# Patient Record
Sex: MALE | Race: BLACK OR AFRICAN AMERICAN | NOT HISPANIC OR LATINO | URBAN - METROPOLITAN AREA
[De-identification: names, ages, dates, MRNs, and addresses within clinical notes are randomized per-mention and may not be internally consistent; named-entity substitution may affect disease eponyms.]

---

## 2019-03-17 ENCOUNTER — HOSPITAL ENCOUNTER (EMERGENCY)
Facility: HOSPITAL | Age: 54
Discharge: HOME | End: 2019-03-17
Attending: EMERGENCY MEDICINE

## 2019-03-17 VITALS
HEIGHT: 71 IN | RESPIRATION RATE: 17 BRPM | WEIGHT: 172.63 LBS | BODY MASS INDEX: 24.17 KG/M2 | SYSTOLIC BLOOD PRESSURE: 172 MMHG | OXYGEN SATURATION: 100 % | HEART RATE: 68 BPM | TEMPERATURE: 98 F | DIASTOLIC BLOOD PRESSURE: 107 MMHG

## 2019-03-17 DIAGNOSIS — I10 HYPERTENSION: ICD-10-CM

## 2019-03-17 LAB
ALBUMIN SERPL BCP-MCNC: 4.3 G/DL
ALP SERPL-CCNC: 104 U/L
ALT SERPL W/O P-5'-P-CCNC: 55 U/L
ANION GAP SERPL CALC-SCNC: 11 MMOL/L
AST SERPL-CCNC: 62 U/L
BACTERIA #/AREA URNS HPF: ABNORMAL /HPF
BASOPHILS # BLD AUTO: 0.01 K/UL
BASOPHILS NFR BLD: 0.2 %
BILIRUB SERPL-MCNC: 0.7 MG/DL
BILIRUB UR QL STRIP: NEGATIVE
BNP SERPL-MCNC: 144 PG/ML
BUN SERPL-MCNC: 15 MG/DL
CALCIUM SERPL-MCNC: 9.7 MG/DL
CHLORIDE SERPL-SCNC: 98 MMOL/L
CLARITY UR: CLEAR
CO2 SERPL-SCNC: 26 MMOL/L
COLOR UR: YELLOW
CREAT SERPL-MCNC: 1.2 MG/DL
DIFFERENTIAL METHOD: ABNORMAL
EOSINOPHIL # BLD AUTO: 0.6 K/UL
EOSINOPHIL NFR BLD: 11.3 %
ERYTHROCYTE [DISTWIDTH] IN BLOOD BY AUTOMATED COUNT: 14.7 %
EST. GFR  (AFRICAN AMERICAN): >60 ML/MIN/1.73 M^2
EST. GFR  (NON AFRICAN AMERICAN): >60 ML/MIN/1.73 M^2
GLUCOSE SERPL-MCNC: 77 MG/DL
GLUCOSE UR QL STRIP: NEGATIVE
HCT VFR BLD AUTO: 41.3 %
HGB BLD-MCNC: 14 G/DL
HGB UR QL STRIP: NEGATIVE
HYALINE CASTS #/AREA URNS LPF: 0 /LPF
KETONES UR QL STRIP: NEGATIVE
LEUKOCYTE ESTERASE UR QL STRIP: NEGATIVE
LYMPHOCYTES # BLD AUTO: 1.2 K/UL
LYMPHOCYTES NFR BLD: 24.8 %
MCH RBC QN AUTO: 30.2 PG
MCHC RBC AUTO-ENTMCNC: 33.9 G/DL
MCV RBC AUTO: 89 FL
MICROSCOPIC COMMENT: ABNORMAL
MONOCYTES # BLD AUTO: 0.8 K/UL
MONOCYTES NFR BLD: 16 %
NEUTROPHILS # BLD AUTO: 2.3 K/UL
NEUTROPHILS NFR BLD: 47.7 %
NITRITE UR QL STRIP: NEGATIVE
NON-SQ EPI CELLS #/AREA URNS HPF: 3 /HPF
PH UR STRIP: 7 [PH] (ref 5–8)
PLATELET # BLD AUTO: 200 K/UL
PMV BLD AUTO: 10.4 FL
POTASSIUM SERPL-SCNC: 4.3 MMOL/L
PROT SERPL-MCNC: 7.9 G/DL
PROT UR QL STRIP: ABNORMAL
RBC # BLD AUTO: 4.64 M/UL
RBC #/AREA URNS HPF: 2 /HPF (ref 0–4)
SODIUM SERPL-SCNC: 135 MMOL/L
SP GR UR STRIP: 1.02 (ref 1–1.03)
SQUAMOUS #/AREA URNS HPF: 0 /HPF
TROPONIN I SERPL DL<=0.01 NG/ML-MCNC: <0.006 NG/ML
URN SPEC COLLECT METH UR: ABNORMAL
UROBILINOGEN UR STRIP-ACNC: 1 EU/DL
WBC # BLD AUTO: 4.87 K/UL
WBC #/AREA URNS HPF: 1 /HPF (ref 0–5)

## 2019-03-17 PROCEDURE — 81000 URINALYSIS NONAUTO W/SCOPE: CPT

## 2019-03-17 PROCEDURE — 93005 ELECTROCARDIOGRAM TRACING: CPT

## 2019-03-17 PROCEDURE — 25000003 PHARM REV CODE 250: Performed by: EMERGENCY MEDICINE

## 2019-03-17 PROCEDURE — 84484 ASSAY OF TROPONIN QUANT: CPT

## 2019-03-17 PROCEDURE — 96374 THER/PROPH/DIAG INJ IV PUSH: CPT

## 2019-03-17 PROCEDURE — 85025 COMPLETE CBC W/AUTO DIFF WBC: CPT

## 2019-03-17 PROCEDURE — 83880 ASSAY OF NATRIURETIC PEPTIDE: CPT

## 2019-03-17 PROCEDURE — 99285 EMERGENCY DEPT VISIT HI MDM: CPT | Mod: 25

## 2019-03-17 PROCEDURE — 93010 ELECTROCARDIOGRAM REPORT: CPT | Mod: ,,, | Performed by: INTERNAL MEDICINE

## 2019-03-17 PROCEDURE — 93010 EKG 12-LEAD: ICD-10-PCS | Mod: ,,, | Performed by: INTERNAL MEDICINE

## 2019-03-17 PROCEDURE — 80053 COMPREHEN METABOLIC PANEL: CPT

## 2019-03-17 PROCEDURE — 63600175 PHARM REV CODE 636 W HCPCS: Performed by: EMERGENCY MEDICINE

## 2019-03-17 RX ORDER — HYDRALAZINE HYDROCHLORIDE 20 MG/ML
10 INJECTION INTRAMUSCULAR; INTRAVENOUS
Status: COMPLETED | OUTPATIENT
Start: 2019-03-17 | End: 2019-03-17

## 2019-03-17 RX ORDER — LISINOPRIL AND HYDROCHLOROTHIAZIDE 10; 12.5 MG/1; MG/1
1 TABLET ORAL DAILY
Qty: 30 TABLET | Refills: 3 | Status: SHIPPED | OUTPATIENT
Start: 2019-03-17 | End: 2020-01-29

## 2019-03-17 RX ADMIN — CLONIDINE HYDROCHLORIDE 0.3 MG: 0.2 TABLET ORAL at 12:03

## 2019-03-17 RX ADMIN — HYDRALAZINE HYDROCHLORIDE 10 MG: 20 INJECTION, SOLUTION INTRAMUSCULAR; INTRAVENOUS at 01:03

## 2019-03-17 NOTE — ED NOTES
APPEARANCE: Alert, oriented and in no acute distress.  CARDIAC: Normal rate and rhythm, no murmur heard.   PERIPHERAL VASCULAR: peripheral pulses present. Normal cap refill. No edema. Warm to touch.    RESPIRATORY:Normal rate and effort, breath sounds clear bilaterally throughout chest. Respirations are equal and unlabored no obvious signs of distress.  GASTRO: soft, bowel sounds normal, no tenderness, no abdominal distention.  MUSC: Full ROM. No bony tenderness or soft tissue tenderness. No obvious deformity.  SKIN: Skin is warm and dry, normal skin turgor, mucous membranes moist.  NEURO: 5/5 strength major flexors/extensors bilaterally. Sensory intact to light touch bilaterally. [place] coma scale: eyes open spontaneously-4, oriented & converses-5, obeys commands-6. No neurological abnormalities.   MENTAL STATUS: awake, alert and aware of environment.  EYE: PERRL, both eyes: pupils brisk and reactive to light. Normal size.

## 2019-03-17 NOTE — ED NOTES
Right sided nose bleed intermittently x 1 week.  Reports hx of HTN but ran out of medications 2-3 years ago but does not have PCP.  Reports HA last pm that resolved after taking advil and going to bed.  Denies weakness, dizziness, blurred vision

## 2019-03-17 NOTE — ED PROVIDER NOTES
Encounter Date: 3/17/2019    SCRIBE #1 NOTE: I, Gavi Lee, am scribing for, and in the presence of,  Dr. Arciniega. I have scribed the entire note.       History     Chief Complaint   Patient presents with    Epistaxis     intermitent x 1 week, states has hx of HTN but cannot afford meds, and doesnot have primary care MD, + HA      Time seen by provider: 12:02 PM    This is a 53 y.o. male with a PMHx of HTN who presents to the Emergency Department with a cc of intermittent nosebleeds x 1 week. He denies fever, chest pain, or nausea. He reports no worsening or alleviating factors. Patient reports no prior history of similar symptoms. He also reports elevated BP and states he hasn't taken any BP medications in 4 years because he couldn't afford them.        The history is provided by the patient.     Review of patient's allergies indicates:  No Known Allergies  Past Medical History:   Diagnosis Date    Hypertension      Past Surgical History:   Procedure Laterality Date    KIDNEY STONE SURGERY       History reviewed. No pertinent family history.  Social History     Tobacco Use    Smoking status: Current Every Day Smoker     Packs/day: 0.50     Years: 30.00     Pack years: 15.00     Types: Cigarettes    Smokeless tobacco: Never Used   Substance Use Topics    Alcohol use: Yes    Drug use: No     Review of Systems   Constitutional: Negative for fever.   HENT: Positive for nosebleeds. Negative for sore throat.    Respiratory: Negative for cough and shortness of breath.    Cardiovascular: Negative for chest pain.   Gastrointestinal: Negative for abdominal pain, diarrhea, nausea and vomiting.   Genitourinary: Negative for dysuria and flank pain.   Musculoskeletal: Negative for back pain.   Skin: Negative for rash.   Neurological: Negative for weakness.   Hematological: Does not bruise/bleed easily.   Psychiatric/Behavioral: Negative.  Negative for confusion and hallucinations.   All other systems reviewed and are  negative.      Physical Exam     Initial Vitals [03/17/19 1110]   BP Pulse Resp Temp SpO2   (!) 268/151 99 20 98.1 °F (36.7 °C) 99 %      MAP       --         Physical Exam    Nursing note and vitals reviewed.  Constitutional: He appears well-developed and well-nourished.   HENT:   Head: Normocephalic and atraumatic.   Eyes: EOM are normal. Pupils are equal, round, and reactive to light.   Neck: Normal range of motion. Neck supple.   Cardiovascular: Normal rate, regular rhythm, normal heart sounds and intact distal pulses.   Pulmonary/Chest: Breath sounds normal.   Abdominal: Soft. Bowel sounds are normal. He exhibits no distension. There is no tenderness. There is no rebound and no guarding.   Musculoskeletal: Normal range of motion. He exhibits no edema.   Neurological: He is alert and oriented to person, place, and time.   Skin: Skin is warm and dry.   Psychiatric: He has a normal mood and affect. His behavior is normal. Judgment and thought content normal.         ED Course   Procedures  Labs Reviewed   CBC W/ AUTO DIFFERENTIAL - Abnormal; Notable for the following components:       Result Value    RDW 14.7 (*)     Eos # 0.6 (*)     Mono% 16.0 (*)     Eosinophil% 11.3 (*)     All other components within normal limits   COMPREHENSIVE METABOLIC PANEL - Abnormal; Notable for the following components:    Sodium 135 (*)     AST 62 (*)     ALT 55 (*)     All other components within normal limits   TROPONIN I   URINALYSIS, REFLEX TO URINE CULTURE   B-TYPE NATRIURETIC PEPTIDE   URINALYSIS MICROSCOPIC     EKG Readings: (Independently Interpreted)   1211: Rate 70 bpm. LVH with nonspecific ST-T wave abnormalities.       Imaging Results    None          Medical Decision Making:   Clinical Tests:   Lab Tests: Ordered and Reviewed  Medical Tests: Ordered and Reviewed                   ED Course as of Mar 17 1500   Sun Mar 17, 2019   1337 Blood pressure is essentially unchanged after clonidine 0.3 mg p.o..  I will order  hydralazine at this time.  [ST]   1458 Blood pressure is now 172/107.  The patient remains asymptomatic.  He will be discharged at this time his labs are normal.  The patient will be started on lisinopril hydrochlorothiazide and will be given multiple resources for a primary care doctor  [ST]      ED Course User Index  [ST] Fanny Arciniega MD     Clinical Impression:     1. Hypertension               I, Fanny Arciniega, personally performed the services described in this documentation. All medical record entries made by the scribe were at my direction and in my presence.  I have reviewed the chart and agree that the record reflects my personal performance and is accurate and complete. Fanny Arciniega M.D. 2:58 PM03/17/2019                   Fanny Arciniega MD  03/17/19 1500

## 2019-11-24 ENCOUNTER — HOSPITAL ENCOUNTER (EMERGENCY)
Facility: HOSPITAL | Age: 54
Discharge: HOME | End: 2019-11-24
Attending: EMERGENCY MEDICINE

## 2019-11-24 VITALS
SYSTOLIC BLOOD PRESSURE: 184 MMHG | TEMPERATURE: 98 F | DIASTOLIC BLOOD PRESSURE: 100 MMHG | RESPIRATION RATE: 19 BRPM | HEIGHT: 73 IN | BODY MASS INDEX: 22.77 KG/M2 | OXYGEN SATURATION: 99 % | HEART RATE: 87 BPM

## 2019-11-24 DIAGNOSIS — F10.10 ETOH ABUSE: ICD-10-CM

## 2019-11-24 DIAGNOSIS — R55 SYNCOPE, UNSPECIFIED SYNCOPE TYPE: Primary | ICD-10-CM

## 2019-11-24 DIAGNOSIS — I10 HYPERTENSION: ICD-10-CM

## 2019-11-24 LAB
ALBUMIN SERPL BCP-MCNC: 4.2 G/DL (ref 3.5–5.2)
ALP SERPL-CCNC: 100 U/L (ref 55–135)
ALT SERPL W/O P-5'-P-CCNC: 26 U/L (ref 10–44)
ANION GAP SERPL CALC-SCNC: 13 MMOL/L (ref 8–16)
AST SERPL-CCNC: 35 U/L (ref 10–40)
BASOPHILS # BLD AUTO: 0.01 K/UL (ref 0–0.2)
BASOPHILS NFR BLD: 0.2 % (ref 0–1.9)
BILIRUB SERPL-MCNC: 0.2 MG/DL (ref 0.1–1)
BILIRUB UR QL STRIP: NEGATIVE
BUN SERPL-MCNC: 15 MG/DL (ref 6–20)
CALCIUM SERPL-MCNC: 9.7 MG/DL (ref 8.7–10.5)
CHLORIDE SERPL-SCNC: 104 MMOL/L (ref 95–110)
CLARITY UR: CLEAR
CO2 SERPL-SCNC: 26 MMOL/L (ref 23–29)
COLOR UR: YELLOW
CREAT SERPL-MCNC: 1.4 MG/DL (ref 0.5–1.4)
DIFFERENTIAL METHOD: ABNORMAL
EOSINOPHIL # BLD AUTO: 0.3 K/UL (ref 0–0.5)
EOSINOPHIL NFR BLD: 8 % (ref 0–8)
ERYTHROCYTE [DISTWIDTH] IN BLOOD BY AUTOMATED COUNT: 14.8 % (ref 11.5–14.5)
EST. GFR  (AFRICAN AMERICAN): >60 ML/MIN/1.73 M^2
EST. GFR  (NON AFRICAN AMERICAN): 56 ML/MIN/1.73 M^2
ETHANOL SERPL-MCNC: 182 MG/DL
GLUCOSE SERPL-MCNC: 74 MG/DL (ref 70–110)
GLUCOSE UR QL STRIP: NEGATIVE
HCT VFR BLD AUTO: 42 % (ref 40–54)
HGB BLD-MCNC: 14.1 G/DL (ref 14–18)
HGB UR QL STRIP: NEGATIVE
KETONES UR QL STRIP: NEGATIVE
LEUKOCYTE ESTERASE UR QL STRIP: NEGATIVE
LYMPHOCYTES # BLD AUTO: 1.8 K/UL (ref 1–4.8)
LYMPHOCYTES NFR BLD: 44.5 % (ref 18–48)
MCH RBC QN AUTO: 30.5 PG (ref 27–31)
MCHC RBC AUTO-ENTMCNC: 33.6 G/DL (ref 32–36)
MCV RBC AUTO: 91 FL (ref 82–98)
MONOCYTES # BLD AUTO: 0.4 K/UL (ref 0.3–1)
MONOCYTES NFR BLD: 10.2 % (ref 4–15)
NEUTROPHILS # BLD AUTO: 1.5 K/UL (ref 1.8–7.7)
NEUTROPHILS NFR BLD: 37.1 % (ref 38–73)
NITRITE UR QL STRIP: NEGATIVE
PH UR STRIP: 6 [PH] (ref 5–8)
PLATELET # BLD AUTO: 247 K/UL (ref 150–350)
PMV BLD AUTO: 9.6 FL (ref 9.2–12.9)
POTASSIUM SERPL-SCNC: 3.9 MMOL/L (ref 3.5–5.1)
PROT SERPL-MCNC: 8.6 G/DL (ref 6–8.4)
PROT UR QL STRIP: NEGATIVE
RBC # BLD AUTO: 4.62 M/UL (ref 4.6–6.2)
SODIUM SERPL-SCNC: 143 MMOL/L (ref 136–145)
SP GR UR STRIP: 1.02 (ref 1–1.03)
TROPONIN I SERPL DL<=0.01 NG/ML-MCNC: 0.02 NG/ML (ref 0–0.03)
URN SPEC COLLECT METH UR: NORMAL
UROBILINOGEN UR STRIP-ACNC: NEGATIVE EU/DL
WBC # BLD AUTO: 4.11 K/UL (ref 3.9–12.7)

## 2019-11-24 PROCEDURE — 96361 HYDRATE IV INFUSION ADD-ON: CPT

## 2019-11-24 PROCEDURE — 63600175 PHARM REV CODE 636 W HCPCS: Performed by: PHYSICIAN ASSISTANT

## 2019-11-24 PROCEDURE — 85025 COMPLETE CBC W/AUTO DIFF WBC: CPT

## 2019-11-24 PROCEDURE — 80320 DRUG SCREEN QUANTALCOHOLS: CPT

## 2019-11-24 PROCEDURE — 25000003 PHARM REV CODE 250: Performed by: PHYSICIAN ASSISTANT

## 2019-11-24 PROCEDURE — 25000003 PHARM REV CODE 250: Performed by: EMERGENCY MEDICINE

## 2019-11-24 PROCEDURE — 93010 EKG 12-LEAD: ICD-10-PCS | Mod: ,,, | Performed by: STUDENT IN AN ORGANIZED HEALTH CARE EDUCATION/TRAINING PROGRAM

## 2019-11-24 PROCEDURE — 81003 URINALYSIS AUTO W/O SCOPE: CPT

## 2019-11-24 PROCEDURE — 84484 ASSAY OF TROPONIN QUANT: CPT

## 2019-11-24 PROCEDURE — 80053 COMPREHEN METABOLIC PANEL: CPT

## 2019-11-24 PROCEDURE — 99285 EMERGENCY DEPT VISIT HI MDM: CPT | Mod: 25

## 2019-11-24 PROCEDURE — 96374 THER/PROPH/DIAG INJ IV PUSH: CPT

## 2019-11-24 PROCEDURE — 93010 ELECTROCARDIOGRAM REPORT: CPT | Mod: ,,, | Performed by: STUDENT IN AN ORGANIZED HEALTH CARE EDUCATION/TRAINING PROGRAM

## 2019-11-24 PROCEDURE — 96376 TX/PRO/DX INJ SAME DRUG ADON: CPT

## 2019-11-24 PROCEDURE — 93005 ELECTROCARDIOGRAM TRACING: CPT

## 2019-11-24 RX ORDER — HYDROCHLOROTHIAZIDE 12.5 MG/1
12.5 TABLET ORAL DAILY
Status: DISCONTINUED | OUTPATIENT
Start: 2019-11-25 | End: 2019-11-24

## 2019-11-24 RX ORDER — HYDRALAZINE HYDROCHLORIDE 20 MG/ML
10 INJECTION INTRAMUSCULAR; INTRAVENOUS
Status: COMPLETED | OUTPATIENT
Start: 2019-11-24 | End: 2019-11-24

## 2019-11-24 RX ORDER — HYDROCHLOROTHIAZIDE 12.5 MG/1
12.5 TABLET ORAL ONCE
Status: COMPLETED | OUTPATIENT
Start: 2019-11-24 | End: 2019-11-24

## 2019-11-24 RX ORDER — LISINOPRIL 10 MG/1
10 TABLET ORAL
Status: COMPLETED | OUTPATIENT
Start: 2019-11-24 | End: 2019-11-24

## 2019-11-24 RX ADMIN — LISINOPRIL 10 MG: 10 TABLET ORAL at 04:11

## 2019-11-24 RX ADMIN — SODIUM CHLORIDE 1000 ML: 0.9 INJECTION, SOLUTION INTRAVENOUS at 03:11

## 2019-11-24 RX ADMIN — HYDRALAZINE HYDROCHLORIDE 10 MG: 20 INJECTION INTRAMUSCULAR; INTRAVENOUS at 06:11

## 2019-11-24 RX ADMIN — HYDROCHLOROTHIAZIDE 12.5 MG: 12.5 TABLET ORAL at 04:11

## 2019-11-24 RX ADMIN — HYDRALAZINE HYDROCHLORIDE 10 MG: 20 INJECTION INTRAMUSCULAR; INTRAVENOUS at 05:11

## 2019-11-24 NOTE — ED PROVIDER NOTES
Encounter Date: 11/24/2019    SCRIBE #1 NOTE: I, Michelle Ahumada, am scribing for, and in the presence of,  Jazmyn Gilliland PA-C. I have scribed the entire note.       History     Chief Complaint   Patient presents with    Loss of Consciousness     pt had a syncopal epsiode vs possible seizure just pta. pt was sitting in a chair when he suddenly became unresponsive, diaphoretic, and incontinent of urine. on arrival pt was only able to squeeze the paramedics hand and is now bakc to normal. pt is AAOx4. denies complaints      Sarbjit Brewer Sr. is a 54 y.o. male who  has a past medical history of Hypertension.    The patient presents to the ED via EMS due to loss of conscious prior to arrival. Patient says he drank less than half a gallon of vodka at home and fell asleep on his cough. EMS personnel reports that patient was not easily aroused and had urinary incontinence. Patient is now awake, alert, and oriented x 4. Patient says he feels fine and doesn't know why he was brought to the ED. He denies abdominal pain, chest pain, back pain, head injury, shortness of breath, or any other complaints at this time. He denies any history of seizures.    The history is provided by the patient.     Review of patient's allergies indicates:  No Known Allergies  Past Medical History:   Diagnosis Date    Hypertension      Past Surgical History:   Procedure Laterality Date    KIDNEY STONE SURGERY       No family history on file.  Social History     Tobacco Use    Smoking status: Current Every Day Smoker     Packs/day: 0.50     Years: 30.00     Pack years: 15.00     Types: Cigarettes    Smokeless tobacco: Never Used   Substance Use Topics    Alcohol use: Yes    Drug use: No     Review of Systems   Constitutional: Negative for chills and fever.   HENT: Negative for congestion, ear pain, rhinorrhea and sore throat.    Respiratory: Negative for cough, shortness of breath and wheezing.    Cardiovascular: Negative for chest pain and  palpitations.   Gastrointestinal: Negative for abdominal pain, diarrhea, nausea and vomiting.   Genitourinary: Negative for dysuria and hematuria.   Musculoskeletal: Negative for back pain, myalgias and neck pain.   Skin: Negative for rash.   Neurological: Negative for dizziness, weakness, light-headedness and headaches.   Psychiatric/Behavioral: Negative for confusion.   All other systems reviewed and are negative.      Physical Exam     Initial Vitals [11/24/19 1410]   BP Pulse Resp Temp SpO2   (!) 169/90 72 18 97.3 °F (36.3 °C) 98 %      MAP       --         Physical Exam    Nursing note and vitals reviewed.  Constitutional: He appears well-developed and well-nourished. No distress.   HENT:   Head: Normocephalic and atraumatic.   Mouth/Throat: Oropharynx is clear and moist.   Eyes: Conjunctivae and EOM are normal. Pupils are equal, round, and reactive to light.   Neck: Normal range of motion. Neck supple. No stridor present. No tracheal deviation present.   Cardiovascular: Normal rate, regular rhythm and normal heart sounds.   No murmur heard.  Pulmonary/Chest: Breath sounds normal. No respiratory distress.   Abdominal: Soft. Bowel sounds are normal. There is no tenderness. There is no rebound and no guarding.   Musculoskeletal: Normal range of motion. He exhibits no edema or tenderness.   Neurological: He is alert and oriented to person, place, and time. No sensory deficit. GCS score is 15. GCS eye subscore is 4. GCS verbal subscore is 5. GCS motor subscore is 6.   Skin: Skin is warm and dry. Capillary refill takes less than 2 seconds.   Psychiatric: He has a normal mood and affect. His behavior is normal.         ED Course   Procedures  Labs Reviewed   CBC W/ AUTO DIFFERENTIAL - Abnormal; Notable for the following components:       Result Value    RDW 14.8 (*)     Gran # (ANC) 1.5 (*)     Gran% 37.1 (*)     All other components within normal limits   ALCOHOL,MEDICAL (ETHANOL) - Abnormal; Notable for the  following components:    Alcohol, Medical, Serum 182 (*)     All other components within normal limits   COMPREHENSIVE METABOLIC PANEL - Abnormal; Notable for the following components:    Total Protein 8.6 (*)     eGFR if non  56 (*)     All other components within normal limits   URINALYSIS, REFLEX TO URINE CULTURE    Narrative:     Preferred Collection Type->Urine, Clean Catch   TROPONIN I   TROPONIN I             Medical Decision Making:   Initial Assessment:   Syncope  Differential Diagnosis:   ETHO intoxication, dehydration, electrolyte abnormality   Clinical Tests:   Lab Tests: Ordered and Reviewed  ED Management:  Patient presents to ED for evaluation after syncopal episode.  Upon family arrival witness states he was getting hair cut and had a syncopal episode with loss of urine.  Was initially difficult to arouse per EMS report.  However he is oriented x3 in the ED.  Patient's blood pressure has increased during course in the ER.  States he has not taken his blood pressure medicine over the last 3 days.  Blood pressure medicine was given in the ED with improvement.  He denies any HA, CP, SOB, dizziness.  Two doses of hydralazine were given with improvement of his pressure.  Patient is stable for discharge at this time.  He was encouraged to follow up with [facility] to develop a plan for alcohol cessation.  Instructed to take his blood pressure medicine as prescribed and to return with any new or worsening symptoms. Patient and wife verbalized understanding and agreement plan.                                 Clinical Impression:     1. Syncope, unspecified syncope type    2. Hypertension    3. ETOH abuse             Scribe attestation Jazmyn AGOSTO PA-C, personally performed the services described in this documentation. All medical record entries made by the scribe were at my direction and in my presence.  I have reviewed the chart and agree that the record reflects my personal  performance and is accurate and complete.                 Jazmyn Gilliland PA-C  11/24/19 1949

## 2019-11-24 NOTE — ED TRIAGE NOTES
Pt received by EMS, Pt wife at bedside, stated pt was sitting up in a chair receiving a haircut when she was alerted that he slumped over and began drooling.  Also stated pt urinated on himself.  Pt is alert at present.  Denies hitting head, denies any pain.  Pt denies hx of seizures.  States he has not had his BP meds in a few days.

## 2020-01-29 ENCOUNTER — OFFICE VISIT (OUTPATIENT)
Dept: FAMILY MEDICINE | Facility: HOSPITAL | Age: 55
End: 2020-01-29

## 2020-01-29 VITALS
BODY MASS INDEX: 24.81 KG/M2 | TEMPERATURE: 98 F | DIASTOLIC BLOOD PRESSURE: 92 MMHG | HEIGHT: 73 IN | SYSTOLIC BLOOD PRESSURE: 152 MMHG | WEIGHT: 187.19 LBS | HEART RATE: 83 BPM

## 2020-01-29 DIAGNOSIS — I10 ESSENTIAL HYPERTENSION: Primary | ICD-10-CM

## 2020-01-29 DIAGNOSIS — F19.90 ILLICIT DRUG USE: ICD-10-CM

## 2020-01-29 DIAGNOSIS — Z72.0 TOBACCO ABUSE: ICD-10-CM

## 2020-01-29 PROCEDURE — 99214 OFFICE O/P EST MOD 30 MIN: CPT | Performed by: STUDENT IN AN ORGANIZED HEALTH CARE EDUCATION/TRAINING PROGRAM

## 2020-01-29 RX ORDER — LISINOPRIL AND HYDROCHLOROTHIAZIDE 12.5; 2 MG/1; MG/1
1 TABLET ORAL DAILY
Qty: 90 TABLET | Refills: 1 | Status: SHIPPED | OUTPATIENT
Start: 2020-01-29 | End: 2020-07-21 | Stop reason: ALTCHOICE

## 2020-02-03 PROBLEM — Z72.0 TOBACCO ABUSE: Status: ACTIVE | Noted: 2020-02-03

## 2020-02-03 NOTE — PROGRESS NOTES
Subjective:       Patient ID: Sarbjit Brewer Sr. is a 54 y.o. male.    Chief Complaint: Hypertension management.    HPI   Pt is a 55 yo AAM w/ pmhx of HNT, alcoholism, illicit drug use, nephrolithiasis, presenting for hypertension. Pt states he recently had an ED visit for AMS and bladder incontinence (11/24/19) after consuming 1 gallon of vodka. In ED was found to have BP 170s/90s, was given antihypertensive meds prescription (Lisniopril-hctz) and given referral for our clinic. Pt has no complaints today.  Denies headache, dizziness, tinnitus, chest pain, shortness breath, blurry vision.  He does not take his blood pressure at home.  He has never had a PCP.    Patient states he drinks 1 gal of vodka per day.  In approximately 2-40 oz beers daily.  He has been doing this for about 23 years.  He has a history of crack/cocaine use, has been to rehab 3 x, he quit smoking crack 6 months ago.  Patient also has a 1 pack per day X 40 years smoking history.     Medhx: nephrolithiasis, htn  Meds: lisinopril-hctz  Surghx: lithotripsy  FamHx: mom; breast caner. Grandfather; cancer. Brother; DMII, HTN. Aunt; breast cancer. Uncle; lung cancer  Social: as above, denies IVDU. Works as . Uninsured. No exercise, diet of processed/fast food. Sexually active w/ wife. Hx of incarceration.    Review of Systems   Constitutional: Negative for chills, diaphoresis, fatigue and fever.   HENT: Negative for congestion, rhinorrhea, sore throat and trouble swallowing.    Eyes: Negative for itching and visual disturbance.   Respiratory: Negative for cough, chest tightness, shortness of breath and wheezing.    Cardiovascular: Negative for chest pain and palpitations.   Gastrointestinal: Negative for abdominal pain, constipation, diarrhea, nausea and vomiting.   Endocrine: Negative for polyphagia and polyuria.   Genitourinary: Negative for dysuria and flank pain.   Musculoskeletal: Negative for back pain, joint swelling and neck  stiffness.   Skin: Negative for color change and rash.   Neurological: Negative for dizziness, weakness, light-headedness and headaches.   Psychiatric/Behavioral: Negative for confusion. The patient is not nervous/anxious.        Objective:      Vitals:    01/29/20 1358   BP: (!) 152/92   Pulse: 83   Temp: 98.1 °F (36.7 °C)     Physical Exam   Constitutional: He is oriented to person, place, and time. He appears well-developed and well-nourished. No distress.   HENT:   Head: Normocephalic and atraumatic.   Mouth/Throat: Oropharynx is clear and moist.   Eyes: Pupils are equal, round, and reactive to light. Conjunctivae and EOM are normal.   Neck: Normal range of motion. Neck supple.   Cardiovascular: Normal rate, regular rhythm, normal heart sounds and intact distal pulses.   Pulmonary/Chest: Effort normal and breath sounds normal. No respiratory distress. He has no wheezes.   Abdominal: Soft. Bowel sounds are normal. There is no tenderness.   Musculoskeletal: Normal range of motion. He exhibits no edema.   Neurological: He is alert and oriented to person, place, and time.   Skin: Skin is warm and dry. Capillary refill takes less than 2 seconds. He is not diaphoretic. No erythema.   Psychiatric: He has a normal mood and affect. His behavior is normal.       Assessment:       1. Essential hypertension    2. Alcoholism /alcohol abuse    3. Tobacco abuse    4. Illicit drug use        Plan:       Essential hypertension  -     lisinopril-hydrochlorothiazide (PRINZIDE,ZESTORETIC) 20-12.5 mg per tablet; Take 1 tablet by mouth once daily.  Dispense: 90 tablet; Refill: 1  -     Ambulatory referral to Smoking Cessation Program  -     Lipid panel; Future; Expected date: 01/29/2020  -     Comprehensive metabolic panel; Future; Expected date: 01/29/2020  -     Hepatitis C antibody; Future; Expected date: 01/29/2020  -     HIV 1/2 Ag/Ab (4th Gen); Future; Expected date: 01/29/2020    Alcoholism /alcohol abuse   -Counseled pt to  cut down on alcohol. Given his history, counseled pt to cutdown because of risk of withdrawal. Will readdress at next visit. Possible referral so substance abuse counseling.    Tobacco abuse  -     Ambulatory referral to Smoking Cessation Program    Illicit drug use  -     Hepatitis C antibody; Future; Expected date: 01/29/2020  -     HIV 1/2 Ag/Ab (4th Gen); Future; Expected date: 01/29/2020    Counseled pt to obtain health insurance. Pt states he could not afford labs since he is self pay.    Follow up in about 3 months (around 4/29/2020), or if symptoms worsen or fail to improve, for HTN, alcohlism.      Jesus Tian,  HO-1  [facility]

## 2020-02-05 NOTE — PROGRESS NOTES
I have reviewed the notes, assessments, and/or procedures performed, I concur with her/his documentation of Sarbjit Brewer Sr..

## 2020-02-13 ENCOUNTER — TELEPHONE (OUTPATIENT)
Dept: SMOKING CESSATION | Facility: CLINIC | Age: 55
End: 2020-02-13

## 2020-02-13 NOTE — TELEPHONE ENCOUNTER
Was not able to leave a message due to phone errors. Was calling to reschedule a missed smoking cessation appointment. Will try again.

## 2020-07-21 ENCOUNTER — HOSPITAL ENCOUNTER (EMERGENCY)
Facility: HOSPITAL | Age: 55
Discharge: HOME | End: 2020-07-21
Attending: EMERGENCY MEDICINE
Payer: OTHER GOVERNMENT

## 2020-07-21 VITALS
RESPIRATION RATE: 20 BRPM | SYSTOLIC BLOOD PRESSURE: 191 MMHG | HEART RATE: 72 BPM | BODY MASS INDEX: 24.67 KG/M2 | OXYGEN SATURATION: 100 % | TEMPERATURE: 99 F | DIASTOLIC BLOOD PRESSURE: 115 MMHG | WEIGHT: 187 LBS

## 2020-07-21 DIAGNOSIS — I10 UNCONTROLLED HYPERTENSION: Primary | ICD-10-CM

## 2020-07-21 DIAGNOSIS — I16.0 HYPERTENSIVE URGENCY: ICD-10-CM

## 2020-07-21 DIAGNOSIS — I10 HYPERTENSION: ICD-10-CM

## 2020-07-21 LAB
ALBUMIN SERPL BCP-MCNC: 4.4 G/DL (ref 3.5–5.2)
ALP SERPL-CCNC: 86 U/L (ref 55–135)
ALT SERPL W/O P-5'-P-CCNC: 34 U/L (ref 10–44)
ANION GAP SERPL CALC-SCNC: 11 MMOL/L (ref 8–16)
AST SERPL-CCNC: 37 U/L (ref 10–40)
BACTERIA #/AREA URNS HPF: NORMAL /HPF
BASOPHILS # BLD AUTO: 0.03 K/UL (ref 0–0.2)
BASOPHILS NFR BLD: 0.5 % (ref 0–1.9)
BILIRUB SERPL-MCNC: 0.3 MG/DL (ref 0.1–1)
BILIRUB UR QL STRIP: NEGATIVE
BUN SERPL-MCNC: 15 MG/DL (ref 6–20)
CALCIUM SERPL-MCNC: 9.5 MG/DL (ref 8.7–10.5)
CHLORIDE SERPL-SCNC: 105 MMOL/L (ref 95–110)
CLARITY UR: CLEAR
CO2 SERPL-SCNC: 21 MMOL/L (ref 23–29)
COLOR UR: YELLOW
CREAT SERPL-MCNC: 1.3 MG/DL (ref 0.5–1.4)
DIFFERENTIAL METHOD: NORMAL
EOSINOPHIL # BLD AUTO: 0.3 K/UL (ref 0–0.5)
EOSINOPHIL NFR BLD: 5.5 % (ref 0–8)
ERYTHROCYTE [DISTWIDTH] IN BLOOD BY AUTOMATED COUNT: 14.4 % (ref 11.5–14.5)
EST. GFR  (AFRICAN AMERICAN): >60 ML/MIN/1.73 M^2
EST. GFR  (NON AFRICAN AMERICAN): >60 ML/MIN/1.73 M^2
GLUCOSE SERPL-MCNC: 81 MG/DL (ref 70–110)
GLUCOSE UR QL STRIP: NEGATIVE
HCT VFR BLD AUTO: 41.8 % (ref 40–54)
HGB BLD-MCNC: 14.3 G/DL (ref 14–18)
HGB UR QL STRIP: ABNORMAL
HYALINE CASTS #/AREA URNS LPF: 0 /LPF
IMM GRANULOCYTES # BLD AUTO: 0.02 K/UL (ref 0–0.04)
IMM GRANULOCYTES NFR BLD AUTO: 0.4 % (ref 0–0.5)
KETONES UR QL STRIP: NEGATIVE
LEUKOCYTE ESTERASE UR QL STRIP: NEGATIVE
LYMPHOCYTES # BLD AUTO: 1.8 K/UL (ref 1–4.8)
LYMPHOCYTES NFR BLD: 32 % (ref 18–48)
MCH RBC QN AUTO: 30.4 PG (ref 27–31)
MCHC RBC AUTO-ENTMCNC: 34.2 G/DL (ref 32–36)
MCV RBC AUTO: 89 FL (ref 82–98)
MICROSCOPIC COMMENT: NORMAL
MONOCYTES # BLD AUTO: 0.6 K/UL (ref 0.3–1)
MONOCYTES NFR BLD: 11.5 % (ref 4–15)
NEUTROPHILS # BLD AUTO: 2.7 K/UL (ref 1.8–7.7)
NEUTROPHILS NFR BLD: 50.1 % (ref 38–73)
NITRITE UR QL STRIP: NEGATIVE
NRBC BLD-RTO: 0 /100 WBC
PH UR STRIP: 6 [PH] (ref 5–8)
PLATELET # BLD AUTO: 230 K/UL (ref 150–350)
PMV BLD AUTO: 11.5 FL (ref 9.2–12.9)
POTASSIUM SERPL-SCNC: 3.9 MMOL/L (ref 3.5–5.1)
PROT SERPL-MCNC: 8.3 G/DL (ref 6–8.4)
PROT UR QL STRIP: ABNORMAL
RBC # BLD AUTO: 4.7 M/UL (ref 4.6–6.2)
RBC #/AREA URNS HPF: 0 /HPF (ref 0–4)
SARS-COV-2 RDRP RESP QL NAA+PROBE: NEGATIVE
SODIUM SERPL-SCNC: 137 MMOL/L (ref 136–145)
SP GR UR STRIP: 1.02 (ref 1–1.03)
SQUAMOUS #/AREA URNS HPF: 2 /HPF
TROPONIN I SERPL DL<=0.01 NG/ML-MCNC: <0.006 NG/ML (ref 0–0.03)
URN SPEC COLLECT METH UR: ABNORMAL
UROBILINOGEN UR STRIP-ACNC: NEGATIVE EU/DL
WBC # BLD AUTO: 5.47 K/UL (ref 3.9–12.7)
WBC #/AREA URNS HPF: 0 /HPF (ref 0–5)

## 2020-07-21 PROCEDURE — 25000003 PHARM REV CODE 250: Performed by: PHYSICIAN ASSISTANT

## 2020-07-21 PROCEDURE — 84484 ASSAY OF TROPONIN QUANT: CPT

## 2020-07-21 PROCEDURE — U0002 COVID-19 LAB TEST NON-CDC: HCPCS

## 2020-07-21 PROCEDURE — 96376 TX/PRO/DX INJ SAME DRUG ADON: CPT

## 2020-07-21 PROCEDURE — 80053 COMPREHEN METABOLIC PANEL: CPT

## 2020-07-21 PROCEDURE — 25000003 PHARM REV CODE 250: Performed by: EMERGENCY MEDICINE

## 2020-07-21 PROCEDURE — 63600175 PHARM REV CODE 636 W HCPCS: Performed by: PHYSICIAN ASSISTANT

## 2020-07-21 PROCEDURE — 81000 URINALYSIS NONAUTO W/SCOPE: CPT

## 2020-07-21 PROCEDURE — 96375 TX/PRO/DX INJ NEW DRUG ADDON: CPT

## 2020-07-21 PROCEDURE — 99285 EMERGENCY DEPT VISIT HI MDM: CPT | Mod: 25

## 2020-07-21 PROCEDURE — 85025 COMPLETE CBC W/AUTO DIFF WBC: CPT

## 2020-07-21 PROCEDURE — 96374 THER/PROPH/DIAG INJ IV PUSH: CPT

## 2020-07-21 PROCEDURE — 93005 ELECTROCARDIOGRAM TRACING: CPT

## 2020-07-21 RX ORDER — LABETALOL HYDROCHLORIDE 5 MG/ML
20 INJECTION, SOLUTION INTRAVENOUS
Status: DISCONTINUED | OUTPATIENT
Start: 2020-07-21 | End: 2020-07-21

## 2020-07-21 RX ORDER — CHLORTHALIDONE 25 MG/1
12.5 TABLET ORAL DAILY
Qty: 15 TABLET | Refills: 2 | Status: ON HOLD | OUTPATIENT
Start: 2020-07-21 | End: 2022-01-03 | Stop reason: HOSPADM

## 2020-07-21 RX ORDER — AMLODIPINE BESYLATE 10 MG/1
10 TABLET ORAL DAILY
Qty: 30 TABLET | Refills: 2 | Status: ON HOLD | OUTPATIENT
Start: 2020-07-21 | End: 2022-01-03 | Stop reason: HOSPADM

## 2020-07-21 RX ORDER — HYDROCHLOROTHIAZIDE 25 MG/1
25 TABLET ORAL
Status: COMPLETED | OUTPATIENT
Start: 2020-07-21 | End: 2020-07-21

## 2020-07-21 RX ORDER — CLONIDINE HYDROCHLORIDE 0.1 MG/1
0.1 TABLET ORAL
Status: COMPLETED | OUTPATIENT
Start: 2020-07-21 | End: 2020-07-21

## 2020-07-21 RX ORDER — HYDRALAZINE HYDROCHLORIDE 20 MG/ML
10 INJECTION INTRAMUSCULAR; INTRAVENOUS
Status: COMPLETED | OUTPATIENT
Start: 2020-07-21 | End: 2020-07-21

## 2020-07-21 RX ORDER — LABETALOL HYDROCHLORIDE 5 MG/ML
20 INJECTION, SOLUTION INTRAVENOUS
Status: COMPLETED | OUTPATIENT
Start: 2020-07-21 | End: 2020-07-21

## 2020-07-21 RX ORDER — AMLODIPINE BESYLATE 5 MG/1
10 TABLET ORAL
Status: COMPLETED | OUTPATIENT
Start: 2020-07-21 | End: 2020-07-21

## 2020-07-21 RX ADMIN — AMLODIPINE BESYLATE 10 MG: 5 TABLET ORAL at 12:07

## 2020-07-21 RX ADMIN — HYDRALAZINE HYDROCHLORIDE 10 MG: 20 INJECTION INTRAMUSCULAR; INTRAVENOUS at 08:07

## 2020-07-21 RX ADMIN — CLONIDINE HYDROCHLORIDE 0.1 MG: 0.1 TABLET ORAL at 12:07

## 2020-07-21 RX ADMIN — HYDRALAZINE HYDROCHLORIDE 10 MG: 20 INJECTION INTRAMUSCULAR; INTRAVENOUS at 10:07

## 2020-07-21 RX ADMIN — HYDROCHLOROTHIAZIDE 25 MG: 25 TABLET ORAL at 12:07

## 2020-07-21 RX ADMIN — LABETALOL HYDROCHLORIDE 20 MG: 5 INJECTION, SOLUTION INTRAVENOUS at 11:07

## 2020-07-21 NOTE — ED TRIAGE NOTES
Pt reports he needed to be retested for COVID. When vitals were taken, pt's BP was elevated and needed to be evaluated. PT in no acute distress.

## 2020-07-21 NOTE — DISCHARGE INSTRUCTIONS
Thank you for choosing [facility]! We appreciate you coming to us for your medical care. We hope you feel better soon! Please come back to [facility] for all of your future medical needs.    Our goal in the emergency department is to always give you outstanding care and exceptional service. You may receive a survey by mail or e-mail in the next week regarding your experience in our ED. We would greatly appreciate your completing and returning the survey. Your feedback provides us with a way to recognize our staff who give very good care and it helps us learn how to improve when your experience was below our aspiration of excellence.       Sincerely,    Dylan Cruz MD  Medical Director  Emergency Department  [facility] [place]

## 2020-07-21 NOTE — ED PROVIDER NOTES
Encounter Date: 7/21/2020       History     Chief Complaint   Patient presents with    COVID-19 Concerns     Sarbjit Brewer Sr., a 54 y.o. male  has a past medical history of Hypertension.     He presents to the ED requesting COVID swab, states that he call in to work yesterday and told his work that he was feeling sick however he was not and lied to his job because he wanted the day off.  Because he told his job that he was not feeling well, in order to return he needed to be COVID swabbed.  He denies all symptoms that could be related to coronavirus.  Upon triage he was found to have a blood pressure of 228/130.  He denies chest pain, headache or shortness of breath.  States he did not take his anti-hypertensive medicines for some time.  Does not like taking the medicine because it causes numbness in his feet.        The history is provided by the patient.     Review of patient's allergies indicates:  No Known Allergies  Past Medical History:   Diagnosis Date    Hypertension      Past Surgical History:   Procedure Laterality Date    KIDNEY STONE SURGERY       No family history on file.  Social History     Tobacco Use    Smoking status: Current Every Day Smoker     Packs/day: 0.50     Years: 30.00     Pack years: 15.00     Types: Cigarettes    Smokeless tobacco: Never Used   Substance Use Topics    Alcohol use: Yes    Drug use: No     Review of Systems   Constitutional: Negative for fever.   HENT: Negative for congestion.    Respiratory: Negative for cough and shortness of breath.    Cardiovascular: Negative for chest pain.   Gastrointestinal: Negative for nausea and vomiting.   Skin: Negative for color change and rash.   Allergic/Immunologic: Negative for immunocompromised state.   Neurological: Negative for headaches.   Psychiatric/Behavioral: Negative for agitation.       Physical Exam     Initial Vitals [07/21/20 0826]   BP Pulse Resp Temp SpO2   (!) 228/130 83 20 98.8 °F (37.1 °C) 97 %      MAP       --          Physical Exam    Nursing note and vitals reviewed.  Constitutional: He appears well-developed and well-nourished.   HENT:   Head: Normocephalic and atraumatic.   Right Ear: External ear normal.   Left Ear: External ear normal.   Nose: Nose normal.   Mouth/Throat: Oropharynx is clear and moist.   Eyes: EOM are normal.   Neck: Normal range of motion. Neck supple.   Cardiovascular: Normal rate and regular rhythm.   Pulmonary/Chest: No respiratory distress. He has no wheezes. He has no rhonchi. He has no rales.   Abdominal: Soft. Bowel sounds are normal. He exhibits no distension. There is no abdominal tenderness. There is no rebound and no guarding.   Musculoskeletal: Normal range of motion. No tenderness or edema.   Lymphadenopathy:     He has no cervical adenopathy.   Neurological: He is alert and oriented to person, place, and time. No cranial nerve deficit.   Skin: Skin is warm and dry. No rash and no abscess noted. No erythema.   Psychiatric: He has a normal mood and affect. Thought content normal.         ED Course   Procedures  Labs Reviewed   COMPREHENSIVE METABOLIC PANEL - Abnormal; Notable for the following components:       Result Value    CO2 21 (*)     All other components within normal limits   URINALYSIS, REFLEX TO URINE CULTURE - Abnormal; Notable for the following components:    Protein, UA 1+ (*)     Occult Blood UA Trace (*)     All other components within normal limits    Narrative:     Specimen Source->Urine   CBC W/ AUTO DIFFERENTIAL   TROPONIN I   SARS-COV-2 RNA AMPLIFICATION, QUAL   URINALYSIS MICROSCOPIC    Narrative:     Specimen Source->Urine          Imaging Results          X-Ray Chest AP Portable (Final result)  Result time 07/21/20 09:37:48    Final result by Marco Arcinieag DO (07/21/20 09:37:48)                 Impression:      No acute cardiopulmonary abnormality.      Electronically signed by: Marco Arciniega  Date:    07/21/2020  Time:    09:37             Narrative:     EXAMINATION:  XR CHEST AP PORTABLE    CLINICAL HISTORY:  Essential (primary) hypertension    TECHNIQUE:  Single frontal view of the chest was performed.    COMPARISON:  Multiple prior radiographs of the chest, most recent from 03/17/2019.    FINDINGS:  The lungs are well expanded and clear. No focal opacities are seen. The pleural spaces are clear.  The cardiac silhouette is unremarkable.  The visualized osseous structures are unremarkable.                                 Medical Decision Making:   Initial Assessment:   Requesting COVID test, hypertensive  Differential Diagnosis:   Hypertensive urgency vs emergency, ACS   ED Management:  PT presents to ED requesting COVID test, found to be extremely hypertensive in the ED.  Has no complaints of end-organ damage.  Was given labetalol, hydralazine in the ER with little improvement to his hypertension.  No acute abnormalities on blood work, EKG or chest x-ray.  Patient was discharged with a systolic blood pressure in the 190s.  He was prescriptions for antihypertensive medications and instructed to follow up with PCP for further evaluation.  Instructed to return with any new or worsening symptoms.  Patient verbalized understanding and agreement with plan.                                 Clinical Impression:       ICD-10-CM ICD-9-CM   1. Uncontrolled hypertension  I10 401.9   2. Hypertension  I10 401.9   3. Hypertensive urgency  I16.0 401.9

## 2020-07-22 NOTE — ED NOTES
Patient identifiers verified and correct for [facility]..    LOC: The patient is awake, alert and aware of environment with an appropriate affect, the patient is oriented x 3 and speaking appropriately.  APPEARANCE: Patient resting comfortably and in no acute distress, patient is clean and well groomed, patient's clothing is properly fastened.  SKIN: The skin is warm and dry, color consistent with ethnicity, patient has normal skin turgor and moist mucus membranes, skin intact, no breakdown or bruising noted.  MUSCULOSKELETAL: Patient moving all extremities spontaneously, no obvious swelling or deformities noted.  RESPIRATORY: Airway is open and patent, respirations are spontaneous, patient has a normal effort and rate, no accessory muscle use noted, bilateral breath sounds .  CARDIAC: Patient has a normal rate and regular rhythm, no periphreal edema noted, capillary refill < 3 seconds.  ABDOMEN: Soft and non tender to palpation, no distention noted, normoactive bowel sounds present in all four quadrants.  NEUROLOGIC: PERRL,  eyes open spontaneously, behavior appropriate to situation, follows commands, facial expression symmetrical, bilateral hand grasp equal and even, purposeful motor response noted, normal sensation in all extremities when touched with a finger.

## 2021-08-24 ENCOUNTER — LAB VISIT (OUTPATIENT)
Dept: FAMILY MEDICINE | Facility: CLINIC | Age: 56
End: 2021-08-24
Payer: OTHER GOVERNMENT

## 2021-08-24 DIAGNOSIS — R05.9 COUGH: ICD-10-CM

## 2021-08-24 PROCEDURE — U0003 INFECTIOUS AGENT DETECTION BY NUCLEIC ACID (DNA OR RNA); SEVERE ACUTE RESPIRATORY SYNDROME CORONAVIRUS 2 (SARS-COV-2) (CORONAVIRUS DISEASE [COVID-19]), AMPLIFIED PROBE TECHNIQUE, MAKING USE OF HIGH THROUGHPUT TECHNOLOGIES AS DESCRIBED BY CMS-2020-01-R: HCPCS | Performed by: INTERNAL MEDICINE

## 2021-08-24 PROCEDURE — U0005 INFEC AGEN DETEC AMPLI PROBE: HCPCS | Performed by: INTERNAL MEDICINE

## 2021-08-25 LAB
SARS-COV-2 RNA RESP QL NAA+PROBE: NOT DETECTED
SARS-COV-2- CYCLE NUMBER: NORMAL

## 2021-10-01 PROBLEM — F10.20 ALCOHOL DEPENDENCE: Status: ACTIVE | Noted: 2020-02-03

## 2021-12-30 ENCOUNTER — HOSPITAL ENCOUNTER (INPATIENT)
Facility: HOSPITAL | Age: 56
LOS: 4 days | Discharge: HOME | DRG: 304 | End: 2022-01-03
Attending: EMERGENCY MEDICINE | Admitting: FAMILY MEDICINE
Payer: MEDICAID

## 2021-12-30 DIAGNOSIS — I16.1 HYPERTENSIVE EMERGENCY: ICD-10-CM

## 2021-12-30 DIAGNOSIS — I48.91 A-FIB: ICD-10-CM

## 2021-12-30 DIAGNOSIS — R03.0 ELEVATED BLOOD PRESSURE READING: ICD-10-CM

## 2021-12-30 DIAGNOSIS — I50.9 CHF (CONGESTIVE HEART FAILURE): ICD-10-CM

## 2021-12-30 DIAGNOSIS — R00.0 TACHYCARDIA: ICD-10-CM

## 2021-12-30 DIAGNOSIS — I50.9 ACUTE ON CHRONIC CONGESTIVE HEART FAILURE, UNSPECIFIED HEART FAILURE TYPE: Primary | ICD-10-CM

## 2021-12-30 DIAGNOSIS — R07.9 CHEST PAIN: ICD-10-CM

## 2021-12-30 DIAGNOSIS — R05.9 COUGH: ICD-10-CM

## 2021-12-30 PROBLEM — I16.0 HYPERTENSIVE URGENCY: Status: ACTIVE | Noted: 2021-12-30

## 2021-12-30 PROBLEM — R79.89 ELEVATED TROPONIN: Status: ACTIVE | Noted: 2021-12-30

## 2021-12-30 LAB
ALBUMIN SERPL BCP-MCNC: 3.6 G/DL (ref 3.5–5.2)
ALP SERPL-CCNC: 118 U/L (ref 55–135)
ALT SERPL W/O P-5'-P-CCNC: 39 U/L (ref 10–44)
ANION GAP SERPL CALC-SCNC: 11 MMOL/L (ref 8–16)
AST SERPL-CCNC: 37 U/L (ref 10–40)
AV INDEX (PROSTH): 0.8
AV MEAN GRADIENT: 6 MMHG
AV PEAK GRADIENT: 10 MMHG
AV VALVE AREA: 3.28 CM2
AV VELOCITY RATIO: 0.81
BACTERIA #/AREA URNS HPF: NORMAL /HPF
BASOPHILS # BLD AUTO: 0.02 K/UL (ref 0–0.2)
BASOPHILS NFR BLD: 0.3 % (ref 0–1.9)
BILIRUB SERPL-MCNC: 0.6 MG/DL (ref 0.1–1)
BILIRUB UR QL STRIP: NEGATIVE
BNP SERPL-MCNC: 1636 PG/ML (ref 0–99)
BSA FOR ECHO PROCEDURE: 1.99 M2
BUN SERPL-MCNC: 18 MG/DL (ref 6–20)
CALCIUM SERPL-MCNC: 9.1 MG/DL (ref 8.7–10.5)
CHLORIDE SERPL-SCNC: 106 MMOL/L (ref 95–110)
CLARITY UR: CLEAR
CO2 SERPL-SCNC: 22 MMOL/L (ref 23–29)
COLOR UR: COLORLESS
CREAT SERPL-MCNC: 1.4 MG/DL (ref 0.5–1.4)
CTP QC/QA: YES
CV ECHO LV RWT: 0.58 CM
D DIMER PPP IA.FEU-MCNC: 0.5 MG/L FEU
DIFFERENTIAL METHOD: ABNORMAL
DOP CALC AO PEAK VEL: 1.61 M/S
DOP CALC AO VTI: 24.13 CM
DOP CALC LVOT AREA: 4.1 CM2
DOP CALC LVOT DIAMETER: 2.28 CM
DOP CALC LVOT PEAK VEL: 1.3 M/S
DOP CALC LVOT STROKE VOLUME: 79.04 CM3
DOP CALC MV VTI: 14.43 CM
DOP CALCLVOT PEAK VEL VTI: 19.37 CM
ECHO LV POSTERIOR WALL: 1.52 CM (ref 0.6–1.1)
EJECTION FRACTION: 40 %
EOSINOPHIL # BLD AUTO: 0.1 K/UL (ref 0–0.5)
EOSINOPHIL NFR BLD: 1.3 % (ref 0–8)
ERYTHROCYTE [DISTWIDTH] IN BLOOD BY AUTOMATED COUNT: 14.8 % (ref 11.5–14.5)
EST. GFR  (AFRICAN AMERICAN): >60 ML/MIN/1.73 M^2
EST. GFR  (NON AFRICAN AMERICAN): 55 ML/MIN/1.73 M^2
ESTIMATED AVG GLUCOSE: 114 MG/DL (ref 68–131)
ETHANOL SERPL-MCNC: <10 MG/DL
FRACTIONAL SHORTENING: 19 % (ref 28–44)
GLUCOSE SERPL-MCNC: 100 MG/DL (ref 70–110)
GLUCOSE UR QL STRIP: NEGATIVE
HBA1C MFR BLD: 5.6 % (ref 4–5.6)
HCT VFR BLD AUTO: 42.3 % (ref 40–54)
HGB BLD-MCNC: 14.2 G/DL (ref 14–18)
HGB UR QL STRIP: NEGATIVE
HYALINE CASTS #/AREA URNS LPF: 0 /LPF
IMM GRANULOCYTES # BLD AUTO: 0.02 K/UL (ref 0–0.04)
IMM GRANULOCYTES NFR BLD AUTO: 0.3 % (ref 0–0.5)
INTERVENTRICULAR SEPTUM: 1.66 CM (ref 0.6–1.1)
KETONES UR QL STRIP: NEGATIVE
LA MAJOR: 6.2 CM
LA MINOR: 5.72 CM
LA WIDTH: 4.02 CM
LEFT ATRIUM SIZE: 3.74 CM
LEFT ATRIUM VOLUME INDEX MOD: 32 ML/M2
LEFT ATRIUM VOLUME INDEX: 37.8 ML/M2
LEFT ATRIUM VOLUME MOD: 64.41 CM3
LEFT ATRIUM VOLUME: 76.04 CM3
LEFT INTERNAL DIMENSION IN SYSTOLE: 4.23 CM (ref 2.1–4)
LEFT VENTRICLE DIASTOLIC VOLUME INDEX: 65.67 ML/M2
LEFT VENTRICLE DIASTOLIC VOLUME: 132 ML
LEFT VENTRICLE MASS INDEX: 188 G/M2
LEFT VENTRICLE SYSTOLIC VOLUME INDEX: 39.7 ML/M2
LEFT VENTRICLE SYSTOLIC VOLUME: 79.83 ML
LEFT VENTRICULAR INTERNAL DIMENSION IN DIASTOLE: 5.24 CM (ref 3.5–6)
LEFT VENTRICULAR MASS: 377.55 G
LEUKOCYTE ESTERASE UR QL STRIP: NEGATIVE
LYMPHOCYTES # BLD AUTO: 1.5 K/UL (ref 1–4.8)
LYMPHOCYTES NFR BLD: 23 % (ref 18–48)
MAGNESIUM SERPL-MCNC: 1.9 MG/DL (ref 1.6–2.6)
MCH RBC QN AUTO: 30.1 PG (ref 27–31)
MCHC RBC AUTO-ENTMCNC: 33.6 G/DL (ref 32–36)
MCV RBC AUTO: 90 FL (ref 82–98)
MICROSCOPIC COMMENT: NORMAL
MONOCYTES # BLD AUTO: 0.9 K/UL (ref 0.3–1)
MONOCYTES NFR BLD: 12.8 % (ref 4–15)
MV MEAN GRADIENT: 1 MMHG
MV PEAK GRADIENT: 4 MMHG
MV VALVE AREA BY CONTINUITY EQUATION: 5.48 CM2
NEUTROPHILS # BLD AUTO: 4.2 K/UL (ref 1.8–7.7)
NEUTROPHILS NFR BLD: 62.3 % (ref 38–73)
NITRITE UR QL STRIP: NEGATIVE
NRBC BLD-RTO: 0 /100 WBC
PH UR STRIP: 7 [PH] (ref 5–8)
PISA TR MAX VEL: 2.59 M/S
PLATELET # BLD AUTO: 236 K/UL (ref 150–450)
PMV BLD AUTO: 11.9 FL (ref 9.2–12.9)
POTASSIUM SERPL-SCNC: 4 MMOL/L (ref 3.5–5.1)
PROT SERPL-MCNC: 7.7 G/DL (ref 6–8.4)
PROT UR QL STRIP: ABNORMAL
PV PEAK VELOCITY: 0.99 CM/S
RA MAJOR: 5.46 CM
RA PRESSURE: 3 MMHG
RA WIDTH: 5.22 CM
RBC # BLD AUTO: 4.72 M/UL (ref 4.6–6.2)
RBC #/AREA URNS HPF: 0 /HPF (ref 0–4)
RIGHT VENTRICULAR END-DIASTOLIC DIMENSION: 2.81 CM
RV TISSUE DOPPLER FREE WALL SYSTOLIC VELOCITY 1 (APICAL 4 CHAMBER VIEW): 15.12 CM/S
SARS-COV-2 RDRP RESP QL NAA+PROBE: NEGATIVE
SINUS: 3.8 CM
SODIUM SERPL-SCNC: 139 MMOL/L (ref 136–145)
SP GR UR STRIP: 1.01 (ref 1–1.03)
TR MAX PG: 27 MMHG
TROPONIN I SERPL DL<=0.01 NG/ML-MCNC: 0.03 NG/ML (ref 0–0.03)
TROPONIN I SERPL DL<=0.01 NG/ML-MCNC: 0.04 NG/ML (ref 0–0.03)
TV REST PULMONARY ARTERY PRESSURE: 30 MMHG
URN SPEC COLLECT METH UR: ABNORMAL
UROBILINOGEN UR STRIP-ACNC: NEGATIVE EU/DL
WBC # BLD AUTO: 6.7 K/UL (ref 3.9–12.7)
WBC #/AREA URNS HPF: 0 /HPF (ref 0–5)

## 2021-12-30 PROCEDURE — 85025 COMPLETE CBC W/AUTO DIFF WBC: CPT | Performed by: EMERGENCY MEDICINE

## 2021-12-30 PROCEDURE — U0002 COVID-19 LAB TEST NON-CDC: HCPCS | Performed by: EMERGENCY MEDICINE

## 2021-12-30 PROCEDURE — 36415 COLL VENOUS BLD VENIPUNCTURE: CPT | Performed by: FAMILY MEDICINE

## 2021-12-30 PROCEDURE — 83036 HEMOGLOBIN GLYCOSYLATED A1C: CPT | Performed by: FAMILY MEDICINE

## 2021-12-30 PROCEDURE — 96375 TX/PRO/DX INJ NEW DRUG ADDON: CPT

## 2021-12-30 PROCEDURE — 25000003 PHARM REV CODE 250: Performed by: EMERGENCY MEDICINE

## 2021-12-30 PROCEDURE — 84484 ASSAY OF TROPONIN QUANT: CPT | Mod: 91 | Performed by: EMERGENCY MEDICINE

## 2021-12-30 PROCEDURE — 25000003 PHARM REV CODE 250: Performed by: FAMILY MEDICINE

## 2021-12-30 PROCEDURE — 25000003 PHARM REV CODE 250: Performed by: NURSE PRACTITIONER

## 2021-12-30 PROCEDURE — 81000 URINALYSIS NONAUTO W/SCOPE: CPT | Performed by: EMERGENCY MEDICINE

## 2021-12-30 PROCEDURE — 63600175 PHARM REV CODE 636 W HCPCS: Performed by: FAMILY MEDICINE

## 2021-12-30 PROCEDURE — 96374 THER/PROPH/DIAG INJ IV PUSH: CPT

## 2021-12-30 PROCEDURE — 99285 EMERGENCY DEPT VISIT HI MDM: CPT | Mod: 25

## 2021-12-30 PROCEDURE — 83735 ASSAY OF MAGNESIUM: CPT | Performed by: EMERGENCY MEDICINE

## 2021-12-30 PROCEDURE — 63600175 PHARM REV CODE 636 W HCPCS: Performed by: EMERGENCY MEDICINE

## 2021-12-30 PROCEDURE — 20000000 HC ICU ROOM

## 2021-12-30 PROCEDURE — 80053 COMPREHEN METABOLIC PANEL: CPT | Performed by: EMERGENCY MEDICINE

## 2021-12-30 PROCEDURE — 80307 DRUG TEST PRSMV CHEM ANLYZR: CPT | Performed by: FAMILY MEDICINE

## 2021-12-30 PROCEDURE — 83880 ASSAY OF NATRIURETIC PEPTIDE: CPT | Performed by: EMERGENCY MEDICINE

## 2021-12-30 PROCEDURE — 85379 FIBRIN DEGRADATION QUANT: CPT | Performed by: EMERGENCY MEDICINE

## 2021-12-30 PROCEDURE — 94761 N-INVAS EAR/PLS OXIMETRY MLT: CPT

## 2021-12-30 PROCEDURE — 82077 ASSAY SPEC XCP UR&BREATH IA: CPT | Performed by: FAMILY MEDICINE

## 2021-12-30 PROCEDURE — 96361 HYDRATE IV INFUSION ADD-ON: CPT

## 2021-12-30 RX ORDER — ASPIRIN 325 MG
325 TABLET ORAL
Status: COMPLETED | OUTPATIENT
Start: 2021-12-30 | End: 2021-12-30

## 2021-12-30 RX ORDER — MUPIROCIN 20 MG/G
OINTMENT TOPICAL 2 TIMES DAILY
Status: DISCONTINUED | OUTPATIENT
Start: 2021-12-30 | End: 2022-01-03 | Stop reason: HOSPADM

## 2021-12-30 RX ORDER — SODIUM CHLORIDE 0.9 % (FLUSH) 0.9 %
10 SYRINGE (ML) INJECTION
Status: DISCONTINUED | OUTPATIENT
Start: 2021-12-30 | End: 2022-01-03 | Stop reason: HOSPADM

## 2021-12-30 RX ORDER — AMLODIPINE BESYLATE 5 MG/1
10 TABLET ORAL DAILY
Status: DISCONTINUED | OUTPATIENT
Start: 2021-12-31 | End: 2022-01-02

## 2021-12-30 RX ORDER — BENZONATATE 100 MG/1
100 CAPSULE ORAL 3 TIMES DAILY PRN
Status: DISCONTINUED | OUTPATIENT
Start: 2021-12-30 | End: 2022-01-03 | Stop reason: HOSPADM

## 2021-12-30 RX ORDER — FUROSEMIDE 10 MG/ML
40 INJECTION INTRAMUSCULAR; INTRAVENOUS
Status: COMPLETED | OUTPATIENT
Start: 2021-12-30 | End: 2021-12-30

## 2021-12-30 RX ORDER — TALC
6 POWDER (GRAM) TOPICAL NIGHTLY PRN
Status: DISCONTINUED | OUTPATIENT
Start: 2021-12-30 | End: 2022-01-03 | Stop reason: HOSPADM

## 2021-12-30 RX ORDER — FAMOTIDINE 20 MG/1
20 TABLET, FILM COATED ORAL 2 TIMES DAILY
Status: DISCONTINUED | OUTPATIENT
Start: 2021-12-30 | End: 2022-01-03 | Stop reason: HOSPADM

## 2021-12-30 RX ORDER — HEPARIN SODIUM 5000 [USP'U]/ML
5000 INJECTION, SOLUTION INTRAVENOUS; SUBCUTANEOUS EVERY 8 HOURS
Status: DISCONTINUED | OUTPATIENT
Start: 2021-12-30 | End: 2022-01-03 | Stop reason: HOSPADM

## 2021-12-30 RX ORDER — NICARDIPINE HYDROCHLORIDE 0.2 MG/ML
0-15 INJECTION INTRAVENOUS CONTINUOUS
Status: DISCONTINUED | OUTPATIENT
Start: 2021-12-30 | End: 2022-01-03

## 2021-12-30 RX ORDER — HYDRALAZINE HYDROCHLORIDE 20 MG/ML
10 INJECTION INTRAMUSCULAR; INTRAVENOUS
Status: COMPLETED | OUTPATIENT
Start: 2021-12-30 | End: 2021-12-30

## 2021-12-30 RX ORDER — NIFEDIPINE 30 MG/1
30 TABLET, EXTENDED RELEASE ORAL DAILY
Status: DISCONTINUED | OUTPATIENT
Start: 2021-12-30 | End: 2021-12-30

## 2021-12-30 RX ORDER — FUROSEMIDE 10 MG/ML
20 INJECTION INTRAMUSCULAR; INTRAVENOUS
Status: DISCONTINUED | OUTPATIENT
Start: 2021-12-30 | End: 2022-01-02

## 2021-12-30 RX ORDER — LABETALOL HYDROCHLORIDE 5 MG/ML
10 INJECTION, SOLUTION INTRAVENOUS
Status: COMPLETED | OUTPATIENT
Start: 2021-12-30 | End: 2021-12-30

## 2021-12-30 RX ADMIN — HEPARIN SODIUM 5000 UNITS: 5000 INJECTION, SOLUTION INTRAVENOUS; SUBCUTANEOUS at 10:12

## 2021-12-30 RX ADMIN — BENZONATATE 100 MG: 100 CAPSULE ORAL at 11:12

## 2021-12-30 RX ADMIN — MUPIROCIN: 20 OINTMENT TOPICAL at 08:12

## 2021-12-30 RX ADMIN — FUROSEMIDE 40 MG: 10 INJECTION, SOLUTION INTRAMUSCULAR; INTRAVENOUS at 12:12

## 2021-12-30 RX ADMIN — LABETALOL HYDROCHLORIDE 10 MG: 5 INJECTION, SOLUTION INTRAVENOUS at 10:12

## 2021-12-30 RX ADMIN — ASPIRIN 325 MG ORAL TABLET 325 MG: 325 PILL ORAL at 10:12

## 2021-12-30 RX ADMIN — FAMOTIDINE 20 MG: 20 TABLET ORAL at 08:12

## 2021-12-30 RX ADMIN — NICARDIPINE HYDROCHLORIDE 2 MG/HR: 0.2 INJECTION, SOLUTION INTRAVENOUS at 01:12

## 2021-12-30 RX ADMIN — SODIUM CHLORIDE 1000 ML: 0.9 INJECTION, SOLUTION INTRAVENOUS at 11:12

## 2021-12-30 RX ADMIN — NICARDIPINE HYDROCHLORIDE 4 MG/HR: 0.2 INJECTION, SOLUTION INTRAVENOUS at 11:12

## 2021-12-30 RX ADMIN — NITROGLYCERIN 0.5 INCH: 20 OINTMENT TOPICAL at 12:12

## 2021-12-30 RX ADMIN — MELATONIN TAB 3 MG 6 MG: 3 TAB at 09:12

## 2021-12-30 RX ADMIN — HYDRALAZINE HYDROCHLORIDE 10 MG: 20 INJECTION INTRAMUSCULAR; INTRAVENOUS at 11:12

## 2021-12-30 RX ADMIN — FUROSEMIDE 20 MG: 10 INJECTION, SOLUTION INTRAVENOUS at 10:12

## 2021-12-30 RX ADMIN — BENZONATATE 100 MG: 100 CAPSULE ORAL at 09:12

## 2021-12-30 NOTE — ED NOTES
Patient unable to urinate at this time. Patient educated on need for urine sample and instructed to call RN when able to urinate.

## 2021-12-30 NOTE — ASSESSMENT & PLAN NOTE
Likely due to uncontrolled hypertension  BNP 1636  Troponin  EKG negative for any ischemic changes  TTE  Started on IV Lasix in the ED-continue at 20 mg b.i.d.  Urine toxicology pending  Supplemental oxygen as tolerated  Consult cardiology

## 2021-12-30 NOTE — ED PROVIDER NOTES
Encounter Date: 12/30/2021    SCRIBE #1 NOTE: I, Rashid Liu, am scribing for, and in the presence of, Hanh Guerra MD.       History     Chief Complaint   Patient presents with    Cough     Sinus pressure/cough/ nasal congestion worse at night x 2 mths       Sarbjit Brewer Sr. is a 56 y.o. male who  has a past medical history of Hypertension.    The patient presents to the ED due to Cough.   Patient reports symptoms started 6 weeks ago. Associated symptoms include rhinorrhea, congestion, and productive cough with green sputum. He denies chest pain, SOB, fever, or N/V/D. He reports that he was prescribed blood pressure medication by the ED and given a 30 day supply but has not followed up with a PCP and has run out. He reports home treatment using Nyquil with some relief.  No known drug allergies.    The history is provided by the patient.     Review of patient's allergies indicates:  No Known Allergies  Past Medical History:   Diagnosis Date    Hypertension      Past Surgical History:   Procedure Laterality Date    KIDNEY STONE SURGERY       History reviewed. No pertinent family history.  Social History     Tobacco Use    Smoking status: Current Every Day Smoker     Packs/day: 1.50     Years: 40.00     Pack years: 60.00     Types: Cigarettes     Start date: 1981    Smokeless tobacco: Never Used    Tobacco comment: Pt enrolled in the [employer] on 1/29/20 (SCT Member ID # [id number]). Ambulatory referral to Smoking Cessation Program.   Substance Use Topics    Alcohol use: Yes    Drug use: No     Review of Systems   HENT: Negative for congestion and rhinorrhea.    Respiratory: Positive for cough. Negative for shortness of breath.    Cardiovascular: Negative for chest pain.   Gastrointestinal: Negative for diarrhea, nausea and vomiting.   All other systems reviewed and are negative.      Physical Exam     Initial Vitals [12/30/21 1009]   BP Pulse Resp Temp SpO2   (!) 244/124 (!) 142 20 98.5 °F (36.9  °C) 100 %      MAP       --         Physical Exam    Nursing note and vitals reviewed.  Constitutional: He appears well-developed and well-nourished.   Eyes: EOM are normal. Pupils are equal, round, and reactive to light.   Cardiovascular: Normal rate, regular rhythm and normal heart sounds.   Pulmonary/Chest: He has wheezes. He has rales.   Abdominal: Abdomen is soft. Bowel sounds are normal. He exhibits no distension and no mass. There is no abdominal tenderness. There is no rebound and no guarding.     Neurological: He is alert and oriented to person, place, and time.   Skin: Skin is warm and dry.   Psychiatric: He has a normal mood and affect.         ED Course   Critical Care    Date/Time: 2/10/2022 3:27 PM  Performed by: Hanh Guerra MD  Authorized by: Darlene Sampson MD   Direct patient critical care time: 40 minutes  Additional history critical care time: 5 minutes  Ordering / reviewing critical care time: 5 minutes  Documentation critical care time: 5 minutes  Consulting other physicians critical care time: 5 minutes  Total critical care time (exclusive of procedural time) : 60 minutes        Labs Reviewed   CBC W/ AUTO DIFFERENTIAL - Abnormal; Notable for the following components:       Result Value    RDW 14.8 (*)     All other components within normal limits   COMPREHENSIVE METABOLIC PANEL - Abnormal; Notable for the following components:    CO2 22 (*)     eGFR if non  55 (*)     All other components within normal limits   TROPONIN I - Abnormal; Notable for the following components:    Troponin I 0.035 (*)     All other components within normal limits   TROPONIN I - Abnormal; Notable for the following components:    Troponin I 0.030 (*)     All other components within normal limits   B-TYPE NATRIURETIC PEPTIDE - Abnormal; Notable for the following components:    BNP 1,636 (*)     All other components within normal limits   D DIMER, QUANTITATIVE - Abnormal; Notable for the  following components:    D-Dimer 0.50 (*)     All other components within normal limits   URINALYSIS, REFLEX TO URINE CULTURE - Abnormal; Notable for the following components:    Color, UA Colorless (*)     Protein, UA 1+ (*)     All other components within normal limits    Narrative:     Specimen Source->Urine   MAGNESIUM   MAGNESIUM   URINALYSIS MICROSCOPIC    Narrative:     Specimen Source->Urine   SARS-COV-2 RDRP GENE    Narrative:     This test utilizes isothermal nucleic acid amplification   technology to detect the SARS-CoV-2 RdRp nucleic acid segment.   The analytical sensitivity (limit of detection) is 125 genome   equivalents/mL.   A POSITIVE result implies infection with the SARS-CoV-2 virus;   the patient is presumed to be contagious.     A NEGATIVE result means that SARS-CoV-2 nucleic acids are not   present above the limit of detection. A NEGATIVE result should be   treated as presumptive. It does not rule out the possibility of   COVID-19 and should not be the sole basis for treatment decisions.   If COVID-19 is strongly suspected based on clinical and exposure   history, re-testing using an alternate molecular assay should be   considered.   This test is only for use under the Food and Drug   Administration s Emergency Use Authorization (EUA).   Commercial kits are provided by [employer].   Performance characteristics of the EUA have been independently   verified by [facility] Department of   Pathology and Laboratory Medicine.   _________________________________________________________________   The authorized Fact Sheet for Healthcare Providers and the authorized Fact   Sheet for Patients of the ID NOW COVID-19 are available on the FDA   website:     https://www.fda.gov/media/858832/download  https://www.fda.gov/media/169127/download         EKG Readings: (Independently Interpreted)   Initial Reading: No STEMI. Rhythm: Atrial Fibrillation. Heart Rate: 128. Conduction: Normal.  Other Impression: AFib with RVR, abnormal EKG, nonspecific ST changes     ECG Results    None       Imaging Results          X-Ray Chest AP Portable (Final result)  Result time 12/30/21 11:29:43    Final result by Aquiles Wilson MD (12/30/21 11:29:43)                 Impression:      Interstitial prominent opacities in both lung fields may relate to pulmonary vascular congestion/edema, noting that viral or atypical infectious etiology could appear similar by imaging.      Electronically signed by: Aquiles Wilson  Date:    12/30/2021  Time:    11:29             Narrative:    EXAMINATION:  XR CHEST AP PORTABLE    CLINICAL HISTORY:  Chest Pain;    TECHNIQUE:  Single frontal view of the chest was performed.    COMPARISON:  Chest radiograph performed 07/21/2020    FINDINGS:  Monitoring leads overlie the chest.  Cardiomediastinal contours appear grossly unchanged, again noted enlargement of the cardiac silhouette and tortuosity of thoracic aorta.    Background chronic coarsened interstitial increased attenuation is noted.  Increased interstitial opacities are noted beyond these, new since prior examination of 07/21/2020.  No definite pneumothorax or pleural effusion.  No acute findings identified in the visualized abdomen.  Osseous soft tissue structures appear without acute finding.                              X-Rays:   Independently Interpreted Readings:   Other Readings:  Vascular congestion    Medications   aspirin tablet 325 mg (325 mg Oral Given 12/30/21 1059)   hydrALAZINE injection 10 mg (10 mg Intravenous Given 12/30/21 1100)   labetaloL injection 10 mg (10 mg Intravenous Given 12/30/21 1059)   furosemide injection 40 mg (40 mg Intravenous Given 12/30/21 1242)   nitroGLYCERIN 2% TD oint ointment 0.5 inch (0.5 inches Transdermal Given 12/30/21 1242)   sars-cov-2 (covid-19) (Pfizer COVID-19) 30 mcg/0.3 ml injection 0.3 mL (0.3 mLs Intramuscular Given 1/3/22 1415)     Medical Decision Making:   Initial  Assessment:   Patient is a 56-year-old male with history of hypertension who presents the ED with complaints of cough for 6 weeks.  He arrives hypertensive and tachycardic.  Patient is non compliant with his medicine.  Symptoms are concerning for hypertensive emergency vs hypertensive urgency likely secondary to COVID-19, pneumonia, sepsis, CHF exacerbation.  Differential Diagnosis:   Pneumonia, PE, sepsis, CHF excacerbation  Independently Interpreted Test(s):   I have ordered and independently interpreted X-rays - see prior notes.  I have ordered and independently interpreted EKG Reading(s) - see prior notes  Clinical Tests:   Lab Tests: Ordered and Reviewed  The following lab test(s) were unremarkable: CBC, CMP, Troponin and BNP  Radiological Study: Ordered and Reviewed  Other:   I have discussed this case with another health care provider.          Scribe Attestation:   Scribe #1: I performed the above scribed service and the documentation accurately describes the services I performed. I attest to the accuracy of the note.                 Clinical Impression:   Final diagnoses:  [R03.0] Elevated blood pressure reading  [R07.9] Chest pain  [I50.9] Acute on chronic congestive heart failure, unspecified heart failure type (Primary)  [R05.9] Cough  [I16.1] Hypertensive emergency  [I48.91] A-fib          ED Disposition Condition    Admit           I, Dr. Hanh Guerra, personally performed the services described in this documentation. All medical record entries made by the scribe were at my direction and in my presence. I have reviewed the chart and agree that the record reflects my personal performance and is accurate and complete. Hanh Guerra MD.  10:59 AM 01/05/2022       Hanh Guerra MD  01/05/22 1803       Hanh Guerra MD  02/10/22 9122

## 2021-12-30 NOTE — ASSESSMENT & PLAN NOTE
Hypertension  Blood pressure uncontrolled on admission 191/129  During exam and after 1 dose of Lasix and hydralazine Bp 134/120  Patient has been out of medication for some months  Continue Norvasc  Continue IV Lasix  Cardene drip  Admit ICU

## 2021-12-30 NOTE — SUBJECTIVE & OBJECTIVE
Past Medical History:   Diagnosis Date    Hypertension        Past Surgical History:   Procedure Laterality Date    KIDNEY STONE SURGERY         Review of patient's allergies indicates:  No Known Allergies    No current facility-administered medications on file prior to encounter.     Current Outpatient Medications on File Prior to Encounter   Medication Sig    amLODIPine (NORVASC) 10 MG tablet Take 1 tablet (10 mg total) by mouth once daily.    amoxicillin (AMOXIL) 500 MG capsule     chlorthalidone (HYGROTEN) 25 MG Tab Take 0.5 tablets (12.5 mg total) by mouth once daily.    cyclobenzaprine (FLEXERIL) 10 MG tablet Take 1 tablet (10 mg total) by mouth 3 (three) times daily as needed for Muscle spasms. (Patient not taking: Reported on 1/29/2020)    naproxen (NAPROSYN) 500 MG tablet Take 1 tablet (500 mg total) by mouth 2 (two) times daily. (Patient not taking: Reported on 1/29/2020)    pantoprazole (PROTONIX) 20 MG tablet Take 2 tablets (40 mg total) by mouth 2 (two) times daily before meals.     Family History    None       Tobacco Use    Smoking status: Current Every Day Smoker     Packs/day: 0.50     Years: 30.00     Pack years: 15.00     Types: Cigarettes    Smokeless tobacco: Never Used   Substance and Sexual Activity    Alcohol use: Yes    Drug use: No    Sexual activity: Not on file     Review of Systems   Constitutional: Negative for activity change, chills, diaphoresis and fever.   HENT: Positive for congestion and rhinorrhea. Negative for ear discharge, sinus pressure and tinnitus.    Eyes: Negative for itching and visual disturbance.   Respiratory: Positive for cough. Negative for apnea, shortness of breath and wheezing.    Cardiovascular: Negative for chest pain.   Gastrointestinal: Negative for nausea and rectal pain.   Endocrine: Negative for polyphagia and polyuria.   Genitourinary: Negative for dysuria.   Skin: Negative for rash.   Neurological: Negative for dizziness, syncope, weakness,  light-headedness, numbness and headaches.   Psychiatric/Behavioral: Negative for behavioral problems, decreased concentration and sleep disturbance.     Objective:     Vital Signs (Most Recent):  Temp: 98.2 °F (36.8 °C) (12/30/21 1217)  Pulse: 105 (12/30/21 1217)  Resp: (!) 22 (12/30/21 1217)  BP: (!) 191/129 (12/30/21 1217)  SpO2: 95 % (12/30/21 1217) Vital Signs (24h Range):  Temp:  [98.2 °F (36.8 °C)-98.5 °F (36.9 °C)] 98.2 °F (36.8 °C)  Pulse:  [105-142] 105  Resp:  [18-22] 22  SpO2:  [95 %-100 %] 95 %  BP: (191-244)/(112-156) 191/129     Weight: 77.1 kg (170 lb)  Body mass index is 22.43 kg/m².    Physical Exam  Constitutional:       Appearance: He is well-developed and well-nourished.   HENT:      Head: Normocephalic and atraumatic.      Right Ear: There is no impacted cerumen.      Left Ear: There is no impacted cerumen.      Nose: No congestion.   Eyes:      Extraocular Movements: Extraocular movements intact.      Pupils: Pupils are equal, round, and reactive to light.   Cardiovascular:      Rate and Rhythm: Regular rhythm. Tachycardia present.      Heart sounds: Normal heart sounds. No murmur heard.  No friction rub. No gallop.    Pulmonary:      Effort: Pulmonary effort is normal.      Breath sounds: Normal breath sounds.   Abdominal:      Tenderness: There is no abdominal tenderness.   Musculoskeletal:         General: No swelling or tenderness.      Cervical back: Normal range of motion and neck supple. No tenderness.      Right lower leg: No edema.      Left lower leg: No edema.   Skin:     General: Skin is warm.      Coloration: Skin is not pale.   Neurological:      Mental Status: He is alert and oriented to person, place, and time. Mental status is at baseline.   Psychiatric:         Mood and Affect: Mood is not anxious or depressed.         Speech: Speech normal.         Behavior: Behavior normal.         Thought Content: Thought content normal.         Cognition and Memory: Cognition and memory  normal.           CRANIAL NERVES     CN III, IV, VI   Pupils are equal, round, and reactive to light.       Significant Labs:   A1C: No results for input(s): HGBA1C in the last 4320 hours.  ABGs: No results for input(s): PH, PCO2, HCO3, POCSATURATED, BE, TOTALHB, COHB, METHB, O2HB, POCFIO2, PO2 in the last 48 hours.  Bilirubin:   Recent Labs   Lab 12/30/21  1058   BILITOT 0.6     Blood Culture: No results for input(s): LABBLOO in the last 48 hours.  CBC:   Recent Labs   Lab 12/30/21  1058   WBC 6.70   HGB 14.2   HCT 42.3        CMP:   Recent Labs   Lab 12/30/21  1058      K 4.0      CO2 22*      BUN 18   CREATININE 1.4   CALCIUM 9.1   PROT 7.7   ALBUMIN 3.6   BILITOT 0.6   ALKPHOS 118   AST 37   ALT 39   ANIONGAP 11   EGFRNONAA 55*     Coagulation: No results for input(s): PT, INR, APTT in the last 48 hours.  Lactic Acid: No results for input(s): LACTATE in the last 48 hours.  Lipase: No results for input(s): LIPASE in the last 48 hours.  Lipid Panel: No results for input(s): CHOL, HDL, LDLCALC, TRIG, CHOLHDL in the last 48 hours.  Troponin:   Recent Labs   Lab 12/30/21  1058   TROPONINI 0.035*     TSH: No results for input(s): TSH in the last 4320 hours.  Urine Culture: No results for input(s): LABURIN in the last 48 hours.  Urine Studies: No results for input(s): COLORU, APPEARANCEUA, PHUR, SPECGRAV, PROTEINUA, GLUCUA, KETONESU, BILIRUBINUA, OCCULTUA, NITRITE, UROBILINOGEN, LEUKOCYTESUR, RBCUA, WBCUA, BACTERIA, SQUAMEPITHEL, HYALINECASTS in the last 48 hours.    Invalid input(s): WRIGHTSUR    Significant Imaging: I have reviewed all pertinent imaging results/findings within the past 24 hours.

## 2021-12-30 NOTE — HPI
Sarbjit Brewer Sr. is a 57 y/o M with PMH of Hypertension, present with 6 weeks history of progressive cough with productive greenish sputum, with associated runny nose, and congestion.  He denies fever, chills, nausea, vomiting, chest pain, dizziness, diaphoresis, headache, weakness. Patient has tried NyQuil and benadryl without any relief.  Patient reported elevated blood pressure few months ago in the ED and was prescribed a 30-day supply of blood pressure medication and is yet to follow-up with his PCP and has run out of medication.   BNP 1636, troponin 0.03, CXR concerning for pulmonary edema, blood pressure elevated at 191/129, EKG with no ischemic changes.      In the ED received Lasix, hydralazine, nitroglycerin paste and labetalol with minimal blood pressure improvement.  Start Cardene drip and admit hospital medicine service on observation for new onset CHF

## 2021-12-30 NOTE — ASSESSMENT & PLAN NOTE
Blood pressure uncontrolled on admission 191/129  Patient has been out of medication for some months  Continue Norvasc  Continue IV Lasix

## 2021-12-30 NOTE — H&P
[facility] Medicine  History & Physical    Patient Name: Sarbjit Brewer Sr.  MRN: [id number]  Patient Class: IP- Inpatient  Admission Date: 12/30/2021  Attending Physician: Darlene Sampson MD  Primary Care Provider: Primary Doctor No         Patient information was obtained from patient and ER records.     Subjective:     Principal Problem:Acute congestive heart failure    Chief Complaint:   Chief Complaint   Patient presents with    Cough     Sinus pressure/cough/ nasal congestion worse at night x 2 mths          HPI: Sarbjit Brewer Sr. is a 55 y/o M with PMH of Hypertension, present with 6 weeks history of progressive cough with productive greenish sputum, with associated runny nose, and congestion.  He denies fever, chills, nausea, vomiting, chest pain, dizziness, diaphoresis, headache, weakness. Patient has tried NyQuil and benadryl without any relief.  Patient reported elevated blood pressure few months ago in the ED and was prescribed a 30-day supply of blood pressure medication and is yet to follow-up with his PCP and has run out of medication.   BNP 1636, troponin 0.03, CXR concerning for pulmonary edema, blood pressure elevated at 191/129, EKG with no ischemic changes.      In the ED received Lasix, hydralazine, nitroglycerin paste and labetalol with minimal blood pressure improvement.  Start Cardene drip and admit hospital medicine service on observation for new onset CHF      Past Medical History:   Diagnosis Date    Hypertension        Past Surgical History:   Procedure Laterality Date    KIDNEY STONE SURGERY         Review of patient's allergies indicates:  No Known Allergies    No current facility-administered medications on file prior to encounter.     Current Outpatient Medications on File Prior to Encounter   Medication Sig    amLODIPine (NORVASC) 10 MG tablet Take 1 tablet (10 mg total) by mouth once daily.    amoxicillin (AMOXIL) 500 MG capsule     chlorthalidone  (HYGROTEN) 25 MG Tab Take 0.5 tablets (12.5 mg total) by mouth once daily.    cyclobenzaprine (FLEXERIL) 10 MG tablet Take 1 tablet (10 mg total) by mouth 3 (three) times daily as needed for Muscle spasms. (Patient not taking: Reported on 1/29/2020)    naproxen (NAPROSYN) 500 MG tablet Take 1 tablet (500 mg total) by mouth 2 (two) times daily. (Patient not taking: Reported on 1/29/2020)    pantoprazole (PROTONIX) 20 MG tablet Take 2 tablets (40 mg total) by mouth 2 (two) times daily before meals.     Family History    None       Tobacco Use    Smoking status: Current Every Day Smoker     Packs/day: 0.50     Years: 30.00     Pack years: 15.00     Types: Cigarettes    Smokeless tobacco: Never Used   Substance and Sexual Activity    Alcohol use: Yes    Drug use: No    Sexual activity: Not on file     Review of Systems   Constitutional: Negative for activity change, chills, diaphoresis and fever.   HENT: Positive for congestion and rhinorrhea. Negative for ear discharge, sinus pressure and tinnitus.    Eyes: Negative for itching and visual disturbance.   Respiratory: Positive for cough. Negative for apnea, shortness of breath and wheezing.    Cardiovascular: Negative for chest pain.   Gastrointestinal: Negative for nausea and rectal pain.   Endocrine: Negative for polyphagia and polyuria.   Genitourinary: Negative for dysuria.   Skin: Negative for rash.   Neurological: Negative for dizziness, syncope, weakness, light-headedness, numbness and headaches.   Psychiatric/Behavioral: Negative for behavioral problems, decreased concentration and sleep disturbance.     Objective:     Vital Signs (Most Recent):  Temp: 98.2 °F (36.8 °C) (12/30/21 1217)  Pulse: 105 (12/30/21 1217)  Resp: (!) 22 (12/30/21 1217)  BP: (!) 191/129 (12/30/21 1217)  SpO2: 95 % (12/30/21 1217) Vital Signs (24h Range):  Temp:  [98.2 °F (36.8 °C)-98.5 °F (36.9 °C)] 98.2 °F (36.8 °C)  Pulse:  [105-142] 105  Resp:  [18-22] 22  SpO2:  [95 %-100 %] 95  %  BP: (191-244)/(112-156) 191/129     Weight: 77.1 kg (170 lb)  Body mass index is 22.43 kg/m².    Physical Exam  Constitutional:       Appearance: He is well-developed and well-nourished.   HENT:      Head: Normocephalic and atraumatic.      Right Ear: There is no impacted cerumen.      Left Ear: There is no impacted cerumen.      Nose: No congestion.   Eyes:      Extraocular Movements: Extraocular movements intact.      Pupils: Pupils are equal, round, and reactive to light.   Cardiovascular:      Rate and Rhythm: Regular rhythm. Tachycardia present.      Heart sounds: Normal heart sounds. No murmur heard.  No friction rub. No gallop.    Pulmonary:      Effort: Pulmonary effort is normal.      Breath sounds: Normal breath sounds.   Abdominal:      Tenderness: There is no abdominal tenderness.   Musculoskeletal:         General: No swelling or tenderness.      Cervical back: Normal range of motion and neck supple. No tenderness.      Right lower leg: No edema.      Left lower leg: No edema.   Skin:     General: Skin is warm.      Coloration: Skin is not pale.   Neurological:      Mental Status: He is alert and oriented to person, place, and time. Mental status is at baseline.   Psychiatric:         Mood and Affect: Mood is not anxious or depressed.         Speech: Speech normal.         Behavior: Behavior normal.         Thought Content: Thought content normal.         Cognition and Memory: Cognition and memory normal.           CRANIAL NERVES     CN III, IV, VI   Pupils are equal, round, and reactive to light.       Significant Labs:   A1C: No results for input(s): HGBA1C in the last 4320 hours.  ABGs: No results for input(s): PH, PCO2, HCO3, POCSATURATED, BE, TOTALHB, COHB, METHB, O2HB, POCFIO2, PO2 in the last 48 hours.  Bilirubin:   Recent Labs   Lab 12/30/21  1058   BILITOT 0.6     Blood Culture: No results for input(s): LABBLOO in the last 48 hours.  CBC:   Recent Labs   Lab 12/30/21  1058   WBC 6.70   HGB  14.2   HCT 42.3        CMP:   Recent Labs   Lab 12/30/21  1058      K 4.0      CO2 22*      BUN 18   CREATININE 1.4   CALCIUM 9.1   PROT 7.7   ALBUMIN 3.6   BILITOT 0.6   ALKPHOS 118   AST 37   ALT 39   ANIONGAP 11   EGFRNONAA 55*     Coagulation: No results for input(s): PT, INR, APTT in the last 48 hours.  Lactic Acid: No results for input(s): LACTATE in the last 48 hours.  Lipase: No results for input(s): LIPASE in the last 48 hours.  Lipid Panel: No results for input(s): CHOL, HDL, LDLCALC, TRIG, CHOLHDL in the last 48 hours.  Troponin:   Recent Labs   Lab 12/30/21  1058   TROPONINI 0.035*     TSH: No results for input(s): TSH in the last 4320 hours.  Urine Culture: No results for input(s): LABURIN in the last 48 hours.  Urine Studies: No results for input(s): COLORU, APPEARANCEUA, PHUR, SPECGRAV, PROTEINUA, GLUCUA, KETONESU, BILIRUBINUA, OCCULTUA, NITRITE, UROBILINOGEN, LEUKOCYTESUR, RBCUA, WBCUA, BACTERIA, SQUAMEPITHEL, HYALINECASTS in the last 48 hours.    Invalid input(s): KIERAN    Significant Imaging: I have reviewed all pertinent imaging results/findings within the past 24 hours.    Assessment/Plan:     * new onset Acute congestive heart failure  Likely due to uncontrolled hypertension  BNP 1636  Troponin  EKG negative for any ischemic changes  TTE  Started on IV Lasix in the ED-continue at 20 mg b.i.d.  Urine toxicology pending  Supplemental oxygen as tolerated  Consult cardiology      Hypertensive urgency  Hypertension  Blood pressure uncontrolled on admission 191/129  During exam and after 1 dose of Lasix and hydralazine Bp 134/120  Patient has been out of medication for some months  Continue Norvasc  Continue IV Lasix  Cardene drip  Admit ICU      Elevated troponin  Likely due to demand ischemia  Monitor      Tobacco abuse  > 3 minute counseling regarding smoking cessation  Smokes about 1 pack per day   Nicotine patch      Alcoholism /alcohol abuse  Ongoing counseling  regarding alcohol  Last use 12/29      GERD (gastroesophageal reflux disease)  PPI        VTE Risk Mitigation (From admission, onward)    None             Darlene Sampson MD  Department of Hospital Medicine   [facility] - Emergency Dept

## 2021-12-30 NOTE — ED NOTES
Attempted to call report - ICU RN will call back.   The patient is Stable - Low risk of patient condition declining or worsening    Shift Goals  Clinical Goals: pain control; skin integrity      Progress made toward(s) clinical / shift goals:  Q2 turns, use of barrier cream and wipes, PRN pain medications used    Patient is not progressing towards the following goals:

## 2021-12-30 NOTE — PHARMACY MED REC
"Admission Medication History     The home medication history was taken by Tatiana Guzman CPhT.    Medication history obtained from,[facility] pharmacy    You may go to "Admission" then "Reconcile Home Medications" tabs to review and/or act upon these items.      The home medication list has been updated by the Pharmacy department.    Please read ALL comments highlighted in yellow.    Please address this information as you see fit.     Feel free to contact us if you have any questions or require assistance.      The medications listed below were removed from the home medication list.  Please reorder if appropriate:  Patient reports no longer taking the following medication(s):   Amoxil 500mg   Flexeril 10mg   Naproxen 500mg   protonix 20mg      Tatiana Guzman CPhT.  Ext [phone]                  .          "

## 2021-12-30 NOTE — FIRST PROVIDER EVALUATION
Emergency Department TeleTriage Encounter Note      CHIEF COMPLAINT    Chief Complaint   Patient presents with    Cough     Sinus pressure/cough/ nasal congestion worse at night x 2 mths         VITAL SIGNS   Initial Vitals [12/30/21 1009]   BP Pulse Resp Temp SpO2   (!) 244/124 (!) 142 20 98.5 °F (36.9 °C) 100 %      MAP       --            ALLERGIES    Review of patient's allergies indicates:  No Known Allergies    PROVIDER TRIAGE NOTE  This is a teletriage evaluation of a 56 y.o. male presenting to the ED complaining of nasal congestion and cough for about two months.  Pt has been out of BP medication for about a month. Denies CP and SOB.    Pt's BP significantly elevated.     Will start with EKG.     Initial orders will be placed and care will be transferred to an alternate provider when patient is roomed for a full evaluation. Any additional orders and the final disposition will be determined by that provider.           ORDERS  Labs Reviewed - No data to display    ED Orders (720h ago, onward)    Start Ordered     Status Ordering Provider    12/30/21 1017 12/30/21 1016  EKG 12-lead  Once         Ordered CLEMENTINA CURRIE            Virtual Visit Note: The provider triage portion of this emergency department evaluation and documentation was performed via [employer], a HIPAA-compliant telemedicine application, in concert with a tele-presenter in the room. A face to face patient evaluation with one of my colleagues will occur once the patient is placed in an emergency department room.      DISCLAIMER: This note was prepared with [employer] voice recognition transcription software. Garbled syntax, mangled pronouns, and other bizarre constructions may be attributed to that software system.

## 2021-12-30 NOTE — PLAN OF CARE
Pt Is in bed resting. Requested diet and received an order. Dmitri de jesus is infusing. All safety measures in place. Bed in lowest position , call light within reach. No distress noted and no complaints.

## 2021-12-30 NOTE — Clinical Note
Diagnosis: Acute on chronic congestive heart failure, unspecified heart failure type [[id number]]   Admitting Provider:: GIRISH ARELLANO [[id number]]   Future Attending Provider: GIRISH ARELLANO [[id number]]   Reason for IP Medical Treatment  (Clinical interventions that can only be accomplished in the IP setting? ) :: Hypertensive emergency, CHF exacerbation   Estimated Length of Stay:: 2 midnights   I certify that Inpatient services for greater than or equal to 2 midnights are medically necessary:: No   Plans for Post-Acute care--if anticipated (pick the single best option):: A. No post acute care anticipated at this time

## 2021-12-30 NOTE — ED NOTES
Report called to ICU RN using SBAR. All questions answered. Patient to be transported to ICU on monitor with RN at bedside.

## 2021-12-30 NOTE — ED NOTES
56 y.o. male with PMH HTN to ED with c.o. cough x 6 weeks with worsening symptoms over the past two days. Patient reports he was not able to sleep last night because he was cough. Patient reports a dry, nonproductive cough. Patient hypertensive in triage at 244/124 and tachycardiac at 142. MD aware. Patient reports he has been out of his blood pressure medication for about 1 week. Patient states it was prescribed by the ED and he did not follow up before the medication ran out. Patient denies headache/blurred vision, denies chest pain/ shortness of breath, denies unilateral weakness or dizziness. Patient neuro intact +PERRL, equal strength and sensation bilaterally. Patient awake, alert, and oriented x 4. No apparent distress noted. Patient assisted onto stretcher and changed into a gown. Patient placed on cardiac monitor, continuous pulse oximetry and automatic blood pressure cuff. Bed placed in low locked position, side rails up x 2, call light is within reach of patient orientation to room and explanation of wait provided to patient, alarms set and turned on for monitor and pulse ox, awaiting MD evaluation and orders, will continue to monitor.

## 2021-12-31 PROBLEM — R00.0 TACHYCARDIA: Status: ACTIVE | Noted: 2021-12-31

## 2021-12-31 PROBLEM — I48.91 ATRIAL FIBRILLATION: Status: ACTIVE | Noted: 2021-12-31

## 2021-12-31 LAB
AMPHET+METHAMPHET UR QL: NEGATIVE
ANION GAP SERPL CALC-SCNC: 13 MMOL/L (ref 8–16)
BARBITURATES UR QL SCN>200 NG/ML: NEGATIVE
BENZODIAZ UR QL SCN>200 NG/ML: NEGATIVE
BUN SERPL-MCNC: 15 MG/DL (ref 6–20)
BZE UR QL SCN: NEGATIVE
CALCIUM SERPL-MCNC: 9.4 MG/DL (ref 8.7–10.5)
CANNABINOIDS UR QL SCN: NEGATIVE
CHLORIDE SERPL-SCNC: 103 MMOL/L (ref 95–110)
CO2 SERPL-SCNC: 18 MMOL/L (ref 23–29)
CREAT SERPL-MCNC: 1.3 MG/DL (ref 0.5–1.4)
CREAT UR-MCNC: 13 MG/DL (ref 23–375)
EST. GFR  (AFRICAN AMERICAN): >60 ML/MIN/1.73 M^2
EST. GFR  (NON AFRICAN AMERICAN): >60 ML/MIN/1.73 M^2
ETHANOL UR-MCNC: <10 MG/DL
GLUCOSE SERPL-MCNC: 99 MG/DL (ref 70–110)
METHADONE UR QL SCN>300 NG/ML: NEGATIVE
OPIATES UR QL SCN: NEGATIVE
PCP UR QL SCN>25 NG/ML: NEGATIVE
POTASSIUM SERPL-SCNC: 3.5 MMOL/L (ref 3.5–5.1)
SODIUM SERPL-SCNC: 134 MMOL/L (ref 136–145)
TOXICOLOGY INFORMATION: ABNORMAL

## 2021-12-31 PROCEDURE — 36415 COLL VENOUS BLD VENIPUNCTURE: CPT | Performed by: FAMILY MEDICINE

## 2021-12-31 PROCEDURE — 99291 PR CRITICAL CARE, E/M 30-74 MINUTES: ICD-10-PCS | Mod: ,,, | Performed by: INTERNAL MEDICINE

## 2021-12-31 PROCEDURE — 93005 ELECTROCARDIOGRAM TRACING: CPT

## 2021-12-31 PROCEDURE — 94761 N-INVAS EAR/PLS OXIMETRY MLT: CPT

## 2021-12-31 PROCEDURE — 99291 CRITICAL CARE FIRST HOUR: CPT | Mod: ,,, | Performed by: INTERNAL MEDICINE

## 2021-12-31 PROCEDURE — 25000003 PHARM REV CODE 250: Performed by: EMERGENCY MEDICINE

## 2021-12-31 PROCEDURE — 63600175 PHARM REV CODE 636 W HCPCS: Performed by: FAMILY MEDICINE

## 2021-12-31 PROCEDURE — 93010 EKG 12-LEAD: ICD-10-PCS | Mod: ,,, | Performed by: INTERNAL MEDICINE

## 2021-12-31 PROCEDURE — 25000003 PHARM REV CODE 250: Performed by: NURSE PRACTITIONER

## 2021-12-31 PROCEDURE — 80048 BASIC METABOLIC PNL TOTAL CA: CPT | Performed by: FAMILY MEDICINE

## 2021-12-31 PROCEDURE — 97162 PT EVAL MOD COMPLEX 30 MIN: CPT | Performed by: PHYSICAL THERAPIST

## 2021-12-31 PROCEDURE — 20000000 HC ICU ROOM

## 2021-12-31 PROCEDURE — 97165 OT EVAL LOW COMPLEX 30 MIN: CPT

## 2021-12-31 PROCEDURE — 25000003 PHARM REV CODE 250: Performed by: FAMILY MEDICINE

## 2021-12-31 PROCEDURE — 93010 ELECTROCARDIOGRAM REPORT: CPT | Mod: ,,, | Performed by: INTERNAL MEDICINE

## 2021-12-31 RX ORDER — LOSARTAN POTASSIUM 25 MG/1
25 TABLET ORAL DAILY
Status: DISCONTINUED | OUTPATIENT
Start: 2022-01-01 | End: 2022-01-02

## 2021-12-31 RX ORDER — METOPROLOL TARTRATE 25 MG/1
25 TABLET, FILM COATED ORAL 2 TIMES DAILY
Status: DISCONTINUED | OUTPATIENT
Start: 2021-12-31 | End: 2021-12-31

## 2021-12-31 RX ORDER — LISINOPRIL 10 MG/1
10 TABLET ORAL DAILY
Status: DISCONTINUED | OUTPATIENT
Start: 2021-12-31 | End: 2021-12-31

## 2021-12-31 RX ADMIN — NICARDIPINE HYDROCHLORIDE 5 MG/HR: 0.2 INJECTION, SOLUTION INTRAVENOUS at 07:12

## 2021-12-31 RX ADMIN — FAMOTIDINE 20 MG: 20 TABLET ORAL at 09:12

## 2021-12-31 RX ADMIN — HEPARIN SODIUM 5000 UNITS: 5000 INJECTION, SOLUTION INTRAVENOUS; SUBCUTANEOUS at 06:12

## 2021-12-31 RX ADMIN — FAMOTIDINE 20 MG: 20 TABLET ORAL at 08:12

## 2021-12-31 RX ADMIN — FUROSEMIDE 20 MG: 10 INJECTION, SOLUTION INTRAVENOUS at 08:12

## 2021-12-31 RX ADMIN — BENZONATATE 100 MG: 100 CAPSULE ORAL at 05:12

## 2021-12-31 RX ADMIN — HEPARIN SODIUM 5000 UNITS: 5000 INJECTION, SOLUTION INTRAVENOUS; SUBCUTANEOUS at 09:12

## 2021-12-31 RX ADMIN — BENZONATATE 100 MG: 100 CAPSULE ORAL at 08:12

## 2021-12-31 RX ADMIN — FUROSEMIDE 20 MG: 10 INJECTION, SOLUTION INTRAVENOUS at 09:12

## 2021-12-31 RX ADMIN — MELATONIN TAB 3 MG 6 MG: 3 TAB at 09:12

## 2021-12-31 RX ADMIN — MUPIROCIN: 20 OINTMENT TOPICAL at 09:12

## 2021-12-31 RX ADMIN — MUPIROCIN 4 G: 20 OINTMENT TOPICAL at 08:12

## 2021-12-31 RX ADMIN — METOPROLOL TARTRATE 25 MG: 25 TABLET, FILM COATED ORAL at 09:12

## 2021-12-31 RX ADMIN — LISINOPRIL 10 MG: 10 TABLET ORAL at 09:12

## 2021-12-31 RX ADMIN — HEPARIN SODIUM 5000 UNITS: 5000 INJECTION, SOLUTION INTRAVENOUS; SUBCUTANEOUS at 01:12

## 2021-12-31 RX ADMIN — AMLODIPINE BESYLATE 10 MG: 5 TABLET ORAL at 08:12

## 2021-12-31 NOTE — PLAN OF CARE
Pt awake, and alert; oriented x4. Continues on Nicardipine gtt at 5 mg/hr. Resp >90% on room air. HR appears a-fib/ST on monitor throughout the night. No apparent distress, denies SOB. UOP overnight ~ 1 L. Updated pt on plan of care. Educated on meds and modifiable risk factors r/t HF dx. Pt verbalizes understanding. Report given to oncoming RNNegrito.

## 2021-12-31 NOTE — PLAN OF CARE
Pt in room, spouse at bedside. No distress noted, no pain reported. Pt had physical therapy and OT. Pt ambulate independently. Pt had bm x2. . No complaints

## 2021-12-31 NOTE — PLAN OF CARE
Problem: Occupational Therapy Goal  Goal: Occupational Therapy Goal  Outcome: Met   Pt. Indep ADLs and fx transfers. No skilled OT needs identified. Dc acute OT. Rec: dc home when medically ready. Will discontinue OT orders.

## 2021-12-31 NOTE — PT/OT/SLP EVAL
Physical Therapy Evaluation and Discharge Note    Patient Name:  Sarbjit Brewer Sr.   MRN:  [id number]    Recommendations:     Discharge Recommendations:  home   Discharge Equipment Recommendations: none   Barriers to discharge: None    Assessment:     Sarbjit Brewer Sr. is a 56 y.o. male admitted with a medical diagnosis of Acute congestive heart failure. .  At this time, patient is functioning at their prior level of function and does not require further acute PT services.     Recent Surgery: * No surgery found *      Plan:     During this hospitalization, patient does not require further acute PT services.  Please re-consult if situation changes.      Subjective     Chief Complaint: I can walk on my own  Patient/Family Comments/goals: go home  Pain/Comfort:  · Pain Rating 1: 0/10    Patients cultural, spiritual, Mandaeism conflicts given the current situation: no    Living Environment:  Pt lives with his wife in a H with no steps.  Prior to admission, patients level of function was Ind community ambulation.  Equipment used at home: none.  DME owned (not currently used): none.  Upon discharge, patient will have assistance from family.    Objective:     Communicated with nurse prior to session.  Patient found HOB elevated with blood pressure cuff,telemetry,pulse ox (continuous),peripheral IV upon PT entry to room.    General Precautions: Standard, fall   Orthopedic Precautions:    Braces:     Respiratory Status: Room air    Exams:  · Pt is oriented x 4.  Pt has BLE AROM WFL.  Pt has 4 to 5/5 BLE strength.    Functional Mobility:  · Pt donned socks Ind.  Pt supine to/from sit Ind. Pt ambulated 120' without AD Ind.    AM-PAC 6 CLICK MOBILITY  Total Score:        AM-PAC 6 CLICK MOBILITY  Total Score:      Patient left up in chair with all lines intact, call button in reach, nurse notified and family present.    GOALS:   Multidisciplinary Problems     Physical Therapy Goals        Problem: Physical Therapy Goal    Goal  Priority Disciplines Outcome Goal Variances Interventions   Physical Therapy Goal     PT, PT/OT Ongoing, Progressing     Description: Goals met                          History:     Past Medical History:   Diagnosis Date    Hypertension        Past Surgical History:   Procedure Laterality Date    KIDNEY STONE SURGERY         Time Tracking:     PT Received On: 12/31/21  PT Start Time: 1040     PT Stop Time: 1100  PT Total Time (min): 20 min     Billable Minutes: Evaluation 20 12/31/2021

## 2021-12-31 NOTE — PROGRESS NOTES
[facility] Medicine  Progress Note    Patient Name: Sarbjit Brewer Sr.  MRN: [id number]  Patient Class: IP- Inpatient   Admission Date: 12/30/2021  Length of Stay: 1 days  Attending Physician: Darlene Sampson*  Primary Care Provider: Primary Doctor No        Subjective:     Principal Problem:Acute congestive heart failure      HPI:  Sarbjit Brewer Sr. is a 57 y/o M with PMH of Hypertension, present with 6 weeks history of progressive cough with productive greenish sputum, with associated runny nose, and congestion.  He denies fever, chills, nausea, vomiting, chest pain, dizziness, diaphoresis, headache, weakness. Patient has tried NyQuil and benadryl without any relief.  Patient reported elevated blood pressure few months ago in the ED and was prescribed a 30-day supply of blood pressure medication and is yet to follow-up with his PCP and has run out of medication.   BNP 1636, troponin 0.03, CXR concerning for pulmonary edema, blood pressure elevated at 191/129, EKG with no ischemic changes.      In the ED received Lasix, hydralazine, nitroglycerin paste and labetalol with minimal blood pressure improvement.  Start Cardene drip and admit hospital medicine service on observation for new onset CHF      Overview/Hospital Course:  No notes on file    Interval History: awake and alert,   BP still elevated and still requiring cardene gtt  Resume home meds and add Lisinopril and Metoprolol.  Requesting for work excuse  TTE EF 40% , LV global hypokinesis and LD diastolic dysfunction  Diuresing- continue Lasix    Review of Systems   Constitutional: Negative for activity change, chills, diaphoresis and fever.   HENT: Negative for congestion, ear discharge, rhinorrhea, sinus pressure and tinnitus.    Respiratory: Negative for apnea, cough, shortness of breath and wheezing.    Cardiovascular: Negative for chest pain.   Gastrointestinal: Negative for nausea and rectal pain.   Genitourinary: Negative  for dysuria.   Skin: Negative for rash.   Neurological: Negative for dizziness, syncope, weakness, light-headedness, numbness and headaches.   Psychiatric/Behavioral: Negative for behavioral problems, decreased concentration and sleep disturbance.     Objective:     Vital Signs (Most Recent):  Temp: 98.3 °F (36.8 °C) (12/31/21 0800)  Pulse: 107 (12/31/21 0930)  Resp: (!) 21 (12/31/21 0930)  BP: (!) 161/104 (12/31/21 0930)  SpO2: 95 % (12/31/21 0930) Vital Signs (24h Range):  Temp:  [97.8 °F (36.6 °C)-98.9 °F (37.2 °C)] 98.3 °F (36.8 °C)  Pulse:  [104-142] 107  Resp:  [12-47] 21  SpO2:  [90 %-100 %] 95 %  BP: (134-270)/() 161/104     Weight: 77.1 kg (170 lb)  Body mass index is 22.43 kg/m².    Intake/Output Summary (Last 24 hours) at 12/31/2021 1007  Last data filed at 12/31/2021 0705  Gross per 24 hour   Intake 353.47 ml   Output 4075 ml   Net -3721.53 ml      Physical Exam  Constitutional:       Appearance: He is well-developed.   HENT:      Head: Normocephalic and atraumatic.      Right Ear: There is no impacted cerumen.      Left Ear: There is no impacted cerumen.      Nose: No congestion.   Cardiovascular:      Rate and Rhythm: Regular rhythm. Tachycardia present.      Heart sounds: Normal heart sounds. No murmur heard.  No friction rub. No gallop.    Pulmonary:      Effort: Pulmonary effort is normal.      Breath sounds: Normal breath sounds.   Abdominal:      Tenderness: There is no abdominal tenderness.   Musculoskeletal:         General: No swelling or tenderness.      Cervical back: Normal range of motion and neck supple. No tenderness.      Right lower leg: No edema.      Left lower leg: No edema.   Skin:     General: Skin is warm.      Coloration: Skin is not pale.   Neurological:      Mental Status: He is alert and oriented to person, place, and time. Mental status is at baseline.   Psychiatric:         Mood and Affect: Mood is not anxious or depressed.         Speech: Speech normal.          Behavior: Behavior normal.         Thought Content: Thought content normal.         Significant Labs:   A1C:   Recent Labs   Lab 12/30/21  1509   HGBA1C 5.6     ABGs: No results for input(s): PH, PCO2, HCO3, POCSATURATED, BE, TOTALHB, COHB, METHB, O2HB, POCFIO2, PO2 in the last 48 hours.  Bilirubin:   Recent Labs   Lab 12/30/21  1058   BILITOT 0.6     Blood Culture: No results for input(s): LABBLOO in the last 48 hours.  CBC:   Recent Labs   Lab 12/30/21  1058   WBC 6.70   HGB 14.2   HCT 42.3        CMP:   Recent Labs   Lab 12/30/21  1058 12/31/21  0312    134*   K 4.0 3.5    103   CO2 22* 18*    99   BUN 18 15   CREATININE 1.4 1.3   CALCIUM 9.1 9.4   PROT 7.7  --    ALBUMIN 3.6  --    BILITOT 0.6  --    ALKPHOS 118  --    AST 37  --    ALT 39  --    ANIONGAP 11 13   EGFRNONAA 55* >60     Lactic Acid: No results for input(s): LACTATE in the last 48 hours.  Lipase: No results for input(s): LIPASE in the last 48 hours.  Lipid Panel: No results for input(s): CHOL, HDL, LDLCALC, TRIG, CHOLHDL in the last 48 hours.  Magnesium:   Recent Labs   Lab 12/30/21  1058   MG 1.9     Troponin:   Recent Labs   Lab 12/30/21  1058 12/30/21  1347   TROPONINI 0.035* 0.030*     TSH: No results for input(s): TSH in the last 4320 hours.  Urine Culture: No results for input(s): LABURIN in the last 48 hours.  Urine Studies:   Recent Labs   Lab 12/30/21  1347   COLORU Colorless*   APPEARANCEUA Clear   PHUR 7.0   SPECGRAV 1.010   PROTEINUA 1+*   GLUCUA Negative   KETONESU Negative   BILIRUBINUA Negative   OCCULTUA Negative   NITRITE Negative   UROBILINOGEN Negative   LEUKOCYTESUR Negative   RBCUA 0   WBCUA 0   BACTERIA None   HYALINECASTS 0       Significant Imaging: I have reviewed all pertinent imaging results/findings within the past 24 hours.      Assessment/Plan:      * new onset Acute congestive heart failure  Likely due to uncontrolled hypertension  BNP 1636  Troponin  EKG negative for any ischemic  changes  TTE  Started on IV Lasix in the ED-continue at 20 mg b.i.d.  Urine toxicology pending  Supplemental oxygen as tolerated  Consult cardiology- appreciates rec's- consider losartan (with goal to transition to sacubitril-valsartan)    Atrial fibrillation  Possibly secondary to uncontrolled BP  -Ischemic work up once euvolemic  Consult Cardiology: consider metoprolol succinate when euvolemic    Hypertensive urgency  Hypertension  Blood pressure uncontrolled on admission 191/129  During exam and after 1 dose of Lasix and hydralazine Bp 134/120  Patient has been out of medication for some months  Continue Norvasc  Continue IV Lasix  Cardene drip  Admit ICU  Add Lisinopril switch to losartan per cardiology  TTE  · The left ventricle is mildly enlarged with concentric hypertrophy and mildly decreased systolic function.  · The estimated ejection fraction is 40%.  · There is left ventricular global hypokinesis.  · Left ventricular diastolic dysfunction.  · Normal right ventricular size with normal right ventricular systolic function.  · Mild left atrial enlargement.  · The sinuses of Valsalva is mildly dilated.  · Moderate aortic regurgitation.  · Moderate mitral regurgitation.            Elevated troponin  Likely due to demand ischemia  Monitor      Tobacco abuse  > 3 minute counseling regarding smoking cessation  Smokes about 1 pack per day   Nicotine patch      Alcoholism /alcohol abuse  Ongoing counseling regarding alcohol  Last use 12/29      GERD (gastroesophageal reflux disease)  PPI      VTE Risk Mitigation (From admission, onward)         Ordered     heparin (porcine) injection 5,000 Units  Every 8 hours         12/30/21 2134     IP VTE HIGH RISK PATIENT  Once         12/30/21 2134     Place sequential compression device  Until discontinued         12/30/21 2134                Discharge Planning   SANTOS:      Code Status: Full Code   Is the patient medically ready for discharge?:     Reason for patient still in  hospital (select all that apply): Patient trending condition           Critical care time spent on the evaluation and treatment of severe organ dysfunction, review of pertinent labs and imaging studies, discussions with consulting providers and discussions with patient/family: 35 minutes.      Darlene Sampson MD  Department of [facility] Medicine   [place] - Intensive Care

## 2021-12-31 NOTE — SUBJECTIVE & OBJECTIVE
Interval History: awake and alert,   BP still elevated and still requiring cardene gtt  Resume home meds and add Lisinopril and Metoprolol.  Requesting for work excuse  TTE EF 40% , LV global hypokinesis and LD diastolic dysfunction  Diuresing- continue Lasix    Review of Systems   Constitutional: Negative for activity change, chills, diaphoresis and fever.   HENT: Negative for congestion, ear discharge, rhinorrhea, sinus pressure and tinnitus.    Respiratory: Negative for apnea, cough, shortness of breath and wheezing.    Cardiovascular: Negative for chest pain.   Gastrointestinal: Negative for nausea and rectal pain.   Genitourinary: Negative for dysuria.   Skin: Negative for rash.   Neurological: Negative for dizziness, syncope, weakness, light-headedness, numbness and headaches.   Psychiatric/Behavioral: Negative for behavioral problems, decreased concentration and sleep disturbance.     Objective:     Vital Signs (Most Recent):  Temp: 98.3 °F (36.8 °C) (12/31/21 0800)  Pulse: 107 (12/31/21 0930)  Resp: (!) 21 (12/31/21 0930)  BP: (!) 161/104 (12/31/21 0930)  SpO2: 95 % (12/31/21 0930) Vital Signs (24h Range):  Temp:  [97.8 °F (36.6 °C)-98.9 °F (37.2 °C)] 98.3 °F (36.8 °C)  Pulse:  [104-142] 107  Resp:  [12-47] 21  SpO2:  [90 %-100 %] 95 %  BP: (134-270)/() 161/104     Weight: 77.1 kg (170 lb)  Body mass index is 22.43 kg/m².    Intake/Output Summary (Last 24 hours) at 12/31/2021 1007  Last data filed at 12/31/2021 0705  Gross per 24 hour   Intake 353.47 ml   Output 4075 ml   Net -3721.53 ml      Physical Exam  Constitutional:       Appearance: He is well-developed.   HENT:      Head: Normocephalic and atraumatic.      Right Ear: There is no impacted cerumen.      Left Ear: There is no impacted cerumen.      Nose: No congestion.   Cardiovascular:      Rate and Rhythm: Regular rhythm. Tachycardia present.      Heart sounds: Normal heart sounds. No murmur heard.  No friction rub. No gallop.    Pulmonary:       Effort: Pulmonary effort is normal.      Breath sounds: Normal breath sounds.   Abdominal:      Tenderness: There is no abdominal tenderness.   Musculoskeletal:         General: No swelling or tenderness.      Cervical back: Normal range of motion and neck supple. No tenderness.      Right lower leg: No edema.      Left lower leg: No edema.   Skin:     General: Skin is warm.      Coloration: Skin is not pale.   Neurological:      Mental Status: He is alert and oriented to person, place, and time. Mental status is at baseline.   Psychiatric:         Mood and Affect: Mood is not anxious or depressed.         Speech: Speech normal.         Behavior: Behavior normal.         Thought Content: Thought content normal.         Significant Labs:   A1C:   Recent Labs   Lab 12/30/21  1509   HGBA1C 5.6     ABGs: No results for input(s): PH, PCO2, HCO3, POCSATURATED, BE, TOTALHB, COHB, METHB, O2HB, POCFIO2, PO2 in the last 48 hours.  Bilirubin:   Recent Labs   Lab 12/30/21  1058   BILITOT 0.6     Blood Culture: No results for input(s): LABBLOO in the last 48 hours.  CBC:   Recent Labs   Lab 12/30/21  1058   WBC 6.70   HGB 14.2   HCT 42.3        CMP:   Recent Labs   Lab 12/30/21  1058 12/31/21  0312    134*   K 4.0 3.5    103   CO2 22* 18*    99   BUN 18 15   CREATININE 1.4 1.3   CALCIUM 9.1 9.4   PROT 7.7  --    ALBUMIN 3.6  --    BILITOT 0.6  --    ALKPHOS 118  --    AST 37  --    ALT 39  --    ANIONGAP 11 13   EGFRNONAA 55* >60     Lactic Acid: No results for input(s): LACTATE in the last 48 hours.  Lipase: No results for input(s): LIPASE in the last 48 hours.  Lipid Panel: No results for input(s): CHOL, HDL, LDLCALC, TRIG, CHOLHDL in the last 48 hours.  Magnesium:   Recent Labs   Lab 12/30/21  1058   MG 1.9     Troponin:   Recent Labs   Lab 12/30/21  1058 12/30/21  1347   TROPONINI 0.035* 0.030*     TSH: No results for input(s): TSH in the last 4320 hours.  Urine Culture: No results for input(s):  LABURIN in the last 48 hours.  Urine Studies:   Recent Labs   Lab 12/30/21  1347   COLORU Colorless*   APPEARANCEUA Clear   PHUR 7.0   SPECGRAV 1.010   PROTEINUA 1+*   GLUCUA Negative   KETONESU Negative   BILIRUBINUA Negative   OCCULTUA Negative   NITRITE Negative   UROBILINOGEN Negative   LEUKOCYTESUR Negative   RBCUA 0   WBCUA 0   BACTERIA None   HYALINECASTS 0       Significant Imaging: I have reviewed all pertinent imaging results/findings within the past 24 hours.

## 2021-12-31 NOTE — ASSESSMENT & PLAN NOTE
Possibly secondary to uncontrolled BP  -Ischemic work up once euvolemic  Consult Cardiology: consider metoprolol succinate when euvolemic

## 2021-12-31 NOTE — ASSESSMENT & PLAN NOTE
Called patient to verify source of prescription.  Patient stated Dr. Valle was the original prescriber of this medication, he was seen at the hospital by Dr. Leon as an inpatient.  Last prescription from Dr. Valle was 7/30/18.  Ok to fill?   Likely due to uncontrolled hypertension  BNP 1636  Troponin  EKG negative for any ischemic changes  TTE  Started on IV Lasix in the ED-continue at 20 mg b.i.d.  Urine toxicology pending  Supplemental oxygen as tolerated  Consult cardiology- appreciates rec's- consider losartan (with goal to transition to sacubitril-valsartan)

## 2021-12-31 NOTE — ASSESSMENT & PLAN NOTE
Hypertension  Blood pressure uncontrolled on admission 191/129  During exam and after 1 dose of Lasix and hydralazine Bp 134/120  Patient has been out of medication for some months  Continue Norvasc  Continue IV Lasix  Cardene drip  Admit ICU  Add Lisinopril switch to losartan per cardiology  TTE  · The left ventricle is mildly enlarged with concentric hypertrophy and mildly decreased systolic function.  · The estimated ejection fraction is 40%.  · There is left ventricular global hypokinesis.  · Left ventricular diastolic dysfunction.  · Normal right ventricular size with normal right ventricular systolic function.  · Mild left atrial enlargement.  · The sinuses of Valsalva is mildly dilated.  · Moderate aortic regurgitation.  · Moderate mitral regurgitation.

## 2021-12-31 NOTE — ASSESSMENT & PLAN NOTE
Hypertension  Blood pressure uncontrolled on admission 191/129  During exam and after 1 dose of Lasix and hydralazine Bp 134/120  Patient has been out of medication for some months  Continue Norvasc  Continue IV Lasix  Cardene drip  Admit ICU    Add Lisinopril  TTE  · The left ventricle is mildly enlarged with concentric hypertrophy and mildly decreased systolic function.  · The estimated ejection fraction is 40%.  · There is left ventricular global hypokinesis.  · Left ventricular diastolic dysfunction.  · Normal right ventricular size with normal right ventricular systolic function.  · Mild left atrial enlargement.  · The sinuses of Valsalva is mildly dilated.  · Moderate aortic regurgitation.  · Moderate mitral regurgitation.

## 2021-12-31 NOTE — CARE UPDATE
[facility]  Critical Care Team Assessment     Patient's name: Sarbjit Brewer Sr.  Today's date: 12/31/2021  Date of Admission: 12/30/2021  Length of Stay: 1  Principal Problem: Acute congestive heart failure      The patient has been evaluated and chart reviewed by the critical care team. Based on our review, we recommend:     1. Continued exceptional care by the primary provider/team.  2. The patient is clinically improving will likely be able to step down to the floor for further management.   3. Initiate goal directed medical therapy for HFrEF and afib. Consider coreg, isordil, and hydralazine given the patient's CKD (looks like he is currently at baseline). Once patient's HR is controlled and he is off cardene, transfer to the floor for continued care.   4. Patient will need f/u with cardiology.     Thank you for allowing us to participate in the care of this patient. Please do not hesitate to contact us with questions or concerns as we are willing and honored to assist in the care of this patient.     Mark Alfrao MD  [facility] Fellow  Pager #: [phone]

## 2021-12-31 NOTE — CONSULTS
[facility] - Intensive Care  Cardiology  Consult Note    Patient Name: Sarbjit Brewer Sr.  MRN: [id number]  Admission Date: 12/30/2021  Hospital Length of Stay: 1 days  Code Status: Full Code   Attending Provider: Darlene Sampson*   Consulting Provider: Gloria Jaramillo MD  Primary Care Physician: Primary Doctor No  Principal Problem:Acute congestive heart failure    Patient information was obtained from patient, past medical records and ER records.     Inpatient consult to Cardiology  Consult performed by: Gloria Jaramillo MD  Consult ordered by: Darlene Sampson MD        Subjective:     Chief Complaint:  ADHF    HPI:  57 yo male h/o HTN  Adm for ADHF (new onset) in the setting of running out of his medications for HTN for 2 months (?amlodipine, chlorthalidone)    Adm /129  Echo LVEF 40%, GHK, DD. Normal RVSF. Mild LAE. SoV mildly dilated. Mod AI, mod MR, mild-mod ND. PASP 30, CVP 3    On nicardipine gtt. SBP 170s  Trop 0.035-->0.030    Telemetry looks concerning for Afib. I do not see an EKG in the chart. EKG order placed.    Mr Brewer denies CP, SOB. He reports he feels fine, except for the persistent cough. He is concerned about his ability to return to work. 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 Reports he has been pacing himself as he has gotten older.    Past Medical History:   Diagnosis Date    Hypertension        Past Surgical History:   Procedure Laterality Date    KIDNEY STONE SURGERY         Review of patient's allergies indicates:  No Known Allergies    No current facility-administered medications on file prior to encounter.     Current Outpatient Medications on File Prior to Encounter   Medication Sig    amLODIPine (NORVASC) 10 MG tablet Take 1 tablet (10 mg total) by mouth once daily.    chlorthalidone (HYGROTEN) 25 MG Tab Take 0.5 tablets (12.5 mg total) by mouth once daily.     Family History    None       Tobacco Use    Smoking status: Current  Every Day Smoker     Packs/day: 0.50     Years: 30.00     Pack years: 15.00     Types: Cigarettes    Smokeless tobacco: Never Used   Substance and Sexual Activity    Alcohol use: Yes    Drug use: No    Sexual activity: Not on file     Review of Systems   Constitutional: Negative for fever.   HENT: Negative for nosebleeds.    Cardiovascular: Negative for chest pain, dyspnea on exertion, irregular heartbeat, leg swelling, near-syncope, orthopnea, palpitations, paroxysmal nocturnal dyspnea and syncope.        As above   Respiratory: Positive for cough. Negative for hemoptysis.    Hematologic/Lymphatic: Negative for bleeding problem.   Musculoskeletal: Negative for arthritis.   Gastrointestinal: Negative for hematochezia.   Genitourinary: Negative for hematuria.   Neurological: Negative for seizures.   Allergic/Immunologic: Positive for environmental allergies.     Objective:     Vital Signs (Most Recent):  Temp: 98.3 °F (36.8 °C) (12/31/21 0800)  Pulse: 107 (12/31/21 0930)  Resp: (!) 21 (12/31/21 0930)  BP: (!) 161/104 (12/31/21 0930)  SpO2: 95 % (12/31/21 0930) Vital Signs (24h Range):  Temp:  [97.8 °F (36.6 °C)-98.9 °F (37.2 °C)] 98.3 °F (36.8 °C)  Pulse:  [104-142] 107  Resp:  [12-47] 21  SpO2:  [90 %-100 %] 95 %  BP: (134-270)/() 161/104     Weight: 77.1 kg (170 lb)  Body mass index is 22.43 kg/m².    SpO2: 95 %  O2 Device (Oxygen Therapy): room air      Intake/Output Summary (Last 24 hours) at 12/31/2021 1000  Last data filed at 12/31/2021 0705  Gross per 24 hour   Intake 353.47 ml   Output 4075 ml   Net -3721.53 ml       Lines/Drains/Airways     Peripheral Intravenous Line                 Peripheral IV - Single Lumen 12/30/21 1030 18 G Left Antecubital <1 day         Peripheral IV - Single Lumen 12/30/21 1352 20 G Left Forearm <1 day                Physical Exam  Constitutional:       General: He is not in acute distress.     Appearance: He is well-developed. He is not diaphoretic.   HENT:      Head:  Normocephalic.   Neck:      Vascular: No JVD.   Cardiovascular:      Rate and Rhythm: Tachycardia present. Rhythm irregular.      Heart sounds: No murmur heard.  No friction rub. No gallop.    Pulmonary:      Effort: Pulmonary effort is normal. No respiratory distress.      Breath sounds: Normal breath sounds.   Abdominal:      Palpations: Abdomen is soft.      Tenderness: There is no abdominal tenderness.   Musculoskeletal:         General: No swelling.      Cervical back: Normal range of motion.   Skin:     General: Skin is warm.   Neurological:      Mental Status: He is alert.   Psychiatric:         Mood and Affect: Mood normal.         Significant Labs:   CMP   Recent Labs   Lab 12/30/21  1058 12/31/21  0312    134*   K 4.0 3.5    103   CO2 22* 18*    99   BUN 18 15   CREATININE 1.4 1.3   CALCIUM 9.1 9.4   PROT 7.7  --    ALBUMIN 3.6  --    BILITOT 0.6  --    ALKPHOS 118  --    AST 37  --    ALT 39  --    ANIONGAP 11 13   ESTGFRAFRICA >60 >60   EGFRNONAA 55* >60   , CBC   Recent Labs   Lab 12/30/21  1058   WBC 6.70   HGB 14.2   HCT 42.3      , INR No results for input(s): INR, PROTIME in the last 48 hours. and Troponin   Recent Labs   Lab 12/30/21  1058 12/30/21  1347   TROPONINI 0.035* 0.030*       Assessment and Plan:     ADHF, HFrEF  - Started on lisinopril. Consider losartan (with goal to transition to sacubitril-valsartan)  - Recommend DC metoprolol tartrate. Consider metoprolol succinate when euvolemic.  - IV furosemide. I/O almost -4L  - Ischemic work up once euvolemic  - Possibly secondary to uncontrolled BP, AFib    Troponin elevation  Likely demand ischemia    AFib  EKG personally reviewed. Afib noted  CHADS2-VASc 2 (CHF, HTN)  HAS-BLED 0  AC recommended if benefits outweigh the risks    HTN  Anti-HTN meds as noted above  On nicardipine gtt. Recommend weaning off    Tobacco use  Active; has decided to stop      Active Diagnoses:    Diagnosis Date Noted POA    PRINCIPAL  PROBLEM:  new onset Acute congestive heart failure [I50.9] 12/30/2021 Yes    Elevated troponin [R77.8] 12/30/2021 Yes    Hypertensive urgency [I16.0] 12/30/2021 Yes    Alcoholism /alcohol abuse [F10.20] 02/03/2020 Yes    Tobacco abuse [Z72.0] 02/03/2020 Yes    Hypertension [I10] 07/31/2014 Yes    GERD (gastroesophageal reflux disease) [K21.9] 07/31/2014 Yes      Problems Resolved During this Admission:       VTE Risk Mitigation (From admission, onward)         Ordered     heparin (porcine) injection 5,000 Units  Every 8 hours         12/30/21 2134     IP VTE HIGH RISK PATIENT  Once         12/30/21 2134     Place sequential compression device  Until discontinued         12/30/21 2134              30 minutes of critical care time was utilized in the care of the patient, including evaluating the patient, reviewing the records, medical decision making and obtaining studies    Thank you for your consult. I will follow-up with patient. Please contact us if you have any additional questions.    Gloria Jaramillo MD  Cardiology   [place] - Intensive Care

## 2021-12-31 NOTE — PLAN OF CARE
Problem: Physical Therapy Goal  Goal: Physical Therapy Goal  Description: Goals met       Pt ambulated Ind 120' without AD.  Pt is not in need of skilled PT services.

## 2021-12-31 NOTE — PT/OT/SLP EVAL
Occupational Therapy   Evaluation and Discharge Note    Name: Sarbjit Brewer Sr.  MRN: [id number]  Admitting Diagnosis:  Acute congestive heart failure   Recent Surgery: * No surgery found *    The primary encounter diagnosis was Acute on chronic congestive heart failure, unspecified heart failure type. Diagnoses of Elevated blood pressure reading, Chest pain, Cough, Hypertensive emergency, CHF (congestive heart failure), and Tachycardia were also pertinent to this visit.    Recommendations:     Discharge Recommendations: home  Discharge Equipment Recommendations:  none  Barriers to discharge:  None    Assessment:     Sarbjit Brewer Sr. is a 56 y.o. male with a medical diagnosis of Acute congestive heart failure. At this time, patient is functioning at their prior level of function and does not require further acute OT services. Pt. Indep ADLs and fx transfers. No skilled OT needs identified. Dc acute OT. Rec: dc home when medically ready. Will discontinue OT orders.    Plan:     During this hospitalization, patient does not require further acute OT services.  Please re-consult if situation changes.    · Plan of Care Reviewed with: patient,spouse    Subjective     Chief Complaint: none  Patient/Family Comments/goals: I am ready to go home.    Occupational Profile:  Living Environment: Pt. lives with spouse in [facility] with no steps; t/s combo with GB.  Previous level of function: Indep ADLs, ambulation, drives, works as  in [employer].   Roles and Routines: active  Equipment Used at home:  none (has grab bar in tub but not using)  Assistance upon Discharge: spouse    Pain/Comfort:  · Pain Rating 1: 0/10  · Pain Rating Post-Intervention 1: 0/10    Patients cultural, spiritual, Yazdanism conflicts given the current situation: no    Objective:     Communicated with: nurse prior to session.  Patient found HOB elevated with bed alarm,pulse ox (continuous),telemetry upon OT entry to room.    General Precautions: Standard,  fall   Orthopedic Precautions:N/A   Braces: N/A  Respiratory Status: Room air     Occupational Performance:    Bed Mobility:    · Patient completed Rolling/Turning to Left with  independence  · Patient completed Scooting/Bridging with independence  · Patient completed Supine to Sit with independence    Functional Mobility/Transfers:  · Patient completed Sit <> Stand Transfer with independence  with  no assistive device   · Functional Mobility: ambulated indep in room to bathroom and back to bed, no LOB.    Activities of Daily Living:  · Feeding:  independence .  · Grooming: independence simulated, pt declined to do grooing act, deferred til later  · Upper Body Dressing: independence .  · Lower Body Dressing: independence socks seated EOB  · Toileting: independence and simulated, says he just finished usng bathroom .    Cognitive/Visual Perceptual:  Cognitive/Psychosocial Skills:     -       Oriented to: Person, Place, Time and Situation   -       Follows Commands/attention:Follows multistep  commands  -       Communication: clear/fluent  -       Memory: No Deficits noted  -       Safety awareness/insight to disability: intact  -       Mood/Affect/Coping skills/emotional control: Cooperative, excitable  Visual/Perceptual:      -Intact .    Physical Exam:  Balance:    -       good sitting and standing  Postural examination/scapula alignment:    -       No postural abnormalities identified  Dominant hand:    -       right  Upper Extremity Range of Motion:     -       Right Upper Extremity: WNL  -       Left Upper Extremity: WNL  Upper Extremity Strength:    -       Right Upper Extremity: WNL  -       Left Upper Extremity: WNL   Strength:    -       Right Upper Extremity: WNL  -       Left Upper Extremity: WNL    AMPAC 6 Click ADL:  AMPAC Total Score: 24    Treatment & Education:   Educated in purpose of OT visit and No skilled OT needs identified. Dc acute OT, pt in agreement.    Patient left seated EOB with all  lines intact, call button in reach and nurse and wife present notified    GOALS:   Multidisciplinary Problems     Occupational Therapy Goals     Not on file          Multidisciplinary Problems (Resolved)        Problem: Occupational Therapy Goal    Goal Priority Disciplines Outcome Interventions   Occupational Therapy Goal   (Resolved)     OT, PT/OT Met                    History:     Past Medical History:   Diagnosis Date    Hypertension        Past Surgical History:   Procedure Laterality Date    KIDNEY STONE SURGERY         Time Tracking:     OT Date of Treatment: 12/31/21  OT Start Time: 1556  OT Stop Time: 1608  OT Total Time (min): 12 min    Billable Minutes:Re-eval 12  Total Time 12 12/31/2021

## 2022-01-01 LAB
ANION GAP SERPL CALC-SCNC: 13 MMOL/L (ref 8–16)
BUN SERPL-MCNC: 21 MG/DL (ref 6–20)
CALCIUM SERPL-MCNC: 9.1 MG/DL (ref 8.7–10.5)
CHLORIDE SERPL-SCNC: 100 MMOL/L (ref 95–110)
CO2 SERPL-SCNC: 23 MMOL/L (ref 23–29)
CREAT SERPL-MCNC: 1.4 MG/DL (ref 0.5–1.4)
EST. GFR  (AFRICAN AMERICAN): >60 ML/MIN/1.73 M^2
EST. GFR  (NON AFRICAN AMERICAN): 55 ML/MIN/1.73 M^2
GLUCOSE SERPL-MCNC: 105 MG/DL (ref 70–110)
POTASSIUM SERPL-SCNC: 3.7 MMOL/L (ref 3.5–5.1)
SODIUM SERPL-SCNC: 136 MMOL/L (ref 136–145)

## 2022-01-01 PROCEDURE — 25000003 PHARM REV CODE 250: Performed by: FAMILY MEDICINE

## 2022-01-01 PROCEDURE — 99291 CRITICAL CARE FIRST HOUR: CPT | Mod: ,,, | Performed by: INTERNAL MEDICINE

## 2022-01-01 PROCEDURE — 94761 N-INVAS EAR/PLS OXIMETRY MLT: CPT

## 2022-01-01 PROCEDURE — 63600175 PHARM REV CODE 636 W HCPCS: Performed by: NURSE PRACTITIONER

## 2022-01-01 PROCEDURE — 20000000 HC ICU ROOM

## 2022-01-01 PROCEDURE — 25000003 PHARM REV CODE 250: Performed by: EMERGENCY MEDICINE

## 2022-01-01 PROCEDURE — 80048 BASIC METABOLIC PNL TOTAL CA: CPT | Performed by: FAMILY MEDICINE

## 2022-01-01 PROCEDURE — 99291 PR CRITICAL CARE, E/M 30-74 MINUTES: ICD-10-PCS | Mod: ,,, | Performed by: INTERNAL MEDICINE

## 2022-01-01 PROCEDURE — 25000003 PHARM REV CODE 250: Performed by: NURSE PRACTITIONER

## 2022-01-01 PROCEDURE — 36415 COLL VENOUS BLD VENIPUNCTURE: CPT | Performed by: FAMILY MEDICINE

## 2022-01-01 PROCEDURE — 63600175 PHARM REV CODE 636 W HCPCS: Performed by: FAMILY MEDICINE

## 2022-01-01 RX ORDER — METOPROLOL SUCCINATE 25 MG/1
25 TABLET, EXTENDED RELEASE ORAL DAILY
Status: DISCONTINUED | OUTPATIENT
Start: 2022-01-01 | End: 2022-01-03 | Stop reason: HOSPADM

## 2022-01-01 RX ORDER — HYDRALAZINE HYDROCHLORIDE 20 MG/ML
10 INJECTION INTRAMUSCULAR; INTRAVENOUS EVERY 6 HOURS PRN
Status: DISCONTINUED | OUTPATIENT
Start: 2022-01-01 | End: 2022-01-03 | Stop reason: HOSPADM

## 2022-01-01 RX ADMIN — FUROSEMIDE 20 MG: 10 INJECTION, SOLUTION INTRAVENOUS at 10:01

## 2022-01-01 RX ADMIN — FAMOTIDINE 20 MG: 20 TABLET ORAL at 09:01

## 2022-01-01 RX ADMIN — HYDRALAZINE HYDROCHLORIDE 10 MG: 20 INJECTION, SOLUTION INTRAMUSCULAR; INTRAVENOUS at 01:01

## 2022-01-01 RX ADMIN — BENZONATATE 100 MG: 100 CAPSULE ORAL at 06:01

## 2022-01-01 RX ADMIN — BENZONATATE 100 MG: 100 CAPSULE ORAL at 10:01

## 2022-01-01 RX ADMIN — HEPARIN SODIUM 5000 UNITS: 5000 INJECTION, SOLUTION INTRAVENOUS; SUBCUTANEOUS at 01:01

## 2022-01-01 RX ADMIN — MELATONIN TAB 3 MG 6 MG: 3 TAB at 09:01

## 2022-01-01 RX ADMIN — METOPROLOL SUCCINATE 25 MG: 25 TABLET, EXTENDED RELEASE ORAL at 04:01

## 2022-01-01 RX ADMIN — HEPARIN SODIUM 5000 UNITS: 5000 INJECTION, SOLUTION INTRAVENOUS; SUBCUTANEOUS at 06:01

## 2022-01-01 RX ADMIN — BENZONATATE 100 MG: 100 CAPSULE ORAL at 02:01

## 2022-01-01 RX ADMIN — MUPIROCIN: 20 OINTMENT TOPICAL at 09:01

## 2022-01-01 RX ADMIN — HEPARIN SODIUM 5000 UNITS: 5000 INJECTION, SOLUTION INTRAVENOUS; SUBCUTANEOUS at 09:01

## 2022-01-01 RX ADMIN — LOSARTAN POTASSIUM 25 MG: 25 TABLET, FILM COATED ORAL at 09:01

## 2022-01-01 RX ADMIN — MUPIROCIN: 20 OINTMENT TOPICAL at 10:01

## 2022-01-01 RX ADMIN — FUROSEMIDE 20 MG: 10 INJECTION, SOLUTION INTRAVENOUS at 09:01

## 2022-01-01 RX ADMIN — AMLODIPINE BESYLATE 10 MG: 5 TABLET ORAL at 09:01

## 2022-01-01 NOTE — PROGRESS NOTES
[facility] Medicine  Progress Note    Patient Name: Sarbjit Brewer Sr.  MRN: [id number]  Patient Class: IP- Inpatient   Admission Date: 12/30/2021  Length of Stay: 2 days  Attending Physician: Darlene Sampson*  Primary Care Provider: Primary Doctor No        Subjective:     Principal Problem:Acute congestive heart failure        HPI:  Sarbjit Brewer Sr. is a 57 y/o M with PMH of Hypertension, present with 6 weeks history of progressive cough with productive greenish sputum, with associated runny nose, and congestion.  He denies fever, chills, nausea, vomiting, chest pain, dizziness, diaphoresis, headache, weakness. Patient has tried NyQuil and benadryl without any relief.  Patient reported elevated blood pressure few months ago in the ED and was prescribed a 30-day supply of blood pressure medication and is yet to follow-up with his PCP and has run out of medication.   BNP 1636, troponin 0.03, CXR concerning for pulmonary edema, blood pressure elevated at 191/129, EKG with no ischemic changes.      In the ED received Lasix, hydralazine, nitroglycerin paste and labetalol with minimal blood pressure improvement.  Start Cardene drip and admit hospital medicine service on observation for new onset CHF      Overview/Hospital Course:  No notes on file    Interval History: awake and alert,   Of Cardene drip since yesterday, BP still elevated and heart rate in the 1 teens, with a fever  Continue p.o. blood pressure medication this morning       Appreciate cardiology    TTE EF 40% , LV global hypokinesis and LD diastolic dysfunction  Diuresing- continue Lasix  Requesting for work excuse  Step down today      Review of Systems   Constitutional: Negative for activity change, chills, diaphoresis and fever.   Respiratory: Negative for apnea, cough, shortness of breath and wheezing.    Cardiovascular: Negative for chest pain.   Gastrointestinal: Negative for nausea and rectal pain.   Genitourinary:  Negative for dysuria.   Skin: Negative for rash.   Neurological: Negative for dizziness, syncope, weakness, light-headedness, numbness and headaches.   Psychiatric/Behavioral: Negative for behavioral problems, decreased concentration and sleep disturbance.     Objective:     Vital Signs (Most Recent):  Temp: 98 °F (36.7 °C) (01/01/22 0330)  Pulse: 98 (01/01/22 0630)  Resp: (!) 23 (01/01/22 0630)  BP: (!) 151/109 (01/01/22 0630)  SpO2: (!) 94 % (01/01/22 0630) Vital Signs (24h Range):  Temp:  [97.9 °F (36.6 °C)-98.7 °F (37.1 °C)] 98 °F (36.7 °C)  Pulse:  [] 98  Resp:  [9-57] 23  SpO2:  [90 %-98 %] 94 %  BP: (129-211)/() 151/109     Weight: 86.5 kg (190 lb 11.2 oz)  Body mass index is 25.16 kg/m².    Intake/Output Summary (Last 24 hours) at 1/1/2022 0811  Last data filed at 1/1/2022 0600  Gross per 24 hour   Intake 493.44 ml   Output 1775 ml   Net -1281.56 ml      Physical Exam  Constitutional:       Appearance: He is well-developed.   HENT:      Head: Normocephalic and atraumatic.      Right Ear: There is no impacted cerumen.      Left Ear: There is no impacted cerumen.      Nose: No congestion.   Cardiovascular:      Rate and Rhythm: Regular rhythm. Tachycardia present.      Heart sounds: Normal heart sounds. No murmur heard.  No friction rub. No gallop.    Pulmonary:      Effort: Pulmonary effort is normal.      Breath sounds: Normal breath sounds.   Abdominal:      Tenderness: There is no abdominal tenderness.   Musculoskeletal:         General: No swelling or tenderness.      Cervical back: Normal range of motion and neck supple. No tenderness.      Right lower leg: No edema.      Left lower leg: No edema.   Skin:     General: Skin is warm.      Coloration: Skin is not pale.   Neurological:      Mental Status: He is alert and oriented to person, place, and time. Mental status is at baseline.   Psychiatric:         Mood and Affect: Mood is not anxious or depressed.         Speech: Speech normal.          Behavior: Behavior normal.         Thought Content: Thought content normal.         Significant Labs:   A1C:   Recent Labs   Lab 12/30/21  1509   HGBA1C 5.6     ABGs: No results for input(s): PH, PCO2, HCO3, POCSATURATED, BE, TOTALHB, COHB, METHB, O2HB, POCFIO2, PO2 in the last 48 hours.  Bilirubin:   Recent Labs   Lab 12/30/21  1058   BILITOT 0.6     Blood Culture: No results for input(s): LABBLOO in the last 48 hours.  CBC:   Recent Labs   Lab 12/30/21  1058   WBC 6.70   HGB 14.2   HCT 42.3        CMP:   Recent Labs   Lab 12/30/21  1058 12/31/21  0312 01/01/22  0541    134* 136   K 4.0 3.5 3.7    103 100   CO2 22* 18* 23    99 105   BUN 18 15 21*   CREATININE 1.4 1.3 1.4   CALCIUM 9.1 9.4 9.1   PROT 7.7  --   --    ALBUMIN 3.6  --   --    BILITOT 0.6  --   --    ALKPHOS 118  --   --    AST 37  --   --    ALT 39  --   --    ANIONGAP 11 13 13   EGFRNONAA 55* >60 55*     Lactic Acid: No results for input(s): LACTATE in the last 48 hours.  Lipase: No results for input(s): LIPASE in the last 48 hours.  Lipid Panel: No results for input(s): CHOL, HDL, LDLCALC, TRIG, CHOLHDL in the last 48 hours.  Magnesium:   Recent Labs   Lab 12/30/21  1058   MG 1.9     Troponin:   Recent Labs   Lab 12/30/21  1058 12/30/21  1347   TROPONINI 0.035* 0.030*     TSH: No results for input(s): TSH in the last 4320 hours.  Urine Culture: No results for input(s): LABURIN in the last 48 hours.  Urine Studies:   Recent Labs   Lab 12/30/21  1347   COLORU Colorless*   APPEARANCEUA Clear   PHUR 7.0   SPECGRAV 1.010   PROTEINUA 1+*   GLUCUA Negative   KETONESU Negative   BILIRUBINUA Negative   OCCULTUA Negative   NITRITE Negative   UROBILINOGEN Negative   LEUKOCYTESUR Negative   RBCUA 0   WBCUA 0   BACTERIA None   HYALINECASTS 0       Significant Imaging: I have reviewed all pertinent imaging results/findings within the past 24 hours.      Assessment/Plan:      * new onset Acute congestive heart failure  Likely due to  uncontrolled hypertension  BNP 1636  Troponin  EKG negative for any ischemic changes  TTE  Started on IV Lasix in the ED-continue at 20 mg b.i.d.  Urine toxicology pending  Supplemental oxygen as tolerated  Consult cardiology- appreciates rec's- consider losartan (with goal to transition to sacubitril-valsartan)    Atrial fibrillation  Possibly secondary to uncontrolled BP  -Ischemic work up once euvolemic  Consult Cardiology: consider metoprolol succinate when euvolemic    Hypertensive urgency  Hypertension  Blood pressure uncontrolled on admission 191/129  During exam and after 1 dose of Lasix and hydralazine Bp 134/120  Patient has been out of medication for some months  Continue Norvasc  Continue IV Lasix  Cardene drip  Admit ICU  Add Lisinopril switch to losartan per cardiology  TTE  · The left ventricle is mildly enlarged with concentric hypertrophy and mildly decreased systolic function.  · The estimated ejection fraction is 40%.  · There is left ventricular global hypokinesis.  · Left ventricular diastolic dysfunction.  · Normal right ventricular size with normal right ventricular systolic function.  · Mild left atrial enlargement.  · The sinuses of Valsalva is mildly dilated.  · Moderate aortic regurgitation.  · Moderate mitral regurgitation.            Elevated troponin  Likely due to demand ischemia  Monitor      Tobacco abuse  > 3 minute counseling regarding smoking cessation  Smokes about 1 pack per day   Nicotine patch      Alcoholism /alcohol abuse  Ongoing counseling regarding alcohol  Last use 12/29      GERD (gastroesophageal reflux disease)  PPI        VTE Risk Mitigation (From admission, onward)         Ordered     heparin (porcine) injection 5,000 Units  Every 8 hours         12/30/21 2134     IP VTE HIGH RISK PATIENT  Once         12/30/21 2134     Place sequential compression device  Until discontinued         12/30/21 2134                Discharge Planning   SANTOS:      Code Status: Full Code    Is the patient medically ready for discharge?:     Reason for patient still in hospital (select all that apply): Patient trending condition               Critical care time spent on the evaluation and treatment of severe organ dysfunction, review of pertinent labs and imaging studies, discussions with consulting providers and discussions with patient/family: 35 minutes.      Darlene Sampson MD  Department of Hospital Medicine   [facility] - Intensive Care

## 2022-01-01 NOTE — PROGRESS NOTES
[facility] - Intensive Care  Cardiology  Progress Note    Patient Name: Sarbjit Brewer Sr.  MRN: [id number]  Admission Date: 12/30/2021  Hospital Length of Stay: 2 days  Code Status: Full Code   Attending Physician: Darlene Sampson*   Primary Care Physician: Primary Doctor No  Expected Discharge Date:   Principal Problem:Acute congestive heart failure    Subjective:     57 yo male h/o HTN  Adm for ADHF (new onset) in the setting of running out of his medications for HTN for 2 months (?amlodipine, chlorthalidone)     Adm /129  Echo LVEF 40%, GHK, DD. Normal RVSF. Mild LAE. SoV mildly dilated. Mod AI, mod MR, mild-mod NE. PASP 30, CVP 3     On nicardipine gtt. SBP 170s  Trop 0.035-->0.030     EKG with AFib     Mr Brewer denies CP, SOB. He continues to have the persistent cough- non-productive.    Review of Systems   Cardiovascular: Negative for chest pain.   Respiratory: Positive for cough. Negative for shortness of breath.      Objective:     Vital Signs (Most Recent):  Temp: 98.4 °F (36.9 °C) (01/01/22 0700)  Pulse: (!) 113 (01/01/22 1015)  Resp: (!) 30 (01/01/22 1015)  BP: (!) 168/95 (01/01/22 1015)  SpO2: 96 % (01/01/22 1015) Vital Signs (24h Range):  Temp:  [97.9 °F (36.6 °C)-98.7 °F (37.1 °C)] 98.4 °F (36.9 °C)  Pulse:  [] 113  Resp:  [9-54] 30  SpO2:  [90 %-98 %] 96 %  BP: (100-211)/() 168/95     Weight: 86.5 kg (190 lb 11.2 oz)  Body mass index is 25.16 kg/m².    SpO2: 96 %  O2 Device (Oxygen Therapy): room air      Intake/Output Summary (Last 24 hours) at 1/1/2022 1049  Last data filed at 1/1/2022 0600  Gross per 24 hour   Intake 493.44 ml   Output 1775 ml   Net -1281.56 ml       Lines/Drains/Airways     Peripheral Intravenous Line                 Peripheral IV - Single Lumen 12/30/21 1030 18 G Left Antecubital 2 days         Peripheral IV - Single Lumen 12/30/21 1352 20 G Left Forearm 1 day                Physical Exam  Constitutional:       General: He is not in acute distress.      Appearance: He is well-developed. He is not diaphoretic.   HENT:      Head: Normocephalic.   Neck:      Vascular: No JVD.   Cardiovascular:      Rate and Rhythm: Tachycardia present. Rhythm irregular.      Heart sounds: No murmur heard.  No friction rub. No gallop.    Pulmonary:      Effort: Pulmonary effort is normal. No respiratory distress.      Breath sounds: Normal breath sounds.   Abdominal:      Palpations: Abdomen is soft.      Tenderness: There is no abdominal tenderness.   Musculoskeletal:         General: No swelling.      Cervical back: Normal range of motion.   Skin:     General: Skin is warm.   Neurological:      Mental Status: He is alert.   Psychiatric:         Mood and Affect: Mood normal.         Significant Labs:   CMP   Recent Labs   Lab 12/30/21  1058 12/31/21  0312 01/01/22  0541    134* 136   K 4.0 3.5 3.7    103 100   CO2 22* 18* 23    99 105   BUN 18 15 21*   CREATININE 1.4 1.3 1.4   CALCIUM 9.1 9.4 9.1   PROT 7.7  --   --    ALBUMIN 3.6  --   --    BILITOT 0.6  --   --    ALKPHOS 118  --   --    AST 37  --   --    ALT 39  --   --    ANIONGAP 11 13 13   ESTGFRAFRICA >60 >60 >60   EGFRNONAA 55* >60 55*   , CBC   Recent Labs   Lab 12/30/21  1058   WBC 6.70   HGB 14.2   HCT 42.3      , INR No results for input(s): INR, PROTIME in the last 48 hours. and Troponin   Recent Labs   Lab 12/30/21  1058 12/30/21  1347   TROPONINI 0.035* 0.030*       Assessment and Plan:     ADHF, HFrEF  - Euvolemic today  - Losartan (with goal to transition to sacubitril-valsartan)  - Metoprolol succinate  - IV furosemide. I/O almost -1.2L  - Ischemic work up discussed including angiogram versus stress test. He will think about this and we will touch base tomorrow  - Possibly secondary to uncontrolled BP, AFib     Troponin elevation  Likely demand ischemia     AFib  EKG personally reviewed. Afib noted  CHADS2-VASc 2 (CHF, HTN)  HAS-BLED 0  AC recommended if benefits outweigh the  risks     HTN  Anti-HTN meds as noted above  Amlodipine --> Consider DC, and up-titrate losartan     Tobacco use  Active; has decided to stop      Active Diagnoses:    Diagnosis Date Noted POA    PRINCIPAL PROBLEM:  new onset Acute congestive heart failure [I50.9] 12/30/2021 Yes    Tachycardia [R00.0] 12/31/2021 Yes    Atrial fibrillation [I48.91] 12/31/2021 Yes    Elevated troponin [R77.8] 12/30/2021 Yes    Hypertensive urgency [I16.0] 12/30/2021 Yes    Alcoholism /alcohol abuse [F10.20] 02/03/2020 Yes    Tobacco abuse [Z72.0] 02/03/2020 Yes    Hypertension [I10] 07/31/2014 Yes    GERD (gastroesophageal reflux disease) [K21.9] 07/31/2014 Yes      Problems Resolved During this Admission:       VTE Risk Mitigation (From admission, onward)         Ordered     heparin (porcine) injection 5,000 Units  Every 8 hours         12/30/21 2134     IP VTE HIGH RISK PATIENT  Once         12/30/21 2134     Place sequential compression device  Until discontinued         12/30/21 2134              30 minutes of critical care time was utilized in the care of the patient including evaluating the patient, reviewing the records, medical decision making, discussing next steps with patient, attempting to reach his wife Elise to discuss (going to voicemail), and discussing with primary team attending Dr Orlando Jaramillo MD  Cardiology  [facility] - Intensive Care

## 2022-01-01 NOTE — SUBJECTIVE & OBJECTIVE
Interval History: awake and alert,   Of Cardene drip since yesterday, BP still elevated and heart rate in the 1 teens, with a fever  Continue p.o. blood pressure medication this morning       Appreciate cardiology    TTE EF 40% , LV global hypokinesis and LD diastolic dysfunction  Diuresing- continue Lasix  Requesting for work excuse  Step down today      Review of Systems   Constitutional: Negative for activity change, chills, diaphoresis and fever.   Respiratory: Negative for apnea, cough, shortness of breath and wheezing.    Cardiovascular: Negative for chest pain.   Gastrointestinal: Negative for nausea and rectal pain.   Genitourinary: Negative for dysuria.   Skin: Negative for rash.   Neurological: Negative for dizziness, syncope, weakness, light-headedness, numbness and headaches.   Psychiatric/Behavioral: Negative for behavioral problems, decreased concentration and sleep disturbance.     Objective:     Vital Signs (Most Recent):  Temp: 98 °F (36.7 °C) (01/01/22 0330)  Pulse: 98 (01/01/22 0630)  Resp: (!) 23 (01/01/22 0630)  BP: (!) 151/109 (01/01/22 0630)  SpO2: (!) 94 % (01/01/22 0630) Vital Signs (24h Range):  Temp:  [97.9 °F (36.6 °C)-98.7 °F (37.1 °C)] 98 °F (36.7 °C)  Pulse:  [] 98  Resp:  [9-57] 23  SpO2:  [90 %-98 %] 94 %  BP: (129-211)/() 151/109     Weight: 86.5 kg (190 lb 11.2 oz)  Body mass index is 25.16 kg/m².    Intake/Output Summary (Last 24 hours) at 1/1/2022 0811  Last data filed at 1/1/2022 0600  Gross per 24 hour   Intake 493.44 ml   Output 1775 ml   Net -1281.56 ml      Physical Exam  Constitutional:       Appearance: He is well-developed.   HENT:      Head: Normocephalic and atraumatic.      Right Ear: There is no impacted cerumen.      Left Ear: There is no impacted cerumen.      Nose: No congestion.   Cardiovascular:      Rate and Rhythm: Regular rhythm. Tachycardia present.      Heart sounds: Normal heart sounds. No murmur heard.  No friction rub. No gallop.    Pulmonary:       Effort: Pulmonary effort is normal.      Breath sounds: Normal breath sounds.   Abdominal:      Tenderness: There is no abdominal tenderness.   Musculoskeletal:         General: No swelling or tenderness.      Cervical back: Normal range of motion and neck supple. No tenderness.      Right lower leg: No edema.      Left lower leg: No edema.   Skin:     General: Skin is warm.      Coloration: Skin is not pale.   Neurological:      Mental Status: He is alert and oriented to person, place, and time. Mental status is at baseline.   Psychiatric:         Mood and Affect: Mood is not anxious or depressed.         Speech: Speech normal.         Behavior: Behavior normal.         Thought Content: Thought content normal.         Significant Labs:   A1C:   Recent Labs   Lab 12/30/21  1509   HGBA1C 5.6     ABGs: No results for input(s): PH, PCO2, HCO3, POCSATURATED, BE, TOTALHB, COHB, METHB, O2HB, POCFIO2, PO2 in the last 48 hours.  Bilirubin:   Recent Labs   Lab 12/30/21  1058   BILITOT 0.6     Blood Culture: No results for input(s): LABBLOO in the last 48 hours.  CBC:   Recent Labs   Lab 12/30/21  1058   WBC 6.70   HGB 14.2   HCT 42.3        CMP:   Recent Labs   Lab 12/30/21  1058 12/31/21  0312 01/01/22  0541    134* 136   K 4.0 3.5 3.7    103 100   CO2 22* 18* 23    99 105   BUN 18 15 21*   CREATININE 1.4 1.3 1.4   CALCIUM 9.1 9.4 9.1   PROT 7.7  --   --    ALBUMIN 3.6  --   --    BILITOT 0.6  --   --    ALKPHOS 118  --   --    AST 37  --   --    ALT 39  --   --    ANIONGAP 11 13 13   EGFRNONAA 55* >60 55*     Lactic Acid: No results for input(s): LACTATE in the last 48 hours.  Lipase: No results for input(s): LIPASE in the last 48 hours.  Lipid Panel: No results for input(s): CHOL, HDL, LDLCALC, TRIG, CHOLHDL in the last 48 hours.  Magnesium:   Recent Labs   Lab 12/30/21  1058   MG 1.9     Troponin:   Recent Labs   Lab 12/30/21  1058 12/30/21  1347   TROPONINI 0.035* 0.030*     TSH: No  results for input(s): TSH in the last 4320 hours.  Urine Culture: No results for input(s): LABURIN in the last 48 hours.  Urine Studies:   Recent Labs   Lab 12/30/21  1347   COLORU Colorless*   APPEARANCEUA Clear   PHUR 7.0   SPECGRAV 1.010   PROTEINUA 1+*   GLUCUA Negative   KETONESU Negative   BILIRUBINUA Negative   OCCULTUA Negative   NITRITE Negative   UROBILINOGEN Negative   LEUKOCYTESUR Negative   RBCUA 0   WBCUA 0   BACTERIA None   HYALINECASTS 0       Significant Imaging: I have reviewed all pertinent imaging results/findings within the past 24 hours.

## 2022-01-01 NOTE — CONSULTS
"RD consulted for "Education on fluid and salt restriction." RD providing remote coverage.   Attached handouts to discharge instructions.    On-site RD to follow up with in person education as appropriate.     Please re-consult as needed.    Thank you.     "

## 2022-01-02 LAB
ANION GAP SERPL CALC-SCNC: 12 MMOL/L (ref 8–16)
BUN SERPL-MCNC: 28 MG/DL (ref 6–20)
CALCIUM SERPL-MCNC: 9.2 MG/DL (ref 8.7–10.5)
CHLORIDE SERPL-SCNC: 99 MMOL/L (ref 95–110)
CO2 SERPL-SCNC: 25 MMOL/L (ref 23–29)
CREAT SERPL-MCNC: 1.6 MG/DL (ref 0.5–1.4)
EST. GFR  (AFRICAN AMERICAN): 54 ML/MIN/1.73 M^2
EST. GFR  (NON AFRICAN AMERICAN): 47 ML/MIN/1.73 M^2
GLUCOSE SERPL-MCNC: 103 MG/DL (ref 70–110)
POTASSIUM SERPL-SCNC: 3.8 MMOL/L (ref 3.5–5.1)
SODIUM SERPL-SCNC: 136 MMOL/L (ref 136–145)

## 2022-01-02 PROCEDURE — 99233 SBSQ HOSP IP/OBS HIGH 50: CPT | Mod: ,,, | Performed by: INTERNAL MEDICINE

## 2022-01-02 PROCEDURE — 20000000 HC ICU ROOM

## 2022-01-02 PROCEDURE — 99233 PR SUBSEQUENT HOSPITAL CARE,LEVL III: ICD-10-PCS | Mod: ,,, | Performed by: INTERNAL MEDICINE

## 2022-01-02 PROCEDURE — 25000003 PHARM REV CODE 250: Performed by: EMERGENCY MEDICINE

## 2022-01-02 PROCEDURE — 36415 COLL VENOUS BLD VENIPUNCTURE: CPT | Performed by: FAMILY MEDICINE

## 2022-01-02 PROCEDURE — 80048 BASIC METABOLIC PNL TOTAL CA: CPT | Performed by: FAMILY MEDICINE

## 2022-01-02 PROCEDURE — 25000003 PHARM REV CODE 250: Performed by: NURSE PRACTITIONER

## 2022-01-02 PROCEDURE — 94760 N-INVAS EAR/PLS OXIMETRY 1: CPT

## 2022-01-02 PROCEDURE — 63600175 PHARM REV CODE 636 W HCPCS: Performed by: FAMILY MEDICINE

## 2022-01-02 PROCEDURE — 25000003 PHARM REV CODE 250: Performed by: FAMILY MEDICINE

## 2022-01-02 RX ORDER — LOSARTAN POTASSIUM 50 MG/1
50 TABLET ORAL DAILY
Status: DISCONTINUED | OUTPATIENT
Start: 2022-01-03 | End: 2022-01-03 | Stop reason: HOSPADM

## 2022-01-02 RX ORDER — FUROSEMIDE 20 MG/1
20 TABLET ORAL 2 TIMES DAILY
Status: DISCONTINUED | OUTPATIENT
Start: 2022-01-02 | End: 2022-01-02

## 2022-01-02 RX ORDER — FUROSEMIDE 20 MG/1
20 TABLET ORAL DAILY
Status: DISCONTINUED | OUTPATIENT
Start: 2022-01-03 | End: 2022-01-03 | Stop reason: HOSPADM

## 2022-01-02 RX ADMIN — BENZONATATE 100 MG: 100 CAPSULE ORAL at 12:01

## 2022-01-02 RX ADMIN — MELATONIN TAB 3 MG 6 MG: 3 TAB at 09:01

## 2022-01-02 RX ADMIN — MUPIROCIN: 20 OINTMENT TOPICAL at 09:01

## 2022-01-02 RX ADMIN — AMLODIPINE BESYLATE 10 MG: 5 TABLET ORAL at 08:01

## 2022-01-02 RX ADMIN — BENZONATATE 100 MG: 100 CAPSULE ORAL at 05:01

## 2022-01-02 RX ADMIN — FAMOTIDINE 20 MG: 20 TABLET ORAL at 08:01

## 2022-01-02 RX ADMIN — HEPARIN SODIUM 5000 UNITS: 5000 INJECTION, SOLUTION INTRAVENOUS; SUBCUTANEOUS at 05:01

## 2022-01-02 RX ADMIN — BENZONATATE 100 MG: 100 CAPSULE ORAL at 01:01

## 2022-01-02 RX ADMIN — LOSARTAN POTASSIUM 25 MG: 25 TABLET, FILM COATED ORAL at 08:01

## 2022-01-02 RX ADMIN — METOPROLOL SUCCINATE 25 MG: 25 TABLET, EXTENDED RELEASE ORAL at 08:01

## 2022-01-02 RX ADMIN — MUPIROCIN: 20 OINTMENT TOPICAL at 01:01

## 2022-01-02 RX ADMIN — HEPARIN SODIUM 5000 UNITS: 5000 INJECTION, SOLUTION INTRAVENOUS; SUBCUTANEOUS at 09:01

## 2022-01-02 RX ADMIN — BENZONATATE 100 MG: 100 CAPSULE ORAL at 09:01

## 2022-01-02 RX ADMIN — FUROSEMIDE 20 MG: 20 TABLET ORAL at 08:01

## 2022-01-02 RX ADMIN — FAMOTIDINE 20 MG: 20 TABLET ORAL at 09:01

## 2022-01-02 RX ADMIN — HEPARIN SODIUM 5000 UNITS: 5000 INJECTION, SOLUTION INTRAVENOUS; SUBCUTANEOUS at 01:01

## 2022-01-02 NOTE — PROGRESS NOTES
[facility] - Intensive Care  Cardiology  Progress Note    Patient Name: Sarbjit Brewer Sr.  MRN: [id number]  Admission Date: 12/30/2021  Hospital Length of Stay: 3 days  Code Status: Full Code   Attending Physician: Darlene Sampson*   Primary Care Physician: Primary Doctor No  Expected Discharge Date:   Principal Problem:Acute congestive heart failure    Subjective:     57 yo male h/o HTN  Adm for ADHF (new onset) in the setting of running out of his medications for HTN for 2 months (?amlodipine, chlorthalidone)     Adm /129  Echo LVEF 40%, GHK, DD. Normal RVSF. Mild LAE. SoV mildly dilated. Mod AI, mod MR, mild-mod ND. PASP 30, CVP 3     On nicardipine gtt. SBP 170s  Trop 0.035-->0.030     EKG with AFib     Mr Brewer continues to deny CP, SOB. Cough is resolving and not as bad.    Review of Systems   Cardiovascular: Negative for chest pain.   Respiratory: Negative for shortness of breath. Cough: Improved from yesterday.      Objective:     Vital Signs (Most Recent):  Temp: 98.1 °F (36.7 °C) (01/02/22 0815)  Pulse: (!) 116 (01/02/22 0800)  Resp: (!) 28 (01/02/22 0800)  BP: (!) 123/93 (01/02/22 0815)  SpO2: 98 % (01/02/22 0100) Vital Signs (24h Range):  Temp:  [98.1 °F (36.7 °C)-98.9 °F (37.2 °C)] 98.1 °F (36.7 °C)  Pulse:  [] 116  Resp:  [12-41] 28  SpO2:  [85 %-100 %] 98 %  BP: (117-177)/() 123/93     Weight: 84.5 kg (186 lb 4.6 oz)  Body mass index is 24.58 kg/m².    SpO2: 98 %  O2 Device (Oxygen Therapy): room air      Intake/Output Summary (Last 24 hours) at 1/2/2022 1019  Last data filed at 1/2/2022 0800  Gross per 24 hour   Intake 220 ml   Output 1325 ml   Net -1105 ml       Lines/Drains/Airways     Peripheral Intravenous Line                 Peripheral IV - Single Lumen 12/30/21 1030 18 G Left Antecubital 2 days         Peripheral IV - Single Lumen 12/30/21 1352 20 G Left Forearm 2 days                Physical Exam  Constitutional:       General: He is not in acute distress.      Appearance: He is well-developed. He is not diaphoretic.   HENT:      Head: Normocephalic.   Neck:      Vascular: No JVD.   Cardiovascular:      Rate and Rhythm: Tachycardia present. Rhythm irregular.      Heart sounds: No murmur heard.  No friction rub. No gallop.    Pulmonary:      Effort: Pulmonary effort is normal. No respiratory distress.      Breath sounds: Normal breath sounds.   Abdominal:      Palpations: Abdomen is soft.      Tenderness: There is no abdominal tenderness.   Musculoskeletal:         General: No swelling.      Cervical back: Normal range of motion.   Skin:     General: Skin is warm.   Neurological:      Mental Status: He is alert.   Psychiatric:         Mood and Affect: Mood normal.         Significant Labs:   CMP   Recent Labs   Lab 01/01/22  0541 01/02/22  0357    136   K 3.7 3.8    99   CO2 23 25    103   BUN 21* 28*   CREATININE 1.4 1.6*   CALCIUM 9.1 9.2   ANIONGAP 13 12   ESTGFRAFRICA >60 54*   EGFRNONAA 55* 47*     Assessment and Plan:     ADHF, HFrEF  - Euvolemic today  - Losartan (with goal to transition to sacubitril-valsartan)  - Metoprolol succinate  - Furosemide. I/O almost -1.2L  - Ischemic work up re-discussed including angiogram versus stress test. He prefers to have a stress test  - Possibly secondary to uncontrolled BP, AFib     Troponin elevation  Likely demand ischemia     AFib  EKG personally reviewed. Afib noted  CHADS2-VASc 2 (CHF, HTN)  HAS-BLED 0  AC recommended if benefits outweigh the risks     HTN  Anti-HTN meds as noted above  Amlodipine --> Consider DC, and up-titrate losartan     Tobacco use  Active; has decided to stop    Active Diagnoses:    Diagnosis Date Noted POA    PRINCIPAL PROBLEM:  new onset Acute congestive heart failure [I50.9] 12/30/2021 Yes    Tachycardia [R00.0] 12/31/2021 Yes    Atrial fibrillation [I48.91] 12/31/2021 Yes    Elevated troponin [R77.8] 12/30/2021 Yes    Hypertensive urgency [I16.0] 12/30/2021 Yes     Alcoholism /alcohol abuse [F10.20] 02/03/2020 Yes    Tobacco abuse [Z72.0] 02/03/2020 Yes    Hypertension [I10] 07/31/2014 Yes    GERD (gastroesophageal reflux disease) [K21.9] 07/31/2014 Yes      Problems Resolved During this Admission:       VTE Risk Mitigation (From admission, onward)         Ordered     heparin (porcine) injection 5,000 Units  Every 8 hours         12/30/21 2134     IP VTE HIGH RISK PATIENT  Once         12/30/21 2134     Place sequential compression device  Until discontinued         12/30/21 2134                Gloria Jaramillo MD  Cardiology  [facility] - Intensive Care

## 2022-01-02 NOTE — PLAN OF CARE
The plan is to transfer out of the ICU when a bed is available. Possible stress test or a cardiac catheterization possibly in the next few days.Dietician will follow up tomorrow with diet education for healthy eating with a new diagnosis of congestive heart failure and hypertension..

## 2022-01-02 NOTE — SUBJECTIVE & OBJECTIVE
Interval History: awake and alert,   Continue p.o. blood pressure medication this morning   Appreciate cardiology rec's    TTE EF 40% , LV global hypokinesis and LD diastolic dysfunction  Diuresing- continue Lasix- switch to po Lasix  Started BB  Requesting for work excuse  Step down     Review of Systems   Constitutional: Negative for activity change, chills, diaphoresis and fever.   Respiratory: Negative for apnea, cough, shortness of breath and wheezing.    Cardiovascular: Negative for chest pain.   Gastrointestinal: Negative for nausea and rectal pain.   Genitourinary: Negative for dysuria.   Skin: Negative for rash.   Neurological: Negative for dizziness, syncope, weakness, light-headedness, numbness and headaches.   Psychiatric/Behavioral: Negative for behavioral problems, decreased concentration and sleep disturbance.     Objective:     Vital Signs (Most Recent):  Temp: 98.1 °F (36.7 °C) (01/02/22 0815)  Pulse: (!) 116 (01/02/22 0800)  Resp: (!) 28 (01/02/22 0800)  BP: (!) 123/93 (01/02/22 0815)  SpO2: 98 % (01/02/22 0100) Vital Signs (24h Range):  Temp:  [98.1 °F (36.7 °C)-98.9 °F (37.2 °C)] 98.1 °F (36.7 °C)  Pulse:  [] 116  Resp:  [12-41] 28  SpO2:  [85 %-100 %] 98 %  BP: (100-177)/() 123/93     Weight: 84.5 kg (186 lb 4.6 oz)  Body mass index is 24.58 kg/m².    Intake/Output Summary (Last 24 hours) at 1/2/2022 0916  Last data filed at 1/2/2022 0800  Gross per 24 hour   Intake 220 ml   Output 1525 ml   Net -1305 ml      Physical Exam  Constitutional:       Appearance: He is well-developed.   HENT:      Head: Normocephalic and atraumatic.      Right Ear: There is no impacted cerumen.      Left Ear: There is no impacted cerumen.      Nose: No congestion.   Cardiovascular:      Rate and Rhythm: Regular rhythm. Tachycardia present.      Heart sounds: Normal heart sounds. No murmur heard.  No friction rub. No gallop.    Pulmonary:      Effort: Pulmonary effort is normal.      Breath sounds: Normal  breath sounds.   Abdominal:      Tenderness: There is no abdominal tenderness.   Musculoskeletal:         General: No swelling or tenderness.      Cervical back: Normal range of motion and neck supple. No tenderness.      Right lower leg: No edema.      Left lower leg: No edema.   Skin:     General: Skin is warm.      Coloration: Skin is not pale.   Neurological:      Mental Status: He is alert and oriented to person, place, and time. Mental status is at baseline.   Psychiatric:         Mood and Affect: Mood is not anxious or depressed.         Speech: Speech normal.         Behavior: Behavior normal.         Thought Content: Thought content normal.         Significant Labs:   A1C:   Recent Labs   Lab 12/30/21  1509   HGBA1C 5.6     ABGs: No results for input(s): PH, PCO2, HCO3, POCSATURATED, BE, TOTALHB, COHB, METHB, O2HB, POCFIO2, PO2 in the last 48 hours.  Bilirubin:   Recent Labs   Lab 12/30/21  1058   BILITOT 0.6     Blood Culture: No results for input(s): LABBLOO in the last 48 hours.  CBC:   No results for input(s): WBC, HGB, HCT, PLT in the last 48 hours.  CMP:   Recent Labs   Lab 01/01/22  0541 01/02/22  0357    136   K 3.7 3.8    99   CO2 23 25    103   BUN 21* 28*   CREATININE 1.4 1.6*   CALCIUM 9.1 9.2   ANIONGAP 13 12   EGFRNONAA 55* 47*     Lactic Acid: No results for input(s): LACTATE in the last 48 hours.  Lipase: No results for input(s): LIPASE in the last 48 hours.  Lipid Panel: No results for input(s): CHOL, HDL, LDLCALC, TRIG, CHOLHDL in the last 48 hours.  Magnesium:   No results for input(s): MG in the last 48 hours.  Troponin:   No results for input(s): TROPONINI in the last 48 hours.  TSH: No results for input(s): TSH in the last 4320 hours.  Urine Culture: No results for input(s): LABURIN in the last 48 hours.  Urine Studies:   No results for input(s): COLORU, APPEARANCEUA, PHUR, SPECGRAV, PROTEINUA, GLUCUA, KETONESU, BILIRUBINUA, OCCULTUA, NITRITE, UROBILINOGEN,  LEUKOCYTESUR, RBCUA, WBCUA, BACTERIA, SQUAMEPITHEL, HYALINECASTS in the last 48 hours.    Invalid input(s): KIERAN    Significant Imaging: I have reviewed all pertinent imaging results/findings within the past 24 hours.

## 2022-01-02 NOTE — ASSESSMENT & PLAN NOTE
Likely due to uncontrolled hypertension  BNP 1636  Troponin  EKG negative for any ischemic changes  TTE: EF 40% , LV global hypokinesis and LD diastolic dysfunction  Started on IV Lasix in the ED-continue at 20 mg b.i.d. switch to Po on 1/2  Urine toxicology pending  Supplemental oxygen as tolerated  Consult cardiology- appreciates rec's- consider losartan (with goal to transition to sacubitril-valsartan)

## 2022-01-02 NOTE — PROGRESS NOTES
[facility] Medicine  Progress Note    Patient Name: Sarbjit Brewer Sr.  MRN: [id number]  Patient Class: IP- Inpatient   Admission Date: 12/30/2021  Length of Stay: 3 days  Attending Physician: Darlene Sampson*  Primary Care Provider: Primary Doctor No        Subjective:     Principal Problem:Acute congestive heart failure        HPI:  Sarbjit Brewer Sr. is a 55 y/o M with PMH of Hypertension, present with 6 weeks history of progressive cough with productive greenish sputum, with associated runny nose, and congestion.  He denies fever, chills, nausea, vomiting, chest pain, dizziness, diaphoresis, headache, weakness. Patient has tried NyQuil and benadryl without any relief.  Patient reported elevated blood pressure few months ago in the ED and was prescribed a 30-day supply of blood pressure medication and is yet to follow-up with his PCP and has run out of medication.   BNP 1636, troponin 0.03, CXR concerning for pulmonary edema, blood pressure elevated at 191/129, EKG with no ischemic changes.      In the ED received Lasix, hydralazine, nitroglycerin paste and labetalol with minimal blood pressure improvement.  Start Cardene drip and admit hospital medicine service on observation for new onset CHF      Overview/Hospital Course:  No notes on file    Interval History: awake and alert,   Continue p.o. blood pressure medication this morning   Appreciate cardiology rec's    TTE EF 40% , LV global hypokinesis and LD diastolic dysfunction  Diuresing- continue Lasix- switch to po Lasix  Started BB  Requesting for work excuse  Step down     Review of Systems   Constitutional: Negative for activity change, chills, diaphoresis and fever.   Respiratory: Negative for apnea, cough, shortness of breath and wheezing.    Cardiovascular: Negative for chest pain.   Gastrointestinal: Negative for nausea and rectal pain.   Genitourinary: Negative for dysuria.   Skin: Negative for rash.   Neurological: Negative  for dizziness, syncope, weakness, light-headedness, numbness and headaches.   Psychiatric/Behavioral: Negative for behavioral problems, decreased concentration and sleep disturbance.     Objective:     Vital Signs (Most Recent):  Temp: 98.1 °F (36.7 °C) (01/02/22 0815)  Pulse: (!) 116 (01/02/22 0800)  Resp: (!) 28 (01/02/22 0800)  BP: (!) 123/93 (01/02/22 0815)  SpO2: 98 % (01/02/22 0100) Vital Signs (24h Range):  Temp:  [98.1 °F (36.7 °C)-98.9 °F (37.2 °C)] 98.1 °F (36.7 °C)  Pulse:  [] 116  Resp:  [12-41] 28  SpO2:  [85 %-100 %] 98 %  BP: (100-177)/() 123/93     Weight: 84.5 kg (186 lb 4.6 oz)  Body mass index is 24.58 kg/m².    Intake/Output Summary (Last 24 hours) at 1/2/2022 0916  Last data filed at 1/2/2022 0800  Gross per 24 hour   Intake 220 ml   Output 1525 ml   Net -1305 ml      Physical Exam  Constitutional:       Appearance: He is well-developed.   HENT:      Head: Normocephalic and atraumatic.      Right Ear: There is no impacted cerumen.      Left Ear: There is no impacted cerumen.      Nose: No congestion.   Cardiovascular:      Rate and Rhythm: Regular rhythm. Tachycardia present.      Heart sounds: Normal heart sounds. No murmur heard.  No friction rub. No gallop.    Pulmonary:      Effort: Pulmonary effort is normal.      Breath sounds: Normal breath sounds.   Abdominal:      Tenderness: There is no abdominal tenderness.   Musculoskeletal:         General: No swelling or tenderness.      Cervical back: Normal range of motion and neck supple. No tenderness.      Right lower leg: No edema.      Left lower leg: No edema.   Skin:     General: Skin is warm.      Coloration: Skin is not pale.   Neurological:      Mental Status: He is alert and oriented to person, place, and time. Mental status is at baseline.   Psychiatric:         Mood and Affect: Mood is not anxious or depressed.         Speech: Speech normal.         Behavior: Behavior normal.         Thought Content: Thought content  normal.         Significant Labs:   A1C:   Recent Labs   Lab 12/30/21  1509   HGBA1C 5.6     ABGs: No results for input(s): PH, PCO2, HCO3, POCSATURATED, BE, TOTALHB, COHB, METHB, O2HB, POCFIO2, PO2 in the last 48 hours.  Bilirubin:   Recent Labs   Lab 12/30/21  1058   BILITOT 0.6     Blood Culture: No results for input(s): LABBLOO in the last 48 hours.  CBC:   No results for input(s): WBC, HGB, HCT, PLT in the last 48 hours.  CMP:   Recent Labs   Lab 01/01/22  0541 01/02/22  0357    136   K 3.7 3.8    99   CO2 23 25    103   BUN 21* 28*   CREATININE 1.4 1.6*   CALCIUM 9.1 9.2   ANIONGAP 13 12   EGFRNONAA 55* 47*     Lactic Acid: No results for input(s): LACTATE in the last 48 hours.  Lipase: No results for input(s): LIPASE in the last 48 hours.  Lipid Panel: No results for input(s): CHOL, HDL, LDLCALC, TRIG, CHOLHDL in the last 48 hours.  Magnesium:   No results for input(s): MG in the last 48 hours.  Troponin:   No results for input(s): TROPONINI in the last 48 hours.  TSH: No results for input(s): TSH in the last 4320 hours.  Urine Culture: No results for input(s): LABURIN in the last 48 hours.  Urine Studies:   No results for input(s): COLORU, APPEARANCEUA, PHUR, SPECGRAV, PROTEINUA, GLUCUA, KETONESU, BILIRUBINUA, OCCULTUA, NITRITE, UROBILINOGEN, LEUKOCYTESUR, RBCUA, WBCUA, BACTERIA, SQUAMEPITHEL, HYALINECASTS in the last 48 hours.    Invalid input(s): WRIGHTSUR    Significant Imaging: I have reviewed all pertinent imaging results/findings within the past 24 hours.      Assessment/Plan:      * new onset Acute congestive heart failure  Likely due to uncontrolled hypertension  BNP 1636  Troponin  EKG negative for any ischemic changes  TTE: EF 40% , LV global hypokinesis and LD diastolic dysfunction  Started on IV Lasix in the ED-continue at 20 mg b.i.d. switch to Po on 1/2  Urine toxicology pending  Supplemental oxygen as tolerated  Consult cardiology- appreciates rec's- consider losartan (with  goal to transition to sacubitril-valsartan)    Atrial fibrillation  Possibly secondary to uncontrolled BP  -Ischemic work up once euvolemic  Consult Cardiology: consider metoprolol succinate when euvolemic    Hypertensive urgency  Hypertension  Blood pressure uncontrolled on admission 191/129  During exam and after 1 dose of Lasix and hydralazine Bp 134/120  Patient has been out of medication for some months  Continue Norvasc  Continue IV Lasix  Cardene drip  Admit ICU  Add Lisinopril switch to losartan per cardiology  TTE  · The left ventricle is mildly enlarged with concentric hypertrophy and mildly decreased systolic function.  · The estimated ejection fraction is 40%.  · There is left ventricular global hypokinesis.  · Left ventricular diastolic dysfunction.  · Normal right ventricular size with normal right ventricular systolic function.  · Mild left atrial enlargement.  · The sinuses of Valsalva is mildly dilated.  · Moderate aortic regurgitation.  · Moderate mitral regurgitation.            Elevated troponin  Likely due to demand ischemia  Monitor      Tobacco abuse  > 3 minute counseling regarding smoking cessation  Smokes about 1 pack per day   Nicotine patch      Alcoholism /alcohol abuse  Ongoing counseling regarding alcohol  Last use 12/29      GERD (gastroesophageal reflux disease)  PPI        VTE Risk Mitigation (From admission, onward)         Ordered     heparin (porcine) injection 5,000 Units  Every 8 hours         12/30/21 2134     IP VTE HIGH RISK PATIENT  Once         12/30/21 2134     Place sequential compression device  Until discontinued         12/30/21 2134                Discharge Planning   SANTOS:      Code Status: Full Code   Is the patient medically ready for discharge?:     Reason for patient still in hospital (select all that apply): Patient trending condition               Critical care time spent on the evaluation and treatment of severe organ dysfunction, review of pertinent labs and  imaging studies, discussions with consulting providers and discussions with patient/family: 35 minutes.      Darlene Sampson MD  Department of Hospital Medicine   [facility] - Intensive Care

## 2022-01-02 NOTE — PLAN OF CARE
No acute distress tonight.A fib rate controlled. VS wnl. Received Tessalon Perrle once tonight for cough/non productive. No complaints of pain or sob and is on room air. Pt has transfer to tele but bed not available at this time. Safety maintained.

## 2022-01-03 ENCOUNTER — CLINICAL SUPPORT (OUTPATIENT)
Dept: SMOKING CESSATION | Facility: CLINIC | Age: 57
End: 2022-01-03
Payer: COMMERCIAL

## 2022-01-03 VITALS
HEIGHT: 73 IN | WEIGHT: 184.06 LBS | HEART RATE: 95 BPM | TEMPERATURE: 98 F | RESPIRATION RATE: 16 BRPM | DIASTOLIC BLOOD PRESSURE: 86 MMHG | BODY MASS INDEX: 24.39 KG/M2 | OXYGEN SATURATION: 98 % | SYSTOLIC BLOOD PRESSURE: 134 MMHG

## 2022-01-03 DIAGNOSIS — F17.210 CIGARETTE SMOKER: Primary | ICD-10-CM

## 2022-01-03 LAB
ANION GAP SERPL CALC-SCNC: 11 MMOL/L (ref 8–16)
BUN SERPL-MCNC: 32 MG/DL (ref 6–20)
CALCIUM SERPL-MCNC: 9 MG/DL (ref 8.7–10.5)
CHLORIDE SERPL-SCNC: 103 MMOL/L (ref 95–110)
CO2 SERPL-SCNC: 22 MMOL/L (ref 23–29)
CREAT SERPL-MCNC: 1.6 MG/DL (ref 0.5–1.4)
EST. GFR  (AFRICAN AMERICAN): 54 ML/MIN/1.73 M^2
EST. GFR  (NON AFRICAN AMERICAN): 47 ML/MIN/1.73 M^2
GLUCOSE SERPL-MCNC: 102 MG/DL (ref 70–110)
POTASSIUM SERPL-SCNC: 4 MMOL/L (ref 3.5–5.1)
SODIUM SERPL-SCNC: 136 MMOL/L (ref 136–145)

## 2022-01-03 PROCEDURE — 99233 SBSQ HOSP IP/OBS HIGH 50: CPT | Mod: ,,, | Performed by: NURSE PRACTITIONER

## 2022-01-03 PROCEDURE — 93010 ELECTROCARDIOGRAM REPORT: CPT | Mod: ,,, | Performed by: INTERNAL MEDICINE

## 2022-01-03 PROCEDURE — 94761 N-INVAS EAR/PLS OXIMETRY MLT: CPT

## 2022-01-03 PROCEDURE — 99999 PR PBB SHADOW E&M-EST. PATIENT-LVL I: CPT | Mod: PBBFAC,,,

## 2022-01-03 PROCEDURE — 36415 COLL VENOUS BLD VENIPUNCTURE: CPT | Performed by: FAMILY MEDICINE

## 2022-01-03 PROCEDURE — 99407 BEHAV CHNG SMOKING > 10 MIN: CPT | Mod: S$GLB,,,

## 2022-01-03 PROCEDURE — 25000003 PHARM REV CODE 250: Performed by: FAMILY MEDICINE

## 2022-01-03 PROCEDURE — 99407 PR TOBACCO USE CESSATION INTENSIVE >10 MINUTES: ICD-10-PCS | Mod: S$GLB,,,

## 2022-01-03 PROCEDURE — 0001A HC IMMUNIZ ADMIN, SARS-COV-2 COVID-19 VACC, 30MCG/0.3ML, 1ST DOSE: CPT | Performed by: FAMILY MEDICINE

## 2022-01-03 PROCEDURE — 99233 PR SUBSEQUENT HOSPITAL CARE,LEVL III: ICD-10-PCS | Mod: ,,, | Performed by: NURSE PRACTITIONER

## 2022-01-03 PROCEDURE — 99999 PR PBB SHADOW E&M-EST. PATIENT-LVL I: ICD-10-PCS | Mod: PBBFAC,,,

## 2022-01-03 PROCEDURE — 80048 BASIC METABOLIC PNL TOTAL CA: CPT | Performed by: FAMILY MEDICINE

## 2022-01-03 PROCEDURE — 93005 ELECTROCARDIOGRAM TRACING: CPT

## 2022-01-03 PROCEDURE — 63600175 PHARM REV CODE 636 W HCPCS: Performed by: FAMILY MEDICINE

## 2022-01-03 PROCEDURE — 91300 PHARM REV CODE 636 W HCPCS: CPT | Performed by: FAMILY MEDICINE

## 2022-01-03 PROCEDURE — 93010 EKG 12-LEAD: ICD-10-PCS | Mod: ,,, | Performed by: INTERNAL MEDICINE

## 2022-01-03 RX ORDER — FUROSEMIDE 20 MG/1
20 TABLET ORAL DAILY
Qty: 90 TABLET | Refills: 4 | Status: ON HOLD | OUTPATIENT
Start: 2022-01-04 | End: 2022-01-14 | Stop reason: SDUPTHER

## 2022-01-03 RX ORDER — LOSARTAN POTASSIUM 50 MG/1
50 TABLET ORAL DAILY
Qty: 90 TABLET | Refills: 3 | Status: ON HOLD | OUTPATIENT
Start: 2022-01-04 | End: 2022-01-14 | Stop reason: SDUPTHER

## 2022-01-03 RX ORDER — BENZONATATE 100 MG/1
100 CAPSULE ORAL 3 TIMES DAILY PRN
Qty: 30 CAPSULE | Refills: 0 | Status: SHIPPED | OUTPATIENT
Start: 2022-01-03 | End: 2022-01-14

## 2022-01-03 RX ORDER — IBUPROFEN 200 MG
1 TABLET ORAL DAILY
Qty: 28 PATCH | Refills: 2 | Status: ON HOLD | OUTPATIENT
Start: 2022-01-03 | End: 2022-01-14 | Stop reason: HOSPADM

## 2022-01-03 RX ORDER — METOPROLOL SUCCINATE 25 MG/1
25 TABLET, EXTENDED RELEASE ORAL DAILY
Qty: 90 TABLET | Refills: 4 | Status: ON HOLD | OUTPATIENT
Start: 2022-01-04 | End: 2022-02-04 | Stop reason: HOSPADM

## 2022-01-03 RX ADMIN — LOSARTAN POTASSIUM 50 MG: 50 TABLET, FILM COATED ORAL at 08:01

## 2022-01-03 RX ADMIN — RNA INGREDIENT BNT-162B2 0.3 ML: 0.23 INJECTION, SUSPENSION INTRAMUSCULAR at 02:01

## 2022-01-03 RX ADMIN — FUROSEMIDE 20 MG: 20 TABLET ORAL at 08:01

## 2022-01-03 RX ADMIN — MUPIROCIN: 20 OINTMENT TOPICAL at 08:01

## 2022-01-03 RX ADMIN — METOPROLOL SUCCINATE 25 MG: 25 TABLET, EXTENDED RELEASE ORAL at 08:01

## 2022-01-03 RX ADMIN — FAMOTIDINE 20 MG: 20 TABLET ORAL at 08:01

## 2022-01-03 NOTE — NURSING
Pt NPO after Midnight for potential stress test vs diagnostic cath today. VS wnl Afib rate controlled. No c/o pain. Continues to have frequent non productive cough. Safety maintained.

## 2022-01-03 NOTE — PLAN OF CARE
Re:  Sarbjit Brewer Sr., WORK / SCHOOL EXCUSE    Date: 01/03/2022           [facility] [place] [facility] [address] [place] [address]  Office: [phone]  Fax: [phone]     To whom it may concern:    Mr. Sarbjit Brewer Sr. has been hospitalized at the [facility] [place] since 12/30/2021.  Please excuse the patient from duties.  Patient may return on 1/7/2022.  No restrictions.     Please contact me if you have any questions.                __________________________  Darlene Sampson MD

## 2022-01-03 NOTE — ASSESSMENT & PLAN NOTE
Likely due to uncontrolled hypertension  BNP 1636  Troponin  EKG negative for any ischemic changes  TTE: EF 40% , LV global hypokinesis and LD diastolic dysfunction  Started on IV Lasix in the ED-continue at 20 mg b.i.d. switch to Po on 1/2  Urine toxicology   Supplemental oxygen as tolerated  Consult cardiology- appreciates rec's- consider losartan (with goal to transition to sacubitril-valsartan)  Outpatient cardiology follow-up  Add OAC- Eliquis or Coumadin

## 2022-01-03 NOTE — ASSESSMENT & PLAN NOTE
- /129 upon admission  - elevation related to medication non compliance  - SBP 120s-140s overnight  - on Losartan and Toprol XL  - weaned off Cardene drip  - goal BP less than 130/80  - stressed importance of medication compliance

## 2022-01-03 NOTE — DISCHARGE SUMMARY
[facility] Medicine  Discharge Summary      Patient Name: Sarbjit Brewer Sr.  MRN: [id number]  Patient Class: IP- Inpatient  Admission Date: 12/30/2021  Hospital Length of Stay: 4 days  Discharge Date and Time: 1/3/2022  5:39 PM  Attending Physician: Darlene Arellano*   Discharging Provider: Darlene Arellano MD  Primary Care Provider: Primary Doctor No      HPI:   Sarbjit Brewer Sr. is a 55 y/o M with PMH of Hypertension, present with 6 weeks history of progressive cough with productive greenish sputum, with associated runny nose, and congestion.  He denies fever, chills, nausea, vomiting, chest pain, dizziness, diaphoresis, headache, weakness. Patient has tried NyQuil and benadryl without any relief.  Patient reported elevated blood pressure few months ago in the ED and was prescribed a 30-day supply of blood pressure medication and is yet to follow-up with his PCP and has run out of medication.   BNP 1636, troponin 0.03, CXR concerning for pulmonary edema, blood pressure elevated at 191/129, EKG with no ischemic changes.      In the ED received Lasix, hydralazine, nitroglycerin paste and labetalol with minimal blood pressure improvement.  Start Cardene drip and admit hospital medicine service on observation for new onset CHF      * No surgery found *      Hospital Course:   No notes on file     Goals of Care Treatment Preferences:  Code Status: Full Code      Consults:   Consults (From admission, onward)          Status Ordering Provider     Inpatient consult to Registered Dietitian/Nutritionist  Once        Provider:  (Not yet assigned)    Completed DELANO MCCRAY     Inpatient consult to Social Work/Case Management  Once        Provider:  (Not yet assigned)    Acknowledged DARLENE ARELLANO     Inpatient consult to Registered Dietitian/Nutritionist  Once        Provider:  (Not yet assigned)    Completed DARLENE ARELLANO     Inpatient consult to Cardiology  Once         Provider:  Gloria Jaramillo MD    Completed INNOCENT-GIRISH LILLY.            * new onset Acute congestive heart failure  Likely due to uncontrolled hypertension  BNP 1636  Troponin  EKG negative for any ischemic changes  TTE: EF 40% , LV global hypokinesis and LD diastolic dysfunction  Started on IV Lasix in the ED-continue at 20 mg b.i.d. switch to Po on 1/2  Urine toxicology   Supplemental oxygen as tolerated  Consult cardiology- appreciates rec's- consider losartan (with goal to transition to sacubitril-valsartan)  Outpatient cardiology follow-up  Add OAC- Eliquis or Coumadin    Atrial fibrillation  Tachycardia  Possibly secondary to uncontrolled BP  -Ischemic work up once euvolemic  Consult Cardiology: consider metoprolol succinate when euvolemic    Hypertensive urgency  Hypertension  Blood pressure uncontrolled on admission 191/129  During exam and after 1 dose of Lasix and hydralazine Bp 134/120  Patient has been out of medication for some months  Continue Norvasc.  DC on discharge  Continue IV Lasix switch to p.o.  Cardene drip  Admit ICU-step-down to floor once bed is available  Add Lisinopril switch to losartan per cardiology.  Add BB once euvolemic  TTE  The left ventricle is mildly enlarged with concentric hypertrophy and mildly decreased systolic function.  The estimated ejection fraction is 40%.  There is left ventricular global hypokinesis.  Left ventricular diastolic dysfunction.  Normal right ventricular size with normal right ventricular systolic function.  Mild left atrial enlargement.  The sinuses of Valsalva is mildly dilated.  Moderate aortic regurgitation.  Moderate mitral regurgitation.            Elevated troponin  Likely due to demand ischemia  Monitor      Tobacco abuse  > 3 minute counseling regarding smoking cessation  Smokes about 1 pack per day   Nicotine patch      Alcoholism /alcohol abuse  Ongoing counseling regarding alcohol  Last use 12/29      GERD (gastroesophageal reflux  disease)  PPI        Final Active Diagnoses:    Diagnosis Date Noted POA    PRINCIPAL PROBLEM:  new onset Acute congestive heart failure [I50.9] 12/30/2021 Yes    Tachycardia [R00.0] 12/31/2021 Yes    Atrial fibrillation [I48.91] 12/31/2021 Yes    Elevated troponin [R77.8] 12/30/2021 Yes    Hypertensive urgency [I16.0] 12/30/2021 Yes    Alcoholism /alcohol abuse [F10.20] 02/03/2020 Yes    Tobacco abuse [Z72.0] 02/03/2020 Yes    Hypertension [I10] 07/31/2014 Yes    GERD (gastroesophageal reflux disease) [K21.9] 07/31/2014 Yes      Problems Resolved During this Admission:       Discharged Condition: stable    Disposition:      Follow Up:   Follow-up Information       Primary Doctor No.               Gloria Jaramillo MD In 2 weeks.    Specialty: INTERVENTIONAL CARDIOLOGY  Contact information:  [address]  [address]  [facility] [id number] [phone]                           Patient Instructions:   No discharge procedures on file.        Pending Diagnostic Studies:       None           Medications:  Reconciled Home Medications:      Medication List        START taking these medications      benzonatate 100 MG capsule  Commonly known as: TESSALON  Take 1 capsule (100 mg total) by mouth 3 (three) times daily as needed for Cough.     ELIQUIS 5 mg Tab  Generic drug: apixaban  Take 1 tablet (5 mg total) by mouth 2 (two) times daily.     furosemide 20 MG tablet  Commonly known as: LASIX  Take 1 tablet (20 mg total) by mouth once daily.     losartan 50 MG tablet  Commonly known as: COZAAR  Take 1 tablet (50 mg total) by mouth once daily.     metoprolol succinate 25 MG 24 hr tablet  Commonly known as: TOPROL-XL  Take 1 tablet (25 mg total) by mouth once daily.     nicotine 21 mg/24 hr  Commonly known as: NICODERM CQ  Place 1 patch onto the skin once daily.            STOP taking these medications      amLODIPine 10 MG tablet  Commonly known as: NORVASC     chlorthalidone 25 MG Tab  Commonly known as: HYGROTEN               Indwelling Lines/Drains at time of discharge:   Lines/Drains/Airways       None                   Time spent on the discharge of patient: 35 minutes         Darlene N Orlando-MD Irma  Department of [facility] Medicine  [facility]

## 2022-01-03 NOTE — PROGRESS NOTES
[facility]                    Pharmacy       Discharge Medication Education    Patient ACCEPTED medication education. Pharmacy has provided education on the name, indication, and possible side effects of the medication(s) prescribed, using teach-back method.     The following medications have also been discussed, during this admission.        Medication List        START taking these medications      apixaban 5 mg Tab  Commonly known as: ELIQUIS  Take 1 tablet (5 mg total) by mouth 2 (two) times daily.     furosemide 20 MG tablet  Commonly known as: LASIX  Take 1 tablet (20 mg total) by mouth once daily.  Start taking on: January 4, 2022     losartan 50 MG tablet  Commonly known as: COZAAR  Take 1 tablet (50 mg total) by mouth once daily.  Start taking on: January 4, 2022     metoprolol succinate 25 MG 24 hr tablet  Commonly known as: TOPROL-XL  Take 1 tablet (25 mg total) by mouth once daily.  Start taking on: January 4, 2022            STOP taking these medications      amLODIPine 10 MG tablet  Commonly known as: NORVASC     chlorthalidone 25 MG Tab  Commonly known as: HYGROTEN               Where to Get Your Medications        These medications were sent to [facility]  [address], [place] [address]      Hours: Mon-Fri, 8a-5:30p Phone: [phone]   apixaban 5 mg Tab  furosemide 20 MG tablet  losartan 50 MG tablet  metoprolol succinate 25 MG 24 hr tablet          Thank you  Thoams Pollard, PharmD  [phone]

## 2022-01-03 NOTE — ASSESSMENT & PLAN NOTE
Tachycardia  Possibly secondary to uncontrolled BP  -Ischemic work up once euvolemic  Consult Cardiology: consider metoprolol succinate when euvolemic

## 2022-01-03 NOTE — ASSESSMENT & PLAN NOTE
Hypertension  Blood pressure uncontrolled on admission 191/129  During exam and after 1 dose of Lasix and hydralazine Bp 134/120  Patient has been out of medication for some months  Continue Norvasc.  DC on discharge  Continue IV Lasix switch to p.o.  Cardene drip  Admit ICU-step-down to floor once bed is available  Add Lisinopril switch to losartan per cardiology.  Add BB once euvolemic  TTE  · The left ventricle is mildly enlarged with concentric hypertrophy and mildly decreased systolic function.  · The estimated ejection fraction is 40%.  · There is left ventricular global hypokinesis.  · Left ventricular diastolic dysfunction.  · Normal right ventricular size with normal right ventricular systolic function.  · Mild left atrial enlargement.  · The sinuses of Valsalva is mildly dilated.  · Moderate aortic regurgitation.  · Moderate mitral regurgitation.

## 2022-01-03 NOTE — PLAN OF CARE
"DCA done at bedside with pt. Pt to DC home today. New CHF noted. TN asked if pt has been on a low salt diet. Pt reports NO. TN educated that with CHF, pts are to significantly reduce their salt intake. TN asked if they have a scale in the home. Pt reports NO. TN educated that they should obtain a scale at home and weight her at the same time every morning and record. They should watch for a weight gain. A gain of even 2lbs in a day could indicated 1 L of fluid retention demonstrated with water pitcher at bedside. TN informed pt that he will have to be followed by a cardiologist for life and adhere to medication regimen. Pt verbalizes an understanding.    Future Appointments   Date Time Provider Department Center   1/5/2022  9:30 AM Sheila Singh MD [facility] IMPRI [facility]   1/20/2022  3:20 PM Gloria Jaramillo MD [facility] CARDIO [facility]   1/20/2022  5:00 PM Daisy Muro [facility] SMOKE [facility]   1/27/2022  8:00 AM PFIZER VACCINE, [facility] INTERNAL MEDICINE [facility] IM [facility]     /86 (BP Location: Left arm, Patient Position: Sitting)   Pulse 95   Temp 97.8 °F (36.6 °C) (Oral)   Resp 16   Ht 6' 1" (1.854 m)   Wt 83.5 kg (184 lb 1.4 oz)   SpO2 98%   BMI 24.29 kg/m²      famotidine  20 mg Oral BID    furosemide  20 mg Oral Daily    heparin (porcine)  5,000 Units Subcutaneous Q8H    losartan  50 mg Oral Daily    metoprolol succinate  25 mg Oral Daily    mupirocin   Nasal BID        01/03/22 0454   Discharge Planning   Assessment Type Discharge Planning Brief Assessment   Resource/Environmental Concerns none   Support Systems Spouse/significant other   Equipment Currently Used at Home none   Current Living Arrangements home/apartment/condo   Patient/Family Anticipates Transition to home;home with family   Patient/Family Anticipated Services at Transition none   DME Needed Upon Discharge  none   Discharge Plan A Home;Home with family       "

## 2022-01-03 NOTE — NURSING TRANSFER
Nursing Transfer Note      1/3/2022     Reason patient is being transferred: downgrade    Transfer To: 475    Transfer via wheelchair    Transfer with cardiac monitoring    Transported by RN    Medicines sent: no medications    Any special needs or follow-up needed: no    Chart send with patient: Yes    Notified: spouse    Patient reassessed at: 01/03/2022@ 0902     Upon arrival to floor: cardiac monitor applied, patient oriented to room, call bell in reach and bed in lowest position, new RN @ bedside

## 2022-01-03 NOTE — SUBJECTIVE & OBJECTIVE
Interval History: awake and alert,   Continue p.o. blood pressure medication this morning   Appreciate cardiology rec's- okay to discharge on BB, diuretics and ACE (with goal to transition to sacubitril-valsartan) and OAC  Will need outpatient cardiology follow-up        Requesting for work excuse      Review of Systems   Constitutional: Negative for activity change, chills, diaphoresis and fever.   Respiratory: Negative for apnea, cough, shortness of breath and wheezing.    Cardiovascular: Negative for chest pain.   Gastrointestinal: Negative for nausea and rectal pain.   Genitourinary: Negative for dysuria.   Skin: Negative for rash.   Neurological: Negative for dizziness, syncope, weakness, light-headedness, numbness and headaches.   Psychiatric/Behavioral: Negative for behavioral problems, decreased concentration and sleep disturbance.     Objective:     Vital Signs (Most Recent):  Temp: 97.8 °F (36.6 °C) (01/03/22 0910)  Pulse: 95 (01/03/22 0917)  Resp: 16 (01/03/22 0910)  BP: 134/86 (01/03/22 0910)  SpO2: 98 % (01/03/22 0910) Vital Signs (24h Range):  Temp:  [97.8 °F (36.6 °C)-98.8 °F (37.1 °C)] 97.8 °F (36.6 °C)  Pulse:  [] 95  Resp:  [11-49] 16  SpO2:  [96 %-98 %] 98 %  BP: (127-155)/() 134/86     Weight: 83.5 kg (184 lb 1.4 oz)  Body mass index is 24.29 kg/m².    Intake/Output Summary (Last 24 hours) at 1/3/2022 1041  Last data filed at 1/3/2022 0700  Gross per 24 hour   Intake 940 ml   Output 1125 ml   Net -185 ml      Physical Exam  Constitutional:       Appearance: He is well-developed.   HENT:      Head: Normocephalic and atraumatic.      Right Ear: There is no impacted cerumen.      Left Ear: There is no impacted cerumen.      Nose: No congestion.   Cardiovascular:      Rate and Rhythm: Normal rate and regular rhythm.      Heart sounds: Normal heart sounds. No murmur heard.  No friction rub. No gallop.    Pulmonary:      Effort: Pulmonary effort is normal.      Breath sounds: Normal breath  sounds.   Abdominal:      Tenderness: There is no abdominal tenderness.   Musculoskeletal:         General: No swelling or tenderness.      Cervical back: Normal range of motion and neck supple. No tenderness.      Right lower leg: No edema.      Left lower leg: No edema.   Skin:     General: Skin is warm.      Coloration: Skin is not pale.   Neurological:      Mental Status: He is alert and oriented to person, place, and time. Mental status is at baseline.   Psychiatric:         Mood and Affect: Mood is not anxious or depressed.         Speech: Speech normal.         Behavior: Behavior normal.         Thought Content: Thought content normal.         Significant Labs:   A1C:   Recent Labs   Lab 12/30/21  1509   HGBA1C 5.6     ABGs: No results for input(s): PH, PCO2, HCO3, POCSATURATED, BE, TOTALHB, COHB, METHB, O2HB, POCFIO2, PO2 in the last 48 hours.  Bilirubin:   Recent Labs   Lab 12/30/21  1058   BILITOT 0.6     Blood Culture: No results for input(s): LABBLOO in the last 48 hours.  CBC:   No results for input(s): WBC, HGB, HCT, PLT in the last 48 hours.  CMP:   Recent Labs   Lab 01/02/22  0357 01/03/22  0322    136   K 3.8 4.0   CL 99 103   CO2 25 22*    102   BUN 28* 32*   CREATININE 1.6* 1.6*   CALCIUM 9.2 9.0   ANIONGAP 12 11   EGFRNONAA 47* 47*     Lactic Acid: No results for input(s): LACTATE in the last 48 hours.  Lipase: No results for input(s): LIPASE in the last 48 hours.  Lipid Panel: No results for input(s): CHOL, HDL, LDLCALC, TRIG, CHOLHDL in the last 48 hours.  Magnesium:   No results for input(s): MG in the last 48 hours.  Troponin:   No results for input(s): TROPONINI in the last 48 hours.  TSH: No results for input(s): TSH in the last 4320 hours.  Urine Culture: No results for input(s): LABURIN in the last 48 hours.  Urine Studies:   No results for input(s): COLORU, APPEARANCEUA, PHUR, SPECGRAV, PROTEINUA, GLUCUA, KETONESU, BILIRUBINUA, OCCULTUA, NITRITE, UROBILINOGEN, LEUKOCYTESUR,  RBCUA, WBCUA, BACTERIA, SQUAMEPITHEL, HYALINECASTS in the last 48 hours.    Invalid input(s): KIERAN    Significant Imaging: I have reviewed all pertinent imaging results/findings within the past 24 hours.

## 2022-01-03 NOTE — PROGRESS NOTES
[facility] - Telemetry  Cardiology  Progress Note    Patient Name: Sarbjit Brewer Sr.  MRN: [id number]  Admission Date: 12/30/2021  Hospital Length of Stay: 4 days  Code Status: Full Code   Attending Physician: Darlene Sampson*   Primary Care Physician: Primary Doctor No  Expected Discharge Date:   Principal Problem:Acute congestive heart failure    Subjective:     Hospital Course:   1/2/2021 57 yo male h/o HTN  Adm for ADHF (new onset) in the setting of running out of his medications for HTN for 2 months (?amlodipine, chlorthalidone)     Adm /129  Echo LVEF 40%, GHK, DD. Normal RVSF. Mild LAE. SoV mildly dilated. Mod AI, mod MR, mild-mod FL. PASP 30, CVP 3     On nicardipine gtt. SBP 170s  Trop 0.035-->0.030     EKG with AFib     ADHF, HFrEF  - Euvolemic today  - Losartan (with goal to transition to sacubitril-valsartan)  - Metoprolol succinate  - Furosemide. I/O almost -1.2L  - Ischemic work up re-discussed including angiogram versus stress test. He prefers to have a stress test  - Possibly secondary to uncontrolled BP, AFib     Troponin elevation  Likely demand ischemia     AFib  EKG personally reviewed. Afib noted  CHADS2-VASc 2 (CHF, HTN)  HAS-BLED 0  AC recommended if benefits outweigh the risks     HTN  Anti-HTN meds as noted above  Amlodipine --> Consider DC, and up-titrate losartan     Tobacco use  Active; has decided to stop    1/3/2021 Weaned off Cardene drip and transferred out of ICU. HR 90s-110s overnight repeat EKG pending. SBP 120s-140s. Creatinine 1.6 up slightly from 1.4 yesterday. Transitioned to oral Lasix with 1.3L out overnight negative 6.2L since admission. Reports feeling better       Review of Systems   Constitutional: Negative for chills, decreased appetite, diaphoresis, fever, malaise/fatigue, weight gain and weight loss.   Cardiovascular: Negative for chest pain, claudication, dyspnea on exertion, irregular heartbeat, leg swelling, near-syncope, orthopnea, palpitations and  paroxysmal nocturnal dyspnea.   Respiratory: Positive for cough. Negative for shortness of breath, snoring, sputum production and wheezing.    Endocrine: Negative for cold intolerance, heat intolerance, polydipsia, polyphagia and polyuria.   Skin: Negative for color change, dry skin, itching, nail changes and poor wound healing.   Musculoskeletal: Negative for back pain, gout, joint pain and joint swelling.   Gastrointestinal: Negative for bloating, abdominal pain, constipation, diarrhea, hematemesis, hematochezia, melena, nausea and vomiting.   Genitourinary: Negative for dysuria, hematuria and nocturia.   Neurological: Negative for dizziness, headaches, light-headedness, numbness, paresthesias and weakness.   Psychiatric/Behavioral: Negative for altered mental status, depression and memory loss.     Objective:     Vital Signs (Most Recent):  Temp: 97.8 °F (36.6 °C) (01/03/22 0910)  Pulse: 95 (01/03/22 0917)  Resp: 16 (01/03/22 0910)  BP: 134/86 (01/03/22 0910)  SpO2: 98 % (01/03/22 0910) Vital Signs (24h Range):  Temp:  [97.8 °F (36.6 °C)-98.8 °F (37.1 °C)] 97.8 °F (36.6 °C)  Pulse:  [] 95  Resp:  [11-49] 16  SpO2:  [96 %-98 %] 98 %  BP: (127-155)/() 134/86     Weight: 83.5 kg (184 lb 1.4 oz)  Body mass index is 24.29 kg/m².     SpO2: 98 %  O2 Device (Oxygen Therapy): room air      Intake/Output Summary (Last 24 hours) at 1/3/2022 1101  Last data filed at 1/3/2022 0700  Gross per 24 hour   Intake 940 ml   Output 1125 ml   Net -185 ml       Lines/Drains/Airways     Peripheral Intravenous Line                 Peripheral IV - Single Lumen 12/30/21 1352 20 G Right Forearm 3 days                Physical Exam  Constitutional:       General: He is not in acute distress.     Appearance: He is well-developed and well-nourished.   Neck:      Vascular: No JVD.   Cardiovascular:      Rate and Rhythm: Normal rate.      Heart sounds: No murmur heard.  No gallop.    Pulmonary:      Effort: Pulmonary effort is normal.  No respiratory distress.      Breath sounds: Normal breath sounds. No wheezing.   Abdominal:      General: Bowel sounds are normal. There is no distension.      Palpations: Abdomen is soft.      Tenderness: There is no abdominal tenderness.   Musculoskeletal:         General: No edema.      Cervical back: Normal range of motion and neck supple.   Skin:     General: Skin is warm and dry.   Neurological:      Mental Status: He is alert and oriented to person, place, and time.   Psychiatric:         Mood and Affect: Mood and affect normal.         Behavior: Behavior normal.         Thought Content: Thought content normal.         Judgment: Judgment normal.         Significant Labs:   BMP:   Recent Labs   Lab 01/02/22  0357 01/03/22  0322    102    136   K 3.8 4.0   CL 99 103   CO2 25 22*   BUN 28* 32*   CREATININE 1.6* 1.6*   CALCIUM 9.2 9.0    and CBC No results for input(s): WBC, HGB, HCT, PLT in the last 48 hours.    Significant Imaging: Echocardiogram:   Transthoracic echo (TTE) complete ([facility] Only):   Results for orders placed or performed during the hospital encounter of 12/30/21   Echo   Result Value Ref Range    BSA 1.99 m2    LA WIDTH 4.02 cm    PV PEAK VELOCITY 0.99 cm/s    LVIDd 5.24 3.5 - 6.0 cm    IVS 1.66 (A) 0.6 - 1.1 cm    Posterior Wall 1.52 (A) 0.6 - 1.1 cm    LVIDs 4.23 (A) 2.1 - 4.0 cm    FS 19 28 - 44 %    LA volume 76.04 cm3    LV mass 377.55 g    LA size 3.74 cm    RVDD 2.81 cm    RV S' 15.12 cm/s    Left Ventricle Relative Wall Thickness 0.58 cm    AV mean gradient 6 mmHg    AV valve area 3.28 cm2    AV Velocity Ratio 0.81     AV index (prosthetic) 0.80     MV mean gradient 1 mmHg    MV valve area by continuity eq 5.48 cm2    LVOT diameter 2.28 cm    LVOT area 4.1 cm2    LVOT peak reece 1.30 m/s    LVOT peak VTI 19.37 cm    Ao peak reece 1.61 m/s    Ao VTI 24.13 cm    LVOT stroke volume 79.04 cm3    AV peak gradient 10 mmHg    MV peak gradient 4 mmHg    TR Max Reece 2.59 m/s    MV VTI  14.43 cm    LV Systolic Volume 79.83 mL    LV Systolic Volume Index 39.7 mL/m2    LV Diastolic Volume 132.00 mL    LV Diastolic Volume Index 65.67 mL/m2    LA Volume Index 37.8 mL/m2    LV Mass Index 188 g/m2    RA Major Axis 5.46 cm    Left Atrium Minor Axis 5.72 cm    Left Atrium Major Axis 6.20 cm    Triscuspid Valve Regurgitation Peak Gradient 27 mmHg    LA Volume Index (Mod) 32.0 mL/m2    LA volume (mod) 64.41 cm3    RA Width 5.22 cm    Right Atrial Pressure (from IVC) 3 mmHg    EF 40 %    Sinus 3.80 cm    TV rest pulmonary artery pressure 30 mmHg    Narrative    · The left ventricle is mildly enlarged with concentric hypertrophy and   mildly decreased systolic function.  · The estimated ejection fraction is 40%.  · There is left ventricular global hypokinesis.  · Left ventricular diastolic dysfunction.  · Normal right ventricular size with normal right ventricular systolic   function.  · Mild left atrial enlargement.  · The sinuses of Valsalva is mildly dilated.  · Moderate aortic regurgitation.  · Moderate mitral regurgitation.  · Mild to moderate pulmonic regurgitation.  · Normal central venous pressure (3 mmHg).  · The estimated PA systolic pressure is 30 mmHg.        Assessment and Plan:     Brief HPI: Seen on AM rounds with wife at the bedside while resting in bed. Denies any complaints and reports cough improving. Discussed cardiac POC as detailed below-verbalized understanding and agrees with POC     * new onset Acute congestive heart failure  - echo with EF 40% and LV diastolic dysfunction  - presented with cough and BNP 1636  - new onset CHF noted on echo  - aggressively diuresed with IV medications; negative 6.2L since admission  - on BB and ARB as well as oral Lasix; symptoms improving; considered Entresto but uncertain if financially feasible  - recommend further workup as an outpatient; needs follow up with PCP and cards     Atrial fibrillation  - initial EKG 12/31 with afib  - transferred out of  ICU this AM unable to review overnight histories  - repeat EKG pending this AM  - on oral BB; HR 90s-110s overnight per Epic; appears better controlled on PE  - continue BB; CHADSVAS score 2 (CHF, HTN) with indication for AC and appears benefit outweighs risk; HASBLED score 0 recommend oral AC with either Eliquis or Coumadin     Hypertensive urgency  - BP elevated at 191/129 upon admission  - management as detailed under hypertension     Elevated troponin  - mild troponin rise of .03  - related to demand etiology in setting of CHF  - continue medical management for CHF; plan for further cardiac workup as an outpatient     Tobacco abuse  - stressed importance of complete cessation     Hypertension  - /129 upon admission  - elevation related to medication non compliance  - SBP 120s-140s overnight  - on Losartan and Toprol XL  - weaned off Cardene drip  - goal BP less than 130/80  - stressed importance of medication compliance         VTE Risk Mitigation (From admission, onward)         Ordered     heparin (porcine) injection 5,000 Units  Every 8 hours         12/30/21 2134     IP VTE HIGH RISK PATIENT  Once         12/30/21 2134     Place sequential compression device  Until discontinued         12/30/21 2134                CAPO Garces, ANP  Cardiology  [facility] - Telemetry

## 2022-01-03 NOTE — ASSESSMENT & PLAN NOTE
- initial EKG 12/31 with afib  - transferred out of ICU this AM unable to review overnight histories  - repeat EKG pending this AM  - on oral BB; HR 90s-110s overnight per Epic; appears better controlled on PE  - continue BB; CHADSVAS score 2 (CHF, HTN) with indication for AC and appears benefit outweighs risk; HASBLED score 0 recommend oral AC with either Eliquis or Coumadin

## 2022-01-03 NOTE — PROGRESS NOTES
Individual Follow-Up Form    1/3/2022    Quit Date: To be determined    Clinical Status of Patient: Inpatient    Length of Service: 30 minutes    Comments: Smoking cessation education provided. Pt is a 1.5 pk/day cigarette smoker x 40 yrs. Order for 21 mg nicotine patch Q day requested. Pt states that he is ready to quit. He is currently enrolled in the [employer]. Ambulatory referral to Smoking Cessation Program following hospital discharge.     Diagnosis: F17.210    Next Visit: Clinic appointment -1/20/2022 lachelle Villa ([facility])

## 2022-01-03 NOTE — PLAN OF CARE
"   01/03/22 1529   Post-Acute Status   Post-Acute Authorization Other   Other Status Awaiting f/u Appts     [facility]  In 1 week  FOLLOW UP WITH PCP AFTER PRIORITY CARE CLINIC APPT  [address], [address]  [place] [id number] [phone]   Gloria Jaramillo MD  In 2 weeks  FOLLOW UP WITH CARDIOLOGIST AFTER DISCHARGE  [address]  [address]  [facility] [id number]  [phone]     Future Appointments   Date Time Provider Department Center   1/20/2022  5:00 PM Daisy Muro [facility] SMOKE [facility]   1/27/2022  8:00 AM PFIZER VACCINE, [facility] INTERNAL MEDICINE [facility] IM [facility]     /86 (BP Location: Left arm, Patient Position: Sitting)   Pulse 95   Temp 97.8 °F (36.6 °C) (Oral)   Resp 16   Ht 6' 1" (1.854 m)   Wt 83.5 kg (184 lb 1.4 oz)   SpO2 98%   BMI 24.29 kg/m²      famotidine  20 mg Oral BID    furosemide  20 mg Oral Daily    heparin (porcine)  5,000 Units Subcutaneous Q8H    losartan  50 mg Oral Daily    metoprolol succinate  25 mg Oral Daily    mupirocin   Nasal BID   '  "

## 2022-01-03 NOTE — HOSPITAL COURSE
1/2/2021 57 yo male h/o HTN  Adm for ADHF (new onset) in the setting of running out of his medications for HTN for 2 months (?amlodipine, chlorthalidone)     Adm /129  Echo LVEF 40%, GHK, DD. Normal RVSF. Mild LAE. SoV mildly dilated. Mod AI, mod MR, mild-mod MO. PASP 30, CVP 3     On nicardipine gtt. SBP 170s  Trop 0.035-->0.030     EKG with AFib     ADHF, HFrEF  - Euvolemic today  - Losartan (with goal to transition to sacubitril-valsartan)  - Metoprolol succinate  - Furosemide. I/O almost -1.2L  - Ischemic work up re-discussed including angiogram versus stress test. He prefers to have a stress test  - Possibly secondary to uncontrolled BP, AFib     Troponin elevation  Likely demand ischemia     AFib  EKG personally reviewed. Afib noted  CHADS2-VASc 2 (CHF, HTN)  HAS-BLED 0  AC recommended if benefits outweigh the risks     HTN  Anti-HTN meds as noted above  Amlodipine --> Consider DC, and up-titrate losartan     Tobacco use  Active; has decided to stop    1/3/2021 Weaned off Cardene drip and transferred out of ICU. HR 90s-110s overnight repeat EKG pending. SBP 120s-140s. Creatinine 1.6 up slightly from 1.4 yesterday. Transitioned to oral Lasix with 1.3L out overnight negative 6.2L since admission. Reports feeling better

## 2022-01-03 NOTE — CONSULTS
Food & Nutrition  Education    Diet Education: heart failure  Time Spent: 15 minutes  Learners: pt and wife      Nutrition Education provided with handouts:   Heart Failure Nutrition Therapy, Shake the Salt Habit    Comments:  Educated pt on heart failure diet. Discussed foods high in sodium to avoid and cooking without salt. Answered pt questions about multiple food items. Discussed 2000mg Na/day limit. Educated him on 1500ml fluid restriction and encouraged him to weigh himself daily to monitor for fluid retention.  Handouts were provided. Pt and wife voiced understanding.    All questions and concerns answered. Dietitian's contact information provided.       Follow-Up: 1/10/22    Please Re-consult as needed      Thanks!  Maria Roman, RD,LDN

## 2022-01-03 NOTE — PROGRESS NOTES
[facility] Medicine  Progress Note    Patient Name: Sarbjit Brewer Sr.  MRN: [id number]  Patient Class: IP- Inpatient   Admission Date: 12/30/2021  Length of Stay: 4 days  Attending Physician: Darlene Sampson*  Primary Care Provider: Primary Doctor No        Subjective:     Principal Problem:Acute congestive heart failure        HPI:  Sarbjit Brewer Sr. is a 57 y/o M with PMH of Hypertension, present with 6 weeks history of progressive cough with productive greenish sputum, with associated runny nose, and congestion.  He denies fever, chills, nausea, vomiting, chest pain, dizziness, diaphoresis, headache, weakness. Patient has tried NyQuil and benadryl without any relief.  Patient reported elevated blood pressure few months ago in the ED and was prescribed a 30-day supply of blood pressure medication and is yet to follow-up with his PCP and has run out of medication.   BNP 1636, troponin 0.03, CXR concerning for pulmonary edema, blood pressure elevated at 191/129, EKG with no ischemic changes.      In the ED received Lasix, hydralazine, nitroglycerin paste and labetalol with minimal blood pressure improvement.  Start Cardene drip and admit hospital medicine service on observation for new onset CHF      Overview/Hospital Course:  No notes on file    Interval History: awake and alert,   Continue p.o. blood pressure medication this morning   Appreciate cardiology rec's- okay to discharge on BB, diuretics and ACE (with goal to transition to sacubitril-valsartan) and OAC  Will need outpatient cardiology follow-up        Requesting for work excuse      Review of Systems   Constitutional: Negative for activity change, chills, diaphoresis and fever.   Respiratory: Negative for apnea, cough, shortness of breath and wheezing.    Cardiovascular: Negative for chest pain.   Gastrointestinal: Negative for nausea and rectal pain.   Genitourinary: Negative for dysuria.   Skin: Negative for rash.    Neurological: Negative for dizziness, syncope, weakness, light-headedness, numbness and headaches.   Psychiatric/Behavioral: Negative for behavioral problems, decreased concentration and sleep disturbance.     Objective:     Vital Signs (Most Recent):  Temp: 97.8 °F (36.6 °C) (01/03/22 0910)  Pulse: 95 (01/03/22 0917)  Resp: 16 (01/03/22 0910)  BP: 134/86 (01/03/22 0910)  SpO2: 98 % (01/03/22 0910) Vital Signs (24h Range):  Temp:  [97.8 °F (36.6 °C)-98.8 °F (37.1 °C)] 97.8 °F (36.6 °C)  Pulse:  [] 95  Resp:  [11-49] 16  SpO2:  [96 %-98 %] 98 %  BP: (127-155)/() 134/86     Weight: 83.5 kg (184 lb 1.4 oz)  Body mass index is 24.29 kg/m².    Intake/Output Summary (Last 24 hours) at 1/3/2022 1041  Last data filed at 1/3/2022 0700  Gross per 24 hour   Intake 940 ml   Output 1125 ml   Net -185 ml      Physical Exam  Constitutional:       Appearance: He is well-developed.   HENT:      Head: Normocephalic and atraumatic.      Right Ear: There is no impacted cerumen.      Left Ear: There is no impacted cerumen.      Nose: No congestion.   Cardiovascular:      Rate and Rhythm: Normal rate and regular rhythm.      Heart sounds: Normal heart sounds. No murmur heard.  No friction rub. No gallop.    Pulmonary:      Effort: Pulmonary effort is normal.      Breath sounds: Normal breath sounds.   Abdominal:      Tenderness: There is no abdominal tenderness.   Musculoskeletal:         General: No swelling or tenderness.      Cervical back: Normal range of motion and neck supple. No tenderness.      Right lower leg: No edema.      Left lower leg: No edema.   Skin:     General: Skin is warm.      Coloration: Skin is not pale.   Neurological:      Mental Status: He is alert and oriented to person, place, and time. Mental status is at baseline.   Psychiatric:         Mood and Affect: Mood is not anxious or depressed.         Speech: Speech normal.         Behavior: Behavior normal.         Thought Content: Thought content  normal.         Significant Labs:   A1C:   Recent Labs   Lab 12/30/21  1509   HGBA1C 5.6     ABGs: No results for input(s): PH, PCO2, HCO3, POCSATURATED, BE, TOTALHB, COHB, METHB, O2HB, POCFIO2, PO2 in the last 48 hours.  Bilirubin:   Recent Labs   Lab 12/30/21  1058   BILITOT 0.6     Blood Culture: No results for input(s): LABBLOO in the last 48 hours.  CBC:   No results for input(s): WBC, HGB, HCT, PLT in the last 48 hours.  CMP:   Recent Labs   Lab 01/02/22  0357 01/03/22  0322    136   K 3.8 4.0   CL 99 103   CO2 25 22*    102   BUN 28* 32*   CREATININE 1.6* 1.6*   CALCIUM 9.2 9.0   ANIONGAP 12 11   EGFRNONAA 47* 47*     Lactic Acid: No results for input(s): LACTATE in the last 48 hours.  Lipase: No results for input(s): LIPASE in the last 48 hours.  Lipid Panel: No results for input(s): CHOL, HDL, LDLCALC, TRIG, CHOLHDL in the last 48 hours.  Magnesium:   No results for input(s): MG in the last 48 hours.  Troponin:   No results for input(s): TROPONINI in the last 48 hours.  TSH: No results for input(s): TSH in the last 4320 hours.  Urine Culture: No results for input(s): LABURIN in the last 48 hours.  Urine Studies:   No results for input(s): COLORU, APPEARANCEUA, PHUR, SPECGRAV, PROTEINUA, GLUCUA, KETONESU, BILIRUBINUA, OCCULTUA, NITRITE, UROBILINOGEN, LEUKOCYTESUR, RBCUA, WBCUA, BACTERIA, SQUAMEPITHEL, HYALINECASTS in the last 48 hours.    Invalid input(s): WRIGHTSUR    Significant Imaging: I have reviewed all pertinent imaging results/findings within the past 24 hours.      Assessment/Plan:      * new onset Acute congestive heart failure  Likely due to uncontrolled hypertension  BNP 1636  Troponin  EKG negative for any ischemic changes  TTE: EF 40% , LV global hypokinesis and LD diastolic dysfunction  Started on IV Lasix in the ED-continue at 20 mg b.i.d. switch to Po on 1/2  Urine toxicology   Supplemental oxygen as tolerated  Consult cardiology- appreciates rec's- consider losartan (with goal  to transition to sacubitril-valsartan)  Outpatient cardiology follow-up  Add OAC- Eliquis or Coumadin    Atrial fibrillation  Tachycardia  Possibly secondary to uncontrolled BP  -Ischemic work up once euvolemic  Consult Cardiology: consider metoprolol succinate when euvolemic    Hypertensive urgency  Hypertension  Blood pressure uncontrolled on admission 191/129  During exam and after 1 dose of Lasix and hydralazine Bp 134/120  Patient has been out of medication for some months  Continue Norvasc.  DC on discharge  Continue IV Lasix switch to p.o.  Cardene drip  Admit ICU-step-down to floor once bed is available  Add Lisinopril switch to losartan per cardiology.  Add BB once euvolemic  TTE  · The left ventricle is mildly enlarged with concentric hypertrophy and mildly decreased systolic function.  · The estimated ejection fraction is 40%.  · There is left ventricular global hypokinesis.  · Left ventricular diastolic dysfunction.  · Normal right ventricular size with normal right ventricular systolic function.  · Mild left atrial enlargement.  · The sinuses of Valsalva is mildly dilated.  · Moderate aortic regurgitation.  · Moderate mitral regurgitation.            Elevated troponin  Likely due to demand ischemia  Monitor      Tobacco abuse  > 3 minute counseling regarding smoking cessation  Smokes about 1 pack per day   Nicotine patch      Alcoholism /alcohol abuse  Ongoing counseling regarding alcohol  Last use 12/29      GERD (gastroesophageal reflux disease)  PPI        VTE Risk Mitigation (From admission, onward)         Ordered     heparin (porcine) injection 5,000 Units  Every 8 hours         12/30/21 2134     IP VTE HIGH RISK PATIENT  Once         12/30/21 2134     Place sequential compression device  Until discontinued         12/30/21 2134                Discharge Planning   [facility]: 1/3/2022     Code Status: Full Code   Is the patient medically ready for discharge?:     Reason for patient still in hospital  (select all that apply): Pending disposition                     Darlene Perry-MD [place]  Department of Hospital Medicine   [place] - [facility]

## 2022-01-03 NOTE — SUBJECTIVE & OBJECTIVE
Review of Systems   Constitutional: Negative for chills, decreased appetite, diaphoresis, fever, malaise/fatigue, weight gain and weight loss.   Cardiovascular: Negative for chest pain, claudication, dyspnea on exertion, irregular heartbeat, leg swelling, near-syncope, orthopnea, palpitations and paroxysmal nocturnal dyspnea.   Respiratory: Positive for cough. Negative for shortness of breath, snoring, sputum production and wheezing.    Endocrine: Negative for cold intolerance, heat intolerance, polydipsia, polyphagia and polyuria.   Skin: Negative for color change, dry skin, itching, nail changes and poor wound healing.   Musculoskeletal: Negative for back pain, gout, joint pain and joint swelling.   Gastrointestinal: Negative for bloating, abdominal pain, constipation, diarrhea, hematemesis, hematochezia, melena, nausea and vomiting.   Genitourinary: Negative for dysuria, hematuria and nocturia.   Neurological: Negative for dizziness, headaches, light-headedness, numbness, paresthesias and weakness.   Psychiatric/Behavioral: Negative for altered mental status, depression and memory loss.     Objective:     Vital Signs (Most Recent):  Temp: 97.8 °F (36.6 °C) (01/03/22 0910)  Pulse: 95 (01/03/22 0917)  Resp: 16 (01/03/22 0910)  BP: 134/86 (01/03/22 0910)  SpO2: 98 % (01/03/22 0910) Vital Signs (24h Range):  Temp:  [97.8 °F (36.6 °C)-98.8 °F (37.1 °C)] 97.8 °F (36.6 °C)  Pulse:  [] 95  Resp:  [11-49] 16  SpO2:  [96 %-98 %] 98 %  BP: (127-155)/() 134/86     Weight: 83.5 kg (184 lb 1.4 oz)  Body mass index is 24.29 kg/m².     SpO2: 98 %  O2 Device (Oxygen Therapy): room air      Intake/Output Summary (Last 24 hours) at 1/3/2022 1101  Last data filed at 1/3/2022 0700  Gross per 24 hour   Intake 940 ml   Output 1125 ml   Net -185 ml       Lines/Drains/Airways     Peripheral Intravenous Line                 Peripheral IV - Single Lumen 12/30/21 1352 20 G Right Forearm 3 days                Physical  Exam  Constitutional:       General: He is not in acute distress.     Appearance: He is well-developed and well-nourished.   Neck:      Vascular: No JVD.   Cardiovascular:      Rate and Rhythm: Normal rate.      Heart sounds: No murmur heard.  No gallop.    Pulmonary:      Effort: Pulmonary effort is normal. No respiratory distress.      Breath sounds: Normal breath sounds. No wheezing.   Abdominal:      General: Bowel sounds are normal. There is no distension.      Palpations: Abdomen is soft.      Tenderness: There is no abdominal tenderness.   Musculoskeletal:         General: No edema.      Cervical back: Normal range of motion and neck supple.   Skin:     General: Skin is warm and dry.   Neurological:      Mental Status: He is alert and oriented to person, place, and time.   Psychiatric:         Mood and Affect: Mood and affect normal.         Behavior: Behavior normal.         Thought Content: Thought content normal.         Judgment: Judgment normal.         Significant Labs:   BMP:   Recent Labs   Lab 01/02/22  0357 01/03/22  0322    102    136   K 3.8 4.0   CL 99 103   CO2 25 22*   BUN 28* 32*   CREATININE 1.6* 1.6*   CALCIUM 9.2 9.0    and CBC No results for input(s): WBC, HGB, HCT, PLT in the last 48 hours.    Significant Imaging: Echocardiogram:   Transthoracic echo (TTE) complete (Cupid Only):   Results for orders placed or performed during the hospital encounter of 12/30/21   Echo   Result Value Ref Range    BSA 1.99 m2    LA WIDTH 4.02 cm    PV PEAK VELOCITY 0.99 cm/s    LVIDd 5.24 3.5 - 6.0 cm    IVS 1.66 (A) 0.6 - 1.1 cm    Posterior Wall 1.52 (A) 0.6 - 1.1 cm    LVIDs 4.23 (A) 2.1 - 4.0 cm    FS 19 28 - 44 %    LA volume 76.04 cm3    LV mass 377.55 g    LA size 3.74 cm    RVDD 2.81 cm    RV S' 15.12 cm/s    Left Ventricle Relative Wall Thickness 0.58 cm    AV mean gradient 6 mmHg    AV valve area 3.28 cm2    AV Velocity Ratio 0.81     AV index (prosthetic) 0.80     MV mean gradient 1  mmHg    MV valve area by continuity eq 5.48 cm2    LVOT diameter 2.28 cm    LVOT area 4.1 cm2    LVOT peak reece 1.30 m/s    LVOT peak VTI 19.37 cm    Ao peak reece 1.61 m/s    Ao VTI 24.13 cm    LVOT stroke volume 79.04 cm3    AV peak gradient 10 mmHg    MV peak gradient 4 mmHg    TR Max Reece 2.59 m/s    MV VTI 14.43 cm    LV Systolic Volume 79.83 mL    LV Systolic Volume Index 39.7 mL/m2    LV Diastolic Volume 132.00 mL    LV Diastolic Volume Index 65.67 mL/m2    LA Volume Index 37.8 mL/m2    LV Mass Index 188 g/m2    RA Major Axis 5.46 cm    Left Atrium Minor Axis 5.72 cm    Left Atrium Major Axis 6.20 cm    Triscuspid Valve Regurgitation Peak Gradient 27 mmHg    LA Volume Index (Mod) 32.0 mL/m2    LA volume (mod) 64.41 cm3    RA Width 5.22 cm    Right Atrial Pressure (from IVC) 3 mmHg    EF 40 %    Sinus 3.80 cm    TV rest pulmonary artery pressure 30 mmHg    Narrative    · The left ventricle is mildly enlarged with concentric hypertrophy and   mildly decreased systolic function.  · The estimated ejection fraction is 40%.  · There is left ventricular global hypokinesis.  · Left ventricular diastolic dysfunction.  · Normal right ventricular size with normal right ventricular systolic   function.  · Mild left atrial enlargement.  · The sinuses of Valsalva is mildly dilated.  · Moderate aortic regurgitation.  · Moderate mitral regurgitation.  · Mild to moderate pulmonic regurgitation.  · Normal central venous pressure (3 mmHg).  · The estimated PA systolic pressure is 30 mmHg.

## 2022-01-03 NOTE — ASSESSMENT & PLAN NOTE
- echo with EF 40% and LV diastolic dysfunction  - presented with cough and BNP 1636  - new onset CHF noted on echo  - aggressively diuresed with IV medications; negative 6.2L since admission  - on BB and ARB as well as oral Lasix; symptoms improving; considered Entresto but uncertain if financially feasible  - recommend further workup as an outpatient; needs follow up with PCP and cards

## 2022-01-03 NOTE — PLAN OF CARE
"Pharmacist will go over home medications and reasons for medications. VN and bedside nurse to reiterate final discharge instructions.     Future Appointments   Date Time Provider Department Center   1/5/2022  9:30 AM Shiela Singh MD [facility] IMPRI [place] [facility]   1/20/2022  3:20 PM Gloria Jaramillo MD [facility] CARDIO [place] [facility]   1/20/2022  5:00 PM Daisy Muro [facility] SMOKE [place] [facility]   1/27/2022  8:00 AM PFIZER VACCINE, [facility] INTERNAL MEDICINE [facility] IM [place] [facility]     /86 (BP Location: Left arm, Patient Position: Sitting)   Pulse 95   Temp 97.8 °F (36.6 °C) (Oral)   Resp 16   Ht 6' 1" (1.854 m)   Wt 83.5 kg (184 lb 1.4 oz)   SpO2 98%   BMI 24.29 kg/m²      famotidine  20 mg Oral BID    furosemide  20 mg Oral Daily    heparin (porcine)  5,000 Units Subcutaneous Q8H    losartan  50 mg Oral Daily    metoprolol succinate  25 mg Oral Daily    mupirocin   Nasal BID        01/03/22 1556   Final Note   Assessment Type Final Discharge Note   Anticipated Discharge Disposition Home   Hospital Resources/Appts/Education Provided Appointments scheduled and added to AVS   Post-Acute Status   Post-Acute Authorization Other   Other Status No Post-Acute Service Needs   Discharge Delays None known at this time     "

## 2022-01-03 NOTE — ASSESSMENT & PLAN NOTE
- mild troponin rise of .03  - related to demand etiology in setting of CHF  - continue medical management for CHF; plan for further cardiac workup as an outpatient

## 2022-01-05 ENCOUNTER — OFFICE VISIT (OUTPATIENT)
Dept: PRIMARY CARE CLINIC | Facility: CLINIC | Age: 57
End: 2022-01-05
Payer: MEDICAID

## 2022-01-05 VITALS
BODY MASS INDEX: 25.35 KG/M2 | OXYGEN SATURATION: 98 % | HEIGHT: 72 IN | HEART RATE: 63 BPM | DIASTOLIC BLOOD PRESSURE: 83 MMHG | SYSTOLIC BLOOD PRESSURE: 140 MMHG | WEIGHT: 187.19 LBS

## 2022-01-05 DIAGNOSIS — Z72.0 TOBACCO ABUSE: ICD-10-CM

## 2022-01-05 DIAGNOSIS — I16.0 HYPERTENSIVE URGENCY: ICD-10-CM

## 2022-01-05 DIAGNOSIS — I50.41 ACUTE COMBINED SYSTOLIC (CONGESTIVE) AND DIASTOLIC (CONGESTIVE) HEART FAILURE: Primary | ICD-10-CM

## 2022-01-05 DIAGNOSIS — Z79.01 ON CONTINUOUS ORAL ANTICOAGULATION: ICD-10-CM

## 2022-01-05 DIAGNOSIS — I10 PRIMARY HYPERTENSION: ICD-10-CM

## 2022-01-05 DIAGNOSIS — G47.00 INSOMNIA, UNSPECIFIED TYPE: ICD-10-CM

## 2022-01-05 DIAGNOSIS — I48.91 ATRIAL FIBRILLATION, UNSPECIFIED TYPE: ICD-10-CM

## 2022-01-05 PROBLEM — I50.9 ACUTE CONGESTIVE HEART FAILURE: Status: RESOLVED | Noted: 2021-12-30 | Resolved: 2022-01-05

## 2022-01-05 PROBLEM — R00.0 TACHYCARDIA: Status: RESOLVED | Noted: 2021-12-31 | Resolved: 2022-01-05

## 2022-01-05 PROBLEM — R79.89 ELEVATED TROPONIN: Status: RESOLVED | Noted: 2021-12-30 | Resolved: 2022-01-05

## 2022-01-05 PROCEDURE — 99205 OFFICE O/P NEW HI 60 MIN: CPT | Mod: S$PBB,,, | Performed by: INTERNAL MEDICINE

## 2022-01-05 PROCEDURE — 99999 PR PBB SHADOW E&M-EST. PATIENT-LVL III: ICD-10-PCS | Mod: PBBFAC,,, | Performed by: INTERNAL MEDICINE

## 2022-01-05 PROCEDURE — 99213 OFFICE O/P EST LOW 20 MIN: CPT | Mod: PBBFAC,PO | Performed by: INTERNAL MEDICINE

## 2022-01-05 PROCEDURE — 99999 PR PBB SHADOW E&M-EST. PATIENT-LVL III: CPT | Mod: PBBFAC,,, | Performed by: INTERNAL MEDICINE

## 2022-01-05 PROCEDURE — 99205 PR OFFICE/OUTPT VISIT, NEW, LEVL V, 60-74 MIN: ICD-10-PCS | Mod: S$PBB,,, | Performed by: INTERNAL MEDICINE

## 2022-01-05 RX ORDER — TRAZODONE HYDROCHLORIDE 50 MG/1
50 TABLET ORAL NIGHTLY
Qty: 30 TABLET | Refills: 6 | Status: ON HOLD | OUTPATIENT
Start: 2022-01-05 | End: 2022-01-14 | Stop reason: HOSPADM

## 2022-01-05 NOTE — PROGRESS NOTES
[facility]   New Visit Progress Note   Recent Hospital Discharge     PRESENTING HISTORY     Chief Complaint/Reason for Admission:  Follow up Hospital Discharge   PCP: Primary Doctor No    History of Present Illness:  Mr. Sarbjit Brewer Sr. is a 56 y.o. male who was recently admitted to the hospital.    [facility] Medicine  Discharge Summary        Patient Name: Sarbjit Brewer Sr.  MRN: [id number]  Patient Class: IP- Inpatient  Admission Date: 12/30/2021  Hospital Length of Stay: 4 days  Discharge Date and Time: 1/3/22  Attending Physician: Darlene Sampson*   Discharging Provider: Darlene Sampson MD  Primary Care Provider: Primary Doctor No  ___________________________________________________________________    Today:  Presents to [facility] for initial hospital follow up.  Recently hospitalized for decompensated heart failure and new onset Atrial fibrillation.   Heart failure and A fib are both newly diagnosed.   Hx long standing untreated HTN.   /129 on arrival.  Patient required Cardene infusion and ICU level of care.   TTE: EF 40% , LV global hypokinesis and LD diastolic dysfunction.  Patient responded well to above intervention, diuresis, and supportive care.  Apixaban initiated for anticoagulation.  Patient discharged to home with plan for outpatient ischemic evaluation.     Patient accompanied today by his wife.  Ambulatory and independent with ADL's.  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   Reports compliance with all medication.  Smokes ~ 2 ppd- interested in quitting.   Currently without insurance coverage- will see hospital Medicaid application/ financial assistance team today.   Following sodium restricted diet- wife is preparing meals.  Not monitoring daily weights- needs to buy a scale.       Review of Systems  General ROS: negative for chills, fever or weight  "loss  Psychological ROS: negative for hallucination, depression or suicidal ideation  + insomnia   Ophthalmic ROS: negative for blurry vision, photophobia or eye pain  ENT ROS: negative for epistaxis, sore throat or rhinorrhea  Respiratory ROS: + cough, no shortness of breath, or wheezing  Cardiovascular ROS: no chest pain or dyspnea on exertion  Gastrointestinal ROS: no abdominal pain, change in bowel habits, or black/ bloody stools  Genito-Urinary ROS: no dysuria, trouble voiding, or hematuria  Musculoskeletal ROS: negative for gait disturbance or muscular weakness  Neurological ROS: no syncope or seizures; no ataxia  Dermatological ROS: negative for pruritis, rash and jaundice      PAST HISTORY:     Past Medical History:   Diagnosis Date    Hypertension        Past Surgical History:   Procedure Laterality Date    KIDNEY STONE SURGERY         No family history on file.      MEDICATIONS & ALLERGIES:     Current Outpatient Medications on File Prior to Visit   Medication Sig Dispense Refill    apixaban (ELIQUIS) 5 mg Tab Take 1 tablet (5 mg total) by mouth 2 (two) times daily. 180 tablet 3    benzonatate (TESSALON) 100 MG capsule Take 1 capsule (100 mg total) by mouth 3 (three) times daily as needed for Cough. 30 capsule 0    furosemide (LASIX) 20 MG tablet Take 1 tablet (20 mg total) by mouth once daily. 90 tablet 4    losartan (COZAAR) 50 MG tablet Take 1 tablet (50 mg total) by mouth once daily. 90 tablet 3    metoprolol succinate (TOPROL-XL) 25 MG 24 hr tablet Take 1 tablet (25 mg total) by mouth once daily. 90 tablet 4    nicotine (NICODERM CQ) 21 mg/24 hr Place 1 patch onto the skin once daily. 28 patch 2     No current facility-administered medications on file prior to visit.        Review of patient's allergies indicates:  No Known Allergies    OBJECTIVE:     Vital Signs:  BP (!) 140/83   Pulse 63   Ht 5' 11.5" (1.816 m)   Wt 84.9 kg (187 lb 2.7 oz)   SpO2 98%   BMI 25.74 kg/m²   Wt Readings from " "Last 3 Encounters:   01/05/22 0953 84.9 kg (187 lb 2.7 oz)   01/03/22 0315 83.5 kg (184 lb 1.4 oz)   01/02/22 0400 84.5 kg (186 lb 4.6 oz)   01/01/22 0330 86.5 kg (190 lb 11.2 oz)   12/31/21 1804 86.5 kg (190 lb 11.2 oz)   12/30/21 1600 77.1 kg (170 lb)   12/30/21 1414 77.1 kg (170 lb)   12/30/21 1009 77.1 kg (170 lb)   07/21/20 0826 84.8 kg (187 lb)     Body mass index is 25.74 kg/m².        Physical Exam:  BP (!) 140/83   Pulse 63   Ht 5' 11.5" (1.816 m)   Wt 84.9 kg (187 lb 2.7 oz)   SpO2 98%   BMI 25.74 kg/m²   General appearance: alert, cooperative, no distress  Constitutional:Oriented to person, place, and time  + appears well-developed and well-nourished.   HEENT: Normocephalic, atraumatic, neck symmetrical, no nasal discharge   Eyes: conjunctivae/corneas clear, PERRL, EOM's intact  Lungs: clear to auscultation bilaterally, no dullness to percussion bilaterally  Heart: regular rate and + irregular rhythm without rub; no displacement of the PMI   + murmur   Abdomen: soft, non-tender; bowel sounds normoactive; no organomegaly  Extremities: extremities symmetric; no clubbing, cyanosis, or edema  Integument: Skin color, texture, turgor normal; no rashes; hair distrubution normal  Neurologic: Alert and oriented X 3, normal strength, normal coordination and gait  Psychiatric: no pressured speech; normal affect; no evidence of impaired cognition     Laboratory  Lab Results   Component Value Date    WBC 6.70 12/30/2021    HGB 14.2 12/30/2021    HCT 42.3 12/30/2021    MCV 90 12/30/2021     12/30/2021     BMP  Lab Results   Component Value Date     01/03/2022    K 4.0 01/03/2022     01/03/2022    CO2 22 (L) 01/03/2022    BUN 32 (H) 01/03/2022    CREATININE 1.6 (H) 01/03/2022    CALCIUM 9.0 01/03/2022    ANIONGAP 11 01/03/2022    ESTGFRAFRICA 54 (A) 01/03/2022    EGFRNONAA 47 (A) 01/03/2022     Lab Results   Component Value Date    ALT 39 12/30/2021    AST 37 12/30/2021    ALKPHOS 118 12/30/2021 "    BILITOT 0.6 12/30/2021     No results found for: INR, PROTIME  Lab Results   Component Value Date    HGBA1C 5.6 12/30/2021       Diagnostic Results:    2 D echo 12/30/21:  · The left ventricle is mildly enlarged with concentric hypertrophy and mildly decreased systolic function.  · The estimated ejection fraction is 40%.  · There is left ventricular global hypokinesis.  · Left ventricular diastolic dysfunction.  · Normal right ventricular size with normal right ventricular systolic function.  · Mild left atrial enlargement.  · The sinuses of Valsalva is mildly dilated.  · Moderate aortic regurgitation.  · Moderate mitral regurgitation.  · Mild to moderate pulmonic regurgitation.  · Normal central venous pressure (3 mmHg).  · The estimated PA systolic pressure is 30 mmHg.    ASSESSMENT & PLAN:         Acute combined systolic (congestive) and diastolic (congestive) heart failure  - new diagnosis  - continue current medication regimen  - keep cardiology follow up 1/20/22, Dr Jaramillo  - buy home scale- record daily AM weights  - sodium restriction     Atrial fibrillation, unspecified type  On continuous oral anticoagulation  - new diagnosis  - currently rate controlled  - anticoagulated with Eliquis    - patient has contact information for [facility] assistance program - currently uninsured and will need financial assistance for Elqius refill     Hypertensive urgency  Primary hypertension  - long standing uncontrolled HTN  - required Cardene infusion while hospitalized   - blood pressure not yet at goal  - continue current medication regimen  - ambulatory monitoring  - sodium restriction     Tobacco abuse  - current 2 ppd smoker  - motivated to quit  - has tobacco cessation appt 1/20/22    Insomnia, unspecified type  -     traZODone (DESYREL) 50 MG tablet; Take 1 tablet (50 mg total) by mouth every evening.  Dispense: 30 tablet; Refill: 6    Barriers to care- uninsured.  I have communicated with [employer]  Assistance Program Representative and patient directed to their office today to begin Medicaid application.    Patient will be released from [facility].  He was provided with contact information for [facility] to establish new primary care.     Instructions for the patient:      Scheduled Follow-up :  Future Appointments   Date Time Provider Department Center   1/20/2022  3:20 PM Gloria Jaramillo MD [facility] CARDIO [place] [facility]   1/20/2022  5:00 PM Daisy Muro [facility] SMOKE [place] [facility]   1/27/2022  8:00 AM PFIZER VACCINE, [facility] INTERNAL MEDICINE [facility] IM [place] [facility]       Post Visit Medication List:     Medication List          Accurate as of January 5, 2022  1:34 PM. If you have any questions, ask your nurse or doctor.            START taking these medications    traZODone 50 MG tablet  Commonly known as: DESYREL  Take 1 tablet (50 mg total) by mouth every evening.  Started by: Sheila Singh MD        CONTINUE taking these medications    benzonatate 100 MG capsule  Commonly known as: TESSALON  Take 1 capsule (100 mg total) by mouth 3 (three) times daily as needed for Cough.     ELIQUIS 5 mg Tab  Generic drug: apixaban  Take 1 tablet (5 mg total) by mouth 2 (two) times daily.     furosemide 20 MG tablet  Commonly known as: LASIX  Take 1 tablet (20 mg total) by mouth once daily.     losartan 50 MG tablet  Commonly known as: COZAAR  Take 1 tablet (50 mg total) by mouth once daily.     metoprolol succinate 25 MG 24 hr tablet  Commonly known as: TOPROL-XL  Take 1 tablet (25 mg total) by mouth once daily.     nicotine 21 mg/24 hr  Commonly known as: NICODERM CQ  Place 1 patch onto the skin once daily.           Where to Get Your Medications      These medications were sent to [facility]  [address], [place] [address]    Hours: Mon-Fri, 8a-5:30p Phone: [phone]   · traZODone 50 MG tablet         Signing Physician:  Sheila Singh MD

## 2022-01-05 NOTE — PHYSICIAN QUERY
PT Name: Sarbjit Brewer Sr.  MR #: [id number]  DOCUMENTATION CLARIFICATION     CDS/: Bob Coreas Jr, RN CCDS               Contact information:[email or web address]  This form is a permanent document in the medical record.     Query Date: January 5, 2022    By submitting this query, we are merely seeking further clarification of documentation. Please utilize your independent clinical judgment when addressing the question(s) below.    The Medical Record contains the following:   Indicators Supporting Clinical Findings Location in Medical Record   x CKD or Chronic Kidney (Renal) Failure/Disease Initiate goal directed medical therapy for HFrEF and afib. Consider coreg, isordil, and hydralazine given the patient's CKD    12/31 Critical Care Team Assessment    x BUN/Creatinine                          GFR BUN=18-->32    Creatinine=1.4-->1.6    GFR=>60-->54 12/30-1/3 Labs    12/30-1/3 Labs    12/30-1/3 Labs    Dehydration      Nausea / Vomiting      Dialysis / CRRT      Medication      Treatment     x Other Chronic Conditions Past Medical History:  Hypertension      12/30 H&P    Other       Provider, please further specify the stage of Chronic Kidney Disease (CKD):    [   ] Chronic Kidney Disease (CKD) stage 2 - Mildly decreased eGFR 60-89   [   ] Chronic Kidney Disease (CKD) stage 3a - Mildly to moderately decreased eGFR 45-59   [   ] Other (please specify):    [   ] Clinically Undetermined     Please document in your progress notes daily for the duration of treatment until resolved and include in your discharge summary.    Reference:     MARCELO Marmolejo MD, & NANO Woodruff MD, MS. (2020, June 18). Definition and staging of chronic kidney disease in adults ([id number] ANYI Hartley MD, ScD & [id number] ROE Ocampo MD, MSc, Eds.). Retrieved October 21, 2020, from  https://[email or web address]/contents/definition-and-staging-of-chronic-kidney-disease-in-adults?search=ckd%20staging&source=search_result&selectedTitle=1~150&usage_type=default&display_rank=1    Form No. [id number]

## 2022-01-05 NOTE — PHYSICIAN QUERY
PT Name: Sarbjit Brewer Sr.  MR #: [id number]    DOCUMENTATION CLARIFICATION     CDS/: Bob Coreas Jr, RN CCDS              Contact information:[email or web address]     This form is a permanent document in the medical record.     Query Date: January 5, 2022    By submitting this query, we are merely seeking further clarification of documentation. Please utilize your independent clinical judgment when addressing the question(s) below.    The Medical Record contains the following:   Indicators Supporting Clinical Findings Location in Medical Record   x Atrial Fibrillation Atrial fibrillation  Possibly secondary to uncontrolled BP   12/31 Hospital Medicine Progress Note   x EKG Results Atrial fibrillation with rapid ventricular response    12/31 EKG Report   x Medication continue BB; CHADSVAS score 2 (CHF, HTN) with indication for AC and appears benefit outweighs risk; HASBLED score 0 recommend oral AC with either Eliquis or Coumadin  1/3 Cardiology Note    Treatment      Other       The clinical guidelines noted are only a system guideline. It does not replace the provider's clinical judgment.    Provider, please specify the type of Atrial Fibrillation (AF):    [  ] Unspecified Atrial Fibrillation   [  ] Other cardiac diagnosis (please specify): ____________   [  ] Clinically Undetermined             Please document in your progress notes daily for the duration of treatment until resolved, and include in your discharge summary.    Reference:  SINAI Dos Santos MD. (2020, September 14). Overview of atrial fibrillation (SADIE Gomez MD & ANYI Laureano MD, Eds.). Retrieved October 22, 2020, from https://[email or web address]/contents/phvfknqk-mn-nyvpge-fibrillation?search=atrial%20fibrillation&source=search_result&selectedTitle=1~150&usage_type=default&display_rank=1    Form No. [id number]

## 2022-01-05 NOTE — PATIENT INSTRUCTIONS
"Patient Education       Low Salt Diet   About this topic   Sodium is a type of mineral found in many foods. It may also be called "salt." Sodium helps balance fluids in your body. Too much sodium may be bad for your health. You may have to limit the amount of sodium in your food.  Salt is known as sodium chloride. It is measured in grams (g) or milligrams (mg). Salt or sodium in our diet comes from 3 main sources:  · Some sodium is naturally found in food.  · We may add salt to our food when we eat or cook.  · Processed foods give us most of the sodium in our diet.         What will the results be?   This diet may help lower your blood pressure. It may also help reduce extra water in your body. This may help kidney, heart, or liver problems.  What changes to diet are needed?   You need to know how much sodium is in the food you eat. Read food labels with care. Choose foods that have 5% or less sodium in one serving. Remember, if you eat more than one serving, you will be getting more sodium. It may take a while for your sense of taste to get used to food with less sodium. Be patient with yourself. You may be surprised at how well you will do.  Try to aim for a diet that has 2,300 mg (2.3 g) or less sodium in it each day. The Food & Drug Administration (FDA) has set up guidelines for food labels. These will help you make healthy choices. Look for these terms on food packages:  · Sodium-free: Less than 5 mg in each serving. These are safe to eat.  · Very low sodium: 35 mg of sodium or less in each serving. These are safe to eat.  · Low sodium: 140 mg of sodium or less in each serving. You need to eat these with care.  · Reduced sodium: At least 25% less sodium than is most often found in each serving. These foods can still be high in sodium.  · Light in sodium: 50% less sodium in each serving.  · Unsalted, no added salt, and without added salt: No salt is added during processing, but the food may still have sodium. " Read the food label and check the sodium content before eating.  What foods are good to eat?   You can control the amount of sodium in foods you make at home. Fresh foods that you cook are most often lower in sodium.  · Regular bread, unsalted crackers, dry cereal, cooked rice, pasta, quinoa, and corn tortillas.  · Fresh, frozen, low sodium, or salt-free canned vegetables. Limit vegetable juice or tomato juice to 1/2 cup (120 mL) each day.  · Fresh, frozen, canned, or dried fruit without salt added  · Nonfat or low-fat milk and yogurt, low-sodium cottage cheese, and other cheeses low in sodium.  · Fresh or frozen beef, veal, lamb, pork, poultry, fish, shellfish  · Low sodium canned meats, frozen dinners with less than 600 mg sodium  · Corn, safflower, sunflower, and soybean oils and unsalted nuts and seeds  · Egg and egg substitutes without added sodium  · Dried peas, beans, and low-sodium peanut butter  · All plain oils and low-sodium salad dressing  · Low-sodium broths, soups, soy sauce, condiments, and snack foods  · Pepper, herbs, spices, vinegar, lemon or lime juice  · Low-sodium carbonated drinks  What foods should be limited or avoided?   Foods that are prepackaged or canned are most often high in sodium. Foods to limit or avoid include:  · Salted breads, rolls, crackers, biscuits, cornbread  · Quick breads, self-rising flours, biscuit mixes, regular bread crumbs, instant hot cereals  · Commercially prepared rice, pasta, or stuffing mixes; potatoes and vegetable mixes  · Regular canned vegetables and juices, vegetables with sauce, and pickled vegetables  · Frozen vegetables with seasonings and sauces  · Processed fruits with salt or sodium  · Malted and chocolate milk and buttermilk  · Regular and processed cheese and spreads  · Smoked, cured, salted, or canned meat, fish, or poultry such as spicer, sausages, sardines, chipped beef, hot dogs, cold cuts, and frozen breaded meats  · Salted and canned peas,  beans, and olives  · Salted snack foods and nuts  · Oils mixed with high-sodium parts such as salad dressing  · Meat tenderizers, seasoning salt, and most flavored vinegars  · Ketchup, bouillon cubes, salt, sea salt, kosher salt, onion salt, garlic salt, and pink Himalayan salt  What can be done to prevent this health problem?   Check with your doctor before using salt substitutes. Also, check the labels when taking over-the-counter (OTC) drugs such as laxatives and antacids. These can have a high sodium content.  When do I need to call the doctor?   Call your doctor if you have questions about this diet. Talk to a dietitian. They can help you find hidden sources of sodium in the food you eat.  Helpful tips   · Use the nutrition facts labels as a guide to look for foods lower in sodium.  · Do not use salt when eating or cooking.  · Select fresh fruits and vegetables for snacks.  · If you are eating out, ask the  to cook your food without salt, or choose foods without sauces.  · Season your food with herbs and spices.  · Drain and rinse canned beans and vegetables that contain sodium.  · Eat foods with potassium. Potassium helps counter the effects of sodium. This may help lower your blood pressure. Foods high in potassium include: Potatoes, tomatoes, bananas, oranges, cantaloupe, beans, and greens.  Where can I learn more?   American Heart Association  https://www.heart.org/en/healthy-living/healthy-eating/eat-smart/sodium/how-to-reduce-sodium   American Heart Association  https://www.heart.org/en/healthy-living/healthy-eating/eat-smart/nutrition-basics/food-packaging-claims   NHS  https://www.nhs.uk/live-well/eat-well/tips-for-a-lower-salt-diet/   UpToDate  http://www.[employer]/contents/low-sodium-diet-beyond-the-basics   Last Reviewed Date   2021-10-11  Consumer Information Use and Disclaimer   This information is not specific medical advice and does not replace information you receive from your health care  provider. This is only a brief summary of general information. It does NOT include all information about conditions, illnesses, injuries, tests, procedures, treatments, therapies, discharge instructions or life-style choices that may apply to you. You must talk with your health care provider for complete information about your health and treatment options. This information should not be used to decide whether or not to accept your health care providers advice, instructions or recommendations. Only your health care provider has the knowledge and training to provide advice that is right for you.  Copyright   Copyright © 2021 [employer] and its affiliates and/or licensors. All rights reserved.  Patient Education       Heart Failure, Adult   About this topic   Heart failure is a condition where your heart does not pump well. Your heart has trouble pumping the right amount of blood throughout your body. Because of this, fluid can back up in your body and your organs may not get as much blood as they need.  Heart failure is a long-term problem and will get worse over time. Your doctor will work hard to treat your heart failure and to keep you as healthy as possible.     What are the causes?   Heart failure is most often caused by coronary artery disease or a heart attack. It may also be caused by problems with the heart's valves. You may have heart failure because you had an infection in your heart muscle. Heart failure may be due to high blood pressure or an abnormal heart rhythm. Sleep apnea or high blood sugar may also cause your heart not to work as well as it should. These causes result in a weak or damaged heart muscle. When your heart is weak or damaged, it has trouble pumping the right amount of blood throughout your body.  What can make this more likely to happen?   You are more likely to have heart failure based on:  · If your blood vessels that supply blood to the heart are narrow or blocked.  · If you  have a problem with your heart valves.  · If you smoke.  · If you have high blood pressure.  · If you have diabetes.  · Your age. Your risk goes up as you get older.  · Your weight. Your risk goes up when you are overweight.  What are the main signs?   · Breathing problems like:  ? Shortness of breath  ? Cough that won't go away  ? Wheezing  ? More trouble breathing at night or when you lay down flat  · Extra fluid that causes:  ? Swelling of feet, ankles, legs, or belly  ? Gaining weight and you don't know why  ? Need to pass urine more often, especially at night  · Problems sleeping like:  ? Need to sleep sitting up or on many pillows  ? Waking up often during sleep times  ? Feeling tired, weak, or have no energy  · General signs like:  ? Increased heart rate or irregular heartbeat  ? Loss of appetite and nausea  ? Feeling lightheaded or dizzy, especially right when you stand up  ? Confusion and impaired thinking     How does the doctor diagnose this health problem?   The doctor will take your history and will do an exam. The doctor will listen to your heart and lungs, and may also feel your belly for liver swelling. The doctor will check your feet, ankles, and legs for swelling.  The doctor may order:  · Lab tests  · Chest x-ray  · Electrocardiogram (ECG or EKG)     · Echocardiogram  · Exercise stress test  · Radioactive imaging. This is a radionuclide scan.  · Dye injected into the heart's arteries. This is coronary angiography.  How does the doctor treat this health problem?   Your doctor will treat you to help your heart work better. The doctor will also work to control your signs. Since other health problems may cause or make heart failure worse, it is important to also treat these. Your doctor may suggest:  · Diet and lifestyle changes to slow down progress of the heart failure  · Drugs to help your heart work better, get rid of the extra fluid, and control your heart rate and blood pressure  · Exercise and  cardiac rehab  · Bypass surgery or a heart stent to open blocked vessels to the muscle of your heart  · Devices like a heart pump  · Heart transplant  What follow-up care is needed?   Your doctor may ask you to come back to the office to check on your progress. Be sure to keep these visits.  What lifestyle changes are needed?   · Limit how much beer, wine, and mixed drinks (alcohol) you drink.  · Your doctor may ask you to limit the amount of salt in your diet. You may also be asked to limit how much fluid you drink.  · Try to lose weight if you are overweight. Your doctor or nurse can help.  · If you smoke, try to quit. Your doctor or nurse can help.  · Unless your doctor tells you to limit your activities, try to be active. Walk, garden, or do something active for 30 minutes or more on most days of the week. Stop activity if you have symptoms like chest pain, shortness of breath, or feel dizzy.  · Let your doctor know if you get more tired while you do an activity or you are not able to do as much activity as you did before.  · Be careful that you take your drugs each day as ordered by your doctor.  ? If you cannot afford your drugs, talk to your doctor.  ? Do not stop your drugs if you have side effects. Talk to your doctor about them.  ? Take your drugs even if you feel well.  · Check your weight each morning and write down your weight in a notebook. This will tell you if you are building up too much fluid. Weigh in the morning after you have passed urine. Weigh yourself with clothes on or off, but do it the same way each day. Make sure your scale is on a hard surface, not on carpet. Your doctor will tell you when you should call based on how much weight you gain in a day or over a week. Take your notebook to your doctor on your next visit.  What drugs may be needed?   Often patients will need to take more than one drug. Together, they will help the heart work as well as it can. Do not take any other  prescription drugs, over-the-counter (OTC) drugs, herbals, or diet aids without asking your doctor.  The doctor may order drugs to:  · Help relax blood vessels. This makes it easier for the heart to work and may also lower your blood pressure. These are ACE inhibitors, ARBs, and calcium channel blockers.  · Slow down the heart rate so that it doesn't have to work as hard. These are beta-blockers and calcium channel blockers.  · Help the heart beat stronger and better. This is digoxin.  · Get rid of extra salt and water in the body. These are water pills or diuretics.  What changes to diet are needed?   · Ask your doctor or dietician what kind of diet is best for you. The doctor may tell you to limit your salt and fat or how much fluid you drink.  · The DASH diet may be helpful. The DASH diet helps to lower blood pressure. This diet includes lots of fruits, vegetables, low-fat dairy foods, and foods that are low in saturated fat, total fat, and cholesterol. Using the DASH diet with a low salt diet may lower blood pressure even more.  · Learn to read labels to see how much salt or sodium is in foods. Lowering your salt intake will help you control some of your symptoms.  When do I need to call the doctor?   Activate the emergency medical system right away if you have signs of a heart attack. Call 911 in the [place] or [place]. The sooner treatment begins, the better your chances for recovery. Call for emergency help right away if you have:  · Signs of heart attack, which may include:  ? Severe chest pain, pressure, or discomfort with:  § Breathing trouble; sweating; upset stomach; or cold, clammy skin.  § Pain in your arms, back, or jaw.  § Worse pain with activity like walking up stairs.  ? Fast or irregular heartbeat.  ? Feeling dizzy, faint, or weak.  Call your doctor if:  · You have so much trouble breathing that you can only say one or two words at a time.  · You need to sit upright at all times to be able  to breathe and/or cannot lie down.  · You are very tired from working to catch your breath or you are sweating from trying to breathe.  · You have trouble breathing when talking, sitting still, or with activity.  · You have wheezing or chest tightness when you are resting.  · You wake up at night because you cant breathe well.  · You become very confused or you faint.  · You have a very fast or irregular heartbeat.  · You cough more than normal or cough up frothy or pink saliva.  · You feel very weak, dizzy, or more tired than normal.  · You need more pillows than normal to sleep.  · You gain 2 to 3 pounds (0.9 to 1.4 kg) overnight or more than 5 pounds (2.3 kg) in 1 week.  · You have more swelling than usual, especially in your feet and ankles or in your belly.  · You feel like your heart is beating very fast.  Where can I learn more?   American Heart Association  http://www.heart.org/HEARTORG/Conditions/HeartFailure/AboutHeartFailure/About-Heart-Failure_UC_002044_Article.jsp   Better Health Channel  https://www.betterhealth.[place].gov.au/health/conditionsandtreatments/congestive-heart-failure-chf   NHS Choices  http://www.nhs.uk/conditions/heart-failure/pages/introduction.aspx   Last Reviewed Date   2021-06-03  Consumer Information Use and Disclaimer   This information is not specific medical advice and does not replace information you receive from your health care provider. This is only a brief summary of general information. It does NOT include all information about conditions, illnesses, injuries, tests, procedures, treatments, therapies, discharge instructions or life-style choices that may apply to you. You must talk with your health care provider for complete information about your health and treatment options. This information should not be used to decide whether or not to accept your health care providers advice, instructions or recommendations. Only your health care provider has the knowledge and training  to provide advice that is right for you.  Copyright   Copyright © 2021 [employer] and its affiliates and/or licensors. All rights reserved.

## 2022-01-12 ENCOUNTER — HOSPITAL ENCOUNTER (INPATIENT)
Facility: HOSPITAL | Age: 57
LOS: 2 days | Discharge: HOME | DRG: 177 | End: 2022-01-14
Attending: EMERGENCY MEDICINE | Admitting: FAMILY MEDICINE
Payer: MEDICAID

## 2022-01-12 DIAGNOSIS — R07.9 CHEST PAIN: ICD-10-CM

## 2022-01-12 DIAGNOSIS — I49.9 ARRHYTHMIA: ICD-10-CM

## 2022-01-12 DIAGNOSIS — G93.40 ACUTE ENCEPHALOPATHY: ICD-10-CM

## 2022-01-12 DIAGNOSIS — R41.0 CONFUSION: Primary | ICD-10-CM

## 2022-01-12 PROBLEM — U07.1 COVID-19 VIRUS INFECTION: Status: ACTIVE | Noted: 2022-01-12

## 2022-01-12 LAB
ALBUMIN SERPL BCP-MCNC: 3.6 G/DL (ref 3.5–5.2)
ALP SERPL-CCNC: 77 U/L (ref 55–135)
ALT SERPL W/O P-5'-P-CCNC: 16 U/L (ref 10–44)
AMPHET+METHAMPHET UR QL: NEGATIVE
ANION GAP SERPL CALC-SCNC: 13 MMOL/L (ref 8–16)
APTT BLDCRRT: 34.2 SEC (ref 21–32)
AST SERPL-CCNC: 18 U/L (ref 10–40)
BACTERIA #/AREA URNS HPF: ABNORMAL /HPF
BARBITURATES UR QL SCN>200 NG/ML: NEGATIVE
BASOPHILS # BLD AUTO: 0.04 K/UL (ref 0–0.2)
BASOPHILS NFR BLD: 0.6 % (ref 0–1.9)
BENZODIAZ UR QL SCN>200 NG/ML: NEGATIVE
BILIRUB SERPL-MCNC: 0.4 MG/DL (ref 0.1–1)
BILIRUB UR QL STRIP: NEGATIVE
BNP SERPL-MCNC: 109 PG/ML (ref 0–99)
BUN SERPL-MCNC: 29 MG/DL (ref 6–20)
BZE UR QL SCN: NEGATIVE
CALCIUM SERPL-MCNC: 9.3 MG/DL (ref 8.7–10.5)
CANNABINOIDS UR QL SCN: NEGATIVE
CHLORIDE SERPL-SCNC: 98 MMOL/L (ref 95–110)
CK SERPL-CCNC: 50 U/L (ref 20–200)
CLARITY UR: CLEAR
CO2 SERPL-SCNC: 21 MMOL/L (ref 23–29)
COLOR UR: YELLOW
CREAT SERPL-MCNC: 2.1 MG/DL (ref 0.5–1.4)
CREAT UR-MCNC: 211.7 MG/DL (ref 23–375)
CTP QC/QA: YES
D DIMER PPP IA.FEU-MCNC: 0.33 MG/L FEU
DIFFERENTIAL METHOD: ABNORMAL
EOSINOPHIL # BLD AUTO: 0.2 K/UL (ref 0–0.5)
EOSINOPHIL NFR BLD: 2.5 % (ref 0–8)
ERYTHROCYTE [DISTWIDTH] IN BLOOD BY AUTOMATED COUNT: 13.8 % (ref 11.5–14.5)
ERYTHROCYTE [SEDIMENTATION RATE] IN BLOOD BY WESTERGREN METHOD: 81 MM/HR (ref 0–10)
EST. GFR  (AFRICAN AMERICAN): 39 ML/MIN/1.73 M^2
EST. GFR  (NON AFRICAN AMERICAN): 34 ML/MIN/1.73 M^2
ETHANOL SERPL-MCNC: <10 MG/DL
FERRITIN SERPL-MCNC: 297 NG/ML (ref 20–300)
GLUCOSE SERPL-MCNC: 110 MG/DL (ref 70–110)
GLUCOSE UR QL STRIP: NEGATIVE
HCT VFR BLD AUTO: 41 % (ref 40–54)
HGB BLD-MCNC: 13.8 G/DL (ref 14–18)
HGB UR QL STRIP: NEGATIVE
HYALINE CASTS #/AREA URNS LPF: 5 /LPF
IMM GRANULOCYTES # BLD AUTO: 0.02 K/UL (ref 0–0.04)
IMM GRANULOCYTES NFR BLD AUTO: 0.3 % (ref 0–0.5)
INR PPP: 1 (ref 0.8–1.2)
KETONES UR QL STRIP: NEGATIVE
LACTATE SERPL-SCNC: 1 MMOL/L (ref 0.5–2.2)
LDH SERPL L TO P-CCNC: 136 U/L (ref 110–260)
LEUKOCYTE ESTERASE UR QL STRIP: NEGATIVE
LYMPHOCYTES # BLD AUTO: 1.2 K/UL (ref 1–4.8)
LYMPHOCYTES NFR BLD: 17.1 % (ref 18–48)
MCH RBC QN AUTO: 29.9 PG (ref 27–31)
MCHC RBC AUTO-ENTMCNC: 33.7 G/DL (ref 32–36)
MCV RBC AUTO: 89 FL (ref 82–98)
METHADONE UR QL SCN>300 NG/ML: NEGATIVE
MICROSCOPIC COMMENT: ABNORMAL
MONOCYTES # BLD AUTO: 1.2 K/UL (ref 0.3–1)
MONOCYTES NFR BLD: 16.3 % (ref 4–15)
NEUTROPHILS # BLD AUTO: 4.5 K/UL (ref 1.8–7.7)
NEUTROPHILS NFR BLD: 63.2 % (ref 38–73)
NITRITE UR QL STRIP: NEGATIVE
NRBC BLD-RTO: 0 /100 WBC
OPIATES UR QL SCN: NEGATIVE
PCP UR QL SCN>25 NG/ML: NEGATIVE
PH UR STRIP: 6 [PH] (ref 5–8)
PLATELET # BLD AUTO: 346 K/UL (ref 150–450)
PMV BLD AUTO: 11.1 FL (ref 9.2–12.9)
POTASSIUM SERPL-SCNC: 4.7 MMOL/L (ref 3.5–5.1)
PROCALCITONIN SERPL IA-MCNC: 0.38 NG/ML
PROT SERPL-MCNC: 8.1 G/DL (ref 6–8.4)
PROT UR QL STRIP: ABNORMAL
PROTHROMBIN TIME: 11 SEC (ref 9–12.5)
RBC # BLD AUTO: 4.61 M/UL (ref 4.6–6.2)
RBC #/AREA URNS HPF: 0 /HPF (ref 0–4)
SARS-COV-2 RDRP RESP QL NAA+PROBE: POSITIVE
SODIUM SERPL-SCNC: 132 MMOL/L (ref 136–145)
SP GR UR STRIP: 1.02 (ref 1–1.03)
SQUAMOUS #/AREA URNS HPF: 0 /HPF
TOXICOLOGY INFORMATION: NORMAL
TROPONIN I SERPL DL<=0.01 NG/ML-MCNC: 0.04 NG/ML (ref 0–0.03)
URN SPEC COLLECT METH UR: ABNORMAL
UROBILINOGEN UR STRIP-ACNC: NEGATIVE EU/DL
WBC # BLD AUTO: 7.12 K/UL (ref 3.9–12.7)
WBC #/AREA URNS HPF: 1 /HPF (ref 0–5)

## 2022-01-12 PROCEDURE — 85652 RBC SED RATE AUTOMATED: CPT

## 2022-01-12 PROCEDURE — 25000003 PHARM REV CODE 250

## 2022-01-12 PROCEDURE — 80053 COMPREHEN METABOLIC PANEL: CPT | Performed by: EMERGENCY MEDICINE

## 2022-01-12 PROCEDURE — 99223 1ST HOSP IP/OBS HIGH 75: CPT | Mod: ,,, | Performed by: PSYCHIATRY & NEUROLOGY

## 2022-01-12 PROCEDURE — 84145 PROCALCITONIN (PCT): CPT

## 2022-01-12 PROCEDURE — 25000003 PHARM REV CODE 250: Performed by: NURSE PRACTITIONER

## 2022-01-12 PROCEDURE — 83605 ASSAY OF LACTIC ACID: CPT

## 2022-01-12 PROCEDURE — 82077 ASSAY SPEC XCP UR&BREATH IA: CPT | Performed by: EMERGENCY MEDICINE

## 2022-01-12 PROCEDURE — 85379 FIBRIN DEGRADATION QUANT: CPT

## 2022-01-12 PROCEDURE — 82728 ASSAY OF FERRITIN: CPT

## 2022-01-12 PROCEDURE — U0002 COVID-19 LAB TEST NON-CDC: HCPCS | Performed by: FAMILY MEDICINE

## 2022-01-12 PROCEDURE — 83615 LACTATE (LD) (LDH) ENZYME: CPT

## 2022-01-12 PROCEDURE — 93010 ELECTROCARDIOGRAM REPORT: CPT | Mod: ,,, | Performed by: INTERNAL MEDICINE

## 2022-01-12 PROCEDURE — 85610 PROTHROMBIN TIME: CPT

## 2022-01-12 PROCEDURE — 27000207 HC ISOLATION

## 2022-01-12 PROCEDURE — 85730 THROMBOPLASTIN TIME PARTIAL: CPT

## 2022-01-12 PROCEDURE — 81000 URINALYSIS NONAUTO W/SCOPE: CPT | Mod: 59

## 2022-01-12 PROCEDURE — 11000001 HC ACUTE MED/SURG PRIVATE ROOM

## 2022-01-12 PROCEDURE — 80307 DRUG TEST PRSMV CHEM ANLYZR: CPT | Performed by: EMERGENCY MEDICINE

## 2022-01-12 PROCEDURE — 83880 ASSAY OF NATRIURETIC PEPTIDE: CPT

## 2022-01-12 PROCEDURE — 93005 ELECTROCARDIOGRAM TRACING: CPT

## 2022-01-12 PROCEDURE — 84484 ASSAY OF TROPONIN QUANT: CPT

## 2022-01-12 PROCEDURE — 99285 EMERGENCY DEPT VISIT HI MDM: CPT | Mod: 25

## 2022-01-12 PROCEDURE — 93010 EKG 12-LEAD: ICD-10-PCS | Mod: ,,, | Performed by: INTERNAL MEDICINE

## 2022-01-12 PROCEDURE — 99223 PR INITIAL HOSPITAL CARE,LEVL III: ICD-10-PCS | Mod: ,,, | Performed by: PSYCHIATRY & NEUROLOGY

## 2022-01-12 PROCEDURE — 82550 ASSAY OF CK (CPK): CPT

## 2022-01-12 PROCEDURE — 85025 COMPLETE CBC W/AUTO DIFF WBC: CPT | Performed by: EMERGENCY MEDICINE

## 2022-01-12 RX ORDER — IBUPROFEN 200 MG
16 TABLET ORAL
Status: DISCONTINUED | OUTPATIENT
Start: 2022-01-12 | End: 2022-01-14 | Stop reason: HOSPADM

## 2022-01-12 RX ORDER — SODIUM CHLORIDE 0.9 % (FLUSH) 0.9 %
10 SYRINGE (ML) INJECTION EVERY 8 HOURS PRN
Status: DISCONTINUED | OUTPATIENT
Start: 2022-01-12 | End: 2022-01-14 | Stop reason: HOSPADM

## 2022-01-12 RX ORDER — PROCHLORPERAZINE EDISYLATE 5 MG/ML
5 INJECTION INTRAMUSCULAR; INTRAVENOUS EVERY 6 HOURS PRN
Status: DISCONTINUED | OUTPATIENT
Start: 2022-01-12 | End: 2022-01-14 | Stop reason: HOSPADM

## 2022-01-12 RX ORDER — METOPROLOL SUCCINATE 25 MG/1
25 TABLET, EXTENDED RELEASE ORAL DAILY
Status: DISCONTINUED | OUTPATIENT
Start: 2022-01-13 | End: 2022-01-14 | Stop reason: HOSPADM

## 2022-01-12 RX ORDER — ACETAMINOPHEN 325 MG/1
650 TABLET ORAL EVERY 4 HOURS PRN
Status: DISCONTINUED | OUTPATIENT
Start: 2022-01-12 | End: 2022-01-14 | Stop reason: HOSPADM

## 2022-01-12 RX ORDER — MAG HYDROX/ALUMINUM HYD/SIMETH 200-200-20
30 SUSPENSION, ORAL (FINAL DOSE FORM) ORAL 4 TIMES DAILY PRN
Status: DISCONTINUED | OUTPATIENT
Start: 2022-01-12 | End: 2022-01-14 | Stop reason: HOSPADM

## 2022-01-12 RX ORDER — IBUPROFEN 200 MG
24 TABLET ORAL
Status: DISCONTINUED | OUTPATIENT
Start: 2022-01-12 | End: 2022-01-14 | Stop reason: HOSPADM

## 2022-01-12 RX ORDER — ASCORBIC ACID 500 MG
500 TABLET ORAL 2 TIMES DAILY
Status: DISCONTINUED | OUTPATIENT
Start: 2022-01-12 | End: 2022-01-14 | Stop reason: HOSPADM

## 2022-01-12 RX ORDER — TALC
6 POWDER (GRAM) TOPICAL NIGHTLY PRN
Status: DISCONTINUED | OUTPATIENT
Start: 2022-01-12 | End: 2022-01-14 | Stop reason: HOSPADM

## 2022-01-12 RX ORDER — ONDANSETRON 2 MG/ML
4 INJECTION INTRAMUSCULAR; INTRAVENOUS EVERY 8 HOURS PRN
Status: DISCONTINUED | OUTPATIENT
Start: 2022-01-12 | End: 2022-01-14 | Stop reason: HOSPADM

## 2022-01-12 RX ORDER — SIMETHICONE 80 MG
1 TABLET,CHEWABLE ORAL 4 TIMES DAILY PRN
Status: DISCONTINUED | OUTPATIENT
Start: 2022-01-12 | End: 2022-01-14 | Stop reason: HOSPADM

## 2022-01-12 RX ORDER — ALBUTEROL SULFATE 90 UG/1
2 AEROSOL, METERED RESPIRATORY (INHALATION) EVERY 6 HOURS PRN
Status: DISCONTINUED | OUTPATIENT
Start: 2022-01-12 | End: 2022-01-14 | Stop reason: HOSPADM

## 2022-01-12 RX ORDER — ENOXAPARIN SODIUM 100 MG/ML
40 INJECTION SUBCUTANEOUS EVERY 24 HOURS
Status: DISCONTINUED | OUTPATIENT
Start: 2022-01-12 | End: 2022-01-12

## 2022-01-12 RX ORDER — LOPERAMIDE HYDROCHLORIDE 2 MG/1
2 CAPSULE ORAL EVERY 6 HOURS PRN
Status: DISCONTINUED | OUTPATIENT
Start: 2022-01-12 | End: 2022-01-14 | Stop reason: HOSPADM

## 2022-01-12 RX ORDER — THIAMINE HCL 100 MG
100 TABLET ORAL DAILY
Status: DISCONTINUED | OUTPATIENT
Start: 2022-01-12 | End: 2022-01-14 | Stop reason: HOSPADM

## 2022-01-12 RX ORDER — ATORVASTATIN CALCIUM 40 MG/1
40 TABLET, FILM COATED ORAL NIGHTLY
Status: DISCONTINUED | OUTPATIENT
Start: 2022-01-12 | End: 2022-01-14 | Stop reason: HOSPADM

## 2022-01-12 RX ORDER — BENZONATATE 100 MG/1
100 CAPSULE ORAL 3 TIMES DAILY PRN
Status: DISCONTINUED | OUTPATIENT
Start: 2022-01-12 | End: 2022-01-14 | Stop reason: HOSPADM

## 2022-01-12 RX ORDER — GLUCAGON 1 MG
1 KIT INJECTION
Status: DISCONTINUED | OUTPATIENT
Start: 2022-01-12 | End: 2022-01-14 | Stop reason: HOSPADM

## 2022-01-12 RX ADMIN — THIAMINE HCL TAB 100 MG 100 MG: 100 TAB at 09:01

## 2022-01-12 RX ADMIN — OXYCODONE HYDROCHLORIDE AND ACETAMINOPHEN 500 MG: 500 TABLET ORAL at 09:01

## 2022-01-12 RX ADMIN — ATORVASTATIN CALCIUM 40 MG: 40 TABLET, FILM COATED ORAL at 09:01

## 2022-01-12 RX ADMIN — APIXABAN 5 MG: 5 TABLET, FILM COATED ORAL at 09:01

## 2022-01-12 NOTE — ED NOTES
"Patient refusing to get on stretcher or change into gown. He's refusing everything at this time stating "i'm ready to go home, I only want to talk to my wife. I'm not about to be in here all day."   "

## 2022-01-12 NOTE — ED NOTES
Provider at bedside. Patient willing to cooperate on blood draw and new IV in exchange for food at this time.

## 2022-01-12 NOTE — PHARMACY MED REC
"  Admission Medication History     The home medication history was taken by Tatiana Guzman CPhT.    Medication history obtained from,[facility]    You may go to "Admission" then "Reconcile Home Medications" tabs to review and/or act upon these items.      The home medication list has been updated by the Pharmacy department.    Please read ALL comments highlighted in yellow.    Please address this information as you see fit.     Feel free to contact us if you have any questions or require assistance.            Tatiana Guzman CPhT.  Ext [phone]                .          "
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
## 2022-01-12 NOTE — ASSESSMENT & PLAN NOTE
-Patient reported last drink was 12/30/21  -No s/s of DTs or alcohol withdrawals  -Monitor for s/s of alcohol withdrawals  -Continuous cardiac monitoring

## 2022-01-12 NOTE — ASSESSMENT & PLAN NOTE
-Dangers of cigarette smoking were reviewed with patient in detail for 10 minutes and patient was encouraged to quit.   -Nicotine replacement options were discussed.

## 2022-01-12 NOTE — Clinical Note
Diagnosis: Confusion [[id number]]   Future Attending Provider: GIRISH ARELLANO [[id number]]   Admitting Provider:: GIRISH ARELLANO [[id number]]

## 2022-01-12 NOTE — ASSESSMENT & PLAN NOTE
- continue home metoprolol; will hold home Losartan and Lasix in setting of TACO for now; will reassess daily  - daily weights, strict I/Os  - Continuous cardiac monitoring  - keep K >4, Mg >2  - 2D echo with left ventricular diastolic dysfunction EF 40%

## 2022-01-12 NOTE — ASSESSMENT & PLAN NOTE
Chronic, controlled.  Latest blood pressure and vitals reviewed-   Temp:  [98.2 °F (36.8 °C)]   Pulse:  [75]   Resp:  [16]   BP: (130)/(88)   SpO2:  [98 %] .   Home meds for hypertension were reviewed and noted below.   Hypertension Medications                       metoprolol succinate (TOPROL-XL) 25 MG 24 hr tablet Take 1 tablet (25 mg total) by mouth once daily.          While in the hospital, will manage blood pressure as follows; Continue home antihypertensive regimen; will hold home furosemide and losartan in setting of TACO    Will utilize p.r.n. blood pressure medication only if patient's blood pressure greater than  180/110 and he develops symptoms such as worsening chest pain or shortness of breath.
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
## 2022-01-12 NOTE — ED NOTES
Patient refusing to stay in room. Attempted to contact his spouse, unsuccessful. Per EMS patient's wife was supposed to be coming.

## 2022-01-12 NOTE — ED NOTES
Patient still refusing to stay at this time. Refusing to get into gown or on monitor, requesting IV be removed.

## 2022-01-13 ENCOUNTER — PATIENT MESSAGE (OUTPATIENT)
Dept: PHARMACY | Facility: CLINIC | Age: 57
End: 2022-01-13
Payer: COMMERCIAL

## 2022-01-13 ENCOUNTER — CLINICAL SUPPORT (OUTPATIENT)
Dept: SMOKING CESSATION | Facility: CLINIC | Age: 57
End: 2022-01-13

## 2022-01-13 ENCOUNTER — TELEPHONE (OUTPATIENT)
Dept: PHARMACY | Facility: CLINIC | Age: 57
End: 2022-01-13
Payer: COMMERCIAL

## 2022-01-13 DIAGNOSIS — F17.210 CIGARETTE SMOKER: Primary | ICD-10-CM

## 2022-01-13 PROBLEM — N17.9 AKI (ACUTE KIDNEY INJURY): Status: ACTIVE | Noted: 2022-01-13

## 2022-01-13 LAB
AMMONIA PLAS-SCNC: 38 UMOL/L (ref 10–50)
ANION GAP SERPL CALC-SCNC: 12 MMOL/L (ref 8–16)
BUN SERPL-MCNC: 31 MG/DL (ref 6–20)
CALCIUM SERPL-MCNC: 9.3 MG/DL (ref 8.7–10.5)
CHLORIDE SERPL-SCNC: 101 MMOL/L (ref 95–110)
CHOLEST SERPL-MCNC: 134 MG/DL (ref 120–199)
CHOLEST/HDLC SERPL: 4.5 {RATIO} (ref 2–5)
CO2 SERPL-SCNC: 21 MMOL/L (ref 23–29)
CREAT SERPL-MCNC: 1.5 MG/DL (ref 0.5–1.4)
EST. GFR  (AFRICAN AMERICAN): 58 ML/MIN/1.73 M^2
EST. GFR  (NON AFRICAN AMERICAN): 51 ML/MIN/1.73 M^2
ESTIMATED AVG GLUCOSE: 117 MG/DL (ref 68–131)
FOLATE SERPL-MCNC: 11.7 NG/ML (ref 4–24)
GLUCOSE SERPL-MCNC: 99 MG/DL (ref 70–110)
HBA1C MFR BLD: 5.7 % (ref 4–5.6)
HDLC SERPL-MCNC: 30 MG/DL (ref 40–75)
HDLC SERPL: 22.4 % (ref 20–50)
LDLC SERPL CALC-MCNC: 91.2 MG/DL (ref 63–159)
MAGNESIUM SERPL-MCNC: 2 MG/DL (ref 1.6–2.6)
NONHDLC SERPL-MCNC: 104 MG/DL
PHOSPHATE SERPL-MCNC: 4.1 MG/DL (ref 2.7–4.5)
POTASSIUM SERPL-SCNC: 4.4 MMOL/L (ref 3.5–5.1)
RPR SER QL: NORMAL
SODIUM SERPL-SCNC: 134 MMOL/L (ref 136–145)
TRIGL SERPL-MCNC: 64 MG/DL (ref 30–150)
TSH SERPL DL<=0.005 MIU/L-ACNC: 1.86 UIU/ML (ref 0.4–4)
VIT B12 SERPL-MCNC: 757 PG/ML (ref 210–950)

## 2022-01-13 PROCEDURE — 36415 COLL VENOUS BLD VENIPUNCTURE: CPT

## 2022-01-13 PROCEDURE — 82607 VITAMIN B-12: CPT | Performed by: NURSE PRACTITIONER

## 2022-01-13 PROCEDURE — 99232 SBSQ HOSP IP/OBS MODERATE 35: CPT | Mod: ,,, | Performed by: PSYCHIATRY & NEUROLOGY

## 2022-01-13 PROCEDURE — 86592 SYPHILIS TEST NON-TREP QUAL: CPT | Performed by: NURSE PRACTITIONER

## 2022-01-13 PROCEDURE — 97535 SELF CARE MNGMENT TRAINING: CPT

## 2022-01-13 PROCEDURE — 11000001 HC ACUTE MED/SURG PRIVATE ROOM

## 2022-01-13 PROCEDURE — 84425 ASSAY OF VITAMIN B-1: CPT | Performed by: NURSE PRACTITIONER

## 2022-01-13 PROCEDURE — 92523 SPEECH SOUND LANG COMPREHEN: CPT

## 2022-01-13 PROCEDURE — 99406 BEHAV CHNG SMOKING 3-10 MIN: CPT | Mod: S$GLB,,,

## 2022-01-13 PROCEDURE — 27000207 HC ISOLATION

## 2022-01-13 PROCEDURE — 82746 ASSAY OF FOLIC ACID SERUM: CPT | Performed by: NURSE PRACTITIONER

## 2022-01-13 PROCEDURE — 80061 LIPID PANEL: CPT | Performed by: NURSE PRACTITIONER

## 2022-01-13 PROCEDURE — 25000003 PHARM REV CODE 250

## 2022-01-13 PROCEDURE — 25000003 PHARM REV CODE 250: Performed by: NURSE PRACTITIONER

## 2022-01-13 PROCEDURE — 80048 BASIC METABOLIC PNL TOTAL CA: CPT

## 2022-01-13 PROCEDURE — 84100 ASSAY OF PHOSPHORUS: CPT

## 2022-01-13 PROCEDURE — 99900035 HC TECH TIME PER 15 MIN (STAT)

## 2022-01-13 PROCEDURE — 83735 ASSAY OF MAGNESIUM: CPT

## 2022-01-13 PROCEDURE — 99406 PT REFUSED TOBACCO CESSATION: ICD-10-PCS | Mod: S$GLB,,,

## 2022-01-13 PROCEDURE — 84443 ASSAY THYROID STIM HORMONE: CPT | Performed by: NURSE PRACTITIONER

## 2022-01-13 PROCEDURE — 97165 OT EVAL LOW COMPLEX 30 MIN: CPT

## 2022-01-13 PROCEDURE — 94761 N-INVAS EAR/PLS OXIMETRY MLT: CPT

## 2022-01-13 PROCEDURE — 99232 PR SUBSEQUENT HOSPITAL CARE,LEVL II: ICD-10-PCS | Mod: ,,, | Performed by: PSYCHIATRY & NEUROLOGY

## 2022-01-13 PROCEDURE — 82140 ASSAY OF AMMONIA: CPT | Performed by: FAMILY MEDICINE

## 2022-01-13 PROCEDURE — 83036 HEMOGLOBIN GLYCOSYLATED A1C: CPT | Performed by: NURSE PRACTITIONER

## 2022-01-13 RX ORDER — IBUPROFEN 200 MG
1 TABLET ORAL DAILY
Status: DISCONTINUED | OUTPATIENT
Start: 2022-01-13 | End: 2022-01-14 | Stop reason: HOSPADM

## 2022-01-13 RX ADMIN — OXYCODONE HYDROCHLORIDE AND ACETAMINOPHEN 500 MG: 500 TABLET ORAL at 09:01

## 2022-01-13 RX ADMIN — ATORVASTATIN CALCIUM 40 MG: 40 TABLET, FILM COATED ORAL at 09:01

## 2022-01-13 RX ADMIN — APIXABAN 5 MG: 5 TABLET, FILM COATED ORAL at 09:01

## 2022-01-13 RX ADMIN — METOPROLOL SUCCINATE 25 MG: 25 TABLET, EXTENDED RELEASE ORAL at 09:01

## 2022-01-13 RX ADMIN — THIAMINE HCL TAB 100 MG 100 MG: 100 TAB at 09:01

## 2022-01-13 RX ADMIN — THERA TABS 1 TABLET: TAB at 09:01

## 2022-01-13 NOTE — ASSESSMENT & PLAN NOTE
- COVID positive 1/12/22; initiated on protocol  - Isolation: Airborne/Droplet. Surgical mask on patient. Notify Infection Control  - CXR without evidence of acute cardiopulmonary disease, not requiring O2  - Telemetry  - CRP pending; D-dimer 34.2  -Covid Labs remarkable for sed rate 81, troponin 0.037, procalcitonin 0.38  - Continuous Pulse Oximetry, goal SpO2 92-96%  - supplemental O2 PRN  - Albuterol INH Q6h PRN  - MVI & ascorbic acid 500mg PO BID  -Anti-tussives for cough  - Acetaminophen Q6hr PRN fever/headache  - Loperamide PRN viral diarrhea  - VTE PPx: resumed home Eliquis  - If deterioration, may warrant trial of NIPPV in neg pressure room or immediate ICU consult

## 2022-01-13 NOTE — PT/OT/SLP EVAL
Occupational Therapy   Evaluation and Discharge Note    Name: Sarbjit Brewer Sr.  MRN: [id number]  Admitting Diagnosis:  Acute encephalopathy   Recent Surgery: * No surgery found *      Recommendations:     Discharge Recommendations: home  Discharge Equipment Recommendations:  none  Barriers to discharge:  None    Assessment:     Sarbjit Brewer Sr. is a 56 y.o. male with a medical diagnosis of Acute encephalopathy. At this time, patient is functioning at their prior level of function and does not require further acute OT services.     Plan:     During this hospitalization, patient does not require further acute OT services.  Please re-consult if situation changes.    · Plan of Care Reviewed with: patient,spouse    Subjective     Chief Complaint: I'm straight  Patient/Family Comments/goals: go home    Occupational Profile:  Living Environment: Lives w/spouse SSH, no JENI, T/S w/GB  Previous level of function: indep  Roles and Routines: works as  in [employer]  Equipment Used at home:  none  Assistance upon Discharge: family    Pain/Comfort:  · Pain Rating 1: 0/10  · Pain Rating Post-Intervention 1: 0/10    Patients cultural, spiritual, Sabianism conflicts given the current situation: no    Objective:     Communicated with: nurse prior to session.  Patient found HOB elevated with telemetry,peripheral IV upon OT entry to room.    General Precautions: Standard, airborne,fall,droplet,contact   Orthopedic Precautions:    Braces:    Respiratory Status: Room air     Occupational Performance:    Bed Mobility:    · Patient completed Rolling/Turning to Right with independence  · Patient completed Scooting/Bridging with independence  · Patient completed Supine to Sit with independence    Functional Mobility/Transfers:  · Patient completed Sit <> Stand Transfer with independence  with  no assistive device   · Functional Mobility: indep in room no AD    Activities of Daily Living:  · indep    Cognitive/Visual Perceptual:  No  deficits of cognition or  noted     Physical Exam:  BUE AROM/strength WNL  Normal coordination, sensation  Normal sit and stand balance      AMPAC 6 Click ADL:  AMPAC Total Score: 24    Treatment & Education:  Pt/wife educated on role of OT/POC  Education:    Patient left seated EOB with all lines intact, call button in reach and wife present    GOALS:   Multidisciplinary Problems     Occupational Therapy Goals     Not on file          Multidisciplinary Problems (Resolved)        Problem: Occupational Therapy Goal    Goal Priority Disciplines Outcome Interventions   Occupational Therapy Goal   (Resolved)     OT, PT/OT Met                    History:     Past Medical History:   Diagnosis Date    A-fib     CHF (congestive heart failure)     Hypertension     Insomnia        Past Surgical History:   Procedure Laterality Date    KIDNEY STONE SURGERY         Time Tracking:     OT Date of Treatment: 01/13/22  OT Start Time: 1432  OT Stop Time: 1448  OT Total Time (min): 16 min    Billable Minutes:Evaluation 16    1/13/2022
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
## 2022-01-13 NOTE — PLAN OF CARE
Problem: Adult Inpatient Plan of Care  Goal: Admit completed, chart reviewed and care plan updated  Outcome: Ongoing, Progressing

## 2022-01-13 NOTE — PROGRESS NOTES
Individual Follow-Up Form    1/13/2022    Quit Date: To be determined    Clinical Status of Patient: Inpatient    Length of Service: 30 minutes    Comments: Virtual smoking cessation education: Pt is a 1 pk/day cigarette smoker x 40 yrs Order for 21 mg nicotine patch Q day requested. Pt is currently enrolled in the [employer] and is scheduled for initial Ambualtory Smoking Cessation Program.     Diagnosis: F17.210    Next Visit:   1/20/22 at 5 o'clock pm with CTTEnzo Martin Smoking Cessation.

## 2022-01-13 NOTE — PLAN OF CARE
OT eval performed, report to follow    No OT needs      Problem: Occupational Therapy Goal  Goal: Occupational Therapy Goal  Outcome: Met

## 2022-01-13 NOTE — TELEPHONE ENCOUNTER
Spoke with Mrs Brewer and explained ( also sent email per her request)  to her that I need them to provide proof of income to help them apply for prescription assistance.
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
## 2022-01-13 NOTE — SUBJECTIVE & OBJECTIVE
Past Medical History:   Diagnosis Date    A-fib     CHF (congestive heart failure)     Hypertension     Insomnia        Past Surgical History:   Procedure Laterality Date    KIDNEY STONE SURGERY         Review of patient's allergies indicates:  No Known Allergies    No current facility-administered medications on file prior to encounter.     Current Outpatient Medications on File Prior to Encounter   Medication Sig    apixaban (ELIQUIS) 5 mg Tab Take 1 tablet (5 mg total) by mouth 2 (two) times daily.    furosemide (LASIX) 20 MG tablet Take 1 tablet (20 mg total) by mouth once daily.    losartan (COZAAR) 50 MG tablet Take 1 tablet (50 mg total) by mouth once daily.    metoprolol succinate (TOPROL-XL) 25 MG 24 hr tablet Take 1 tablet (25 mg total) by mouth once daily.    traZODone (DESYREL) 50 MG tablet Take 1 tablet (50 mg total) by mouth every evening.    benzonatate (TESSALON) 100 MG capsule Take 1 capsule (100 mg total) by mouth 3 (three) times daily as needed for Cough.    nicotine (NICODERM CQ) 21 mg/24 hr Place 1 patch onto the skin once daily.     Family History    None       Tobacco Use    Smoking status: Current Every Day Smoker     Packs/day: 1.50     Years: 40.00     Pack years: 60.00     Types: Cigarettes     Start date: 1981    Smokeless tobacco: Never Used    Tobacco comment: Pt enrolled in the [employer] on 1/29/20 (SCT Member ID # [id number]). Ambulatory referral to Smoking Cessation Program.   Substance and Sexual Activity    Alcohol use: Yes    Drug use: No    Sexual activity: Not on file     Review of Systems   Constitutional: Negative for chills, diaphoresis and fever.   HENT: Negative for congestion, hearing loss and sore throat.    Eyes: Positive for visual disturbance.   Respiratory: Negative for cough and shortness of breath.    Cardiovascular: Negative for chest pain, palpitations and leg swelling.   Gastrointestinal: Negative for abdominal pain, diarrhea, nausea and  vomiting.   Genitourinary: Negative for difficulty urinating, dysuria and flank pain.   Musculoskeletal: Negative for back pain.   Skin: Negative for rash and wound.   Neurological: Negative for dizziness, weakness and headaches.   Psychiatric/Behavioral: Positive for confusion. Negative for agitation. The patient is not nervous/anxious.      Objective:     Vital Signs (Most Recent):  Temp: 98.2 °F (36.8 °C) (01/12/22 1132)  Pulse: 75 (01/12/22 1132)  Resp: 16 (01/12/22 1132)  BP: 130/88 (01/12/22 1132)  SpO2: 98 % (01/12/22 1132) Vital Signs (24h Range):  Temp:  [98.2 °F (36.8 °C)] 98.2 °F (36.8 °C)  Pulse:  [75] 75  Resp:  [16] 16  SpO2:  [98 %] 98 %  BP: (130)/(88) 130/88     Weight: 84.8 kg (187 lb)  Body mass index is 25.72 kg/m².    Physical Exam  Vitals and nursing note reviewed.   Constitutional:       General: He is not in acute distress.     Appearance: Normal appearance. He is not ill-appearing.   HENT:      Head: Normocephalic and atraumatic.      Mouth/Throat:      Mouth: Mucous membranes are moist.   Eyes:      Extraocular Movements: Extraocular movements intact.      Pupils: Pupils are equal, round, and reactive to light.   Cardiovascular:      Rate and Rhythm: Normal rate and regular rhythm.      Pulses: Normal pulses.      Heart sounds: Normal heart sounds. No murmur heard.  No friction rub. No gallop.    Pulmonary:      Effort: Pulmonary effort is normal. No respiratory distress.      Breath sounds: Examination of the right-lower field reveals decreased breath sounds. Examination of the left-lower field reveals decreased breath sounds. Decreased breath sounds present. No wheezing or rhonchi.   Abdominal:      General: Bowel sounds are normal. There is no distension.      Palpations: Abdomen is soft.      Tenderness: There is no abdominal tenderness. There is no guarding.   Musculoskeletal:         General: No swelling.      Cervical back: Normal range of motion and neck supple.      Right lower  leg: No edema.      Left lower leg: No edema.   Skin:     General: Skin is warm and dry.      Capillary Refill: Capillary refill takes less than 2 seconds.      Findings: No bruising, erythema or rash.   Neurological:      General: No focal deficit present.      Mental Status: He is alert and oriented to person, place, and time.      GCS: GCS eye subscore is 4. GCS verbal subscore is 4. GCS motor subscore is 6.   Psychiatric:         Attention and Perception: He is inattentive.         Mood and Affect: Affect is flat.         Speech: Speech is tangential.         Behavior: Behavior is withdrawn.           CRANIAL NERVES     CN III, IV, VI   Pupils are equal, round, and reactive to light.       Significant Labs:   All pertinent labs within the past 24 hours have been reviewed.  A1C:   Recent Labs   Lab 12/30/21  1509   HGBA1C 5.6     : Bilirubin:   Recent Labs   Lab 12/30/21  1058 01/12/22  1322   BILITOT 0.6 0.4     BMP:   Recent Labs   Lab 01/12/22  1322      *   K 4.7   CL 98   CO2 21*   BUN 29*   CREATININE 2.1*   CALCIUM 9.3     CBC:   Recent Labs   Lab 01/12/22  1325   WBC 7.12   HGB 13.8*   HCT 41.0        CMP:   Recent Labs   Lab 01/12/22  1322   *   K 4.7   CL 98   CO2 21*      BUN 29*   CREATININE 2.1*   CALCIUM 9.3   PROT 8.1   ALBUMIN 3.6   BILITOT 0.4   ALKPHOS 77   AST 18   ALT 16   ANIONGAP 13   EGFRNONAA 34*     Cardiac Markers:   Recent Labs   Lab 01/12/22  1714   *     Coagulation:   Recent Labs   Lab 01/12/22  1714   INR 1.0   APTT 34.2*     Lactic Acid:   Recent Labs   Lab 01/12/22  1714   LACTATE 1.0       Pathology Results  (Last 10 years)    None        Troponin:   Recent Labs   Lab 01/12/22  1714   TROPONINI 0.037*     Urine Studies:   Recent Labs   Lab 01/12/22 1959   COLORU Yellow   APPEARANCEUA Clear   PHUR 6.0   SPECGRAV 1.020   PROTEINUA 2+*   GLUCUA Negative   KETONESU Negative   BILIRUBINUA Negative   OCCULTUA Negative   NITRITE Negative    UROBILINOGEN Negative   LEUKOCYTESUR Negative   RBCUA 0   WBCUA 1   BACTERIA None   SQUAMEPITHEL 0   HYALINECASTS 5*       Significant Imaging: I have reviewed all pertinent imaging results/findings within the past 24 hours.

## 2022-01-13 NOTE — PLAN OF CARE
Plan of care reviewed with patient. Patient voiced understanding. NSR on monitor. No acute distress noted. Side rails x 2, bed in lowest position, call bell within reach, pt advised to call for assistance. Maintain bed alarm for patient safety.  Wife at bedside.

## 2022-01-13 NOTE — SUBJECTIVE & OBJECTIVE
Interval History:  Await and alert, sitting up in bed eating.  On room air  TACO improving  Awaiting SLP assessment  Awaiting imaging today and possible discharge tomorrow      Review of Systems   Constitutional: Negative for chills, diaphoresis and fever.   HENT: Negative for congestion, hearing loss and sore throat.    Eyes: Negative for visual disturbance.   Respiratory: Negative for cough and shortness of breath.    Cardiovascular: Negative for chest pain, palpitations and leg swelling.   Gastrointestinal: Negative for abdominal pain, diarrhea, nausea and vomiting.   Genitourinary: Negative for difficulty urinating, dysuria and flank pain.   Musculoskeletal: Negative for back pain.   Skin: Negative for rash and wound.   Neurological: Negative for dizziness, weakness and headaches.   Psychiatric/Behavioral: Negative for agitation and confusion. The patient is not nervous/anxious.      Objective:     Vital Signs (Most Recent):  Temp: 98.6 °F (37 °C) (01/13/22 0920)  Pulse: 86 (01/13/22 0926)  Resp: 18 (01/13/22 0920)  BP: 128/88 (01/13/22 0926)  SpO2: 98 % (01/13/22 0920) Vital Signs (24h Range):  Temp:  [98.5 °F (36.9 °C)-98.8 °F (37.1 °C)] 98.6 °F (37 °C)  Pulse:  [72-86] 86  Resp:  [18-21] 18  SpO2:  [94 %-98 %] 98 %  BP: (118-165)/(77-95) 128/88     Weight: 81.7 kg (180 lb 1.9 oz)  Body mass index is 24.77 kg/m².    Intake/Output Summary (Last 24 hours) at 1/13/2022 1155  Last data filed at 1/13/2022 0800  Gross per 24 hour   Intake 240 ml   Output 200 ml   Net 40 ml      Physical Exam  Vitals and nursing note reviewed.   Constitutional:       General: He is not in acute distress.     Appearance: Normal appearance. He is not ill-appearing.   HENT:      Head: Normocephalic and atraumatic.   Cardiovascular:      Rate and Rhythm: Normal rate and regular rhythm.      Pulses: Normal pulses.      Heart sounds: Normal heart sounds. No murmur heard.  No friction rub. No gallop.    Pulmonary:      Effort: Pulmonary  effort is normal. No respiratory distress.   Abdominal:      General: Bowel sounds are normal. There is no distension.      Palpations: Abdomen is soft.      Tenderness: There is no abdominal tenderness. There is no guarding.   Musculoskeletal:         General: No swelling.      Cervical back: Normal range of motion and neck supple.      Right lower leg: No edema.      Left lower leg: No edema.   Skin:     General: Skin is warm and dry.      Capillary Refill: Capillary refill takes less than 2 seconds.      Findings: No bruising, erythema or rash.   Neurological:      General: No focal deficit present.      Mental Status: He is alert and oriented to person, place, and time.      GCS: GCS eye subscore is 4. GCS verbal subscore is 4. GCS motor subscore is 6.   Psychiatric:         Attention and Perception: He is attentive.         Mood and Affect: Affect is not flat.         Speech: Speech is not tangential.         Behavior: Behavior is not withdrawn.         Significant Labs:   A1C:   Recent Labs   Lab 12/30/21  1509 01/13/22  0001   HGBA1C 5.6 5.7*     ABGs: No results for input(s): PH, PCO2, HCO3, POCSATURATED, BE, TOTALHB, COHB, METHB, O2HB, POCFIO2, PO2 in the last 48 hours.  Blood Culture: No results for input(s): LABBLOO in the last 48 hours.  CBC:   Recent Labs   Lab 01/12/22  1325   WBC 7.12   HGB 13.8*   HCT 41.0        CMP:   Recent Labs   Lab 01/12/22  1322 01/13/22  0350   * 134*   K 4.7 4.4   CL 98 101   CO2 21* 21*    99   BUN 29* 31*   CREATININE 2.1* 1.5*   CALCIUM 9.3 9.3   PROT 8.1  --    ALBUMIN 3.6  --    BILITOT 0.4  --    ALKPHOS 77  --    AST 18  --    ALT 16  --    ANIONGAP 13 12   EGFRNONAA 34* 51*     Cardiac Markers:   Recent Labs   Lab 01/12/22  1714   *     Coagulation:   Recent Labs   Lab 01/12/22  1714   INR 1.0   APTT 34.2*     Lactic Acid:   Recent Labs   Lab 01/12/22  1714   LACTATE 1.0     Lipase: No results for input(s): LIPASE in the last 48  hours.  Lipid Panel:   Recent Labs   Lab 01/13/22  0001   CHOL 134   HDL 30*   LDLCALC 91.2   TRIG 64   CHOLHDL 22.4     Magnesium:   Recent Labs   Lab 01/13/22  0350   MG 2.0     Troponin:   Recent Labs   Lab 01/12/22  1714   TROPONINI 0.037*     TSH:   Recent Labs   Lab 01/13/22  0001   TSH 1.863     Urine Culture: No results for input(s): LABURIN in the last 48 hours.  Urine Studies:   Recent Labs   Lab 01/12/22 1959   COLORU Yellow   APPEARANCEUA Clear   PHUR 6.0   SPECGRAV 1.020   PROTEINUA 2+*   GLUCUA Negative   KETONESU Negative   BILIRUBINUA Negative   OCCULTUA Negative   NITRITE Negative   UROBILINOGEN Negative   LEUKOCYTESUR Negative   RBCUA 0   WBCUA 1   BACTERIA None   SQUAMEPITHEL 0   HYALINECASTS 5*       Significant Imaging: I have reviewed all pertinent imaging results/findings within the past 24 hours.
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
## 2022-01-13 NOTE — TELEPHONE ENCOUNTER
----- Message from Kiara Kaufman RN sent at 1/13/2022  1:22 PM CST -----  Regarding: Wife reports she had been trying to contact you to enroll for assist [phone] Elise Brewer
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
## 2022-01-13 NOTE — PLAN OF CARE
Patient got in from ER @ 2220 hrs. Oriented the patient to the unit and the nurse call light system. Vital signs recorded as Temp:98.8, HR:79, Resp:20, SpO2: 97% and BP:165/95. Plan of care reviewed with the patient.  Not in any acute distress throughout the shift. Advised the patient to call for assistance. Continued monitoring the patient.
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
## 2022-01-14 ENCOUNTER — TELEPHONE (OUTPATIENT)
Dept: EMERGENCY MEDICINE | Facility: HOSPITAL | Age: 57
End: 2022-01-14
Payer: COMMERCIAL

## 2022-01-14 ENCOUNTER — CLINICAL SUPPORT (OUTPATIENT)
Dept: SMOKING CESSATION | Facility: CLINIC | Age: 57
End: 2022-01-14

## 2022-01-14 VITALS
SYSTOLIC BLOOD PRESSURE: 144 MMHG | OXYGEN SATURATION: 97 % | WEIGHT: 180.13 LBS | TEMPERATURE: 97 F | HEART RATE: 100 BPM | RESPIRATION RATE: 18 BRPM | DIASTOLIC BLOOD PRESSURE: 88 MMHG | HEIGHT: 72 IN | BODY MASS INDEX: 24.4 KG/M2

## 2022-01-14 DIAGNOSIS — U07.1 COVID-19 VIRUS DETECTED: ICD-10-CM

## 2022-01-14 DIAGNOSIS — F17.210 CIGARETTE SMOKER: Primary | ICD-10-CM

## 2022-01-14 PROBLEM — F41.9 ANXIETY: Status: ACTIVE | Noted: 2022-01-14

## 2022-01-14 PROBLEM — G47.00 INSOMNIA: Status: ACTIVE | Noted: 2022-01-14

## 2022-01-14 PROBLEM — F43.25 ADJUSTMENT DISORDER WITH MIXED DISTURBANCE OF EMOTIONS AND CONDUCT: Status: ACTIVE | Noted: 2022-01-14

## 2022-01-14 PROCEDURE — 90833 PR PSYCHOTHERAPY W/PATIENT W/E&M, 30 MIN (ADD ON): ICD-10-PCS | Mod: AF,HB,, | Performed by: PSYCHIATRY & NEUROLOGY

## 2022-01-14 PROCEDURE — 90471 IMMUNIZATION ADMIN: CPT | Performed by: FAMILY MEDICINE

## 2022-01-14 PROCEDURE — 99223 1ST HOSP IP/OBS HIGH 75: CPT | Mod: AF,HB,, | Performed by: PSYCHIATRY & NEUROLOGY

## 2022-01-14 PROCEDURE — 99406 BEHAV CHNG SMOKING 3-10 MIN: CPT | Mod: S$GLB,,,

## 2022-01-14 PROCEDURE — 90833 PSYTX W PT W E/M 30 MIN: CPT | Mod: AF,HB,, | Performed by: PSYCHIATRY & NEUROLOGY

## 2022-01-14 PROCEDURE — 94761 N-INVAS EAR/PLS OXIMETRY MLT: CPT

## 2022-01-14 PROCEDURE — 90686 IIV4 VACC NO PRSV 0.5 ML IM: CPT | Performed by: FAMILY MEDICINE

## 2022-01-14 PROCEDURE — 97161 PT EVAL LOW COMPLEX 20 MIN: CPT

## 2022-01-14 PROCEDURE — 25000003 PHARM REV CODE 250

## 2022-01-14 PROCEDURE — 99223 PR INITIAL HOSPITAL CARE,LEVL III: ICD-10-PCS | Mod: AF,HB,, | Performed by: PSYCHIATRY & NEUROLOGY

## 2022-01-14 PROCEDURE — 25000003 PHARM REV CODE 250: Performed by: NURSE PRACTITIONER

## 2022-01-14 PROCEDURE — 99406 PT REFUSED TOBACCO CESSATION: ICD-10-PCS | Mod: S$GLB,,,

## 2022-01-14 PROCEDURE — 63600175 PHARM REV CODE 636 W HCPCS: Performed by: FAMILY MEDICINE

## 2022-01-14 RX ORDER — LOSARTAN POTASSIUM 50 MG/1
50 TABLET ORAL DAILY
Qty: 90 TABLET | Refills: 3 | Status: SHIPPED | OUTPATIENT
Start: 2022-01-16 | End: 2022-10-28 | Stop reason: DRUGHIGH

## 2022-01-14 RX ORDER — FUROSEMIDE 20 MG/1
20 TABLET ORAL DAILY
Qty: 90 TABLET | Refills: 4 | Status: SHIPPED | OUTPATIENT
Start: 2022-01-16 | End: 2023-02-27

## 2022-01-14 RX ORDER — ALBUTEROL SULFATE 90 UG/1
2 AEROSOL, METERED RESPIRATORY (INHALATION) EVERY 6 HOURS PRN
Qty: 18 G | Refills: 0 | Status: SHIPPED | OUTPATIENT
Start: 2022-01-14 | End: 2022-10-28

## 2022-01-14 RX ORDER — MIRTAZAPINE 15 MG/1
15 TABLET, FILM COATED ORAL NIGHTLY
Qty: 30 TABLET | Refills: 11 | Status: SHIPPED | OUTPATIENT
Start: 2022-01-14 | End: 2023-02-27

## 2022-01-14 RX ORDER — IBUPROFEN 200 MG
1 TABLET ORAL DAILY
Qty: 30 PATCH | Refills: 2 | Status: SHIPPED | OUTPATIENT
Start: 2022-01-14 | End: 2022-02-07 | Stop reason: SDUPTHER

## 2022-01-14 RX ORDER — ASCORBIC ACID 500 MG
500 TABLET ORAL 2 TIMES DAILY
Qty: 30 TABLET | Refills: 0 | Status: SHIPPED | OUTPATIENT
Start: 2022-01-14 | End: 2022-10-28

## 2022-01-14 RX ORDER — ATORVASTATIN CALCIUM 40 MG/1
40 TABLET, FILM COATED ORAL NIGHTLY
Qty: 90 TABLET | Refills: 3 | Status: SHIPPED | OUTPATIENT
Start: 2022-01-14 | End: 2023-02-27

## 2022-01-14 RX ORDER — LANOLIN ALCOHOL/MO/W.PET/CERES
100 CREAM (GRAM) TOPICAL DAILY
Qty: 30 TABLET | Refills: 4 | Status: SHIPPED | OUTPATIENT
Start: 2022-01-14 | End: 2022-02-17

## 2022-01-14 RX ADMIN — OXYCODONE HYDROCHLORIDE AND ACETAMINOPHEN 500 MG: 500 TABLET ORAL at 10:01

## 2022-01-14 RX ADMIN — THIAMINE HCL TAB 100 MG 100 MG: 100 TAB at 10:01

## 2022-01-14 RX ADMIN — METOPROLOL SUCCINATE 25 MG: 25 TABLET, EXTENDED RELEASE ORAL at 10:01

## 2022-01-14 RX ADMIN — APIXABAN 5 MG: 5 TABLET, FILM COATED ORAL at 10:01

## 2022-01-14 RX ADMIN — INFLUENZA VIRUS VACCINE 0.5 ML: 15; 15; 15; 15 SUSPENSION INTRAMUSCULAR at 11:01

## 2022-01-14 RX ADMIN — THERA TABS 1 TABLET: TAB at 10:01

## 2022-01-14 NOTE — NURSING
Patient already received covid 1/3/22 and not due till 1/24/22. Pharmacy called and alerted the nurse. Pharmacy will send influeza vaccine.

## 2022-01-14 NOTE — PLAN OF CARE
VN reviewed discharge instructions with pt'w wife. Using teach back method.  AVS printed and handed to pt by bedside nurse.  Reviewed follow-up appointments, medications, diet, and importance of medication compliance.  Reviewed home care instructions, treatment plan, self-management, and when to seek medical attention.  Allowed time for questions.  All questions answered.  Patients wife verbalized complete understanding of discharge instructions and voices no concerns.     Discharge instructions complete.  Bedside delivery done.  Transport/wheelchair requested.  Bedside nurse notified.

## 2022-01-14 NOTE — PLAN OF CARE
Problem: Physical Therapy Goal  Goal: Physical Therapy Goal  Outcome: Met     PT Eval completed, note to follow. Pt is currently functioning at his baseline level of function. No skilled PT services needed. Anticipate pt to d/c home with no DME needs. D/c PT.

## 2022-01-14 NOTE — NURSING
Nurse introduced self to patient and patient found to be lying in bed supine eyes open and alert times 4 with respirations even and unlabored. Patient asked about being discharged and ready to go home and go back to work. Called wife on the phone and talked to wife. Vitals, notes, labs, and orders reviewed. Will continue to monitor.

## 2022-01-14 NOTE — PLAN OF CARE
01/14/22 1150   Post-Acute Status   Other Status No Post-Acute Service Needs   Discharge Delays None known at this time     Future Appointments   Date Time Provider Department Center   1/20/2022  3:20 PM Gloria Jaramillo MD [facility] CARDIO [facility]   1/20/2022  5:00 PM Daisy Muro [facility] SMOKE [facility]   1/27/2022  8:00 AM PFIZER VACCINE, [facility] INTERNAL MEDICINE [facility] IM [facility]   2/2/2022  9:30 AM Sheila Singh MD [facility] IMPRI [facility]     [facility]  Go on 1/31/2022  at 0830 am; FOLLOW UP WITH PSYCHIATRY, ESTABLISH CARE WITH PRIMARY PROVIDER APPOINTMENT, BRING DISCHARGE PAPER WORK AND ALL PILL BOTTLES, Bring check stubs from last 30 days to get financial assistance for visit.  [address] [address]  [phone]   [facility]  Go on 2/2/2022  at 0930 am; FOLLOW UP WITH PCP AFTER [facility]  [address] [facility] [address]  [place] [id number] [phone]

## 2022-01-14 NOTE — PLAN OF CARE
Sarbjit Brewer    MRN [id number]        scheduled 01/31 @ 8:30 Dr Johnson [address]. please have pt arrive 30 min early to fill out paper. If he is self pay the charge will be $95. They also have financial assistance if he wants to apply, he will need to bring paychecks of the last 30 days    Communicated to wife via [employer]. Informed that at [facility] appt they will have to get them to schedule pt for behavioral follow up as well as they will not allow [place] to make that appt. Contact info in AVS. Informed to get Vitamin C and Thiamine over the counter from their pharmacy. Other meds covered by pt assistance fund.  Pt reports he already has nicotine patches at home.    [facility]  Go on 1/31/2022  at 0830 am; FOLLOW UP WITH PSYCHIATRY, ESTABLISH CARE WITH PRIMARY PROVIDER APPOINTMENT, BRING DISCHARGE PAPER WORK AND ALL PILL BOTTLES, Bring check stubs from last 30 days to get financial assistance for visit.  [address] [place] [address]  [phone]   [facility]  Go to  FOLLOW UP WITH PCP AFTER [facility] APPT  [address] [place] [address]  [facility] [id number]  [phone]        01/14/22 1126   Post-Acute Status   Post-Acute Authorization Other   Other Status No Post-Acute Service Needs   Discharge Delays None known at this time   Discharge Plan   Discharge Plan A Home;Home with family

## 2022-01-14 NOTE — NURSING
Disharge instructions and education provided. AVS given to patient and educated per virtual nurse BHARATHI Hassan. . Patient voiced understanding. IV site removed. Catheter tip intact. Telemetry discontinued. Patient shows no acute distress.  Medication and Equipment and Teaching to the Patient with teach back method. Patient waiting for transporter to take him in a wheelchair to the front of the building.   All questions answered.

## 2022-01-14 NOTE — ASSESSMENT & PLAN NOTE
Anxiety  Insomnia  Consult psychiatry:  Appreciate recs discontinued trazodone and start Remeron  Continue Folate, Thiamine, and Multi-V supplementation

## 2022-01-14 NOTE — ASSESSMENT & PLAN NOTE
- continue home metoprolol; will hold home Losartan and Lasix in setting of TACO for now; resume at discharge  - daily weights, strict I/Os  - Continuous cardiac monitoring  - keep K >4, Mg >2  - 2D echo with left ventricular diastolic dysfunction EF 40%

## 2022-01-14 NOTE — PT/OT/SLP EVAL
Physical Therapy Evaluation and Discharge Note    Patient Name:  Sarbjit Brewer Sr.   MRN:  [id number]    Recommendations:     Discharge Recommendations:  home   Discharge Equipment Recommendations: none   Barriers to discharge: None    Assessment:     Sarjbit Brewer Sr. is a 56 y.o. male admitted with a medical diagnosis of Acute encephalopathy. .  At this time, patient is functioning at their prior level of function and does not require further acute PT services.     Pt is currently functioning at his baseline level of function. No skilled PT services needed. Anticipate pt to d/c home with no DME needs. D/c PT.    Recent Surgery: * No surgery found *      Plan:     During this hospitalization, patient does not require further acute PT services.  Please re-consult if situation changes.      Subjective     Chief Complaint: Pt is ready to go home  Patient/Family Comments/goals: Return home  Pain/Comfort:  · Pain Rating 1: 0/10  · Pain Rating Post-Intervention 1: 0/10    Patients cultural, spiritual, Scientology conflicts given the current situation: no    Living Environment:  Lives w/spouse SSH, no JENI, T/S w/GB  Prior to admission, patients level of function was Independent and works as a  in [employer].  Equipment used at home: none.  DME owned (not currently used): none.  Upon discharge, patient will have assistance from family.    Objective:     Communicated with nurse prior to session.  Patient found HOB elevated with telemetry,peripheral IV upon PT entry to room.    General Precautions: Standard, airborne,fall,droplet,contact   Orthopedic Precautions:N/A   Braces: N/A   Respiratory Status: Room air    Exams:  · Cognitive Exam:  Patient is grossly AAOx4  · Gross Motor Coordination:  WFL  · Postural Exam:  Patient presented with the following abnormalities:    · -       Rounded shoulders  · Sensation:    · -       Intact  · RLE ROM: WFL  · RLE Strength: WFL  · LLE ROM: WFL  · LLE Strength: WFL    Functional  Mobility:  · Bed Mobility:     · Scooting: independence  · Supine to Sit: independence  · Sit to Supine: independence  · Transfers:     · Sit to Stand:  independence with no AD  · Gait: Pt ambulated ~30 ft in room with no AD and independently. No significant gait abnormalities noted.    AM-PAC 6 CLICK MOBILITY  Total Score:24       Therapeutic Activities and Exercises:   Pt educated on tole of PT/POC and pt agreeable to participate in therapy session.  Pt reports no pain or SOB.  Pt ambulated around room independently then returned supine    AM-PAC 6 CLICK MOBILITY  Total Score:24     Patient left HOB elevated with all lines intact, call button in reach, nsg notified and wife present.    GOALS:   Multidisciplinary Problems     Physical Therapy Goals     Not on file          Multidisciplinary Problems (Resolved)        Problem: Physical Therapy Goal    Goal Priority Disciplines Outcome Goal Variances Interventions   Physical Therapy Goal   (Resolved)     PT, PT/OT Met                     History:     Past Medical History:   Diagnosis Date    A-fib     CHF (congestive heart failure)     Hypertension     Insomnia        Past Surgical History:   Procedure Laterality Date    KIDNEY STONE SURGERY         Time Tracking:     PT Received On: 01/14/22  PT Start Time: 1026     PT Stop Time: 1034  PT Total Time (min): 8 min     Billable Minutes: Evaluation 8      01/14/2022
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
## 2022-01-14 NOTE — PROGRESS NOTES
Individual Follow-Up Form    1/14/2022    Quit Date: To be determined    Clinical Status of Patient: Inpatient    Length of Service: 15 minutes    Continuing Medication: yes  Patches    Comments: Virtual smoking cessation education follow-up: Pt states nicotine patch is working well. Order for 21 mg nicotine patch Q day requested upon discharge. Reviewed initial [facility] Smoking Cessation Program appointment details and pt states no conflict w/ appt. date and time.    Diagnosis: F17.210    Next Visit:  1/20/22 at 5 o'clock pm with Enzo Villa

## 2022-01-14 NOTE — SUBJECTIVE & OBJECTIVE
Interval History:  Await and alert, sitting up in bed eating.  On room air  TACO improving  Awaiting SLP assessment- appreciates rec's  Imaging reviewed  MARCIAL of 12/30 21 reviewed  Possible discharge today        Review of Systems   Constitutional: Negative for chills, diaphoresis and fever.   HENT: Negative for congestion, hearing loss and sore throat.    Eyes: Negative for visual disturbance.   Respiratory: Negative for cough and shortness of breath.    Cardiovascular: Negative for chest pain, palpitations and leg swelling.   Gastrointestinal: Negative for abdominal pain, diarrhea, nausea and vomiting.   Genitourinary: Negative for difficulty urinating, dysuria and flank pain.   Musculoskeletal: Negative for back pain.   Skin: Negative for rash and wound.   Neurological: Negative for dizziness, weakness and headaches.   Psychiatric/Behavioral: Negative for agitation and confusion. The patient is not nervous/anxious.      Objective:     Vital Signs (Most Recent):  Temp: 99.6 °F (37.6 °C) (01/14/22 0859)  Pulse: 72 (01/14/22 0859)  Resp: 18 (01/14/22 0859)  BP: 131/75 (01/14/22 0859)  SpO2: 98 % (01/14/22 0859) Vital Signs (24h Range):  Temp:  [98.2 °F (36.8 °C)-99.6 °F (37.6 °C)] 99.6 °F (37.6 °C)  Pulse:  [] 72  Resp:  [18] 18  SpO2:  [95 %-98 %] 98 %  BP: (121-164)/() 131/75     Weight: 81.7 kg (180 lb 1.9 oz)  Body mass index is 24.77 kg/m².  No intake or output data in the 24 hours ending 01/14/22 1007   Physical Exam  Vitals and nursing note reviewed.   Constitutional:       General: He is not in acute distress.     Appearance: Normal appearance. He is not ill-appearing.   HENT:      Head: Normocephalic and atraumatic.   Cardiovascular:      Rate and Rhythm: Normal rate and regular rhythm.      Pulses: Normal pulses.      Heart sounds: Normal heart sounds. No murmur heard.  No friction rub. No gallop.    Pulmonary:      Effort: Pulmonary effort is normal. No respiratory distress.   Abdominal:       General: Bowel sounds are normal. There is no distension.      Palpations: Abdomen is soft.      Tenderness: There is no abdominal tenderness. There is no guarding.   Musculoskeletal:         General: No swelling.      Cervical back: Normal range of motion and neck supple.      Right lower leg: No edema.      Left lower leg: No edema.   Skin:     General: Skin is warm and dry.      Capillary Refill: Capillary refill takes less than 2 seconds.      Findings: No bruising, erythema or rash.   Neurological:      General: No focal deficit present.      Mental Status: He is alert and oriented to person, place, and time.      GCS: GCS eye subscore is 4. GCS verbal subscore is 4. GCS motor subscore is 6.   Psychiatric:         Attention and Perception: He is attentive.         Mood and Affect: Affect is not flat.         Speech: Speech is not tangential.         Behavior: Behavior is not withdrawn.         Significant Labs:   A1C:   Recent Labs   Lab 12/30/21  1509 01/13/22  0001   HGBA1C 5.6 5.7*     ABGs: No results for input(s): PH, PCO2, HCO3, POCSATURATED, BE, TOTALHB, COHB, METHB, O2HB, POCFIO2, PO2 in the last 48 hours.  Blood Culture: No results for input(s): LABBLOO in the last 48 hours.  CBC:   Recent Labs   Lab 01/12/22  1325   WBC 7.12   HGB 13.8*   HCT 41.0        CMP:   Recent Labs   Lab 01/12/22  1322 01/13/22  0350   * 134*   K 4.7 4.4   CL 98 101   CO2 21* 21*    99   BUN 29* 31*   CREATININE 2.1* 1.5*   CALCIUM 9.3 9.3   PROT 8.1  --    ALBUMIN 3.6  --    BILITOT 0.4  --    ALKPHOS 77  --    AST 18  --    ALT 16  --    ANIONGAP 13 12   EGFRNONAA 34* 51*     Cardiac Markers:   Recent Labs   Lab 01/12/22  1714   *     Coagulation:   Recent Labs   Lab 01/12/22  1714   INR 1.0   APTT 34.2*     Lactic Acid:   Recent Labs   Lab 01/12/22  1714   LACTATE 1.0     Lipase: No results for input(s): LIPASE in the last 48 hours.  Lipid Panel:   Recent Labs   Lab 01/13/22  0001   CHOL 134   HDL  30*   LDLCALC 91.2   TRIG 64   CHOLHDL 22.4     Magnesium:   Recent Labs   Lab 01/13/22  0350   MG 2.0     Troponin:   Recent Labs   Lab 01/12/22  1714   TROPONINI 0.037*     TSH:   Recent Labs   Lab 01/13/22  0001   TSH 1.863     Urine Culture: No results for input(s): LABURIN in the last 48 hours.  Urine Studies:   Recent Labs   Lab 01/12/22 1959   COLORU Yellow   APPEARANCEUA Clear   PHUR 6.0   SPECGRAV 1.020   PROTEINUA 2+*   GLUCUA Negative   KETONESU Negative   BILIRUBINUA Negative   OCCULTUA Negative   NITRITE Negative   UROBILINOGEN Negative   LEUKOCYTESUR Negative   RBCUA 0   WBCUA 1   BACTERIA None   SQUAMEPITHEL 0   HYALINECASTS 5*       Significant Imaging: I have reviewed all pertinent imaging results/findings within the past 24 hours.

## 2022-01-18 ENCOUNTER — PATIENT OUTREACH (OUTPATIENT)
Dept: ADMINISTRATIVE | Facility: CLINIC | Age: 57
End: 2022-01-18
Payer: COMMERCIAL

## 2022-01-18 LAB — VIT B1 BLD-MCNC: 61 UG/L (ref 38–122)

## 2022-01-18 NOTE — PROGRESS NOTES
C3 nurse attempted to contact Sarbjit Brewer Sr.  for a TCC post hospital discharge follow up call. No answer. Left voicemail with callback information. The patient has a scheduled HOSFU appointment with Sheila Singh MD on 2/2/2022 @ 930AM.

## 2022-01-19 NOTE — PATIENT INSTRUCTIONS
Patient Education       Head Injury (Adult)    You have a head injury. It does not appear serious at this time. But symptoms of a more serious problem, such as a mild brain injury (concussion) or bruising or bleeding in the brain, may appear later. For this reason, you or someone caring for you will need to watch for the symptoms listed below. Once youre home, also be sure to follow any care instructions youre given.  Home care  Watch for the following symptoms  Seek emergency medical care if you have any of these symptoms over the next hours to days:   · Headache  · Nausea or vomiting  · Dizziness  · Sensitivity to light or noise  · Unusual sleepiness or grogginess  · Trouble falling asleep  · Personality changes  · Vision changes  · Memory loss  · Confusion  · Trouble walking or clumsiness  · Loss of consciousness (even for a short time)  · Inability to be awakened  · Stiff neck  · Weakness or numbness in any part of the body  · Seizures  General care  · If you were prescribed medicines for pain, use them as directed. Note: Dont take other medicines for pain without talking to your provider first.  · To help reduce swelling and pain, apply a cold source to the injured area for up to 20 minutes at a time. Do this as often as directed. Use a cold pack or bag of ice wrapped in a thin towel. Never apply a cold source directly to the skin.  · If you have cuts or scrapes as a result of your head injury, care for them as directed.  · For the next 24 hours (or longer, if instructed):  ¨ Dont drink alcohol or use sedatives or other medicines that make you sleepy.  ¨ Dont drive or operate machinery.  ¨ Dont do anything strenuous, such as heavy lifting or straining.  ¨ Limit tasks that require concentration. This includes reading, using a smartphone or computer, watching TV, and playing video games.  ¨ Dont return to sports or other activities that could result in another head injury.  Follow-up care  Follow up with  [facility] in one week or as instructed.   When to seek medical advice  Call your healthcare provider right away if any of these occur:  · Pain doesnt get better or worsens  · New or increased swelling or bruising  · Fever of 100.4°F (38°C) or higher, or as directed by your provider  · Increased redness, warmth, drainage, or bleeding from the injured area  · Fluid drainage or bleeding from the nose or ears  · Any depression or bony abnormality in the injured area  Date Last Reviewed: 9/26/2015 © 2000-2017 [employer]. [address] [place] [address]. All rights reserved. This information is not intended as a substitute for professional medical care. Always follow your healthcare professional's instructions.  Imelda teaching reviewed with Sarbjit Brewer Sr.'s wife   . Sarbjit Brewer Sr.'s wife  verbalized understanding.    Education was provided based on the patient's discharge diagnosis using the attached Imelda patient education as a reference.

## 2022-01-19 NOTE — PROGRESS NOTES
C3 nurse spoke with Sarbjit Brewer 's wife   for a TCC post hospital discharge follow up call. The patient has a scheduled HOSFU appointment with Sheila Singh on 2/2/2022 @ 930AM.

## 2022-01-20 ENCOUNTER — OFFICE VISIT (OUTPATIENT)
Dept: CARDIOLOGY | Facility: CLINIC | Age: 57
End: 2022-01-20
Payer: MEDICAID

## 2022-01-20 VITALS
BODY MASS INDEX: 25.52 KG/M2 | HEIGHT: 71 IN | WEIGHT: 182.31 LBS | HEART RATE: 72 BPM | SYSTOLIC BLOOD PRESSURE: 120 MMHG | OXYGEN SATURATION: 98 % | DIASTOLIC BLOOD PRESSURE: 81 MMHG

## 2022-01-20 DIAGNOSIS — I48.91 ATRIAL FIBRILLATION, UNSPECIFIED TYPE: ICD-10-CM

## 2022-01-20 DIAGNOSIS — I50.20 HEART FAILURE WITH REDUCED EJECTION FRACTION: Primary | ICD-10-CM

## 2022-01-20 DIAGNOSIS — I10 HYPERTENSION, UNSPECIFIED TYPE: ICD-10-CM

## 2022-01-20 DIAGNOSIS — F41.9 ANXIETY: ICD-10-CM

## 2022-01-20 DIAGNOSIS — I50.9 ACUTE ON CHRONIC CONGESTIVE HEART FAILURE, UNSPECIFIED HEART FAILURE TYPE: ICD-10-CM

## 2022-01-20 PROCEDURE — 99999 PR PBB SHADOW E&M-EST. PATIENT-LVL IV: CPT | Mod: PBBFAC,,, | Performed by: INTERNAL MEDICINE

## 2022-01-20 PROCEDURE — 99215 OFFICE O/P EST HI 40 MIN: CPT | Mod: S$PBB,,, | Performed by: INTERNAL MEDICINE

## 2022-01-20 PROCEDURE — 99214 OFFICE O/P EST MOD 30 MIN: CPT | Mod: PBBFAC,PO | Performed by: INTERNAL MEDICINE

## 2022-01-20 PROCEDURE — 99215 PR OFFICE/OUTPT VISIT, EST, LEVL V, 40-54 MIN: ICD-10-PCS | Mod: S$PBB,,, | Performed by: INTERNAL MEDICINE

## 2022-01-20 PROCEDURE — 99999 PR PBB SHADOW E&M-EST. PATIENT-LVL IV: ICD-10-PCS | Mod: PBBFAC,,, | Performed by: INTERNAL MEDICINE

## 2022-01-20 NOTE — PATIENT INSTRUCTIONS
- Daily weights  - First thing in the morning: Pee, take off clothes, step on the scale.  - Write down the date, and the weight. Maintain this chart everyday  - If weight increases by > 3 lbs in a day, or > 5 lbs in a week, take an extra pill of furosemide. Call the office.  - If worsening symptoms, go to the ED  - Salt restriction

## 2022-01-20 NOTE — PROGRESS NOTES
Cardiology Clinic note    Subjective:   Patient ID:  Sarbjit Brewer Sr. is a 56 y.o. male who presents for HFrEF FUP    HPI:   HFrEF: Denies orthopnea, PND, LE swelling. Weight 183 lbs - stable. Following salt restriction    Afib: No palpitations, irregular heart beats, syncope or near syncope    HTN: On medications    HLD: Tolerating statin    SH Tobacco active; cessation referral deferred; has a patch  FH No premature CAD    Patient Active Problem List    Diagnosis Date Noted    Anxiety 01/14/2022    Insomnia 01/14/2022    Adjustment disorder with mixed disturbance of emotions and conduct 01/14/2022    TACO (acute kidney injury) 01/13/2022    Acute encephalopathy 01/12/2022    COVID-19 virus infection 01/12/2022    Acute combined systolic (congestive) and diastolic (congestive) heart failure 01/05/2022    On continuous oral anticoagulation 01/05/2022    Atrial fibrillation 12/31/2021    Hypertensive urgency 12/30/2021    Alcoholism /alcohol abuse 02/03/2020 2021 IMO Regulatory Import      Tobacco abuse 02/03/2020    H. pylori infection 08/17/2014    Calcification of tendon 08/17/2014    Back pain 07/31/2014    Hypertension 07/31/2014       Patient's Medications   New Prescriptions    No medications on file   Previous Medications    ALBUTEROL (PROVENTIL/VENTOLIN HFA) 90 MCG/ACTUATION INHALER    Inhale 2 puffs into the lungs every 6 (six) hours as needed for Wheezing. Rescue    APIXABAN (ELIQUIS) 5 MG TAB    Take 1 tablet (5 mg total) by mouth 2 (two) times daily.    ASCORBIC ACID, VITAMIN C, (VITAMIN C) 500 MG TABLET    Take 1 tablet (500 mg total) by mouth 2 (two) times daily.    ATORVASTATIN (LIPITOR) 40 MG TABLET    Take 1 tablet (40 mg total) by mouth every evening.    FUROSEMIDE (LASIX) 20 MG TABLET    Take 1 tablet (20 mg total) by mouth once daily.    LOSARTAN (COZAAR) 50 MG TABLET    Take 1 tablet (50 mg total) by mouth once daily.    METOPROLOL SUCCINATE (TOPROL-XL) 25 MG 24 HR TABLET   "  Take 1 tablet (25 mg total) by mouth once daily.    MIRTAZAPINE (REMERON) 15 MG TABLET    Take 1 tablet (15 mg total) by mouth every evening.    NICOTINE (NICODERM CQ) 21 MG/24 HR    Place 1 patch onto the skin once daily.    THIAMINE 100 MG TABLET    Take 1 tablet (100 mg total) by mouth once daily.   Modified Medications    No medications on file   Discontinued Medications    No medications on file        Review of Systems   Constitutional: Negative for fever.   HENT: Negative for nosebleeds.    Cardiovascular: Negative for chest pain, dyspnea on exertion, irregular heartbeat, leg swelling, near-syncope, orthopnea, palpitations, paroxysmal nocturnal dyspnea and syncope.        As above   Respiratory: Negative for hemoptysis.    Hematologic/Lymphatic: Negative for bleeding problem.   Musculoskeletal: Negative for arthritis.   Gastrointestinal: Negative for hematochezia.   Genitourinary: Negative for hematuria.   Neurological: Negative for seizures.   Allergic/Immunologic: Negative for environmental allergies.         Objective:   Vitals  Vitals:    01/20/22 1540   BP: 120/81   Pulse: 72   SpO2: 98%   Weight: 82.7 kg (182 lb 5.1 oz)   Height: 5' 11" (1.803 m)          Physical Exam  Constitutional:       General: He is not in acute distress.     Appearance: He is well-developed. He is not diaphoretic.   HENT:      Head: Normocephalic.   Neck:      Vascular: No JVD.   Cardiovascular:      Rate and Rhythm: Normal rate and regular rhythm.      Heart sounds: No murmur heard.  No friction rub. No gallop.    Pulmonary:      Effort: Pulmonary effort is normal. No respiratory distress.      Breath sounds: Normal breath sounds.   Abdominal:      Palpations: Abdomen is soft.      Tenderness: There is no abdominal tenderness.   Musculoskeletal:         General: No swelling.      Cervical back: Normal range of motion.   Skin:     General: Skin is warm.   Neurological:      Mental Status: He is alert.   Psychiatric:         " "Mood and Affect: Mood normal.             Assessment:     1. Heart failure with reduced ejection fraction    2. Acute on chronic congestive heart failure, unspecified heart failure type    3. Atrial fibrillation, unspecified type    4. Hypertension, unspecified type    5. Anxiety        Plan:   Sarbjit Brewer Sr. is a 56 y.o. male h/o HFrEF, Afib, HTN, HLD    Accompanied by wife Elise Melton Dec -Jan 2022 for ADHF (new onset) in the setting of running out of his medications for HTN for 2 months (?amlodipine, chlorthalidone)    - EKG personally reviewed. My interpretation:  1/12/22: SR 70s, normal axuis. PACs, aberrant conduction. PRWP    Atrial Fibrillation, Paroxysmal  - CHADS2-VASc 2 (CHF, HTN)  - HASBLED 0  - Apixaban 5 mg bid (Age < 80 y, Wt > 60 kg, Cr =1.5)  Estimated body mass index is 25.43 kg/m² as calculated from the following:    Height as of this encounter: 5' 11" (1.803 m).    Weight as of this encounter: 82.7 kg (182 lb 5.1 oz).  56 y.o.  Lab Results   Component Value Date    CREATININE 1.5 (H) 01/13/2022    AST 18 01/12/2022    BILITOT 0.4 01/12/2022     HFrEF  - NYHA II  - Echo Dec 2021: LVEF 40%, GHK, DD. Normal RVSF. Mild LAE. SoV mildly dilated. Mod AI, mod MR, mild-mod MT. PASP 30, CVP 3  - Ischemic work-up: stress test deferred to OP. Exercise nuclear stress test ordered  - Losartan, metoprolol succinate  Lab Results   Component Value Date    CREATININE 1.5 (H) 01/13/2022    K 4.4 01/13/2022   - Furosemide  - Daily weights  - First thing in the morning: Pee, take off clothes, step on the scale.  - Write down the date, and the weight. Maintain this chart everyday  - If weight increases by > 3 lbs in a day, or > 5 lbs in a week, take an extra pill of furosemide. Call the office.  - If worsening symptoms, go to the ED  - Salt restriction    HTN  BP well controlled  Losartan, metoprolol succinate    HLD  Lab Results   Component Value Date    LDLCALC 91.2 01/13/2022   Statin as above    Anxiety, " stress  Behavioral health referral placed    Continue with current medical plan and lifestyle changes.    Orders Placed This Encounter   Procedures    NM Myocardial Perfusion Spect Multi Exer     Standing Status:   Future     Standing Expiration Date:   1/20/2023     Order Specific Question:   May the Radiologist modify the order per protocol to meet the clinical needs of the patient?     Answer:   Yes     Order Specific Question:   Will a Cardiologist read this study?     Answer:   No    Ambulatory referral/consult to Behavioral Health     Standing Status:   Future     Standing Expiration Date:   2/20/2023     Referral Priority:   Routine     Referral Type:   Psychiatric     Referral Reason:   Specialty Services Required     Requested Specialty:   Behavioral Health     Number of Visits Requested:   1    Cardiac PET Scan Stress     Standing Status:   Future     Standing Expiration Date:   1/20/2023     Order Specific Question:   Which medicaton for the stress procedure?     Answer:   Dipyridamole     Order Specific Question:   Release to patient     Answer:   Immediate    Nuclear Stress Test     Standing Status:   Future     Standing Expiration Date:   1/20/2023     Order Specific Question:   Which stress agent will be used     Answer:   Exercise     Order Specific Question:   Release to patient     Answer:   Immediate       Follow up as scheduled  Return sooner for concerns or questions. If symptoms persist go to the ED    He expressed verbal understanding and agreed with the plan      Gloria Jaramillo MD  Interventional Cardiology  [facility]  Phone: [phone]

## 2022-02-01 ENCOUNTER — HOSPITAL ENCOUNTER (OUTPATIENT)
Dept: RADIOLOGY | Facility: HOSPITAL | Age: 57
Discharge: HOME | End: 2022-02-01
Attending: INTERNAL MEDICINE
Payer: MEDICAID

## 2022-02-01 ENCOUNTER — HOSPITAL ENCOUNTER (OUTPATIENT)
Dept: CARDIOLOGY | Facility: HOSPITAL | Age: 57
Discharge: HOME | End: 2022-02-01
Attending: INTERNAL MEDICINE
Payer: MEDICAID

## 2022-02-01 DIAGNOSIS — I50.20 HEART FAILURE WITH REDUCED EJECTION FRACTION: ICD-10-CM

## 2022-02-01 LAB
CV STRESS BASE HR: 127 BPM
DIASTOLIC BLOOD PRESSURE: 96 MMHG
OHS CV CPX 1 MINUTE RECOVERY HEART RATE: 155 BPM
OHS CV CPX 85 PERCENT MAX PREDICTED HEART RATE MALE: 138
OHS CV CPX ESTIMATED METS: 5
OHS CV CPX MAX PREDICTED HEART RATE: 162
OHS CV CPX PATIENT IS FEMALE: 0
OHS CV CPX PATIENT IS MALE: 1
OHS CV CPX PEAK DIASTOLIC BLOOD PRESSURE: 104 MMHG
OHS CV CPX PEAK HEAR RATE: 179 BPM
OHS CV CPX PEAK RATE PRESSURE PRODUCT: NORMAL
OHS CV CPX PEAK SYSTOLIC BLOOD PRESSURE: 171 MMHG
OHS CV CPX PERCENT MAX PREDICTED HEART RATE ACHIEVED: 110
OHS CV CPX RATE PRESSURE PRODUCT PRESENTING: NORMAL
STRESS ECHO POST EXERCISE DUR MIN: 3 MINUTES
STRESS ECHO POST EXERCISE DUR SEC: 39 SECONDS
SYSTOLIC BLOOD PRESSURE: 143 MMHG

## 2022-02-01 PROCEDURE — 93016 CV STRESS TEST SUPVJ ONLY: CPT | Mod: ,,, | Performed by: INTERNAL MEDICINE

## 2022-02-01 PROCEDURE — 78452 NM MYOCARDIAL PERFUSION SPECT MULTI STUDY: ICD-10-PCS | Mod: 26,,, | Performed by: RADIOLOGY

## 2022-02-01 PROCEDURE — 93018 CV STRESS TEST I&R ONLY: CPT | Mod: ,,, | Performed by: INTERNAL MEDICINE

## 2022-02-01 PROCEDURE — 93017 CV STRESS TEST TRACING ONLY: CPT

## 2022-02-01 PROCEDURE — A9502 TC99M TETROFOSMIN: HCPCS

## 2022-02-01 PROCEDURE — 93018 NUCLEAR STRESS TEST (CUPID ONLY): ICD-10-PCS | Mod: ,,, | Performed by: INTERNAL MEDICINE

## 2022-02-01 PROCEDURE — 78452 HT MUSCLE IMAGE SPECT MULT: CPT | Mod: 26,,, | Performed by: RADIOLOGY

## 2022-02-01 PROCEDURE — 93016 NUCLEAR STRESS TEST (CUPID ONLY): ICD-10-PCS | Mod: ,,, | Performed by: INTERNAL MEDICINE

## 2022-02-02 ENCOUNTER — HOSPITAL ENCOUNTER (OUTPATIENT)
Facility: HOSPITAL | Age: 57
Discharge: HOME | End: 2022-02-04
Attending: EMERGENCY MEDICINE | Admitting: FAMILY MEDICINE
Payer: MEDICAID

## 2022-02-02 DIAGNOSIS — I48.91 ATRIAL FIBRILLATION, UNSPECIFIED TYPE: ICD-10-CM

## 2022-02-02 DIAGNOSIS — Z86.73 HISTORY OF CVA (CEREBROVASCULAR ACCIDENT): ICD-10-CM

## 2022-02-02 DIAGNOSIS — R41.82 ALTERED MENTAL STATUS, UNSPECIFIED ALTERED MENTAL STATUS TYPE: Primary | ICD-10-CM

## 2022-02-02 DIAGNOSIS — I50.20 HFREF (HEART FAILURE WITH REDUCED EJECTION FRACTION): ICD-10-CM

## 2022-02-02 DIAGNOSIS — R42 DIZZINESS: ICD-10-CM

## 2022-02-02 DIAGNOSIS — I63.89 ACUTE BILAT WATERSHED INFARCTION: ICD-10-CM

## 2022-02-02 DIAGNOSIS — R79.89 ELEVATED TROPONIN: ICD-10-CM

## 2022-02-02 DIAGNOSIS — R55 SYNCOPE: ICD-10-CM

## 2022-02-02 LAB
ALBUMIN SERPL BCP-MCNC: 3.2 G/DL (ref 3.5–5.2)
ALLENS TEST: ABNORMAL
ALLENS TEST: ABNORMAL
ALP SERPL-CCNC: 81 U/L (ref 55–135)
ALT SERPL W/O P-5'-P-CCNC: 19 U/L (ref 10–44)
AMPHET+METHAMPHET UR QL: NEGATIVE
ANION GAP SERPL CALC-SCNC: 15 MMOL/L (ref 8–16)
AST SERPL-CCNC: 25 U/L (ref 10–40)
BACTERIA #/AREA URNS HPF: ABNORMAL /HPF
BARBITURATES UR QL SCN>200 NG/ML: NEGATIVE
BASOPHILS # BLD AUTO: 0.05 K/UL (ref 0–0.2)
BASOPHILS NFR BLD: 0.5 % (ref 0–1.9)
BENZODIAZ UR QL SCN>200 NG/ML: NEGATIVE
BILIRUB SERPL-MCNC: 0.8 MG/DL (ref 0.1–1)
BILIRUB UR QL STRIP: NEGATIVE
BUN SERPL-MCNC: 27 MG/DL (ref 6–20)
BZE UR QL SCN: NEGATIVE
CALCIUM SERPL-MCNC: 9.6 MG/DL (ref 8.7–10.5)
CANNABINOIDS UR QL SCN: NEGATIVE
CHLORIDE SERPL-SCNC: 101 MMOL/L (ref 95–110)
CHOLEST SERPL-MCNC: 112 MG/DL (ref 120–199)
CHOLEST/HDLC SERPL: 3.9 {RATIO} (ref 2–5)
CLARITY UR: CLEAR
CO2 SERPL-SCNC: 21 MMOL/L (ref 23–29)
COLOR UR: YELLOW
CREAT SERPL-MCNC: 2.1 MG/DL (ref 0.5–1.4)
CREAT SERPL-MCNC: 2.2 MG/DL (ref 0.5–1.4)
CREAT UR-MCNC: 215.2 MG/DL (ref 23–375)
CTP QC/QA: YES
DELSYS: ABNORMAL
DELSYS: ABNORMAL
DIFFERENTIAL METHOD: ABNORMAL
EOSINOPHIL # BLD AUTO: 0.1 K/UL (ref 0–0.5)
EOSINOPHIL NFR BLD: 0.6 % (ref 0–8)
ERYTHROCYTE [DISTWIDTH] IN BLOOD BY AUTOMATED COUNT: 14.5 % (ref 11.5–14.5)
EST. GFR  (AFRICAN AMERICAN): 37 ML/MIN/1.73 M^2
EST. GFR  (NON AFRICAN AMERICAN): 32 ML/MIN/1.73 M^2
ESTIMATED AVG GLUCOSE: 126 MG/DL (ref 68–131)
ETHANOL SERPL-MCNC: <10 MG/DL
GLUCOSE SERPL-MCNC: 111 MG/DL (ref 70–110)
GLUCOSE SERPL-MCNC: 169 MG/DL (ref 70–110)
GLUCOSE SERPL-MCNC: 193 MG/DL (ref 70–110)
GLUCOSE UR QL STRIP: NEGATIVE
HBA1C MFR BLD: 6 % (ref 4–5.6)
HCT VFR BLD AUTO: 47.1 % (ref 40–54)
HDLC SERPL-MCNC: 29 MG/DL (ref 40–75)
HDLC SERPL: 25.9 % (ref 20–50)
HGB BLD-MCNC: 15.1 G/DL (ref 14–18)
HGB UR QL STRIP: NEGATIVE
HYALINE CASTS #/AREA URNS LPF: 8 /LPF
IMM GRANULOCYTES # BLD AUTO: 0.12 K/UL (ref 0–0.04)
IMM GRANULOCYTES NFR BLD AUTO: 1.3 % (ref 0–0.5)
INR PPP: 1.1 (ref 0.8–1.2)
KETONES UR QL STRIP: NEGATIVE
LDLC SERPL CALC-MCNC: 66.2 MG/DL (ref 63–159)
LEUKOCYTE ESTERASE UR QL STRIP: NEGATIVE
LYMPHOCYTES # BLD AUTO: 1.6 K/UL (ref 1–4.8)
LYMPHOCYTES NFR BLD: 17.2 % (ref 18–48)
MAGNESIUM SERPL-MCNC: 1.8 MG/DL (ref 1.6–2.6)
MCH RBC QN AUTO: 29 PG (ref 27–31)
MCHC RBC AUTO-ENTMCNC: 32.1 G/DL (ref 32–36)
MCV RBC AUTO: 91 FL (ref 82–98)
METHADONE UR QL SCN>300 NG/ML: NEGATIVE
MICROSCOPIC COMMENT: ABNORMAL
MONOCYTES # BLD AUTO: 0.8 K/UL (ref 0.3–1)
MONOCYTES NFR BLD: 8.9 % (ref 4–15)
NEUTROPHILS # BLD AUTO: 6.6 K/UL (ref 1.8–7.7)
NEUTROPHILS NFR BLD: 71.5 % (ref 38–73)
NITRITE UR QL STRIP: NEGATIVE
NONHDLC SERPL-MCNC: 83 MG/DL
NRBC BLD-RTO: 0 /100 WBC
OPIATES UR QL SCN: NEGATIVE
PCP UR QL SCN>25 NG/ML: NEGATIVE
PH UR STRIP: 6 [PH] (ref 5–8)
PLATELET # BLD AUTO: 249 K/UL (ref 150–450)
PMV BLD AUTO: 12.3 FL (ref 9.2–12.9)
POC PTINR: 2.2 (ref 0.9–1.2)
POC PTWBT: 25.2 SEC (ref 9.7–14.3)
POCT GLUCOSE: 111 MG/DL (ref 70–110)
POCT GLUCOSE: 136 MG/DL (ref 70–110)
POTASSIUM SERPL-SCNC: 4.2 MMOL/L (ref 3.5–5.1)
PROT SERPL-MCNC: 8.6 G/DL (ref 6–8.4)
PROT UR QL STRIP: ABNORMAL
PROTHROMBIN TIME: 11.4 SEC (ref 9–12.5)
RBC # BLD AUTO: 5.2 M/UL (ref 4.6–6.2)
RBC #/AREA URNS HPF: 1 /HPF (ref 0–4)
SAMPLE: ABNORMAL
SAMPLE: ABNORMAL
SARS-COV-2 RDRP RESP QL NAA+PROBE: NEGATIVE
SITE: ABNORMAL
SITE: ABNORMAL
SODIUM SERPL-SCNC: 137 MMOL/L (ref 136–145)
SP GR UR STRIP: 1.02 (ref 1–1.03)
SQUAMOUS #/AREA URNS HPF: 2 /HPF
TOXICOLOGY INFORMATION: NORMAL
TRIGL SERPL-MCNC: 84 MG/DL (ref 30–150)
TROPONIN I SERPL DL<=0.01 NG/ML-MCNC: 0.02 NG/ML (ref 0–0.03)
TROPONIN I SERPL DL<=0.01 NG/ML-MCNC: 0.03 NG/ML (ref 0–0.03)
TSH SERPL DL<=0.005 MIU/L-ACNC: 1.88 UIU/ML (ref 0.4–4)
TSH SERPL DL<=0.005 MIU/L-ACNC: 3.75 UIU/ML (ref 0.4–4)
URN SPEC COLLECT METH UR: ABNORMAL
UROBILINOGEN UR STRIP-ACNC: NEGATIVE EU/DL
WBC # BLD AUTO: 9.28 K/UL (ref 3.9–12.7)
WBC #/AREA URNS HPF: 2 /HPF (ref 0–5)

## 2022-02-02 PROCEDURE — 99900035 HC TECH TIME PER 15 MIN (STAT)

## 2022-02-02 PROCEDURE — 93010 ELECTROCARDIOGRAM REPORT: CPT | Mod: ,,, | Performed by: INTERNAL MEDICINE

## 2022-02-02 PROCEDURE — 85610 PROTHROMBIN TIME: CPT

## 2022-02-02 PROCEDURE — 80061 LIPID PANEL: CPT | Performed by: NURSE PRACTITIONER

## 2022-02-02 PROCEDURE — 99214 PR OFFICE/OUTPT VISIT, EST, LEVL IV, 30-39 MIN: ICD-10-PCS | Mod: ,,, | Performed by: PSYCHIATRY & NEUROLOGY

## 2022-02-02 PROCEDURE — 84443 ASSAY THYROID STIM HORMONE: CPT | Performed by: STUDENT IN AN ORGANIZED HEALTH CARE EDUCATION/TRAINING PROGRAM

## 2022-02-02 PROCEDURE — 82962 GLUCOSE BLOOD TEST: CPT

## 2022-02-02 PROCEDURE — 93005 ELECTROCARDIOGRAM TRACING: CPT

## 2022-02-02 PROCEDURE — 99499 NO LOS: ICD-10-PCS | Mod: GT,,, | Performed by: PSYCHIATRY & NEUROLOGY

## 2022-02-02 PROCEDURE — 84484 ASSAY OF TROPONIN QUANT: CPT | Performed by: NURSE PRACTITIONER

## 2022-02-02 PROCEDURE — 80053 COMPREHEN METABOLIC PANEL: CPT | Performed by: NURSE PRACTITIONER

## 2022-02-02 PROCEDURE — 82077 ASSAY SPEC XCP UR&BREATH IA: CPT | Performed by: EMERGENCY MEDICINE

## 2022-02-02 PROCEDURE — 84484 ASSAY OF TROPONIN QUANT: CPT | Mod: 91 | Performed by: STUDENT IN AN ORGANIZED HEALTH CARE EDUCATION/TRAINING PROGRAM

## 2022-02-02 PROCEDURE — 94760 N-INVAS EAR/PLS OXIMETRY 1: CPT

## 2022-02-02 PROCEDURE — 85610 PROTHROMBIN TIME: CPT | Performed by: NURSE PRACTITIONER

## 2022-02-02 PROCEDURE — 99214 OFFICE O/P EST MOD 30 MIN: CPT | Mod: ,,, | Performed by: PSYCHIATRY & NEUROLOGY

## 2022-02-02 PROCEDURE — G0378 HOSPITAL OBSERVATION PER HR: HCPCS

## 2022-02-02 PROCEDURE — 99291 CRITICAL CARE FIRST HOUR: CPT | Mod: 25

## 2022-02-02 PROCEDURE — 82565 ASSAY OF CREATININE: CPT | Mod: 91

## 2022-02-02 PROCEDURE — 80307 DRUG TEST PRSMV CHEM ANLYZR: CPT | Performed by: STUDENT IN AN ORGANIZED HEALTH CARE EDUCATION/TRAINING PROGRAM

## 2022-02-02 PROCEDURE — 83735 ASSAY OF MAGNESIUM: CPT | Performed by: NURSE PRACTITIONER

## 2022-02-02 PROCEDURE — 83036 HEMOGLOBIN GLYCOSYLATED A1C: CPT | Performed by: EMERGENCY MEDICINE

## 2022-02-02 PROCEDURE — U0002 COVID-19 LAB TEST NON-CDC: HCPCS | Performed by: FAMILY MEDICINE

## 2022-02-02 PROCEDURE — 99499 UNLISTED E&M SERVICE: CPT | Mod: GT,,, | Performed by: PSYCHIATRY & NEUROLOGY

## 2022-02-02 PROCEDURE — 85025 COMPLETE CBC W/AUTO DIFF WBC: CPT | Performed by: NURSE PRACTITIONER

## 2022-02-02 PROCEDURE — 93010 EKG 12-LEAD: ICD-10-PCS | Mod: ,,, | Performed by: INTERNAL MEDICINE

## 2022-02-02 PROCEDURE — 25000003 PHARM REV CODE 250: Performed by: STUDENT IN AN ORGANIZED HEALTH CARE EDUCATION/TRAINING PROGRAM

## 2022-02-02 PROCEDURE — 25000003 PHARM REV CODE 250: Performed by: FAMILY MEDICINE

## 2022-02-02 PROCEDURE — 84443 ASSAY THYROID STIM HORMONE: CPT | Mod: 91 | Performed by: NURSE PRACTITIONER

## 2022-02-02 PROCEDURE — 81000 URINALYSIS NONAUTO W/SCOPE: CPT | Mod: 59 | Performed by: STUDENT IN AN ORGANIZED HEALTH CARE EDUCATION/TRAINING PROGRAM

## 2022-02-02 RX ORDER — IBUPROFEN 200 MG
1 TABLET ORAL DAILY PRN
Status: DISCONTINUED | OUTPATIENT
Start: 2022-02-02 | End: 2022-02-04 | Stop reason: HOSPADM

## 2022-02-02 RX ORDER — MIRTAZAPINE 15 MG/1
15 TABLET, ORALLY DISINTEGRATING ORAL NIGHTLY
Status: DISCONTINUED | OUTPATIENT
Start: 2022-02-02 | End: 2022-02-04 | Stop reason: HOSPADM

## 2022-02-02 RX ORDER — INSULIN ASPART 100 [IU]/ML
0-5 INJECTION, SOLUTION INTRAVENOUS; SUBCUTANEOUS
Status: DISCONTINUED | OUTPATIENT
Start: 2022-02-02 | End: 2022-02-04 | Stop reason: HOSPADM

## 2022-02-02 RX ORDER — IBUPROFEN 200 MG
24 TABLET ORAL
Status: DISCONTINUED | OUTPATIENT
Start: 2022-02-02 | End: 2022-02-04 | Stop reason: HOSPADM

## 2022-02-02 RX ORDER — IBUPROFEN 200 MG
1 TABLET ORAL DAILY
Status: DISCONTINUED | OUTPATIENT
Start: 2022-02-02 | End: 2022-02-02

## 2022-02-02 RX ORDER — ACETAMINOPHEN 325 MG/1
650 TABLET ORAL EVERY 4 HOURS PRN
Status: DISCONTINUED | OUTPATIENT
Start: 2022-02-02 | End: 2022-02-04 | Stop reason: HOSPADM

## 2022-02-02 RX ORDER — METOPROLOL SUCCINATE 50 MG/1
50 TABLET, EXTENDED RELEASE ORAL DAILY
Status: DISCONTINUED | OUTPATIENT
Start: 2022-02-02 | End: 2022-02-04 | Stop reason: HOSPADM

## 2022-02-02 RX ORDER — NALOXONE HCL 0.4 MG/ML
0.02 VIAL (ML) INJECTION
Status: DISCONTINUED | OUTPATIENT
Start: 2022-02-02 | End: 2022-02-04 | Stop reason: HOSPADM

## 2022-02-02 RX ORDER — IBUPROFEN 200 MG
16 TABLET ORAL
Status: DISCONTINUED | OUTPATIENT
Start: 2022-02-02 | End: 2022-02-04 | Stop reason: HOSPADM

## 2022-02-02 RX ORDER — THIAMINE HCL 100 MG
100 TABLET ORAL DAILY
Status: DISCONTINUED | OUTPATIENT
Start: 2022-02-02 | End: 2022-02-04 | Stop reason: HOSPADM

## 2022-02-02 RX ORDER — MIRTAZAPINE 15 MG/1
15 TABLET, FILM COATED ORAL NIGHTLY
Status: DISCONTINUED | OUTPATIENT
Start: 2022-02-02 | End: 2022-02-02

## 2022-02-02 RX ORDER — ATORVASTATIN CALCIUM 40 MG/1
40 TABLET, FILM COATED ORAL NIGHTLY
Status: DISCONTINUED | OUTPATIENT
Start: 2022-02-02 | End: 2022-02-04 | Stop reason: HOSPADM

## 2022-02-02 RX ORDER — SODIUM CHLORIDE 0.9 % (FLUSH) 0.9 %
5 SYRINGE (ML) INJECTION
Status: DISCONTINUED | OUTPATIENT
Start: 2022-02-02 | End: 2022-02-04 | Stop reason: HOSPADM

## 2022-02-02 RX ORDER — ACETAMINOPHEN 500 MG
500 TABLET ORAL EVERY 6 HOURS PRN
COMMUNITY
End: 2022-02-17

## 2022-02-02 RX ORDER — GLUCAGON 1 MG
1 KIT INJECTION
Status: DISCONTINUED | OUTPATIENT
Start: 2022-02-02 | End: 2022-02-04 | Stop reason: HOSPADM

## 2022-02-02 RX ORDER — AMOXICILLIN 250 MG
1 CAPSULE ORAL 2 TIMES DAILY
Status: DISCONTINUED | OUTPATIENT
Start: 2022-02-02 | End: 2022-02-04 | Stop reason: HOSPADM

## 2022-02-02 RX ORDER — TALC
9 POWDER (GRAM) TOPICAL NIGHTLY PRN
Status: DISCONTINUED | OUTPATIENT
Start: 2022-02-02 | End: 2022-02-04 | Stop reason: HOSPADM

## 2022-02-02 RX ADMIN — APIXABAN 5 MG: 5 TABLET, FILM COATED ORAL at 09:02

## 2022-02-02 RX ADMIN — MIRTAZAPINE 15 MG: 15 TABLET, ORALLY DISINTEGRATING ORAL at 11:02

## 2022-02-02 RX ADMIN — SENNOSIDES AND DOCUSATE SODIUM 1 TABLET: 8.6; 5 TABLET ORAL at 09:02

## 2022-02-02 RX ADMIN — ATORVASTATIN CALCIUM 40 MG: 40 TABLET, FILM COATED ORAL at 09:02
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
## 2022-02-02 NOTE — ED TRIAGE NOTES
Sarbjit Brewer Sr., an 56 y.o. male presents to the ED via EMS, Pt state that he was walking to the bus stop and his legs got really weak and he lost his balance, stumbling to the ground, denies LOC. Pt reports that his hands are numb and very cold. Pt lost bladder continuance. Pt is slow to response when asked question but answer approprietly. Denies SOB, Chest Pain, dizziness and HA.  Pt reports this all starting after his stress teat yesterday.      Review of patient's allergies indicates:  No Known Allergies  Chief Complaint   Patient presents with    Follow-up     Pt had a stress test yesterday and had follow-up appointment today with cardiologist that was missed while pt was at bus stop near facility. Wife at scene called 911 and left to buy food once EMS arrived. Pt AOx4 and no complaints. VS stable.      Past Medical History:   Diagnosis Date    A-fib     CHF (congestive heart failure)     Hypertension     Insomnia        Patient identifiers verified and correct.     LOC: The patient is awake, alert and aware of environment with an appropriate affect, the patient is oriented x 3 and speaking appropriately.     APPEARANCE: Patient appears comfortable and in no acute distress, patient is clean and well groomed.    HEENT: Head symmetrical. Eyes bilateral.  Bilateral ears without drainage. Bilateral nares patent, throat clear.    SKIN: The skin is cold and dry, colorpallar, patient has normal skin turgor and dry mucus membranes, skin intact, no breakdown or bruising noted.     MUSCULOSKELETAL: Patient moving all extremities spontaneously, no swelling noted.    RESPIRATORY: Airway is open and patent, respirations are spontaneous, patient has a normal effort and rate, no accessory muscle use noted.     CARDIAC: Patient has a normal rate and regular rhythm, no edema noted, capillary refill < 3 seconds.     GASTRO: Abdomen soft and non-distended.     NEURO: Pt opens eyes spontaneously pupils equal, round, and  reactive. behavior appropriate to situation, follows commands, facial expression symmetrical,  bilateral hand grasp equal and even, purposeful motor response noted, normal sensation in all extremities when touched with a finger.    NEUROVASCULAR: All extremities are warm and pink.     SOCIAL: Patient is accompanied by EMS.   Will continue to monitor.

## 2022-02-02 NOTE — CONSULTS
Patient discussed via telephone with provider team at [facility].  Clinical exam as reported most consistent with an encephalopathic type picture.  No clear localizing signs regarding weakness or numbness.    Similar presentation in January of this year.  MRI findings at that time were of bilateral near watershed region infarcts.     Suspect both clinical presentation was are from the same etiology and that the infarcts noted in his earlier visit likely represent a consequence rather than the cause of his symptoms.    Low yield to perform CTA as no clinical exam findings suggest LVO.    Consider seizures and global hypoperfuison as possible cause.    No acute intervention recommended at this time for cerebrovascular processes.        Catrachito Vasquez MD  Vascular and Interventional Neurology  , Department of Neurology  Section Head, Vascular Neurology  Departments of Neurology, Neurosurgery and Radiology  [facility] [place], [place]

## 2022-02-02 NOTE — PLAN OF CARE
ROLANDO notified Kiara Summers, Medical Cost Assistance Program Representative, Medicaid Eligibility about pt's current visit to ED. Kiara states that pt submitted an abbi last month and he was denied for Medicaid. Case management will continue to follow.       02/02/22 1510   Discharge Assessment   Assessment Type Discharge Planning Brief Assessment   Source of Information health record

## 2022-02-02 NOTE — CONSULTS
"NEUROLOGY FLOOR CONSULT    Reason for consult:  Code stroke, AMS    Informant:  Patient       Other sources of information : patient, spouse/SO and ER records    CC:  "Fell over"    HPI:   Sarbjit Brewer Sr. is a 56 y.o. year old right handed male with PMHx of hypertension, afib with rvr, chf and prior multiple prior CVA most recently on 1/12/22 and one 5-10 yrs ago  who was admitted to [facility] on 2/2/22 due to syncope.     The patient appears well but with poor cognition with word finding difficulties making him a a poor historian. His wife is at bedside and was able to provide a more detailed hx. She states that the patient's altered mental status appears to be his new baseline  his most recent stroke on 1/12/22 and that he has been having trouble finding words, remembering recent events and maintaining concentration. She state that he has been feeling especially tired since his treadmill stress test yesterday as he has been otherwise bedbound since his last admission. She states that he was able to walk to the bus station this morning with considerable effort to return to the hospital for results of his test, however upon disembarking from the bus he was very unstable and was no longer able to support himself and collapsed. Both the wife and patient deny any LOC or head trauma. The patient endorsed bilateral leg pains that he described as muscle soreness from the stress test.    Patient admits to compliance all of the time to his meds including elequis which was prescribed on 1/12/22. He admits to smoking 2-3 cigarettes a day which is much reduced to his previous 1-2 ppd hx for 40 years. He reports that he has not been drinking any alcohol since his admission 12/30/21 and denies any other drug usage.      ROS: Denies any TIA/stroke symptoms other than mild numbness to dorsum of L foot. Endorses difficulty concentrating and poor memory     Histories:     Allergies:  Patient has no known " allergies.    Current Medications:    Current Facility-Administered Medications   Medication Dose Route Frequency Provider Last Rate Last Admin    acetaminophen tablet 650 mg  650 mg Oral Q4H PRN Dinesh Chavez,         apixaban tablet 5 mg  5 mg Oral BID Dinesh Chavez, DO        atorvastatin tablet 40 mg  40 mg Oral QHS Dinesh Chavez, DO        dextrose 10% bolus 125 mL  12.5 g Intravenous PRN Casper Segura III, MD        dextrose 10% bolus 250 mL  25 g Intravenous PRN Casper Segura III, MD        glucagon (human recombinant) injection 1 mg  1 mg Intramuscular PRN Dinesh Chavez, DO        glucose chewable tablet 16 g  16 g Oral PRN Dinesh Chavez, DO        glucose chewable tablet 24 g  24 g Oral PRN Dinesh Chavez,         insulin aspart U-100 pen 0-5 Units  0-5 Units Subcutaneous QID (AC + HS) PRN Dinesh Chavez, DO        melatonin tablet 9 mg  9 mg Oral Nightly PRN Dinesh Chavez DO        metoprolol succinate (TOPROL-XL) 24 hr tablet 50 mg  50 mg Oral Daily Dinesh Chavez DO        mirtazapine tablet 15 mg  15 mg Oral QHS Dinesh Chavez,         naloxone 0.4 mg/mL injection 0.02 mg  0.02 mg Intravenous PRN Dinesh Chavez,         nicotine 21 mg/24 hr 1 patch  1 patch Transdermal Daily Dinesh Chavez DO        senna-docusate 8.6-50 mg per tablet 1 tablet  1 tablet Oral BID Dinesh Chavez DO        sodium chloride 0.9% flush 5 mL  5 mL Intravenous PRN Dinesh Chavez,         thiamine tablet 100 mg  100 mg Oral Daily Dinesh Chavez DO         Current Outpatient Medications   Medication Sig Dispense Refill    acetaminophen (TYLENOL) 500 MG tablet Take 500 mg by mouth every 6 (six) hours as needed for Pain.      albuterol (PROVENTIL/VENTOLIN HFA) 90 mcg/actuation inhaler Inhale 2 puffs into the lungs every 6 (six) hours as needed for Wheezing. Rescue 18 g 0    apixaban (ELIQUIS) 5 mg Tab Take 1 tablet (5 mg total) by  mouth 2 (two) times daily. 180 tablet 3    ascorbic acid, vitamin C, (VITAMIN C) 500 MG tablet Take 1 tablet (500 mg total) by mouth 2 (two) times daily. 30 tablet 0    atorvastatin (LIPITOR) 40 MG tablet Take 1 tablet (40 mg total) by mouth every evening. 90 tablet 3    furosemide (LASIX) 20 MG tablet Take 1 tablet (20 mg total) by mouth once daily. 90 tablet 4    losartan (COZAAR) 50 MG tablet Take 1 tablet (50 mg total) by mouth once daily. 90 tablet 3    metoprolol succinate (TOPROL-XL) 25 MG 24 hr tablet Take 1 tablet (25 mg total) by mouth once daily. 90 tablet 4    mirtazapine (REMERON) 15 MG tablet Take 1 tablet (15 mg total) by mouth every evening. 30 tablet 11    nicotine (NICODERM CQ) 21 mg/24 hr Place 1 patch onto the skin once daily. 30 patch 2    thiamine 100 MG tablet Take 1 tablet (100 mg total) by mouth once daily. 30 tablet 4       Past Medical/Surgical/Family History:  Medical:   Past Medical History:   Diagnosis Date    A-fib     CHF (congestive heart failure)     Hypertension     Insomnia       Surgeries:   Past Surgical History:   Procedure Laterality Date    KIDNEY STONE SURGERY        Family: No family history on file., brother with hx of CVA    Social History:    Substance Abuse/Dependence History:  Tobacco: Smoked 1 packs per day for 40 years now 2-3 cigarrettes/day since 2022  EtOH: history of alcohol abuse but sober since 12/30/21  Ilicits: denies    Occupational/Employment History:  Occupation: used to work in waste management      Current Evaluation:     Vital Signs:   Vitals:    02/02/22 1337   BP: 113/75   Pulse: 93   Resp: (!) 25   Temp: 98.2 °F (36.8 °C)          ORIENTATION: Oriented to self, situation and place, NOT time    MEMORY: global memory impairment noted    LANGUAGE: 1=Mild to moderate aphasia; some obvious loss of fluency or facility of comprehension without significant limitation on ideas expressed or form of expression.    CRANIAL  NERVES:  normal    MOTOR:  Pronator drift: absent  RUE: 5/5  LUE: 5/5  RLE: 5/5  LLE: 5/5    complete motion against gravity and full resistance    Tone: normal    SENSORY:  normal to light touch throughout, except for mild parethesia in Dorsum of L foot    CEREBELLAR/GAIT:  Finger to nose:normal  Heel to shin:not examined  Gait: deferred         1a  Level of consciousness: 0=alert; keenly responsive   1b. LOC questions:  1=Answers one task correctly   1c. LOC commands: 0=Answers both tasks correctly   2.  Best Gaze: 0=normal   3.  Visual: 0=No visual loss   4. Facial Palsy: 0=Normal symmetric movement   5a.  Motor left arm: 0=No drift, limb holds 90 (or 45) degrees for full 10 seconds   5b.  Motor right arm: 0=No drift, limb holds 90 (or 45) degrees for full 10 seconds   6a. motor left le=No drift, limb holds 90 (or 45) degrees for full 10 seconds   6b  Motor right le=No drift, limb holds 90 (or 45) degrees for full 10 seconds   7. Limb Ataxia: 0=Absent   8.  Sensory: 0=Normal; no sensory loss   9. Best Language:  1=Mild to moderate aphasia; some obvious loss of fluency or facility of comprehension without significant limitation on ideas expressed or form of expression.   10. Dysarthria: 0=Normal   11. Extinction and Inattention: 0=No abnormality    Total:   2     LABORATORY STUDIES:  Recent Results (from the past 24 hour(s))   POCT glucose    Collection Time: 22 10:38 AM   Result Value Ref Range    POCT Glucose 136 (H) 70 - 110 mg/dL   ISTAT PROCEDURE    Collection Time: 22 10:54 AM   Result Value Ref Range    POC PTWBT 25.2 (H) 9.7 - 14.3 sec    POC PTINR 2.2 (H) 0.9 - 1.2    Sample VENOUS     Site Elo/UA     Allens Test N/A     DelSys Room Air    ISTAT CREATININE    Collection Time: 22 10:54 AM   Result Value Ref Range    POC Creatinine 2.1 (H) 0.5 - 1.4 mg/dL    Sample VENOUS     Site Elo/UAC     Allens Test N/A     DelSys Room Air    Hemoglobin A1C    Collection Time: 22  11:00 AM   Result Value Ref Range    Hemoglobin A1C 6.0 (H) 4.0 - 5.6 %    Estimated Avg Glucose 126 68 - 131 mg/dL   CBC auto differential    Collection Time: 02/02/22 12:03 PM   Result Value Ref Range    WBC 9.28 3.90 - 12.70 K/uL    RBC 5.20 4.60 - 6.20 M/uL    Hemoglobin 15.1 14.0 - 18.0 g/dL    Hematocrit 47.1 40.0 - 54.0 %    MCV 91 82 - 98 fL    MCH 29.0 27.0 - 31.0 pg    MCHC 32.1 32.0 - 36.0 g/dL    RDW 14.5 11.5 - 14.5 %    Platelets 249 150 - 450 K/uL    MPV 12.3 9.2 - 12.9 fL    Immature Granulocytes 1.3 (H) 0.0 - 0.5 %    Gran # (ANC) 6.6 1.8 - 7.7 K/uL    Immature Grans (Abs) 0.12 (H) 0.00 - 0.04 K/uL    Lymph # 1.6 1.0 - 4.8 K/uL    Mono # 0.8 0.3 - 1.0 K/uL    Eos # 0.1 0.0 - 0.5 K/uL    Baso # 0.05 0.00 - 0.20 K/uL    nRBC 0 0 /100 WBC    Gran % 71.5 38.0 - 73.0 %    Lymph % 17.2 (L) 18.0 - 48.0 %    Mono % 8.9 4.0 - 15.0 %    Eosinophil % 0.6 0.0 - 8.0 %    Basophil % 0.5 0.0 - 1.9 %    Differential Method Automated    Comprehensive metabolic panel    Collection Time: 02/02/22 12:03 PM   Result Value Ref Range    Sodium 137 136 - 145 mmol/L    Potassium 4.2 3.5 - 5.1 mmol/L    Chloride 101 95 - 110 mmol/L    CO2 21 (L) 23 - 29 mmol/L    Glucose 193 (H) 70 - 110 mg/dL    BUN 27 (H) 6 - 20 mg/dL    Creatinine 2.2 (H) 0.5 - 1.4 mg/dL    Calcium 9.6 8.7 - 10.5 mg/dL    Total Protein 8.6 (H) 6.0 - 8.4 g/dL    Albumin 3.2 (L) 3.5 - 5.2 g/dL    Total Bilirubin 0.8 0.1 - 1.0 mg/dL    Alkaline Phosphatase 81 55 - 135 U/L    AST 25 10 - 40 U/L    ALT 19 10 - 44 U/L    Anion Gap 15 8 - 16 mmol/L    eGFR if African American 37 (A) >60 mL/min/1.73 m^2    eGFR if non African American 32 (A) >60 mL/min/1.73 m^2   Troponin I    Collection Time: 02/02/22 12:03 PM   Result Value Ref Range    Troponin I 0.034 (H) 0.000 - 0.026 ng/mL   Magnesium    Collection Time: 02/02/22 12:03 PM   Result Value Ref Range    Magnesium 1.8 1.6 - 2.6 mg/dL   Protime-INR    Collection Time: 02/02/22 12:03 PM   Result Value Ref Range     Prothrombin Time 11.4 9.0 - 12.5 sec    INR 1.1 0.8 - 1.2   TSH    Collection Time: 02/02/22 12:03 PM   Result Value Ref Range    TSH 3.754 0.400 - 4.000 uIU/mL   LDL - Lipid Panel    Collection Time: 02/02/22 12:03 PM   Result Value Ref Range    Cholesterol 112 (L) 120 - 199 mg/dL    Triglycerides 84 30 - 150 mg/dL    HDL 29 (L) 40 - 75 mg/dL    LDL Cholesterol 66.2 63.0 - 159.0 mg/dL    HDL/Cholesterol Ratio 25.9 20.0 - 50.0 %    Total Cholesterol/HDL Ratio 3.9 2.0 - 5.0    Non-HDL Cholesterol 83 mg/dL   Ethanol    Collection Time: 02/02/22 12:03 PM   Result Value Ref Range    Alcohol, Serum <10 <10 mg/dL   POCT COVID-19 Rapid Screening    Collection Time: 02/02/22  1:41 PM   Result Value Ref Range    POC Rapid COVID Negative Negative     Acceptable Yes        Thyroid normal  HgA1C%:  5.7  Vit B12: 75  Folate:  11.7    RADIOLOGY STUDIES:  I have personally reviewed the images performed.   Imaging Results          X-Ray Chest AP Portable (Final result)  Result time 02/02/22 11:48:47    Final result by Tee Vegas MD (02/02/22 11:48:47)                 Impression:      No obvious evidence of an acute pulmonary process.      Electronically signed by: Tee Vegas  Date:    02/02/2022  Time:    11:48             Narrative:    EXAMINATION:  XR CHEST AP PORTABLE    CLINICAL HISTORY:  Stroke;    TECHNIQUE:  Single frontal view of the chest was performed.    COMPARISON:  January 12, 2022    FINDINGS:  Imaging of the chest reveals the lungs are well aerated.  No indication of a consolidative process or pleural effusion.  No pulmonary nodule.  The heart is normal in size and contour.  No evidence of free air under the diaphragm.                               CT Head Without Contrast (Final result)  Result time 02/02/22 11:38:59    Final result by Tee Vegas MD (02/02/22 11:38:59)                 Impression:      No significant change as compared to the previous examination performed on the  12th January 2022.      Electronically signed by: Tee Vegas  Date:    02/02/2022  Time:    11:38             Narrative:    EXAMINATION:  CT HEAD WITHOUT CONTRAST    CLINICAL HISTORY:  Syncope, recurrent;    TECHNIQUE:  Low dose axial images were obtained through the head.  Coronal and sagittal reformations were also performed. Contrast was not administered.    COMPARISON:  January 12, 2022    FINDINGS:  The area of encephalomalacia involving the left temporal lobe is once again appreciated.  The area has not extended beyond the original location.  There is however regions of small-vessel involutional changes involving the deep white matter which is especially noticeable in the posterior watershed region of the left hemisphere.  No indication of a mass, edema, midline shift or extra-axial fluid collection.  The gray-white interface is intact.  The lateral ventricles are prominent especially the posterior horns.  Third and 4th ventricle are also prominent.    The sagittal set of images indicates that the corpus callosum mid brain brainstem and proximal spinal cord are unremarkable.    Evaluation of the cerebellum shows no obvious gross abnormality.  The cistern is within normal limits.  No evidence of a suprasellar mass.  The pituitary and optic chiasm are unremarkable.    Imaging the orbits reveals that the globes, optic nerves, extraocular muscles and the intra and extraconal adipose tissue are unremarkable.                              HEAD CT: unchanged from 1/12/22    BRAIN MRI: 1/12/22 bilateral parietal-temporal lesions and significant stenosis of bilateral ICAs.       Assessment:  P     56 year old male with altered mental status but currently at baseline and poor cognition with hx of prior CVAs. There is little evidence to suggest an acute neurologic event that precipitated the patient's fall today. Fall is likely 2/2 deconditioning and muscle fatigue vs presyncope.     Treatment Plan    - LDL 66.2,  continue statin 40 mg to keep <70   - Long term SBP goal <140  - Recommend MRI W/O contrast to rule out any new intracranial pathology  - MRA neck 1/13 shows L ICA flow limiting stenosis. On our read we see bilateral ICA stenosis. Recommend consult to vascular surgery  - recommend continue elequis and beta blocker for afib and cardiology follow up  - neuro f/u outpatient 5/10/22    Differential diagnosis was explained to the patient. All questions were answered. Patient understood and agreed to adhere to plan.     Case discussed with Dr. Greene    Will follow for additional input regarding management of current neurologic condition and imaging and monitor for any new signs/symptoms to suggest neurologic detrimental changes.     Appreciate the consult.     Dheeraj Cassidy MD, MPH  [employer] [facility], PGY-1

## 2022-02-02 NOTE — PROGRESS NOTES
[facility]           Pharmacy        Current Drug Shortage     Due to national backorder and [facility] is critically low on inventory of Dextrose 50% (D50) Syringes and Vials, pharmacy has automatically switched from D50% to D10% IVPB at the equivalent dose until resolution of the shortage per P&T approved protocol.               Liana Marie, PharmD  [phone]

## 2022-02-02 NOTE — ED PROVIDER NOTES
Encounter Date: 2/2/2022       History     Chief Complaint   Patient presents with    Follow-up     Pt had a stress test yesterday and had follow-up appointment today with cardiologist that was missed while pt was at bus stop near facility. Wife at scene called 911 and left to buy food once EMS arrived. Pt AOx4 and no complaints. VS stable.      56-year-old male presents emergency room after having a near syncopal episode while at the bus stop today.  The patient's wife was not present during the initial interview per myself in the sore room, in which the patient reports that he fell out and had numbness to both of his hands.  Patient was supposed to be heading up to the hospital today for the results of his nuclear stress test that were done yesterday.  Patient appeared slow to responding to questions and sort and was altered.  At that time a code stroke was called.  Patient has a past medical history of AFib, CHF, hypertension, insomnia.  See the remainder of physical examination.    The history is provided by the patient. No  was used.     Review of patient's allergies indicates:  No Known Allergies  Past Medical History:   Diagnosis Date    A-fib     CHF (congestive heart failure)     Hypertension     Insomnia      Past Surgical History:   Procedure Laterality Date    KIDNEY STONE SURGERY       No family history on file.  Social History     Tobacco Use    Smoking status: Current Every Day Smoker     Packs/day: 1.00     Years: 40.00     Pack years: 40.00     Types: Cigarettes     Start date: 1981    Smokeless tobacco: Never Used    Tobacco comment: Pt enrolled in the [employer] on 1/29/20 ([employer] Member ID # [id number]). Ambulatory referral to Smoking Cessation Program.   Substance Use Topics    Alcohol use: Yes     Comment: 12 years ago    Drug use: No     Review of Systems   Constitutional: Negative for fever.   Respiratory: Negative for shortness of breath.    Cardiovascular:  Negative for chest pain.   Gastrointestinal: Negative for diarrhea, nausea and vomiting.   Musculoskeletal: Negative for neck pain and neck stiffness.   Skin: Negative for rash and wound.   Neurological: Positive for syncope and weakness.       Physical Exam     Initial Vitals   BP Pulse Resp Temp SpO2   02/02/22 1006 02/02/22 1006 02/02/22 1006 02/02/22 1038 02/02/22 1006   114/70 77 16 98.7 °F (37.1 °C) 100 %      MAP       --                Physical Exam    Constitutional:   Patient is confused with an unkempt appearance.   HENT:   Head: Normocephalic and atraumatic.   Right Ear: Hearing and tympanic membrane normal.   Left Ear: Hearing and tympanic membrane normal.   Nose: Nose normal.   Mouth/Throat: Uvula is midline, oropharynx is clear and moist and mucous membranes are normal.   Eyes: Lids are normal. Pupils are equal, round, and reactive to light.   Cardiovascular: Normal rate.   Pulmonary/Chest: Effort normal and breath sounds normal. No respiratory distress. He has no wheezes. He has no rhonchi.   Abdominal: Abdomen is soft. There is no abdominal tenderness.   Musculoskeletal:         General: Normal range of motion.      Cervical back: No rigidity.     Neurological: He is alert. He displays tremor. He displays no atrophy. He displays no seizure activity.   Patient is confused to time and place.  He has no facial droop noted.   are equal to the upper extremities.  Patient was ambulatory in to the emergency room.   Skin: Skin is warm. No rash noted.   Psychiatric: He has a normal mood and affect. His behavior is normal. Judgment and thought content normal.         ED Course   Critical Care    Date/Time: 2/2/2022 12:26 PM  Performed by: Jessa Connell NP  Authorized by: Peyton Flores MD   Direct patient critical care time: 45 minutes  Total critical care time (exclusive of procedural time) : 45 minutes  Critical care time was exclusive of separately billable procedures and treating other  patients.  Critical care was time spent personally by me on the following activities: blood draw for specimens, discussions with consultants, obtaining history from patient or surrogate, ordering and review of laboratory studies, pulse oximetry, review of old charts, development of treatment plan with patient or surrogate, discussions with primary provider, interpretation of cardiac output measurements, examination of patient, ordering and performing treatments and interventions, ordering and review of radiographic studies and re-evaluation of patient's condition.        Labs Reviewed   CBC W/ AUTO DIFFERENTIAL - Abnormal; Notable for the following components:       Result Value    Immature Granulocytes 1.3 (*)     Immature Grans (Abs) 0.12 (*)     Lymph % 17.2 (*)     All other components within normal limits   COMPREHENSIVE METABOLIC PANEL - Abnormal; Notable for the following components:    CO2 21 (*)     Glucose 193 (*)     BUN 27 (*)     Creatinine 2.2 (*)     Total Protein 8.6 (*)     Albumin 3.2 (*)     eGFR if  37 (*)     eGFR if non  32 (*)     All other components within normal limits   LIPID PANEL - Abnormal; Notable for the following components:    Cholesterol 112 (*)     HDL 29 (*)     All other components within normal limits   POCT GLUCOSE - Abnormal; Notable for the following components:    POCT Glucose 136 (*)     All other components within normal limits   ISTAT CREATININE - Abnormal; Notable for the following components:    POC Creatinine 2.1 (*)     All other components within normal limits   ISTAT PROCEDURE - Abnormal; Notable for the following components:    POC PTWBT 25.2 (*)     POC PTINR 2.2 (*)     All other components within normal limits   MAGNESIUM   ALCOHOL,MEDICAL (ETHANOL)   HEMOGLOBIN A1C   FOLATE   VITAMIN B12   TROPONIN I   URINALYSIS, REFLEX TO URINE CULTURE   DRUG SCREEN PANEL, URINE EMERGENCY   PROTIME-INR   TSH   HEMOGLOBIN A1C   POCT GLUCOSE,  HAND-HELD DEVICE        ECG Results          EKG 12-lead (In process)  Result time 02/02/22 12:11:43    In process by Interface, Lab In [facility] (02/02/22 12:11:43)                 Narrative:    Test Reason : R55,    Vent. Rate : 111 BPM     Atrial Rate : 097 BPM     P-R Int : 000 ms          QRS Dur : 094 ms      QT Int : 340 ms       P-R-T Axes : 000 064 -69 degrees     QTc Int : 462 ms    Atrial fibrillation with rapid ventricular response  Minimal voltage criteria for LVH, may be normal variant  Septal infarct ,age undetermined  ST and T wave abnormality, consider inferior ischemia  Abnormal ECG  When compared with ECG of 01-FEB-2022 11:28,  T wave inversion more evident in Inferior leads  Inverted T waves have replaced nonspecific T wave abnormality in Lateral  leads    Referred By: [email or web address]   SELF           Confirmed By:                             Imaging Results          X-Ray Chest AP Portable (Final result)  Result time 02/02/22 11:48:47    Final result by Tee Vegas MD (02/02/22 11:48:47)                 Impression:      No obvious evidence of an acute pulmonary process.      Electronically signed by: Tee Vegas  Date:    02/02/2022  Time:    11:48             Narrative:    EXAMINATION:  XR CHEST AP PORTABLE    CLINICAL HISTORY:  Stroke;    TECHNIQUE:  Single frontal view of the chest was performed.    COMPARISON:  January 12, 2022    FINDINGS:  Imaging of the chest reveals the lungs are well aerated.  No indication of a consolidative process or pleural effusion.  No pulmonary nodule.  The heart is normal in size and contour.  No evidence of free air under the diaphragm.                               CT Head Without Contrast (Final result)  Result time 02/02/22 11:38:59    Final result by Tee Vegas MD (02/02/22 11:38:59)                 Impression:      No significant change as compared to the previous examination performed on the 12th January 2022.      Electronically signed  by: Tee Vegas  Date:    02/02/2022  Time:    11:38             Narrative:    EXAMINATION:  CT HEAD WITHOUT CONTRAST    CLINICAL HISTORY:  Syncope, recurrent;    TECHNIQUE:  Low dose axial images were obtained through the head.  Coronal and sagittal reformations were also performed. Contrast was not administered.    COMPARISON:  January 12, 2022    FINDINGS:  The area of encephalomalacia involving the left temporal lobe is once again appreciated.  The area has not extended beyond the original location.  There is however regions of small-vessel involutional changes involving the deep white matter which is especially noticeable in the posterior watershed region of the left hemisphere.  No indication of a mass, edema, midline shift or extra-axial fluid collection.  The gray-white interface is intact.  The lateral ventricles are prominent especially the posterior horns.  Third and 4th ventricle are also prominent.    The sagittal set of images indicates that the corpus callosum mid brain brainstem and proximal spinal cord are unremarkable.    Evaluation of the cerebellum shows no obvious gross abnormality.  The cistern is within normal limits.  No evidence of a suprasellar mass.  The pituitary and optic chiasm are unremarkable.    Imaging the orbits reveals that the globes, optic nerves, extraocular muscles and the intra and extraconal adipose tissue are unremarkable.                                 Medications - No data to display              ED Course as of 02/02/22 1227   Wed Feb 02, 2022   1037 Code stroke called as the pts onset of symptoms were within the hour.  [DT]   1041 Copied from MRI obtained in January.     Impression:     1. Multifocal areas of acute to early subacute ischemia predominately involving the left parietal and temporal lobes with additional smaller foci suggested in the left occipital lobe.  No definite evidence of macroscopic hemorrhage or significant mass effect at the present time.  2.  Extensive multifocal remote post ischemic sequelae, as described.  3. Findings in keeping with chronic hypertensive arteriopathy.  4. Additional details as provided in the body of report.  This report was flagged in Epic as abnormal. [DT]   1054 Spoke with the vascular neurologist, Dr. Vasquez who does not recommend any intervention from a vascular standpoint however feels this may be from seizures and recommends EEG. Will admit and consult neurology.  [DT]   1055 Neurology paged.  [DT]   1102 Spoke with Bert Rodríguez from Neurology who states they will see the pt from a consult standpoint today and hold off on any seizure meds at this time.  [DT]   1128 Neuro at the bedside [DT]   1212 Neuro states they do not feel this is stemming from neuro issues rather cardiology. Will consult [facility] for admit and cards     [DT]   1222 Spoke with [facility] for admission.  [DT]      ED Course User Index  [DT] Jessa Connell NP             Clinical Impression:   Final diagnoses:  [R55] Syncope  [R41.82] Altered mental status, unspecified altered mental status type (Primary)          ED Disposition Condition    Observation               Jessa Connell NP  02/02/22 [id number]

## 2022-02-02 NOTE — ED NOTES
Patient identifiers verified and correct for Mr. Brewer  C/C: weakness  APPEARANCE: awake and alert in NAD. Confused and oriented to person and place  SKIN: warm, dry and intact. No breakdown or bruising.  MUSCULOSKELETAL: Patient moving all extremities spontaneously with weakness to bilateral LE, no obvious swelling or deformities noted. Ambulates with assistance, unstable gait  RESPIRATORY: Denies shortness of breath.Respirations unlabored.   CARDIAC: Denies CP, 2+ distal pulses; no peripheral edema  ABDOMEN: S/ND/NT, Denies nausea  : voids spontaneously, denies difficulty  Neurologic: follows commands; denies numbness/tingling. Denies dizziness

## 2022-02-02 NOTE — PHARMACY MED REC
"  Admission Medication History     The home medication history was taken by Minnie Erickson CPhT.    Medication history obtained from, Patient's Wife Verified    You may go to "Admission" then "Reconcile Home Medications" tabs to review and/or act upon these items.      The home medication list has been updated by the Pharmacy department.    Please read ALL comments highlighted in yellow.    Please address this information as you see fit.     Feel free to contact us if you have any questions or require assistance.        Potential issues to be addressed PRIOR TO DISCHARGE   Drug cost interfering with therapy (provide alternatives if possible) Patient's Wife states patient is having problems paying for Eliquis.   Patient request for refills on Eliquis 5 mg        Minnie Erickson CPhT.  Ext [phone]              .          "

## 2022-02-03 ENCOUNTER — CLINICAL SUPPORT (OUTPATIENT)
Dept: SMOKING CESSATION | Facility: CLINIC | Age: 57
End: 2022-02-03
Payer: COMMERCIAL

## 2022-02-03 DIAGNOSIS — F17.210 CIGARETTE SMOKER: Primary | ICD-10-CM

## 2022-02-03 LAB
ALBUMIN SERPL BCP-MCNC: 2.8 G/DL (ref 3.5–5.2)
ALP SERPL-CCNC: 79 U/L (ref 55–135)
ALT SERPL W/O P-5'-P-CCNC: 18 U/L (ref 10–44)
ANION GAP SERPL CALC-SCNC: 13 MMOL/L (ref 8–16)
AST SERPL-CCNC: 23 U/L (ref 10–40)
BASOPHILS # BLD AUTO: 0.03 K/UL (ref 0–0.2)
BASOPHILS NFR BLD: 0.4 % (ref 0–1.9)
BILIRUB SERPL-MCNC: 0.6 MG/DL (ref 0.1–1)
BUN SERPL-MCNC: 30 MG/DL (ref 6–20)
CALCIUM SERPL-MCNC: 8.9 MG/DL (ref 8.7–10.5)
CHLORIDE SERPL-SCNC: 102 MMOL/L (ref 95–110)
CO2 SERPL-SCNC: 21 MMOL/L (ref 23–29)
CREAT SERPL-MCNC: 1.5 MG/DL (ref 0.5–1.4)
DIFFERENTIAL METHOD: ABNORMAL
EOSINOPHIL # BLD AUTO: 0.2 K/UL (ref 0–0.5)
EOSINOPHIL NFR BLD: 2.1 % (ref 0–8)
ERYTHROCYTE [DISTWIDTH] IN BLOOD BY AUTOMATED COUNT: 13.9 % (ref 11.5–14.5)
EST. GFR  (AFRICAN AMERICAN): 58 ML/MIN/1.73 M^2
EST. GFR  (NON AFRICAN AMERICAN): 51 ML/MIN/1.73 M^2
GLUCOSE SERPL-MCNC: 104 MG/DL (ref 70–110)
HCT VFR BLD AUTO: 38 % (ref 40–54)
HGB BLD-MCNC: 12.6 G/DL (ref 14–18)
IMM GRANULOCYTES # BLD AUTO: 0.03 K/UL (ref 0–0.04)
IMM GRANULOCYTES NFR BLD AUTO: 0.4 % (ref 0–0.5)
LYMPHOCYTES # BLD AUTO: 1.9 K/UL (ref 1–4.8)
LYMPHOCYTES NFR BLD: 22.3 % (ref 18–48)
MAGNESIUM SERPL-MCNC: 2 MG/DL (ref 1.6–2.6)
MCH RBC QN AUTO: 28.8 PG (ref 27–31)
MCHC RBC AUTO-ENTMCNC: 33.2 G/DL (ref 32–36)
MCV RBC AUTO: 87 FL (ref 82–98)
MONOCYTES # BLD AUTO: 1 K/UL (ref 0.3–1)
MONOCYTES NFR BLD: 11.4 % (ref 4–15)
NEUTROPHILS # BLD AUTO: 5.4 K/UL (ref 1.8–7.7)
NEUTROPHILS NFR BLD: 63.4 % (ref 38–73)
NRBC BLD-RTO: 0 /100 WBC
PHOSPHATE SERPL-MCNC: 4.1 MG/DL (ref 2.7–4.5)
PLATELET # BLD AUTO: 294 K/UL (ref 150–450)
PMV BLD AUTO: 11.1 FL (ref 9.2–12.9)
POCT GLUCOSE: 105 MG/DL (ref 70–110)
POCT GLUCOSE: 118 MG/DL (ref 70–110)
POCT GLUCOSE: 122 MG/DL (ref 70–110)
POCT GLUCOSE: 135 MG/DL (ref 70–110)
POTASSIUM SERPL-SCNC: 4.4 MMOL/L (ref 3.5–5.1)
PROT SERPL-MCNC: 7.4 G/DL (ref 6–8.4)
RBC # BLD AUTO: 4.38 M/UL (ref 4.6–6.2)
SODIUM SERPL-SCNC: 136 MMOL/L (ref 136–145)
WBC # BLD AUTO: 8.48 K/UL (ref 3.9–12.7)

## 2022-02-03 PROCEDURE — 99214 PR OFFICE/OUTPT VISIT, EST, LEVL IV, 30-39 MIN: ICD-10-PCS | Mod: ,,, | Performed by: PSYCHIATRY & NEUROLOGY

## 2022-02-03 PROCEDURE — 99407 PR TOBACCO USE CESSATION INTENSIVE >10 MINUTES: ICD-10-PCS | Mod: S$GLB,,,

## 2022-02-03 PROCEDURE — 25000003 PHARM REV CODE 250: Performed by: STUDENT IN AN ORGANIZED HEALTH CARE EDUCATION/TRAINING PROGRAM

## 2022-02-03 PROCEDURE — 99407 BEHAV CHNG SMOKING > 10 MIN: CPT | Mod: S$GLB,,,

## 2022-02-03 PROCEDURE — G0378 HOSPITAL OBSERVATION PER HR: HCPCS

## 2022-02-03 PROCEDURE — 85025 COMPLETE CBC W/AUTO DIFF WBC: CPT | Performed by: STUDENT IN AN ORGANIZED HEALTH CARE EDUCATION/TRAINING PROGRAM

## 2022-02-03 PROCEDURE — 99214 OFFICE O/P EST MOD 30 MIN: CPT | Mod: ,,, | Performed by: PSYCHIATRY & NEUROLOGY

## 2022-02-03 PROCEDURE — 97165 OT EVAL LOW COMPLEX 30 MIN: CPT

## 2022-02-03 PROCEDURE — 25500020 PHARM REV CODE 255: Performed by: FAMILY MEDICINE

## 2022-02-03 PROCEDURE — 97530 THERAPEUTIC ACTIVITIES: CPT

## 2022-02-03 PROCEDURE — 36415 COLL VENOUS BLD VENIPUNCTURE: CPT | Performed by: STUDENT IN AN ORGANIZED HEALTH CARE EDUCATION/TRAINING PROGRAM

## 2022-02-03 PROCEDURE — 25000003 PHARM REV CODE 250: Performed by: FAMILY MEDICINE

## 2022-02-03 PROCEDURE — 84100 ASSAY OF PHOSPHORUS: CPT | Performed by: STUDENT IN AN ORGANIZED HEALTH CARE EDUCATION/TRAINING PROGRAM

## 2022-02-03 PROCEDURE — 97162 PT EVAL MOD COMPLEX 30 MIN: CPT

## 2022-02-03 PROCEDURE — 94761 N-INVAS EAR/PLS OXIMETRY MLT: CPT

## 2022-02-03 PROCEDURE — 83735 ASSAY OF MAGNESIUM: CPT | Performed by: STUDENT IN AN ORGANIZED HEALTH CARE EDUCATION/TRAINING PROGRAM

## 2022-02-03 PROCEDURE — 80053 COMPREHEN METABOLIC PANEL: CPT | Performed by: STUDENT IN AN ORGANIZED HEALTH CARE EDUCATION/TRAINING PROGRAM

## 2022-02-03 PROCEDURE — 97116 GAIT TRAINING THERAPY: CPT

## 2022-02-03 RX ADMIN — APIXABAN 5 MG: 5 TABLET, FILM COATED ORAL at 09:02

## 2022-02-03 RX ADMIN — METOPROLOL SUCCINATE 50 MG: 50 TABLET, EXTENDED RELEASE ORAL at 08:02

## 2022-02-03 RX ADMIN — SENNOSIDES AND DOCUSATE SODIUM 1 TABLET: 8.6; 5 TABLET ORAL at 09:02

## 2022-02-03 RX ADMIN — SENNOSIDES AND DOCUSATE SODIUM 1 TABLET: 8.6; 5 TABLET ORAL at 08:02

## 2022-02-03 RX ADMIN — MIRTAZAPINE 15 MG: 15 TABLET, ORALLY DISINTEGRATING ORAL at 09:02

## 2022-02-03 RX ADMIN — IOHEXOL 100 ML: 350 INJECTION, SOLUTION INTRAVENOUS at 09:02

## 2022-02-03 RX ADMIN — THIAMINE HCL TAB 100 MG 100 MG: 100 TAB at 08:02

## 2022-02-03 RX ADMIN — APIXABAN 5 MG: 5 TABLET, FILM COATED ORAL at 08:02

## 2022-02-03 RX ADMIN — ATORVASTATIN CALCIUM 40 MG: 40 TABLET, FILM COATED ORAL at 09:02

## 2022-02-03 NOTE — PROGRESS NOTES
VIRTUAL NURSE: Admission questions completed with patient at this time. Care plan and safety precautions reviewed. Pt verbalized no complaints, no needs expressed. Instructed to use call light for assistance, he verbalized understanding. Will continue to monitor.

## 2022-02-03 NOTE — PLAN OF CARE
Problem: Physical Therapy Goal  Goal: Physical Therapy Goal  Outcome: Adequate for Care Transition       Patient seen for physical therapy evaluation.  Report to follow.  Patient is at independent level with all functional mobility.  Patient was oriented, however had difficulty following multi-step commands and required repetition.  Patient has no physical therapy needs.  Follow-up with SLP warranted due to cognitive deficits.

## 2022-02-03 NOTE — ED NOTES
Report received from Gayle.   Care assumed.   Pt VS updated and stable. Pt off unit with rad transporter to MRI.

## 2022-02-03 NOTE — H&P
[facility] Medicine  History & Physical    Patient Name: Sarbjit Brewer Sr.  MRN: [id number]  Patient Class: OP- Observation  Admission Date: 2/2/2022  Attending Physician: Casper Segura III, MD   Primary Care Provider: Primary Doctor No         Patient information was obtained from patient, spouse/SO and ER records.     Subjective:     Principal Problem:Dizziness    Chief Complaint:   Chief Complaint   Patient presents with    Follow-up     Pt had a stress test yesterday and had follow-up appointment today with cardiologist that was missed while pt was at bus stop near facility. Wife at scene called 911 and left to buy food once EMS arrived. Pt AOx4 and no complaints. VS stable.         HPI: Sarbjit Brewer Sr.is a 56-year-old male PMHx atrial fibrillation (on chronic Eliquis), HFrEF (EF 40%), history of prior CVAs most recent 01/12/202 presented to [facility] ED with presyncopal event this morning.  Endorses he was at a bus stop reports falling down during this event.  Denies hitting head. Began experiencing dizziness, hand numbness, and rhythmic kicking of his legs.  Reports fatigue and dizziness that began yesterday. Patient was actually on his way to a cardiology appointment to discuss results of his stress test performed yesterday.  Denies any changes in speech, unilateral muscle weakness, or changes in vision.    In the ED, was found to be altered and was slow to respond to questions. Code stroke was activated, CT head without contrast showed no changes from previous CT performed on January 12th 2022. Troponin 0.034. EKG showing atrial flutter/fibrillation. CMP with BUN/creatinine ratio 27/2.2. UDS negative. Patient was admitted to [facility] for presyncope and CVA workup.      Past Medical History:   Diagnosis Date    A-fib     CHF (congestive heart failure)     Hypertension     Insomnia        Past Surgical History:   Procedure Laterality Date    KIDNEY STONE SURGERY         Review  of patient's allergies indicates:  No Known Allergies    No current facility-administered medications on file prior to encounter.     Current Outpatient Medications on File Prior to Encounter   Medication Sig    acetaminophen (TYLENOL) 500 MG tablet Take 500 mg by mouth every 6 (six) hours as needed for Pain.    albuterol (PROVENTIL/VENTOLIN HFA) 90 mcg/actuation inhaler Inhale 2 puffs into the lungs every 6 (six) hours as needed for Wheezing. Rescue    apixaban (ELIQUIS) 5 mg Tab Take 1 tablet (5 mg total) by mouth 2 (two) times daily.    ascorbic acid, vitamin C, (VITAMIN C) 500 MG tablet Take 1 tablet (500 mg total) by mouth 2 (two) times daily.    atorvastatin (LIPITOR) 40 MG tablet Take 1 tablet (40 mg total) by mouth every evening.    furosemide (LASIX) 20 MG tablet Take 1 tablet (20 mg total) by mouth once daily.    losartan (COZAAR) 50 MG tablet Take 1 tablet (50 mg total) by mouth once daily.    metoprolol succinate (TOPROL-XL) 25 MG 24 hr tablet Take 1 tablet (25 mg total) by mouth once daily.    mirtazapine (REMERON) 15 MG tablet Take 1 tablet (15 mg total) by mouth every evening.    nicotine (NICODERM CQ) 21 mg/24 hr Place 1 patch onto the skin once daily.    thiamine 100 MG tablet Take 1 tablet (100 mg total) by mouth once daily.     Family History    None       Tobacco Use    Smoking status: Current Every Day Smoker     Packs/day: 1.00     Years: 40.00     Pack years: 40.00     Types: Cigarettes     Start date: 1981    Smokeless tobacco: Never Used    Tobacco comment: Pt enrolled in the [employer] on 1/29/20 (SCT Member ID # [id number]). Ambulatory referral to Smoking Cessation Program.   Substance and Sexual Activity    Alcohol use: Yes     Comment: 12 years ago    Drug use: No    Sexual activity: Not on file     Review of Systems   Constitutional: Positive for fatigue. Negative for chills and fever.   HENT: Negative.    Eyes: Negative.    Respiratory: Negative for shortness of  breath.    Cardiovascular: Negative for chest pain.   Gastrointestinal: Negative.    Endocrine: Negative.    Genitourinary: Negative.    Musculoskeletal: Negative.    Skin: Negative.    Neurological: Positive for weakness. Negative for seizures, syncope and speech difficulty.   Psychiatric/Behavioral: Negative.      Objective:     Vital Signs (Most Recent):  Temp: 98 °F (36.7 °C) (02/02/22 1801)  Pulse: 89 (02/02/22 1801)  Resp: 20 (02/02/22 1801)  BP: 109/64 (02/02/22 1801)  SpO2: 97 % (02/02/22 1801) Vital Signs (24h Range):  Temp:  [97.8 °F (36.6 °C)-98.7 °F (37.1 °C)] 98 °F (36.7 °C)  Pulse:  [] 89  Resp:  [16-25] 20  SpO2:  [95 %-100 %] 97 %  BP: ()/(53-82) 109/64     Weight: 83.9 kg (185 lb)  Body mass index is 25.8 kg/m².    Physical Exam  Constitutional:       Appearance: Normal appearance.   HENT:      Head: Normocephalic and atraumatic.   Eyes:      Pupils: Pupils are equal, round, and reactive to light.   Cardiovascular:      Rate and Rhythm: Normal rate and regular rhythm.      Heart sounds: No murmur heard.  No friction rub. No gallop.    Pulmonary:      Effort: Pulmonary effort is normal.      Breath sounds: Normal breath sounds. No wheezing, rhonchi or rales.   Abdominal:      General: Bowel sounds are normal. There is no distension.      Palpations: Abdomen is soft.      Tenderness: There is no abdominal tenderness.   Musculoskeletal:         General: Normal range of motion.      Right lower leg: No edema.      Left lower leg: No edema.   Skin:     General: Skin is warm.      Findings: No rash.   Neurological:      Mental Status: He is alert and oriented to person, place, and time.      Comments:   CN II-XII  5/5 muscle strength in bilateral upper and lower extremity  Finger-nose-finger intact  Sensation intact   Psychiatric:         Mood and Affect: Mood normal.         Behavior: Behavior normal.           CRANIAL NERVES     CN III, IV, VI   Pupils are equal, round, and reactive to  light.       Significant Labs:   All pertinent labs within the past 24 hours have been reviewed.  A1C:   Recent Labs   Lab 12/30/21  1509 01/13/22  0001 02/02/22  1100   HGBA1C 5.6 5.7* 6.0*     CBC:   Recent Labs   Lab 02/02/22  1203   WBC 9.28   HGB 15.1   HCT 47.1        CMP:   Recent Labs   Lab 02/02/22  1203      K 4.2      CO2 21*   *   BUN 27*   CREATININE 2.2*   CALCIUM 9.6   PROT 8.6*   ALBUMIN 3.2*   BILITOT 0.8   ALKPHOS 81   AST 25   ALT 19   ANIONGAP 15   EGFRNONAA 32*     Coagulation:   Recent Labs   Lab 02/02/22  1203   INR 1.1     Lipid Panel:   Recent Labs   Lab 02/02/22  1203   CHOL 112*   HDL 29*   LDLCALC 66.2   TRIG 84   CHOLHDL 25.9     Magnesium:   Recent Labs   Lab 02/02/22  1203   MG 1.8     POCT Glucose:   Recent Labs   Lab 02/02/22  1038   POCTGLUCOSE 136*     Troponin:   Recent Labs   Lab 02/02/22  1203 02/02/22  1805   TROPONINI 0.034* 0.019     TSH:   Recent Labs   Lab 02/02/22  1334   TSH 1.877       Significant Imaging:  EXAMINATION:  XR CHEST AP PORTABLE     CLINICAL HISTORY:  Stroke;     TECHNIQUE:  Single frontal view of the chest was performed.     COMPARISON:  January 12, 2022     FINDINGS:  Imaging of the chest reveals the lungs are well aerated.  No indication of a consolidative process or pleural effusion.  No pulmonary nodule.  The heart is normal in size and contour.  No evidence of free air under the diaphragm.     Impression:     No obvious evidence of an acute pulmonary process.        Electronically signed by: Tee Vegas  Date:                                            02/02/2022  Time:                                           11:48        EXAMINATION:  CT HEAD WITHOUT CONTRAST     CLINICAL HISTORY:  Syncope, recurrent;     TECHNIQUE:  Low dose axial images were obtained through the head.  Coronal and sagittal reformations were also performed. Contrast was not administered.     COMPARISON:  January 12, 2022     FINDINGS:  The area of  encephalomalacia involving the left temporal lobe is once again appreciated.  The area has not extended beyond the original location.  There is however regions of small-vessel involutional changes involving the deep white matter which is especially noticeable in the posterior watershed region of the left hemisphere.  No indication of a mass, edema, midline shift or extra-axial fluid collection.  The gray-white interface is intact.  The lateral ventricles are prominent especially the posterior horns.  Third and 4th ventricle are also prominent.     The sagittal set of images indicates that the corpus callosum mid brain brainstem and proximal spinal cord are unremarkable.     Evaluation of the cerebellum shows no obvious gross abnormality.  The cistern is within normal limits.  No evidence of a suprasellar mass.  The pituitary and optic chiasm are unremarkable.     Imaging the orbits reveals that the globes, optic nerves, extraocular muscles and the intra and extraconal adipose tissue are unremarkable.     Impression:     No significant change as compared to the previous examination performed on the 12th January 2022.        Electronically signed by: Tee Vegas  Date:                                            02/02/2022  Time:                                           11:38       I have reviewed all pertinent imaging results/findings within the past 24 hours.    Assessment/Plan:     Presyncope  History of CVA (cerebrovascular accident)  Concern for stroke from history and change from patient's baseline. Physical exam with no focal deficit  CT of the head negative for acute hemorrhage or infarct   Presyncope etiology could be a consequence of previous CVA vs uncontrolled AFib  Neurology on board  Recommend MRI without contrast  Continue atorvastatin 40 mg daily. On Eliquis 5 mg BID and metoprolol for Afib      Elevated troponin  Recent Labs   Lab 02/02/22  1203 02/02/22  1805   TROPONINI 0.034* 0.019     Trended  downwards. Stable.  Continue atorvastatin 40 mg daily      HFrEF (heart failure with reduced ejection fraction)  12/30/2021: EF 40%: Mildly decreased systolic function, left ventricular global hypokinesis, and left ventricular diastolic dysfunction  On home Lasix 20 mg daily      TACO       Cr baseline 1.2-1.4       Encourage PO intake       Most likely secondary to decreased po intake and dehydration       Strict I/O's       Avoid renal toxic meds       Keep Mg 2, PO4 3, and K 4       Continue to monitor      Atrial fibrillation  Home meds include metoprolol succinate 25 mg daily and eliquis 5 mg BID. Will increase dose of metoprolol 50 mg daily given poor control   Continue Eliquis 5 mg BID     Essential hypertension  Metoprolol succinate 25 mg, Losartan 50 mg   Hold losartan given TACO  Continue metoprolol 50 mg daily    Tobacco abuse  Nicotine replacement prn    Alcoholism /alcohol abuse  Endorses his last drink was 1 month ago on 12/30/2021. History of multiple hospitalizations for alcoholism.  Continue to monitor for alcohol withdrawal. Continuous cardiac monitoring.      VTE Risk Mitigation (From admission, onward)         Ordered     apixaban tablet 5 mg  2 times daily         02/02/22 1319     Reason for No Pharmacological VTE Prophylaxis  Once        Question:  Reasons:  Answer:  Already adequately anticoagulated on oral Anticoagulants    02/02/22 1319     IP VTE HIGH RISK PATIENT  Once         02/02/22 1319     Place sequential compression device  Until discontinued         02/02/22 1319                   Dinesh Chavez DO  Department of Hospital Medicine   [place] - Emergency Dept

## 2022-02-03 NOTE — ASSESSMENT & PLAN NOTE
Metoprolol succinate 25 mg, Losartan 50 mg   Hold losartan given TACO  Continue metoprolol 50 mg daily

## 2022-02-03 NOTE — PLAN OF CARE
ROLANDO spoke with pt and pt's Wife Elise [phone] at bedside. Pt stated that he does not use any equipment at home and he take all his medications as directed. Wife stated that they currently have Medicaid pending. Wife reported that she would be the one to help pt get home. Sw will request f/u apts. White board updated with CM name and contact information.  Discharge brochure provided.  Pt encouraged to call with any questions or concerns.  Cm will continue to follow pt through transitions of care and assist with any discharge needs.    Pt was approved for medicaid.    Jose Ayush, MSAJ  [phone]    Future Appointments   Date Time Provider Department Center   2/7/2022  2:00 PM Daisy Muro [facility] SMOKE [place] [facility]   5/10/2022  1:00 PM Jean Carlos Reeves DO [facility] NEURO [place] [facility]        02/03/22 1232   Discharge Assessment   Assessment Type Discharge Planning Assessment   Confirmed/corrected address, phone number and insurance Yes   Confirmed Demographics Correct on Facesheet   Source of Information patient;family   Does patient/caregiver understand observation status Yes   Reason For Admission Presyncope   Lives With significant other   Facility Arrived From: home   Do you expect to return to your current living situation? Yes   Do you have help at home or someone to help you manage your care at home? Yes   Who are your caregiver(s) and their phone number(s)? wife Elise [phone]   Prior to hospitilization cognitive status: Alert/Oriented   Current cognitive status: Alert/Oriented   Walking or Climbing Stairs Difficulty none   Dressing/Bathing Difficulty none   Home Layout Able to live on 1st floor   Equipment Currently Used at Home none   Readmission within 30 days? No   Patient currently being followed by outpatient case management? No   Do you currently have service(s) that help you manage your care at home? No   Do you take prescription medications? Yes   Do you have prescription coverage? No   Do you  have any problems affording any of your prescribed medications? No   Is the patient taking medications as prescribed? yes   Who is going to help you get home at discharge? Wife Elise [phone]   How do you get to doctors appointments? family or friend will provide   Are you on dialysis? No   Do you take coumadin? No   Discharge Plan A Home with family   Discharge Plan B Home   DME Needed Upon Discharge  other (see comments)  (tbd)   Discharge Plan discussed with: Patient;Spouse/sig other   Name(s) and Number(s) Pema Olivares [phone]   Discharge Barriers Identified None   Relationship/Environment   Name(s) of Who Lives With Patient Pema Olivares [phone]

## 2022-02-03 NOTE — ED NOTES
Pt returned from MRI.   Reconnected pt to cardiac monitor and VS updated.   Pt AAO to person and place - confused to time and situation, easily reoriented. MAEW.  RR= and not labored, NADN.   Assisted pt to reposition for comfort. Provided fresh blankets and additional pillow.  Re-taped IV access to left hand saline lock (20G)  Placed call bell in reach and demonstrated how to call for assistance.  Updated pt on bed status and admit.   Pt verbalized understanding.

## 2022-02-03 NOTE — SUBJECTIVE & OBJECTIVE
Interval History: NAEON. Resting in bed this am.    Review of Systems   Constitutional: Positive for fatigue. Negative for chills and fever.   HENT: Negative.    Eyes: Negative.    Respiratory: Negative for shortness of breath.    Cardiovascular: Negative for chest pain.   Gastrointestinal: Negative.    Endocrine: Negative.    Genitourinary: Negative.    Musculoskeletal: Negative.    Skin: Negative.    Neurological: Positive for weakness. Negative for seizures, syncope and speech difficulty.   Psychiatric/Behavioral: Negative.      Objective:     Vital Signs (Most Recent):  Temp: 98.7 °F (37.1 °C) (02/03/22 0443)  Pulse: 91 (02/03/22 0443)  Resp: 18 (02/03/22 0443)  BP: 132/85 (02/03/22 0443)  SpO2: 100 % (02/03/22 0443) Vital Signs (24h Range):  Temp:  [97.8 °F (36.6 °C)-98.7 °F (37.1 °C)] 98.7 °F (37.1 °C)  Pulse:  [] 91  Resp:  [16-25] 18  SpO2:  [95 %-100 %] 100 %  BP: ()/(53-85) 132/85     Weight: 79.9 kg (176 lb 2.4 oz)  Body mass index is 24.57 kg/m².  No intake or output data in the 24 hours ending 02/03/22 0727   Physical Exam  Constitutional:       Appearance: Normal appearance.   HENT:      Head: Normocephalic and atraumatic.   Eyes:      Pupils: Pupils are equal, round, and reactive to light.   Cardiovascular:      Rate and Rhythm: Normal rate and regular rhythm.      Heart sounds: No murmur heard.  No friction rub. No gallop.    Pulmonary:      Effort: Pulmonary effort is normal.      Breath sounds: Normal breath sounds. No wheezing, rhonchi or rales.   Abdominal:      General: Bowel sounds are normal. There is no distension.      Palpations: Abdomen is soft.      Tenderness: There is no abdominal tenderness.   Musculoskeletal:         General: Normal range of motion.      Right lower leg: No edema.      Left lower leg: No edema.   Skin:     General: Skin is warm.      Findings: No rash.   Neurological:      Mental Status: He is alert and oriented to person, place, and time.      Comments:    CN II-XII  5/5 muscle strength in bilateral upper and lower extremity  Finger-nose-finger intact  Sensation intact   Psychiatric:         Mood and Affect: Mood normal.         Behavior: Behavior normal.         Significant Labs:   All pertinent labs within the past 24 hours have been reviewed.  A1C:   Recent Labs   Lab 12/30/21  1509 01/13/22  0001 02/02/22  1100   HGBA1C 5.6 5.7* 6.0*     ABGs: No results for input(s): PH, PCO2, HCO3, POCSATURATED, BE, TOTALHB, COHB, METHB, O2HB, POCFIO2, PO2 in the last 48 hours.  Bilirubin:   Recent Labs   Lab 01/12/22  1322 02/02/22  1203   BILITOT 0.4 0.8     Blood Culture: No results for input(s): LABBLOO in the last 48 hours.  BMP:   Recent Labs   Lab 02/02/22  1203   *      K 4.2      CO2 21*   BUN 27*   CREATININE 2.2*   CALCIUM 9.6   MG 1.8     CBC:   Recent Labs   Lab 02/02/22  1203 02/03/22  0609   WBC 9.28 8.48   HGB 15.1 12.6*   HCT 47.1 38.0*    294     CMP:   Recent Labs   Lab 02/02/22  1203      K 4.2      CO2 21*   *   BUN 27*   CREATININE 2.2*   CALCIUM 9.6   PROT 8.6*   ALBUMIN 3.2*   BILITOT 0.8   ALKPHOS 81   AST 25   ALT 19   ANIONGAP 15   EGFRNONAA 32*     Cardiac Markers: No results for input(s): CKMB, MYOGLOBIN, BNP, TROPISTAT in the last 48 hours.  Coagulation:   Recent Labs   Lab 02/02/22  1203   INR 1.1     Lactic Acid: No results for input(s): LACTATE in the last 48 hours.  Lipase: No results for input(s): LIPASE in the last 48 hours.  Lipid Panel:   Recent Labs   Lab 02/02/22  1203   CHOL 112*   HDL 29*   LDLCALC 66.2   TRIG 84   CHOLHDL 25.9     Magnesium:   Recent Labs   Lab 02/02/22  1203   MG 1.8     Pathology Results  (Last 10 years)    None        POCT Glucose:   Recent Labs   Lab 02/02/22  1038 02/02/22  2131 02/03/22  0557   POCTGLUCOSE 136* 111* 118*     Prealbumin: No results for input(s): PREALBUMIN in the last 48 hours.  Respiratory Culture: No results for input(s): GSRESP, RESPIRATORYC in the  last 48 hours.  Troponin:   Recent Labs   Lab 02/02/22  1203 02/02/22  1805   TROPONINI 0.034* 0.019     TSH:   Recent Labs   Lab 02/02/22  1334   TSH 1.877     Urine Culture: No results for input(s): LABURIN in the last 48 hours.  Urine Studies:   Recent Labs   Lab 02/02/22  1704   COLORU Yellow   APPEARANCEUA Clear   PHUR 6.0   SPECGRAV 1.020   PROTEINUA 2+*   GLUCUA Negative   KETONESU Negative   BILIRUBINUA Negative   OCCULTUA Negative   NITRITE Negative   UROBILINOGEN Negative   LEUKOCYTESUR Negative   RBCUA 1   WBCUA 2   BACTERIA Occasional   SQUAMEPITHEL 2   HYALINECASTS 8*       Significant Imaging:    EXAMINATION:  MRI BRAIN WITHOUT CONTRAST     CLINICAL HISTORY:  Dizziness, persistent/recurrent, cardiac or vascular cause suspected;Stroke suspected (Ped 0-18y);     TECHNIQUE:  Multiplanar multisequence MR imaging of the brain was performed without contrast.     COMPARISON:  MRI of the brain, 01/13/2022     FINDINGS:  Multifocal areas of restricted diffusion are again noted.  They appear to have increased in size and number in the watershed distribution of the posterior and middle cerebral artery territory in the temporoparietal junctions bilaterally.     The remaining brain parenchyma appears stable with involutional and chronic small vessel changes throughout the deep and subcortical white matter along with involutional changes manifested by prominence of the cortical sulcal markings, basilar cisterns and ventricular system.     There is no evidence of high signal intensity on T1 weighted sequences to suggest acute hemorrhage.  Scattered areas of hemosiderin is noted on SWI in the basal ganglia right greater than left.  Remote lacunar-type infarct in the left damion near the brachium pontis is noted.  Small lacunar infarcts in the cerebellum are again noted appearing chronic.     Impression:     Subtle increase in areas of restricted diffusion in the watershed distribution in both posterior right  temporoparietal lobes suggesting extension of micro infarcts.     No evidence of major arterial infarct hemorrhage or mass.     Diffuse chronic small vessel and involutional changes with areas of hemosiderin in lacunar infarcts mainly in the basal ganglia and thalamus on the right.     This report was flagged in Epic as abnormal.        Electronically signed by: Jed Barbosa  Date:                                            02/02/2022  Time:                                           23:01       I have reviewed all pertinent imaging results/findings within the past 24 hours.

## 2022-02-03 NOTE — PROGRESS NOTES
Individual Follow-Up Form    2/3/2022    Quit Date: To be determined    Clinical Status of Patient: Inpatient    Length of Service: 30 minutes    Comments: Smoking cessation education provided. Pt is a 1 pk/day cigarette smoker x 41 yrs. 21 mg nicoitne patch Q day prn ordered. Pt is enrolled in the [employer] but he declines referral to Ambulatory Smoking Cessation Program, stating that he is not ready to quit. Handout provided.     Diagnosis: F17.210

## 2022-02-03 NOTE — ASSESSMENT & PLAN NOTE
12/30/2021: EF 40%: Mildly decreased systolic function, left ventricular global hypokinesis, and left ventricular diastolic dysfunction  On home Lasix 20 mg daily

## 2022-02-03 NOTE — NURSING
Pt admitted to room 479 from ED. Pt is alert and oriented x 4. VS on graphic. Tele on. Admission assessment complete. Pt denies pain and verbalizes no needs/complaints at this time. Bed in lowest position, locked, and side rails up x 3. Bed alarm on. Call light in reach.

## 2022-02-03 NOTE — ASSESSMENT & PLAN NOTE
Nephrology on board  Recommend MRI without contrast to rule out any intracranial pathology  Continue atorvastatin 40 mg daily  On Eliquis 5 mg BID

## 2022-02-03 NOTE — ASSESSMENT & PLAN NOTE
Recent Labs   Lab 02/02/22  1203 02/02/22  1805   TROPONINI 0.034* 0.019     Trended downwards. Stable.  Continue atorvastatin 40 mg daily       No

## 2022-02-03 NOTE — PT/OT/SLP EVAL
Occupational Therapy   Evaluation    Name: Sarbjit Brewer Sr.  MRN: [id number]  Admitting Diagnosis:  Dizziness  Recent Surgery: * No surgery found *      Recommendations:     Discharge Recommendations: other (see comments),outpatient OT,outpatient speech therapy (neurophthamology)  Discharge Equipment Recommendations:  none  Barriers to discharge:  None    Assessment:     Sarbjit Brewer Sr. is a 56 y.o. male with a medical diagnosis of Dizziness.  He presents with performance deficits affecting function: impaired cognition,decreased safety awareness,visual deficits.      Rehab Prognosis: Good; patient would benefit from acute skilled OT services to address these deficits and reach maximum level of function.       Plan:     Patient to be seen   to address the above listed problems via cognitive retraining  · Plan of Care Expires: 03/03/22  · Plan of Care Reviewed with: patient    Subjective     Chief Complaint: pt reports depression  Patient/Family Comments/goals: return to PLOF    Occupational Profile:  Living Environment: Lives w/wife in [facility], no JENI, T/S grab bar  Previous level of function: indep  Roles and Routines: hasn't worked since stroke in January  Equipment Used at Home:  none  Assistance upon Discharge: family    Pain/Comfort:  · Pain Rating 1: 0/10  · Pain Rating Post-Intervention 1: 0/10    Patients cultural, spiritual, Jehovah's witness conflicts given the current situation: no    Objective:     Communicated with: nurse prior to session.  Patient found HOB elevated with peripheral IV,telemetry upon OT entry to room.    General Precautions: Standard, fall   Orthopedic Precautions:    Braces:    Respiratory Status: Room air    Occupational Performance:    Bed Mobility:    · Patient completed Rolling/Turning to Left with  independence  · Patient completed Scooting/Bridging with independence  · Patient completed Supine to Sit with independence  · Patient completed Sit to Supine with independence    Functional  Mobility/Transfers:  · Patient completed Sit <> Stand Transfer with independence  with  no assistive device   · Patient completed Toilet Transfer Step Transfer technique with independence with  no AD  · Functional Mobility: indep in room no AD    Activities of Daily Living:  · Lower Body Dressing: independence    · Toileting: independence      Cognitive/Visual Perceptual:  AO4  Impaired problem solving, decreased command following, visual perception-further assessment needed  Pt with moderate difficulty reading phone numbers off of paper, moving head to various positions to read    Physical Exam:  BUE AROM/strength/coordination/sensation WNL  Normal sit and stand balance    AMPAC 6 Click ADL:  AMPAC Total Score: 24    Treatment & Education:  Pt educated on role of OT/POC, post acute recs.  Education:    Patient left sitting edge of bed with all lines intact, call button in reach and TN present    GOALS:   Multidisciplinary Problems     Occupational Therapy Goals        Problem: Occupational Therapy Goal    Goal Priority Disciplines Outcome Interventions   Occupational Therapy Goal     OT, PT/OT Ongoing, Progressing    Description:                        History:     Past Medical History:   Diagnosis Date    A-fib     CHF (congestive heart failure)     Hypertension     Insomnia        Past Surgical History:   Procedure Laterality Date    KIDNEY STONE SURGERY         Time Tracking:     OT Date of Treatment: 02/03/22  OT Start Time: 0910  OT Stop Time: 0929  OT Total Time (min): 19 min    Billable Minutes:Evaluation 19    2/3/2022

## 2022-02-03 NOTE — PROGRESS NOTES
[facility] Medicine  Progress Note    Patient Name: Sarbjit Brewer Sr.  MRN: [id number]  Patient Class: OP- Observation   Admission Date: 2/2/2022  Length of Stay: 0 days  Attending Physician: Casper Segura III, MD  Primary Care Provider: Primary Doctor No        Subjective:     Principal Problem:Dizziness        HPI:  Sarbjit Brewer Sr.is a 56-year-old male PMHx atrial fibrillation (on chronic Eliquis), HFrEF (EF 40%), history of prior CVAs most recent 01/12/202 presented to [facility] with presyncopal event this morning.  Endorses he was at a bus stop reports falling down during this event.  Denies hitting head. Began experiencing dizziness, hand numbness, and rhythmic kicking of his legs.  Reports fatigue and dizziness that began yesterday. Patient was actually on his way to a cardiology appointment to discuss results of his stress test performed yesterday.  Denies any changes in speech, unilateral muscle weakness, or changes in vision.    In the ED, was found to be altered and was slow to respond to questions. Code stroke was activated, CT head without contrast showed no changes from previous CT performed on January 12th 2022.   Troponin 0.034. EKG showing atrial flutter/fibrillation. CMP with BUN/creatinine ratio 27/2.2. UDS negative. Patient was admitted to [facility] for presyncope and CVA workup.      Overview/Hospital Course:  No notes on file    Interval History: NAEON. Resting in bed this am.    Review of Systems   Constitutional: Positive for fatigue. Negative for chills and fever.   HENT: Negative.    Eyes: Negative.    Respiratory: Negative for shortness of breath.    Cardiovascular: Negative for chest pain.   Gastrointestinal: Negative.    Endocrine: Negative.    Genitourinary: Negative.    Musculoskeletal: Negative.    Skin: Negative.    Neurological: Positive for weakness. Negative for seizures, syncope and speech difficulty.   Psychiatric/Behavioral: Negative.      Objective:      Vital Signs (Most Recent):  Temp: 98.7 °F (37.1 °C) (02/03/22 0443)  Pulse: 91 (02/03/22 0443)  Resp: 18 (02/03/22 0443)  BP: 132/85 (02/03/22 0443)  SpO2: 100 % (02/03/22 0443) Vital Signs (24h Range):  Temp:  [97.8 °F (36.6 °C)-98.7 °F (37.1 °C)] 98.7 °F (37.1 °C)  Pulse:  [] 91  Resp:  [16-25] 18  SpO2:  [95 %-100 %] 100 %  BP: ()/(53-85) 132/85     Weight: 79.9 kg (176 lb 2.4 oz)  Body mass index is 24.57 kg/m².  No intake or output data in the 24 hours ending 02/03/22 0727   Physical Exam  Constitutional:       Appearance: Normal appearance.   HENT:      Head: Normocephalic and atraumatic.   Eyes:      Pupils: Pupils are equal, round, and reactive to light.   Cardiovascular:      Rate and Rhythm: Normal rate and regular rhythm.      Heart sounds: No murmur heard.  No friction rub. No gallop.    Pulmonary:      Effort: Pulmonary effort is normal.      Breath sounds: Normal breath sounds. No wheezing, rhonchi or rales.   Abdominal:      General: Bowel sounds are normal. There is no distension.      Palpations: Abdomen is soft.      Tenderness: There is no abdominal tenderness.   Musculoskeletal:         General: Normal range of motion.      Right lower leg: No edema.      Left lower leg: No edema.   Skin:     General: Skin is warm.      Findings: No rash.   Neurological:      Mental Status: He is alert and oriented to person, place, and time.      Comments:   CN II-XII  5/5 muscle strength in bilateral upper and lower extremity  Finger-nose-finger intact  Sensation intact   Psychiatric:         Mood and Affect: Mood normal.         Behavior: Behavior normal.         Significant Labs:   All pertinent labs within the past 24 hours have been reviewed.  A1C:   Recent Labs   Lab 12/30/21  1509 01/13/22  0001 02/02/22  1100   HGBA1C 5.6 5.7* 6.0*     ABGs: No results for input(s): PH, PCO2, HCO3, POCSATURATED, BE, TOTALHB, COHB, METHB, O2HB, POCFIO2, PO2 in the last 48 hours.  Bilirubin:   Recent Labs    Lab 01/12/22  1322 02/02/22  1203   BILITOT 0.4 0.8     Blood Culture: No results for input(s): LABBLOO in the last 48 hours.  BMP:   Recent Labs   Lab 02/02/22  1203   *      K 4.2      CO2 21*   BUN 27*   CREATININE 2.2*   CALCIUM 9.6   MG 1.8     CBC:   Recent Labs   Lab 02/02/22  1203 02/03/22  0609   WBC 9.28 8.48   HGB 15.1 12.6*   HCT 47.1 38.0*    294     CMP:   Recent Labs   Lab 02/02/22  1203      K 4.2      CO2 21*   *   BUN 27*   CREATININE 2.2*   CALCIUM 9.6   PROT 8.6*   ALBUMIN 3.2*   BILITOT 0.8   ALKPHOS 81   AST 25   ALT 19   ANIONGAP 15   EGFRNONAA 32*     Cardiac Markers: No results for input(s): CKMB, MYOGLOBIN, BNP, TROPISTAT in the last 48 hours.  Coagulation:   Recent Labs   Lab 02/02/22  1203   INR 1.1     Lactic Acid: No results for input(s): LACTATE in the last 48 hours.  Lipase: No results for input(s): LIPASE in the last 48 hours.  Lipid Panel:   Recent Labs   Lab 02/02/22  1203   CHOL 112*   HDL 29*   LDLCALC 66.2   TRIG 84   CHOLHDL 25.9     Magnesium:   Recent Labs   Lab 02/02/22  1203   MG 1.8     Pathology Results  (Last 10 years)    None        POCT Glucose:   Recent Labs   Lab 02/02/22  1038 02/02/22  2131 02/03/22  0557   POCTGLUCOSE 136* 111* 118*     Prealbumin: No results for input(s): PREALBUMIN in the last 48 hours.  Respiratory Culture: No results for input(s): GSRESP, RESPIRATORYC in the last 48 hours.  Troponin:   Recent Labs   Lab 02/02/22  1203 02/02/22  1805   TROPONINI 0.034* 0.019     TSH:   Recent Labs   Lab 02/02/22  1334   TSH 1.877     Urine Culture: No results for input(s): LABURIN in the last 48 hours.  Urine Studies:   Recent Labs   Lab 02/02/22  1704   COLORU Yellow   APPEARANCEUA Clear   PHUR 6.0   SPECGRAV 1.020   PROTEINUA 2+*   GLUCUA Negative   KETONESU Negative   BILIRUBINUA Negative   OCCULTUA Negative   NITRITE Negative   UROBILINOGEN Negative   LEUKOCYTESUR Negative   RBCUA 1   WBCUA 2   BACTERIA  Occasional   SQUAMEPITHEL 2   HYALINECASTS 8*       Significant Imaging:    EXAMINATION:  MRI BRAIN WITHOUT CONTRAST     CLINICAL HISTORY:  Dizziness, persistent/recurrent, cardiac or vascular cause suspected;Stroke suspected (Ped 0-18y);     TECHNIQUE:  Multiplanar multisequence MR imaging of the brain was performed without contrast.     COMPARISON:  MRI of the brain, 01/13/2022     FINDINGS:  Multifocal areas of restricted diffusion are again noted.  They appear to have increased in size and number in the watershed distribution of the posterior and middle cerebral artery territory in the temporoparietal junctions bilaterally.     The remaining brain parenchyma appears stable with involutional and chronic small vessel changes throughout the deep and subcortical white matter along with involutional changes manifested by prominence of the cortical sulcal markings, basilar cisterns and ventricular system.     There is no evidence of high signal intensity on T1 weighted sequences to suggest acute hemorrhage.  Scattered areas of hemosiderin is noted on SWI in the basal ganglia right greater than left.  Remote lacunar-type infarct in the left damion near the brachium pontis is noted.  Small lacunar infarcts in the cerebellum are again noted appearing chronic.     Impression:     Subtle increase in areas of restricted diffusion in the watershed distribution in both posterior right temporoparietal lobes suggesting extension of micro infarcts.     No evidence of major arterial infarct hemorrhage or mass.     Diffuse chronic small vessel and involutional changes with areas of hemosiderin in lacunar infarcts mainly in the basal ganglia and thalamus on the right.     This report was flagged in Epic as abnormal.        Electronically signed by: Jed Barbosa  Date:                                            02/02/2022  Time:                                           23:01       I have reviewed all pertinent imaging  results/findings within the past 24 hours.      Assessment/Plan:      Presyncope  History of CVA (cerebrovascular accident)  Concern for stroke from history and change from patient's baseline. Physical exam with no focal deficit  CT of the head negative for acute hemorrhage or infarct   Presyncope etiology could be a consequence of previous CVA vs uncontrolled AFib  Neurology on board  MRI Subtle increase in areas of restricted diffusion in the watershed distribution in both posterior right temporoparietal lobes suggesting extension of micro infarcts. Diffuse chronic small vessel and involutional changes with areas of hemosiderin in lacunar infarcts mainly in the basal ganglia and thalamus on the right.  Continue atorvastatin 40 mg daily. On Eliquis 5 mg BID and metoprolol for Afib  Follow-up with neurology today      Elevated troponin       Recent Labs   Lab 02/02/22  1203 02/02/22  1805   TROPONINI 0.034* 0.019      Trended downwards. Stable.  Continue atorvastatin 40 mg daily       HFrEF (heart failure with reduced ejection fraction)  12/30/2021: EF 40%: Mildly decreased systolic function, left ventricular global hypokinesis, and left ventricular diastolic dysfunction  On home Lasix 20 mg daily        TACO       Cr baseline 1.2-1.4       Encourage PO intake       Most likely secondary to decreased po intake and dehydration       Strict I/O's       Avoid renal toxic meds       Keep Mg 2, PO4 3, and K 4       Continue to monitor     Atrial fibrillation  Home meds include metoprolol succinate 25 mg daily and eliquis 5 mg BID. Will increase dose of metoprolol 50 mg daily given poor control   Continue Eliquis 5 mg BID      Essential hypertension  Metoprolol succinate 25 mg, Losartan 50 mg   Hold losartan given TACO  Continue metoprolol 50 mg daily     Tobacco abuse  Nicotine replacement prn     Alcoholism /alcohol abuse  Endorses his last drink was 1 month ago on 12/30/2021. History of multiple hospitalizations for  alcoholism.  Continue to monitor for alcohol withdrawal. Continuous cardiac monitoring.         VTE Risk Mitigation (From admission, onward)         Ordered     apixaban tablet 5 mg  2 times daily         02/02/22 1319     Reason for No Pharmacological VTE Prophylaxis  Once        Question:  Reasons:  Answer:  Already adequately anticoagulated on oral Anticoagulants    02/02/22 1319     IP VTE HIGH RISK PATIENT  Once         02/02/22 1319     Place sequential compression device  Until discontinued         02/02/22 1319                Discharge Planning   SANTOS:      Code Status: Full Code   Is the patient medically ready for discharge?:     Reason for patient still in hospital (select all that apply): Consult recommendations             Dinesh Chavez DO  Department of Hospital Medicine   [facility]

## 2022-02-03 NOTE — ASSESSMENT & PLAN NOTE
Endorses his last drink was 1 month ago on 12/30/2021. History of multiple hospitalizations for alcoholism.  Continue to monitor for alcohol withdrawal. Continuous cardiac monitoring.

## 2022-02-03 NOTE — SUBJECTIVE & OBJECTIVE
Past Medical History:   Diagnosis Date    A-fib     CHF (congestive heart failure)     Hypertension     Insomnia        Past Surgical History:   Procedure Laterality Date    KIDNEY STONE SURGERY         Review of patient's allergies indicates:  No Known Allergies    No current facility-administered medications on file prior to encounter.     Current Outpatient Medications on File Prior to Encounter   Medication Sig    acetaminophen (TYLENOL) 500 MG tablet Take 500 mg by mouth every 6 (six) hours as needed for Pain.    albuterol (PROVENTIL/VENTOLIN HFA) 90 mcg/actuation inhaler Inhale 2 puffs into the lungs every 6 (six) hours as needed for Wheezing. Rescue    apixaban (ELIQUIS) 5 mg Tab Take 1 tablet (5 mg total) by mouth 2 (two) times daily.    ascorbic acid, vitamin C, (VITAMIN C) 500 MG tablet Take 1 tablet (500 mg total) by mouth 2 (two) times daily.    atorvastatin (LIPITOR) 40 MG tablet Take 1 tablet (40 mg total) by mouth every evening.    furosemide (LASIX) 20 MG tablet Take 1 tablet (20 mg total) by mouth once daily.    losartan (COZAAR) 50 MG tablet Take 1 tablet (50 mg total) by mouth once daily.    metoprolol succinate (TOPROL-XL) 25 MG 24 hr tablet Take 1 tablet (25 mg total) by mouth once daily.    mirtazapine (REMERON) 15 MG tablet Take 1 tablet (15 mg total) by mouth every evening.    nicotine (NICODERM CQ) 21 mg/24 hr Place 1 patch onto the skin once daily.    thiamine 100 MG tablet Take 1 tablet (100 mg total) by mouth once daily.     Family History    None       Tobacco Use    Smoking status: Current Every Day Smoker     Packs/day: 1.00     Years: 40.00     Pack years: 40.00     Types: Cigarettes     Start date: 1981    Smokeless tobacco: Never Used    Tobacco comment: Pt enrolled in the [employer] on 1/29/20 ([employer] Member ID # [id number]). Ambulatory referral to Smoking Cessation Program.   Substance and Sexual Activity    Alcohol use: Yes     Comment: 12 years ago    Drug  use: No    Sexual activity: Not on file     Review of Systems   Constitutional: Positive for fatigue. Negative for chills and fever.   HENT: Negative.    Eyes: Negative.    Respiratory: Negative for shortness of breath.    Cardiovascular: Negative for chest pain.   Gastrointestinal: Negative.    Endocrine: Negative.    Genitourinary: Negative.    Musculoskeletal: Negative.    Skin: Negative.    Neurological: Positive for weakness. Negative for seizures, syncope and speech difficulty.   Psychiatric/Behavioral: Negative.      Objective:     Vital Signs (Most Recent):  Temp: 98 °F (36.7 °C) (02/02/22 1801)  Pulse: 89 (02/02/22 1801)  Resp: 20 (02/02/22 1801)  BP: 109/64 (02/02/22 1801)  SpO2: 97 % (02/02/22 1801) Vital Signs (24h Range):  Temp:  [97.8 °F (36.6 °C)-98.7 °F (37.1 °C)] 98 °F (36.7 °C)  Pulse:  [] 89  Resp:  [16-25] 20  SpO2:  [95 %-100 %] 97 %  BP: ()/(53-82) 109/64     Weight: 83.9 kg (185 lb)  Body mass index is 25.8 kg/m².    Physical Exam  Constitutional:       Appearance: Normal appearance.   HENT:      Head: Normocephalic and atraumatic.   Eyes:      Pupils: Pupils are equal, round, and reactive to light.   Cardiovascular:      Rate and Rhythm: Normal rate and regular rhythm.      Heart sounds: No murmur heard.  No friction rub. No gallop.    Pulmonary:      Effort: Pulmonary effort is normal.      Breath sounds: Normal breath sounds. No wheezing, rhonchi or rales.   Abdominal:      General: Bowel sounds are normal. There is no distension.      Palpations: Abdomen is soft.      Tenderness: There is no abdominal tenderness.   Musculoskeletal:         General: Normal range of motion.      Right lower leg: No edema.      Left lower leg: No edema.   Skin:     General: Skin is warm.      Findings: No rash.   Neurological:      Mental Status: He is alert and oriented to person, place, and time.      Comments:   CN II-XII  5/5 muscle strength in bilateral upper and lower  extremity  Finger-nose-finger intact  Sensation intact   Psychiatric:         Mood and Affect: Mood normal.         Behavior: Behavior normal.           CRANIAL NERVES     CN III, IV, VI   Pupils are equal, round, and reactive to light.       Significant Labs:   All pertinent labs within the past 24 hours have been reviewed.  A1C:   Recent Labs   Lab 12/30/21  1509 01/13/22  0001 02/02/22  1100   HGBA1C 5.6 5.7* 6.0*     CBC:   Recent Labs   Lab 02/02/22  1203   WBC 9.28   HGB 15.1   HCT 47.1        CMP:   Recent Labs   Lab 02/02/22  1203      K 4.2      CO2 21*   *   BUN 27*   CREATININE 2.2*   CALCIUM 9.6   PROT 8.6*   ALBUMIN 3.2*   BILITOT 0.8   ALKPHOS 81   AST 25   ALT 19   ANIONGAP 15   EGFRNONAA 32*     Coagulation:   Recent Labs   Lab 02/02/22  1203   INR 1.1     Lipid Panel:   Recent Labs   Lab 02/02/22  1203   CHOL 112*   HDL 29*   LDLCALC 66.2   TRIG 84   CHOLHDL 25.9     Magnesium:   Recent Labs   Lab 02/02/22  1203   MG 1.8     POCT Glucose:   Recent Labs   Lab 02/02/22  1038   POCTGLUCOSE 136*     Troponin:   Recent Labs   Lab 02/02/22  1203 02/02/22  1805   TROPONINI 0.034* 0.019     TSH:   Recent Labs   Lab 02/02/22  1334   TSH 1.877       Significant Imaging:  EXAMINATION:  XR CHEST AP PORTABLE     CLINICAL HISTORY:  Stroke;     TECHNIQUE:  Single frontal view of the chest was performed.     COMPARISON:  January 12, 2022     FINDINGS:  Imaging of the chest reveals the lungs are well aerated.  No indication of a consolidative process or pleural effusion.  No pulmonary nodule.  The heart is normal in size and contour.  No evidence of free air under the diaphragm.     Impression:     No obvious evidence of an acute pulmonary process.        Electronically signed by: Tee Vegas  Date:                                            02/02/2022  Time:                                           11:48        EXAMINATION:  CT HEAD WITHOUT CONTRAST     CLINICAL HISTORY:  Syncope,  recurrent;     TECHNIQUE:  Low dose axial images were obtained through the head.  Coronal and sagittal reformations were also performed. Contrast was not administered.     COMPARISON:  January 12, 2022     FINDINGS:  The area of encephalomalacia involving the left temporal lobe is once again appreciated.  The area has not extended beyond the original location.  There is however regions of small-vessel involutional changes involving the deep white matter which is especially noticeable in the posterior watershed region of the left hemisphere.  No indication of a mass, edema, midline shift or extra-axial fluid collection.  The gray-white interface is intact.  The lateral ventricles are prominent especially the posterior horns.  Third and 4th ventricle are also prominent.     The sagittal set of images indicates that the corpus callosum mid brain brainstem and proximal spinal cord are unremarkable.     Evaluation of the cerebellum shows no obvious gross abnormality.  The cistern is within normal limits.  No evidence of a suprasellar mass.  The pituitary and optic chiasm are unremarkable.     Imaging the orbits reveals that the globes, optic nerves, extraocular muscles and the intra and extraconal adipose tissue are unremarkable.     Impression:     No significant change as compared to the previous examination performed on the 12th January 2022.        Electronically signed by: Tee Vegas  Date:                                            02/02/2022  Time:                                           11:38       I have reviewed all pertinent imaging results/findings within the past 24 hours.

## 2022-02-03 NOTE — ASSESSMENT & PLAN NOTE
Continue home meds metoprolol succinate 25 mg daily. Will increase dose to 50 mg daily.   Continue Eliquis 5 mg BID

## 2022-02-03 NOTE — PLAN OF CARE
Goals to be met by: 2/6    Complete visual perceptual assessment      Recs for OP neuro-ophthalmology, OP SLP, OT for /fxnl cognition        Problem: Occupational Therapy Goal  Goal: Occupational Therapy Goal  Description:       Outcome: Ongoing, Progressing
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
## 2022-02-04 VITALS
HEART RATE: 93 BPM | BODY MASS INDEX: 24.66 KG/M2 | WEIGHT: 176.13 LBS | RESPIRATION RATE: 18 BRPM | TEMPERATURE: 98 F | OXYGEN SATURATION: 99 % | DIASTOLIC BLOOD PRESSURE: 79 MMHG | HEIGHT: 71 IN | SYSTOLIC BLOOD PRESSURE: 119 MMHG

## 2022-02-04 LAB
ALBUMIN SERPL BCP-MCNC: 2.7 G/DL (ref 3.5–5.2)
ALP SERPL-CCNC: 78 U/L (ref 55–135)
ALT SERPL W/O P-5'-P-CCNC: 20 U/L (ref 10–44)
ANION GAP SERPL CALC-SCNC: 13 MMOL/L (ref 8–16)
AST SERPL-CCNC: 26 U/L (ref 10–40)
BASOPHILS # BLD AUTO: 0.02 K/UL (ref 0–0.2)
BASOPHILS NFR BLD: 0.3 % (ref 0–1.9)
BILIRUB SERPL-MCNC: 0.5 MG/DL (ref 0.1–1)
BUN SERPL-MCNC: 22 MG/DL (ref 6–20)
CALCIUM SERPL-MCNC: 8.8 MG/DL (ref 8.7–10.5)
CHLORIDE SERPL-SCNC: 101 MMOL/L (ref 95–110)
CO2 SERPL-SCNC: 21 MMOL/L (ref 23–29)
CREAT SERPL-MCNC: 1.4 MG/DL (ref 0.5–1.4)
DIFFERENTIAL METHOD: ABNORMAL
EOSINOPHIL # BLD AUTO: 0.3 K/UL (ref 0–0.5)
EOSINOPHIL NFR BLD: 3.9 % (ref 0–8)
ERYTHROCYTE [DISTWIDTH] IN BLOOD BY AUTOMATED COUNT: 13.6 % (ref 11.5–14.5)
ERYTHROCYTE [SEDIMENTATION RATE] IN BLOOD BY WESTERGREN METHOD: 120 MM/HR (ref 0–10)
EST. GFR  (AFRICAN AMERICAN): >60 ML/MIN/1.73 M^2
EST. GFR  (NON AFRICAN AMERICAN): 55 ML/MIN/1.73 M^2
GLUCOSE SERPL-MCNC: 101 MG/DL (ref 70–110)
HCT VFR BLD AUTO: 36.6 % (ref 40–54)
HGB BLD-MCNC: 12.3 G/DL (ref 14–18)
IMM GRANULOCYTES # BLD AUTO: 0.02 K/UL (ref 0–0.04)
IMM GRANULOCYTES NFR BLD AUTO: 0.3 % (ref 0–0.5)
LYMPHOCYTES # BLD AUTO: 2 K/UL (ref 1–4.8)
LYMPHOCYTES NFR BLD: 26.1 % (ref 18–48)
MAGNESIUM SERPL-MCNC: 1.9 MG/DL (ref 1.6–2.6)
MCH RBC QN AUTO: 28.8 PG (ref 27–31)
MCHC RBC AUTO-ENTMCNC: 33.6 G/DL (ref 32–36)
MCV RBC AUTO: 86 FL (ref 82–98)
MONOCYTES # BLD AUTO: 0.9 K/UL (ref 0.3–1)
MONOCYTES NFR BLD: 12.2 % (ref 4–15)
NEUTROPHILS # BLD AUTO: 4.4 K/UL (ref 1.8–7.7)
NEUTROPHILS NFR BLD: 57.2 % (ref 38–73)
NRBC BLD-RTO: 0 /100 WBC
PHOSPHATE SERPL-MCNC: 4.4 MG/DL (ref 2.7–4.5)
PLATELET # BLD AUTO: 256 K/UL (ref 150–450)
PMV BLD AUTO: 11.4 FL (ref 9.2–12.9)
POCT GLUCOSE: 117 MG/DL (ref 70–110)
POCT GLUCOSE: 255 MG/DL (ref 70–110)
POTASSIUM SERPL-SCNC: 4.8 MMOL/L (ref 3.5–5.1)
PROT SERPL-MCNC: 7.1 G/DL (ref 6–8.4)
RBC # BLD AUTO: 4.27 M/UL (ref 4.6–6.2)
SODIUM SERPL-SCNC: 135 MMOL/L (ref 136–145)
WBC # BLD AUTO: 7.69 K/UL (ref 3.9–12.7)

## 2022-02-04 PROCEDURE — 63600175 PHARM REV CODE 636 W HCPCS: Performed by: FAMILY MEDICINE

## 2022-02-04 PROCEDURE — 25000003 PHARM REV CODE 250: Performed by: STUDENT IN AN ORGANIZED HEALTH CARE EDUCATION/TRAINING PROGRAM

## 2022-02-04 PROCEDURE — 83735 ASSAY OF MAGNESIUM: CPT | Performed by: STUDENT IN AN ORGANIZED HEALTH CARE EDUCATION/TRAINING PROGRAM

## 2022-02-04 PROCEDURE — 96372 THER/PROPH/DIAG INJ SC/IM: CPT

## 2022-02-04 PROCEDURE — 85025 COMPLETE CBC W/AUTO DIFF WBC: CPT | Performed by: STUDENT IN AN ORGANIZED HEALTH CARE EDUCATION/TRAINING PROGRAM

## 2022-02-04 PROCEDURE — 91300 PHARM REV CODE 636 W HCPCS: CPT | Performed by: FAMILY MEDICINE

## 2022-02-04 PROCEDURE — 80053 COMPREHEN METABOLIC PANEL: CPT | Performed by: STUDENT IN AN ORGANIZED HEALTH CARE EDUCATION/TRAINING PROGRAM

## 2022-02-04 PROCEDURE — 36415 COLL VENOUS BLD VENIPUNCTURE: CPT | Performed by: STUDENT IN AN ORGANIZED HEALTH CARE EDUCATION/TRAINING PROGRAM

## 2022-02-04 PROCEDURE — 95816 PR EEG,W/AWAKE & DROWSY RECORD: ICD-10-PCS | Mod: 26,,, | Performed by: PSYCHIATRY & NEUROLOGY

## 2022-02-04 PROCEDURE — 84100 ASSAY OF PHOSPHORUS: CPT | Performed by: STUDENT IN AN ORGANIZED HEALTH CARE EDUCATION/TRAINING PROGRAM

## 2022-02-04 PROCEDURE — G0378 HOSPITAL OBSERVATION PER HR: HCPCS

## 2022-02-04 PROCEDURE — 0002A HC IMMUNIZ ADMIN, SARS-COV-2 COVID-19 VACC, 30MCG/0.3ML, 2ND DOSE: CPT | Performed by: FAMILY MEDICINE

## 2022-02-04 PROCEDURE — 94761 N-INVAS EAR/PLS OXIMETRY MLT: CPT

## 2022-02-04 PROCEDURE — 63600175 PHARM REV CODE 636 W HCPCS: Performed by: STUDENT IN AN ORGANIZED HEALTH CARE EDUCATION/TRAINING PROGRAM

## 2022-02-04 PROCEDURE — 95816 EEG AWAKE AND DROWSY: CPT | Mod: 26,,, | Performed by: PSYCHIATRY & NEUROLOGY

## 2022-02-04 PROCEDURE — 85652 RBC SED RATE AUTOMATED: CPT | Performed by: STUDENT IN AN ORGANIZED HEALTH CARE EDUCATION/TRAINING PROGRAM

## 2022-02-04 PROCEDURE — 96372 THER/PROPH/DIAG INJ SC/IM: CPT | Performed by: STUDENT IN AN ORGANIZED HEALTH CARE EDUCATION/TRAINING PROGRAM

## 2022-02-04 PROCEDURE — 95816 EEG AWAKE AND DROWSY: CPT

## 2022-02-04 RX ORDER — METOPROLOL SUCCINATE 50 MG/1
50 TABLET, EXTENDED RELEASE ORAL DAILY
Qty: 90 TABLET | Refills: 3 | Status: SHIPPED | OUTPATIENT
Start: 2022-02-05 | End: 2023-05-12 | Stop reason: SDUPTHER

## 2022-02-04 RX ORDER — ASPIRIN 81 MG/1
81 TABLET ORAL DAILY
Qty: 30 TABLET | Refills: 11 | Status: SHIPPED | OUTPATIENT
Start: 2022-02-04 | End: 2022-02-17 | Stop reason: SDUPTHER

## 2022-02-04 RX ADMIN — RNA INGREDIENT BNT-162B2 0.3 ML: 0.23 INJECTION, SUSPENSION INTRAMUSCULAR at 02:02

## 2022-02-04 RX ADMIN — THIAMINE HCL TAB 100 MG 100 MG: 100 TAB at 08:02

## 2022-02-04 RX ADMIN — METOPROLOL SUCCINATE 50 MG: 50 TABLET, EXTENDED RELEASE ORAL at 08:02

## 2022-02-04 RX ADMIN — INSULIN ASPART 3 UNITS: 100 INJECTION, SOLUTION INTRAVENOUS; SUBCUTANEOUS at 11:02

## 2022-02-04 RX ADMIN — APIXABAN 5 MG: 5 TABLET, FILM COATED ORAL at 08:02
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
## 2022-02-04 NOTE — PLAN OF CARE
Charlie met with pt and pt's wife Elise [phone] at bedside for final assessment. Pt will have wife help pt transport home at d/c. Pt will attend out pt rehab. PT has f/u apts on avs. SW added medicaid transportation number to avs. Rounds completed on pt.  All questions addressed.  Bedside nurse to discuss d/c medications.  Discussed importance to attend all f/u appts and take medications as prescribed.  Verbalized understanding.    Jose Peacock, MSAJ  [phone]    Future Appointments   Date Time Provider Department Center   2/7/2022  2:00 PM Daisy Muro [facility] SMOKE [facility]   2/17/2022  8:30 AM Sheila Singh MD [facility] IMPRI [facility]   2/17/2022 11:20 AM Gloria Jaramillo MD [facility] CARDIO [facility]   5/10/2022  1:00 PM Jean Carlos Reeves DO [facility] NEURO [facility]        02/04/22 1313   Final Note   Assessment Type Final Discharge Note   Anticipated Discharge Disposition Home  (Out Patient Rehab)   What phone number can be called within the next 1-3 days to see how you are doing after discharge? [phone]   Hospital Resources/Appts/Education Provided Appointments scheduled by Navigator/Coordinator   Post-Acute Status   Post-Acute Authorization Other  (Out Patient Rehab)   Discharge Delays None known at this time

## 2022-02-04 NOTE — NURSING
Shift assessment complete. Pt is alert and oriented x 4. Denies pain. Bed in lowest position, locked, and side rails up x 3. Call light in reach. Bed alarm on.
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
## 2022-02-04 NOTE — SUBJECTIVE & OBJECTIVE
Interval History:     Review of Systems   Constitutional: Positive for fatigue. Negative for chills and fever.   HENT: Negative.    Eyes: Negative.    Respiratory: Negative for shortness of breath.    Cardiovascular: Negative for chest pain.   Gastrointestinal: Negative.    Endocrine: Negative.    Genitourinary: Negative.    Musculoskeletal: Negative.    Skin: Negative.    Neurological: Positive for weakness. Negative for seizures, syncope and speech difficulty.   Psychiatric/Behavioral: Negative.      Objective:     Vital Signs (Most Recent):  Temp: 97.6 °F (36.4 °C) (02/04/22 0422)  Pulse: 90 (02/04/22 0422)  Resp: 18 (02/04/22 0422)  BP: 133/81 (02/04/22 0422)  SpO2: 97 % (02/04/22 0422) Vital Signs (24h Range):  Temp:  [96.7 °F (35.9 °C)-99.4 °F (37.4 °C)] 97.6 °F (36.4 °C)  Pulse:  [] 90  Resp:  [18] 18  SpO2:  [97 %-99 %] 97 %  BP: (114-150)/() 133/81     Weight: 79.9 kg (176 lb 2.4 oz)  Body mass index is 24.57 kg/m².    Intake/Output Summary (Last 24 hours) at 2/4/2022 0654  Last data filed at 2/4/2022 0300  Gross per 24 hour   Intake --   Output 200 ml   Net -200 ml      Physical Exam  Constitutional:       Appearance: Normal appearance.   HENT:      Head: Normocephalic and atraumatic.   Eyes:      Pupils: Pupils are equal, round, and reactive to light.   Cardiovascular:      Rate and Rhythm: Normal rate and regular rhythm.      Heart sounds: No murmur heard.  No friction rub. No gallop.    Pulmonary:      Effort: Pulmonary effort is normal.      Breath sounds: Normal breath sounds. No wheezing, rhonchi or rales.   Abdominal:      General: Bowel sounds are normal. There is no distension.      Palpations: Abdomen is soft.      Tenderness: There is no abdominal tenderness.   Musculoskeletal:         General: Normal range of motion.      Right lower leg: No edema.      Left lower leg: No edema.   Skin:     General: Skin is warm.      Findings: No rash.   Neurological:      Mental Status: He is  alert and oriented to person, place, and time.      Comments:   CN II-XII  5/5 muscle strength in bilateral upper and lower extremity  Finger-nose-finger intact  Sensation intact   Psychiatric:         Mood and Affect: Mood normal.         Behavior: Behavior normal.         Significant Labs:   All pertinent labs within the past 24 hours have been reviewed.  A1C:   Recent Labs   Lab 12/30/21  1509 01/13/22  0001 02/02/22  1100   HGBA1C 5.6 5.7* 6.0*     Bilirubin:   Recent Labs   Lab 01/12/22  1322 02/02/22  1203 02/03/22  0609   BILITOT 0.4 0.8 0.6     BMP:   Recent Labs   Lab 02/03/22  0609         K 4.4      CO2 21*   BUN 30*   CREATININE 1.5*   CALCIUM 8.9   MG 2.0     CBC:   Recent Labs   Lab 02/02/22  1203 02/03/22  0609 02/04/22  0538   WBC 9.28 8.48 7.69   HGB 15.1 12.6* 12.3*   HCT 47.1 38.0* 36.6*    294 256     CMP:   Recent Labs   Lab 02/02/22  1203 02/03/22  0609    136   K 4.2 4.4    102   CO2 21* 21*   * 104   BUN 27* 30*   CREATININE 2.2* 1.5*   CALCIUM 9.6 8.9   PROT 8.6* 7.4   ALBUMIN 3.2* 2.8*   BILITOT 0.8 0.6   ALKPHOS 81 79   AST 25 23   ALT 19 18   ANIONGAP 15 13   EGFRNONAA 32* 51*     Coagulation:   Recent Labs   Lab 02/02/22  1203   INR 1.1     Lipid Panel:   Recent Labs   Lab 02/02/22  1203   CHOL 112*   HDL 29*   LDLCALC 66.2   TRIG 84   CHOLHDL 25.9     Magnesium:   Recent Labs   Lab 02/02/22  1203 02/03/22  0609   MG 1.8 2.0     POCT Glucose:   Recent Labs   Lab 02/03/22  1642 02/03/22  2054 02/04/22  0424   POCTGLUCOSE 122* 135* 117*     Troponin:   Recent Labs   Lab 02/02/22  1203 02/02/22  1805   TROPONINI 0.034* 0.019     TSH:   Recent Labs   Lab 02/02/22  1334   TSH 1.877     Urine Studies:   Recent Labs   Lab 02/02/22  1704   COLORU Yellow   APPEARANCEUA Clear   PHUR 6.0   SPECGRAV 1.020   PROTEINUA 2+*   GLUCUA Negative   KETONESU Negative   BILIRUBINUA Negative   OCCULTUA Negative   NITRITE Negative   UROBILINOGEN Negative    LEUKOCYTESUR Negative   RBCUA 1   WBCUA 2   BACTERIA Occasional   SQUAMEPITHEL 2   HYALINECASTS 8*       Significant Imaging: I have reviewed all pertinent imaging results/findings within the past 24 hours.

## 2022-02-04 NOTE — CONSULTS
[facility]   Consult   Consult Requested By: Colton  Reason for Consult: HFrEF with 2 watershed infarcts     SUBJECTIVE:     History of Present Illness:  Patient is a 56 y.o. male presents with significant pmhx of afib on eliquis, HFrEF (EF 40%), CVA most recent 1/12/22 who presented to the ED after having a sycopal episode [place] bus stop.     Pt reports that he started feeling dizziness, hand numbness and kicking of his legs.  Syncope witnessed by wife at bus stop. In the ED pt with AMS, slow to respond to questions and was subsequently stroke activated. CTH w/o con showing no changes from previous CT from January. Troponin 0.034 > 0.019, ekg with afib.           Review of Systems   Constitutional: Negative for chills, diaphoresis and fever.   HENT: Negative.    Eyes: Negative.    Cardiovascular: Negative for chest pain, claudication, cyanosis and dyspnea on exertion.   Respiratory: Negative for cough, hemoptysis and shortness of breath.    Endocrine: Negative.    Skin: Negative for itching and rash.   Musculoskeletal: Negative.    Gastrointestinal: Negative for abdominal pain, constipation and diarrhea.   Genitourinary: Negative.    Neurological: Positive for difficulty with concentration, dizziness, light-headedness and numbness. Negative for focal weakness.   Psychiatric/Behavioral: Negative.             Review of patient's allergies indicates:  No Known Allergies    Past Medical History:   Diagnosis Date    A-fib     CHF (congestive heart failure)     Hypertension     Insomnia        Past Surgical History:   Procedure Laterality Date    KIDNEY STONE SURGERY         No family history on file.    Social History     Tobacco Use    Smoking status: Current Every Day Smoker     Packs/day: 1.00     Years: 41.00     Pack years: 41.00     Types: Cigarettes     Start date: 1981    Smokeless tobacco: Never Used    Tobacco comment: Pt enrolled in the [employer] on 1/29/20 (SCT Member ID # [id number]).  He declines Ambulatory referral to Smoking Cessation Program.   Substance Use Topics    Alcohol use: Yes     Comment: 12 years ago    Drug use: No        Home meds:  No current facility-administered medications on file prior to encounter.     Current Outpatient Medications on File Prior to Encounter   Medication Sig Dispense Refill    acetaminophen (TYLENOL) 500 MG tablet Take 500 mg by mouth every 6 (six) hours as needed for Pain.      albuterol (PROVENTIL/VENTOLIN HFA) 90 mcg/actuation inhaler Inhale 2 puffs into the lungs every 6 (six) hours as needed for Wheezing. Rescue 18 g 0    apixaban (ELIQUIS) 5 mg Tab Take 1 tablet (5 mg total) by mouth 2 (two) times daily. 180 tablet 3    ascorbic acid, vitamin C, (VITAMIN C) 500 MG tablet Take 1 tablet (500 mg total) by mouth 2 (two) times daily. 30 tablet 0    atorvastatin (LIPITOR) 40 MG tablet Take 1 tablet (40 mg total) by mouth every evening. 90 tablet 3    furosemide (LASIX) 20 MG tablet Take 1 tablet (20 mg total) by mouth once daily. 90 tablet 4    losartan (COZAAR) 50 MG tablet Take 1 tablet (50 mg total) by mouth once daily. 90 tablet 3    metoprolol succinate (TOPROL-XL) 25 MG 24 hr tablet Take 1 tablet (25 mg total) by mouth once daily. 90 tablet 4    mirtazapine (REMERON) 15 MG tablet Take 1 tablet (15 mg total) by mouth every evening. 30 tablet 11    nicotine (NICODERM CQ) 21 mg/24 hr Place 1 patch onto the skin once daily. 30 patch 2    thiamine 100 MG tablet Take 1 tablet (100 mg total) by mouth once daily. 30 tablet 4       Current meds:    Scheduled Meds:   apixaban  5 mg Oral BID    atorvastatin  40 mg Oral QHS    metoprolol succinate  50 mg Oral Daily    mirtazapine  15 mg Oral QHS    senna-docusate 8.6-50 mg  1 tablet Oral BID    thiamine  100 mg Oral Daily     Continuous Infusions:  PRN Meds:.acetaminophen, dextrose 10%, dextrose 10%, glucagon (human recombinant), glucose, glucose, insulin aspart U-100, melatonin, naloxone,  nicotine, sodium chloride 0.9%        OBJECTIVE:     Vital Signs (Most Recent)  Vitals:    02/03/22 2024 02/04/22 0047 02/04/22 0422 02/04/22 0749   BP:  (!) 150/79 133/81 109/74   BP Location:  Left arm Left arm Left arm   Patient Position:   Lying Lying   Pulse: 94 98 90 61   Resp: 18 18 18 18   Temp:  97 °F (36.1 °C) 97.6 °F (36.4 °C) 96.6 °F (35.9 °C)   TempSrc:  Oral Oral Oral   SpO2: 97% 97% 97% 97%   Weight:       Height:             Physical Exam:  Gen: Alert to person, place and time. Well appearing, non toxic, NAD.    Head: Atraumatic, normocephalic   Neck: No JVD present, neck supple, no LAD   Eyes: Pupils equal and reactive to light,extra-ocular eye movements intact,   Mouth: no erythema, exudates, or lesions present in oropharynx   CV: RRR, no rubs, gallops, or murmurs.      Lungs: CTAB no crackles, wheezing, or rhonchi.  No fremitus noted.     Abd: Soft, non tender, non distended, bowel sounds present. No rebound tenderness or guarding present.     EXT: 2+ radial and dorsalis pedis pulses bilaterally.  No edema or cyanosis noted.    Neuro: CN II: pupils equal, reactive to light. CN III, IV, VI: extra-ocular muscles intact. CN V: sensation to light touch intact throughout face bilaterally. CN VII: upper and lower facial muscles intact. CN XI: sternocleidomastoid intact movement against resistance. CN XII: Tongue movements intact.  No focal neuro deficits noted.  UE/LE muscles move against resistance with no problem. Sensation intact in feet bilaterally.     Skin: Warm and dry.  No jaundice noted.        LABS      CBC  Hemoglobin (g/dL)   Date Value   02/04/2022 12.3 (L)   02/03/2022 12.6 (L)   02/02/2022 15.1     Hematocrit (%)   Date Value   02/04/2022 36.6 (L)   02/03/2022 38.0 (L)   02/02/2022 47.1          BMP  Sodium (mmol/L)   Date Value   02/03/2022 136   02/02/2022 137   01/13/2022 134 (L)     Potassium (mmol/L)   Date Value   02/03/2022 4.4   02/02/2022 4.2   01/13/2022 4.4     CO2 (mmol/L)    Date Value   02/03/2022 21 (L)   02/02/2022 21 (L)   01/13/2022 21 (L)     Chloride (mmol/L)   Date Value   02/03/2022 102   02/02/2022 101   01/13/2022 101     BUN (mg/dL)   Date Value   02/03/2022 30 (H)   02/02/2022 27 (H)   01/13/2022 31 (H)     Creatinine (mg/dL)   Date Value   02/03/2022 1.5 (H)   02/02/2022 2.2 (H)   01/13/2022 1.5 (H)     Glucose (mg/dL)   Date Value   02/03/2022 104   02/02/2022 193 (H)   01/13/2022 99       BNP  BNP (pg/mL)   Date Value   01/12/2022 109 (H)   12/30/2021 1,636 (H)   03/17/2019 144 (H)       Troponin panel:   No results found for: TROPONIN      COAGS  INR (no units)   Date Value   02/02/2022 1.1   01/12/2022 1.0     aPTT (sec)   Date Value   01/12/2022 34.2 (H)       Lipid panel:    Lab Results   Component Value Date    CHOL 112 (L) 02/02/2022    CHOL 134 01/13/2022     Lab Results   Component Value Date    HDL 29 (L) 02/02/2022    HDL 30 (L) 01/13/2022     Lab Results   Component Value Date    LDLCALC 66.2 02/02/2022    LDLCALC 91.2 01/13/2022     Lab Results   Component Value Date    TRIG 84 02/02/2022    TRIG 64 01/13/2022     Lab Results   Component Value Date    CHOLHDL 25.9 02/02/2022    CHOLHDL 22.4 01/13/2022       Diagnostic Results:    EKG: Afib no RVR  CXR: no acute process   Echo: no new     Chart review:    Echo: EF 40 , left ventricular global hypokinesis, LV diastolic dysfunction, normal rv systolic function, moderate aortic regurgitation, moderate mitral regurgitation   Cath: n/a  EKGs: afib     ASSESSMENT/PLAN:    56 y.o. male presents with significant pmhx of afib on eliquis, HFrEF (EF 40%), CVA most recent 1/12/22 who presented to the ED after having a sycopal episode at a bus stop. Consulted for HFrEF in setting of CVA.     Plan:   -continue eliquis 5 mg BID for afib  -continue GDMT: losartan 50 mg daily, metoprolol succinate 25 mg daily, lasix 20 mg daily  -continue atorvastatin   -Recent echo from 12/30/21 noted above.  Can repeat TTE with bubble  study.     -rest of care per primary team       Thanks for allowing us to participate in the care of this patient.  Staff attestation to follow.      Leroy Judd MD   [id number]  [facility]

## 2022-02-04 NOTE — PT/OT/SLP PROGRESS
Speech Language Pathology  VISIT Attempt      Sarbjit Brewer .  MRN: [id number]      1300pm  Pt not seen due to [facility] for testing, pt was placed on a PO diet, pt did pass [facility]ONARD[facility] swallow screener with no issues reported. Pt will need OP OT and SLP services at [facility].

## 2022-02-07 ENCOUNTER — CLINICAL SUPPORT (OUTPATIENT)
Dept: SMOKING CESSATION | Facility: CLINIC | Age: 57
End: 2022-02-07
Payer: COMMERCIAL

## 2022-02-07 DIAGNOSIS — F17.210 CIGARETTE SMOKER: Primary | ICD-10-CM

## 2022-02-07 PROCEDURE — 99999 PR PBB SHADOW E&M-EST. PATIENT-LVL II: CPT | Mod: PBBFAC,,,

## 2022-02-07 PROCEDURE — 99404 PR PREVENT COUNSEL,INDIV,60 MIN: ICD-10-PCS | Mod: S$GLB,,,

## 2022-02-07 PROCEDURE — 99404 PREV MED CNSL INDIV APPRX 60: CPT | Mod: S$GLB,,,

## 2022-02-07 PROCEDURE — 99999 PR PBB SHADOW E&M-EST. PATIENT-LVL II: ICD-10-PCS | Mod: PBBFAC,,,

## 2022-02-07 RX ORDER — MICONAZOLE NITRATE 2 %
2 CREAM (GRAM) TOPICAL
Qty: 100 EACH | Refills: 0 | Status: SHIPPED | OUTPATIENT
Start: 2022-02-07 | End: 2022-10-28

## 2022-02-07 RX ORDER — IBUPROFEN 200 MG
1 TABLET ORAL DAILY
Qty: 28 PATCH | Refills: 2 | Status: SHIPPED | OUTPATIENT
Start: 2022-02-07 | End: 2022-10-28

## 2022-02-07 NOTE — PROGRESS NOTES
CESD of 4 is perceived as no mental distress or depression at this time. FTND of 5 Indicates a high  level  of tobacco/nicotine dependency. Patient states he smokes 3 cpd. Pt's wife states he has not bought any cigarettes due to no money. Exhaled carbon monoxide level was 15 ppm per [employer].

## 2022-02-08 NOTE — DISCHARGE SUMMARY
[facility] Medicine  Discharge Summary      Patient Name: Sarbjit Brewer Sr.  MRN: [id number]  Patient Class: OP- Observation  Admission Date: 2/2/2022  Hospital Length of Stay: 0 days  Discharge Date and Time:  02/08/2022 2:07 PM  Attending Physician: Magaly att. providers found   Discharging Provider: Dinesh Chavez DO  Primary Care Provider: Primary Doctor Magaly    HPI:   Sarbjit Brewer Sr.is a 56-year-old male PMHx atrial fibrillation (on chronic Eliquis), HFrEF (EF 40%), history of prior CVAs most recent 01/12/202 [place] [facility] [place] event this [place].  Endorses he was at a bus stop reports falling down during this event.  Denies hitting head. Began experiencing dizziness, hand numbness, and rhythmic kicking of his legs.  Reports fatigue and dizziness that began yesterday. Patient was actually on his way to a cardiology appointment to discuss results of his stress test performed yesterday.  Denies any changes in speech, unilateral muscle weakness, or changes in vision.    In the ED, was found to be altered and was slow to respond to questions. Code stroke was activated, CT head without contrast showed no changes from previous CT performed on January 12th 2022.   Troponin 0.034. EKG showing atrial flutter/fibrillation. CMP with BUN/creatinine ratio 27/2.2. UDS negative. Patient was admitted to [facility] for presyncope and CVA workup.    * No surgery found *      Hospital Course:   Patient was admitted for syncope and CVA workup. Neurology was consulted upon admission. MRI with subtle increase in areas of restricted diffusion in the watershed distribution in both the posterior right temporal parietal lobe suggestive extensive of micro infarcts.  Neurology with concern for ICA stenosis recommended CTA for further imaging. CTA head with bilateral symmetrical subcortical infarctions in the parietal occipital subcortical white matter, grossly stable from recent MRI. CTA also  Subjective        History of Present Illness     Brittney Rollins is a 82 y.o. female who presents for  six-month follow up on hyperlipidemia, hypertension, GERD, osteoporosis, REY, and hypokalemia among other issues.  She has a mild thoracic aortic aneurysm which we follow approximately every 2 years.    Her blood pressure is well controlled.  Her weight is stable.    She continues to take Xanax chronically for anxiety/sleep.  When she was here six months ago, she was experiencing dysthymia, for which we started Lexapro.  She took it for short while and did not like the way she felt, so she discontinued the Lexapro.    She reports episodic posterior neck pain, usually on the left.  She denies radiation down the arm.  I reproduce the pain by palpating the posterior left trapezius and paraspinal muscle regions.  The pain improves with deep massage.    She reports some episodes of disequilibrium or dizziness consistent with eustachian tube dysfunction or vertigo.  She denies any of those symptoms today.  She took some OTC dizziness medicine earlier this year without much improvement.  We have given her prescription meclizine in the past, and provided refills today.    She also struggles with chronic back and joint pain, for which she takes Tramadol.  She is due repeat DEXA.  DEXA 08/2017 revealed severe osteopenia of lumbar spine.  She has been intolerant to the Fosamax and Prolia in the past.  She has decided to continue on calcium and vitamin D supplements without prescription osteoporosis medication.        The patient's relevant past medical, surgical, and social history was reviewed in Epic.   Lab results are reviewed with the patient today.   CBC unremarkable. Fasting glucose 104.  Vitamin D and vitamin B-12 at goal with oral supplements. Total cholesterol 146.  HDL 51.  LDL 57.  Triglycerides 188.  LDL/HDL ratio 1.13.  Normal renal and liver function.     Review of Systems   Constitutional: Negative for chills,  "fatigue and fever.   HENT: Negative for congestion, ear pain, postnasal drip, sinus pressure and sore throat.    Respiratory: Negative for cough, shortness of breath and wheezing.    Cardiovascular: Negative for chest pain, palpitations and leg swelling.   Gastrointestinal: Negative for abdominal pain, blood in stool, constipation, diarrhea, nausea and vomiting.   Endocrine: Negative for cold intolerance, heat intolerance, polydipsia and polyuria.   Genitourinary: Negative for dysuria, frequency, hematuria and urgency.   Musculoskeletal: Positive for arthralgias.   Skin: Negative for rash.   Neurological: Negative for syncope and weakness.   Psychiatric/Behavioral: The patient is nervous/anxious.         Objective     /68   Pulse 74   Temp 98.7 °F (37.1 °C) (Oral)   Ht 149.9 cm (59\")   Wt 54 kg (119 lb)   SpO2 98%   BMI 24.04 kg/m²     Physical Exam   Constitutional: She is oriented to person, place, and time. She appears well-developed and well-nourished. No distress.   Pleasant.  Good eye contact.  Mildly flat affect, but she laughs and smiles with me some.  No acute distress.   HENT:   Head: Normocephalic and atraumatic.   Nose: Right sinus exhibits no maxillary sinus tenderness and no frontal sinus tenderness. Left sinus exhibits no maxillary sinus tenderness and no frontal sinus tenderness.   Mouth/Throat: Uvula is midline, oropharynx is clear and moist and mucous membranes are normal. No oral lesions. No tonsillar exudate.   Eyes: Conjunctivae and EOM are normal. Pupils are equal, round, and reactive to light.   Neck: Trachea normal. Neck supple. No JVD present. Carotid bruit is not present. No tracheal deviation present. No thyroid mass and no thyromegaly present.   Cardiovascular: Normal rate, regular rhythm, normal heart sounds and intact distal pulses.  No extrasystoles are present. PMI is not displaced.   No murmur heard.  Pulmonary/Chest: Effort normal and breath sounds normal. No accessory " ruled out concern for ICA stenosis. Neurology recommended patient follow-up with an outpatient EEG and cardiology evaluation for atrial fibrillation. Patient's metoprolol was increased to 50 mg daily for better rate control. Patient was evaluated by PT/OT during hospitalization. PT/OT recommended SLP evaluation as an outpatient. Patient was provided with referrals to Cardiology, Neurology, and speech therapy for follow-up at discharge. Was started on low-dose aspirin at discharge. Patient was stable at discharge       Goals of Care Treatment Preferences:  Code Status: Full Code    Consults:   Consults (From admission, onward)        Status Ordering Provider     Consult to Telemedicine - Acute Stroke  Once        Provider:  (Not yet assigned)    JUICE Rosenthal          No new Assessment & Plan notes have been filed under this hospital service since the last note was generated.  Service: Hospital Medicine    Final Active Diagnoses:    Diagnosis Date Noted POA    PRINCIPAL PROBLEM:  Presyncope [R42] 02/02/2022 Yes    Acute bilat watershed infarction [I63.89]  Yes    History of CVA (cerebrovascular accident) [Z86.73] 02/02/2022 Not Applicable    HFrEF (heart failure with reduced ejection fraction) [I50.20] 02/02/2022 Yes    Elevated troponin [R77.8] 02/02/2022 Yes    Atrial fibrillation [I48.91] 12/31/2021 Yes    Alcoholism /alcohol abuse [F10.20] 02/03/2020 Yes    Tobacco abuse [Z72.0] 02/03/2020 Yes    Essential hypertension [I10] 07/31/2014 Yes      Problems Resolved During this Admission:       Discharged Condition: stable    Disposition: Home or Self Care    Follow Up:   Follow-up Information     Please follow up.    Why: Medicaid Transportation phone number [phone]                     Patient Instructions:      Ambulatory referral/consult to Neurology   Standing Status: Future   Referral Priority: Routine Referral Type: Consultation   Referral Reason: Specialty Services Required    Requested Specialty: Neurology   Number of Visits Requested: 1     Ambulatory referral/consult to Speech Therapy   Standing Status: Future   Referral Priority: Routine Referral Type: Speech Therapy   Referral Reason: Specialty Services Required   Requested Specialty: Speech Pathology   Number of Visits Requested: 1     Ambulatory referral/consult to Cardiology   Standing Status: Future   Referral Priority: Routine Referral Type: Consultation   Referral Reason: Specialty Services Required   Requested Specialty: Cardiology   Number of Visits Requested: 1     COVID-19 PFIZER 2ND DOSAGE APPT REQUEST   Standing Status: Future Standing Exp. Date: 02/04/23     Order Specific Question Answer Comments   Schedule the second dosage on this date: 2/25/2022        Significant Diagnostic Studies:       EXAMINATION:  CTA HEAD AND NECK (XPD)     CLINICAL HISTORY:  Stroke/TIA, determine embolic source;     TECHNIQUE:  Axial CT images obtained throughout the region of the head before and after the administration of intravenous contrast.  CT angiogram was performed from through the cervical and intracranial vasculature during the IV bolus administration of 100mL of Omnipaque 350Multiplanar MPR and MIP reformats were performed.     COMPARISON:  MRI 02/02/2022     FINDINGS:  The ventricles are stable in size, without evidence of hydrocephalus.     Patchy symmetric hypoattenuation in the subcortical white matter of parieto-occipital regions keeping with areas of recent infarction when correlated with MRI.  Moderate chronic microvascular ischemic change throughout the supratentorial white matter.  Moderate-sized remote left anterior temporal infarct.  Small remote bilateral cerebellar infarcts.  Remote right caudate lacunar type infarct.  No new major vascular distribution infarct.  No acute parenchymal hemorrhage.  No significant mass effect or midline shift.  No enhancing lesions     No extra-axial blood or fluid collections.     The  muscle usage. No respiratory distress. She has no decreased breath sounds. She has no wheezes. She has no rhonchi. She has no rales.   Abdominal: Soft. Bowel sounds are normal. She exhibits no distension. There is no hepatosplenomegaly. There is no tenderness.   Musculoskeletal:   The muscles of the posterior neck and trapezius region are somewhat tender with spasm/tension, left more than right.     Vascular Status -  Her right foot exhibits normal foot vasculature  and no edema. Her left foot exhibits normal foot vasculature  and no edema.  Lymphadenopathy:     She has no cervical adenopathy.   Neurological: She is alert and oriented to person, place, and time. No cranial nerve deficit. Coordination normal.   Skin: Skin is warm, dry and intact. No rash noted. No cyanosis. Nails show no clubbing.   Psychiatric: Her speech is normal and behavior is normal. Judgment and thought content normal.   Vitals reviewed.          Assessment/Plan      Continue the vascular risk factor modifications, including Lipitor and lisinopril.  Also continue atenolol.    We will repeat DEXA and mammogram.  I asked her to resume taking the calcium/vitamin D supplement daily.  She did not tolerate bisphosphonates and reported intolerance to single dose of Prolia.  She has refused other medication interventions thus far.  Walk daily for exercise.  Fall precautions.    I realized that it has been 2 years since her last CT angiogram of the chest to reevaluate the mild thoracic aortic aneurysm, which was 4.3 cm when last evaluated in 2017.  If she is willing, we will reschedule the CT angiogram of the chest.    I demonstrated deep massage techniques for the posterior neck pain.  She reports it was helpful.    She may decrease the oral iron to once weekly.  Continue the vitamin B12 supplement approximately every other day.    She continues to use Xanax as needed for anxiety or sleeplessness.  I offered to prescribe some other SSRI or SNRI, as  cranium appears intact.     Patchy mucosal thickening particularly along the floor the maxillary sinuses bilaterally.  There is endodontal disease with missing teeth in the sizable left maxillary periapical lucency remodeling the floor of maxillary sinus.     Mild thyromegaly with several subcentimeter thyroid nodules.     Modest cervical spondylosis.        CTA:     The aortic arch maintains a normal branching pattern.  No significant atherosclerotic plaque or stenosis at the major branch vessel origins.     Right common carotid artery normal in caliber.  Mild calcified and noncalcified plaque at the carotid bifurcation without associated narrowing.  Mild plaque in the cervical internal carotid artery without associated narrowing.  Moderate atherosclerotic calcification with mild narrowing in the cavernous and supraclinoid segments.     The left common carotid artery is normal in caliber.  Probable long segment noncalcified plaque without significant narrowing.  No significant plaque or stenosis at the carotid bifurcation.  The left internal carotid artery normal caliber throughout its cervical and petrous segments but there is extensive atherosclerotic change in the cervical and proximal supraclinoid segments with at least moderate stenosis.  Possible severe stenosis at the paraclinoid segment.     The vertebral origins are patent.  Circumferential noncalcified plaque with mild focal narrowing in the proximal cervical segment.  Mild multifocal narrowing in the intracranial (V 4 segment).  Left vertebral artery normal in caliber throughout its cervical course with a mild to moderate stenosis intracranially after origin of the posteroinferior cerebellar artery.  Mild irregularity and narrowing throughout the basilar artery.     Proximal right MCA is patent.  There is a tapered stenosis in the distal M1 segment with moderate multifocal irregularity and narrowing distally.  Mild tapered stenosis of the distal M1  she did not like the way she felt with a short course of Lexapro earlier this year.  She declined any new prescription today, but agrees to notify me if her symptoms worsen over the fall or winter.    Scribed for Dr. Dillon by Sophie Méndez [facility].     Diagnoses and all orders for this visit:    Mixed hyperlipidemia  -     CBC Auto Differential; Future  -     Comprehensive Metabolic Panel; Future  -     LDL Cholesterol, Direct; Future    Essential hypertension  -     Comprehensive Metabolic Panel; Future    Chronic superficial gastritis without bleeding    Chronic bilateral low back pain without sciatica    Age-related osteoporosis without current pathological fracture  -     DEXA Bone Density Axial; Future  -     Vitamin D 25 Hydroxy; Future    Osteopenia of lumbar spine  -     DEXA Bone Density Axial; Future  -     Vitamin D 25 Hydroxy; Future    Iron deficiency anemia secondary to inadequate dietary iron intake  -     Iron Profile; Future  -     Ferritin; Future    Vitamin B12 deficiency (dietary) anemia  -     Vitamin B12; Future    Generalized anxiety disorder    Dysthymia    Sleep disorder    Encounter for screening mammogram for breast cancer  -     Mammo Screening Digital Tomosynthesis Bilateral With CAD; Future    Impaired fasting glucose  -     Hemoglobin A1c; Future    Thoracic aortic aneurysm without rupture (CMS/HCC)    Other orders  -     meclizine (ANTIVERT) 25 MG tablet; Take 1 tablet by mouth 4 (Four) Times a Day As Needed for dizziness.  -     Fluad Tri 65yr+        Lab on 10/04/2019   Component Date Value Ref Range Status   • WBC 10/04/2019 3.57  3.40 - 10.80 10*3/mm3 Final   • RBC 10/04/2019 4.12  3.77 - 5.28 10*6/mm3 Final   • Hemoglobin 10/04/2019 13.2  12.0 - 15.9 g/dL Final   • Hematocrit 10/04/2019 38.4  34.0 - 46.6 % Final   • MCV 10/04/2019 93.2  79.0 - 97.0 fL Final   • MCH 10/04/2019 32.0  26.6 - 33.0 pg Final   • MCHC 10/04/2019 34.4  31.5 - 35.7 g/dL Final   • RDW 10/04/2019 13.3   12.3 - 15.4 % Final   • RDW-SD 10/04/2019 43.7  37.0 - 54.0 fl Final   • MPV 10/04/2019 10.3  6.0 - 12.0 fL Final   • Platelets 10/04/2019 159  140 - 450 10*3/mm3 Final   • Neutrophil % 10/04/2019 41.8* 42.7 - 76.0 % Final   • Lymphocyte % 10/04/2019 44.8  19.6 - 45.3 % Final   • Monocyte % 10/04/2019 10.6  5.0 - 12.0 % Final   • Eosinophil % 10/04/2019 2.5  0.3 - 6.2 % Final   • Basophil % 10/04/2019 0.3  0.0 - 1.5 % Final   • Neutrophils, Absolute 10/04/2019 1.49* 1.70 - 7.00 10*3/mm3 Final   • Lymphocytes, Absolute 10/04/2019 1.60  0.70 - 3.10 10*3/mm3 Final   • Monocytes, Absolute 10/04/2019 0.38  0.10 - 0.90 10*3/mm3 Final   • Eosinophils, Absolute 10/04/2019 0.09  0.00 - 0.40 10*3/mm3 Final   • Basophils, Absolute 10/04/2019 0.01  0.00 - 0.20 10*3/mm3 Final   • Glucose 10/04/2019 104* 70 - 99 mg/dL Final   • BUN 10/04/2019 5* 7 - 23 mg/dL Final   • Creatinine 10/04/2019 1.01  0.52 - 1.04 mg/dL Final   • Sodium 10/04/2019 144  137 - 145 mmol/L Final   • Potassium 10/04/2019 4.1  3.4 - 5.0 mmol/L Final   • Chloride 10/04/2019 105  101 - 112 mmol/L Final   • CO2 10/04/2019 26.0  22.0 - 30.0 mmol/L Final   • Calcium 10/04/2019 9.8  8.4 - 10.2 mg/dL Final   • Total Protein 10/04/2019 7.9  6.3 - 8.6 g/dL Final   • Albumin 10/04/2019 4.70  3.50 - 5.00 g/dL Final   • ALT (SGPT) 10/04/2019 18  <=35 U/L Final   • AST (SGOT) 10/04/2019 33  14 - 36 U/L Final   • Alkaline Phosphatase 10/04/2019 73  38 - 126 U/L Final   • Total Bilirubin 10/04/2019 1.5* 0.2 - 1.3 mg/dL Final   • eGFR Non African Amer 10/04/2019 52  39 - 90 mL/min/1.73 Final   • Globulin 10/04/2019 3.2  2.3 - 3.5 gm/dL Final   • A/G Ratio 10/04/2019 1.5  1.1 - 1.8 g/dL Final   • BUN/Creatinine Ratio 10/04/2019 5.0* 7.0 - 25.0 Final   • Anion Gap 10/04/2019 13.0  5.0 - 15.0 mmol/L Final   • Ferritin 10/04/2019 334.00* 13.00 - 150.00 ng/mL Final   • Iron 10/04/2019 112  37 - 145 mcg/dL Final   • Iron Saturation 10/04/2019 36  20 - 50 % Final   • Transferrin  segment of the left MCA with moderate multifocal irregularity narrowing distally, particularly affecting the superior division.  Probable severe stenosis at the origin the anterior temporal artery.     Developmentally hypoplastic or absent A1 segment of left CHRYSTAL.  A1 segment the right ACAs within normal limits.  Mild multifocal irregularity narrowing throughout the distal ACAs.     There is multifocal severe stenosis involving the P2 and distal left PCA.  Mild to moderate narrowing of the proximal right PCA with severe stenosis or occlusion distally.     No evidence of intracranial aneurysm or vascular malformation.     The anterior, middle, and posterior cerebral arteries are within normal limits, without evidence of significant stenosis, focal occlusion, or intracranial aneurysm formation.     Impression:     Bilateral symmetric subcortical infarctions in the parieto-occipital subcortical white matter, grossly stable from recent MRI.  No associated mass effect or hemorrhage.  Distribution again raising the possibility of watershed infarct.     Moderate chronic ischemic change elsewhere as above.     No new infarct or hemorrhage.     Modest atherosclerotic change in the cervical vasculature but advanced intracranial atherosclerotic disease with multifocal moderate to severe stenoses as further detailed above.     Endodontal disease and additional incidental findings as above.     This report was flagged in Epic as abnormal.        Electronically signed by: Franky Otero MD  Date:                                            02/04/2022  Time:                                           08:22        EXAMINATION:  MRI BRAIN WITHOUT CONTRAST     CLINICAL HISTORY:  Dizziness, persistent/recurrent, cardiac or vascular cause suspected;Stroke suspected (Ped 0-18y);     TECHNIQUE:  Multiplanar multisequence MR imaging of the brain was performed without contrast.     COMPARISON:  MRI of the brain,  01/13/2022     FINDINGS:  Multifocal areas of restricted diffusion are again noted.  They appear to have increased in size and number in the watershed distribution of the posterior and middle cerebral artery territory in the temporoparietal junctions bilaterally.     The remaining brain parenchyma appears stable with involutional and chronic small vessel changes throughout the deep and subcortical white matter along with involutional changes manifested by prominence of the cortical sulcal markings, basilar cisterns and ventricular system.     There is no evidence of high signal intensity on T1 weighted sequences to suggest acute hemorrhage.  Scattered areas of hemosiderin is noted on SWI in the basal ganglia right greater than left.  Remote lacunar-type infarct in the left damion near the brachium pontis is noted.  Small lacunar infarcts in the cerebellum are again noted appearing chronic.     Impression:     Subtle increase in areas of restricted diffusion in the watershed distribution in both posterior right temporoparietal lobes suggesting extension of micro infarcts.     No evidence of major arterial infarct hemorrhage or mass.     Diffuse chronic small vessel and involutional changes with areas of hemosiderin in lacunar infarcts mainly in the basal ganglia and thalamus on the right.     This report was flagged in Epic as abnormal.        Electronically signed by: Jed Barbosa  Date:                                            02/02/2022  Time:                                           23:01        Pending Diagnostic Studies:     None         Medications:  Reconciled Home Medications:      Medication List      START taking these medications    aspirin 81 MG EC tablet  Commonly known as: ECOTRIN  Take 1 tablet (81 mg total) by mouth once daily.        CHANGE how you take these medications    metoprolol succinate 50 MG 24 hr tablet  Commonly known as: TOPROL-XL  Take 1 tablet (50 mg total) by mouth once  10/04/2019 207  200 - 360 mg/dL Final   • TIBC 10/04/2019 308  298 - 536 mcg/dL Final   • Total Cholesterol 10/04/2019 146* 150 - 200 mg/dL Final   • Triglycerides 10/04/2019 188* <=150 mg/dL Final   • HDL Cholesterol 10/04/2019 51  40 - 59 mg/dL Final   • LDL Cholesterol  10/04/2019 57  <=100 mg/dL Final   • VLDL Cholesterol 10/04/2019 37.6  mg/dL Final   • LDL/HDL Ratio 10/04/2019 1.13  0.00 - 3.22 Final   • Vitamin B-12 10/04/2019 643  211 - 946 pg/mL Final   • 25 Hydroxy, Vitamin D 10/04/2019 53.2  30.0 - 100.0 ng/ml Final   • TSH 10/04/2019 3.800  0.270 - 4.200 uIU/mL Final   ]     daily.  What changed:   · medication strength  · how much to take        CONTINUE taking these medications    acetaminophen 500 MG tablet  Commonly known as: TYLENOL  Take 500 mg by mouth every 6 (six) hours as needed for Pain.     albuterol 90 mcg/actuation inhaler  Commonly known as: PROVENTIL/VENTOLIN HFA  Inhale 2 puffs into the lungs every 6 (six) hours as needed for Wheezing. Rescue     atorvastatin 40 MG tablet  Commonly known as: LIPITOR  Take 1 tablet (40 mg total) by mouth every evening.     ELIQUIS 5 mg Tab  Generic drug: apixaban  Take 1 tablet (5 mg total) by mouth 2 (two) times daily.     furosemide 20 MG tablet  Commonly known as: LASIX  Take 1 tablet (20 mg total) by mouth once daily.     losartan 50 MG tablet  Commonly known as: COZAAR  Take 1 tablet (50 mg total) by mouth once daily.     mirtazapine 15 MG tablet  Commonly known as: REMERON  Take 1 tablet (15 mg total) by mouth every evening.     thiamine 100 MG tablet  Take 1 tablet (100 mg total) by mouth once daily.     VITAMIN C WITH JESSICA HIPS 500 MG tablet  Generic drug: ascorbic acid (vitamin C)  Take 1 tablet (500 mg total) by mouth 2 (two) times daily.        ASK your doctor about these medications    sars-cov-2 (covid-19) 30 mcg/0.3 ml injection  Commonly known as: Pfizer COVID-19  Inject 0.3 mLs into the muscle once. for 1 dose  Ask about: Should I take this medication?            Indwelling Lines/Drains at time of discharge:   Lines/Drains/Airways     None                 Time spent on the discharge of patient: 30 minutes       Dinesh Chavez DO  [facility], PGY-2  Department [facility]  [facility]

## 2022-02-08 NOTE — HOSPITAL COURSE
Patient was admitted for syncope and CVA workup. Neurology was consulted upon admission. MRI with subtle increase in areas of restricted diffusion in the watershed distribution in both the posterior right temporal parietal lobe suggestive extensive of micro infarcts.  Neurology with concern for ICA stenosis recommended CTA for further imaging. CTA head with bilateral symmetrical subcortical infarctions in the parietal occipital subcortical white matter, grossly stable from recent MRI. CTA also ruled out concern for ICA stenosis. Neurology recommended patient follow-up with an outpatient EEG and cardiology evaluation for atrial fibrillation. Patient's metoprolol was increased to 50 mg daily for better rate control. Patient was evaluated by PT/OT during hospitalization. PT/OT recommended SLP evaluation as an outpatient. Patient was provided with referrals to Cardiology, Neurology, and speech therapy for follow-up at discharge. Was started on low-dose aspirin at discharge. Patient was stable at discharge

## 2022-02-09 ENCOUNTER — CLINICAL SUPPORT (OUTPATIENT)
Dept: REHABILITATION | Facility: HOSPITAL | Age: 57
End: 2022-02-09
Payer: MEDICAID

## 2022-02-09 DIAGNOSIS — Z86.73 HISTORY OF CVA (CEREBROVASCULAR ACCIDENT): ICD-10-CM

## 2022-02-09 DIAGNOSIS — R41.841 COGNITIVE COMMUNICATION DEFICIT: ICD-10-CM

## 2022-02-09 PROCEDURE — 92523 SPEECH SOUND LANG COMPREHEN: CPT | Mod: PN | Performed by: SPEECH-LANGUAGE PATHOLOGIST

## 2022-02-09 NOTE — PROGRESS NOTES
Please see initial evaluation in plan of care.    NEO Crocker, CCC-SLP  Speech Language Pathologist   2/9/2022

## 2022-02-10 PROBLEM — R41.841 COGNITIVE COMMUNICATION DEFICIT: Status: ACTIVE | Noted: 2022-02-10

## 2022-02-10 NOTE — PLAN OF CARE
"[facility]  Speech Therapy Evaluation -Neurological Rehabilitation    Date: 2/9/2022     Name: Sarbjit Brewer Sr.   MRN: [id number]    Therapy Diagnosis:   Encounter Diagnoses   Name Primary?    History of CVA (cerebrovascular accident)     Cognitive communication deficit       Physician: Dinesh Chavez DO  Physician Orders: Z86.73 (ICD-10-CM) - History of CVA (cerebrovascular accident)  Medical Diagnosis: [id number] - AMB REFERRAL/CONSULT TO SPEECH THERAPY     Visit # / Visits Authorized:  1 / 1   Date of Evaluation:  2/9/2022   Insurance Authorization Period: 2/4/2022 to 12/31/2022  Plan of Care Certification:    2/9/2022 to 4/6/2022     Time In: 2:49 pm  Time Out: 3:44 pm   Procedure Min.   Speech Language Evaluation   55         Precautions: Decreased field of vision (left-sided)  Subjective   Date of Onset: Most recent stroke - January 2022  History of Current Condition: Patient required max encouragement to attempt to provide history - however poor initiation there fore pt's wife provided medical history information. Spouse reports multiple CVA's. Patient reports feelings of confusion. Spouse reported difficulty with auditory comprehension; "Sometimes he does not understand what I say". Of note, patient requires frequent repetitions in conversation. No word-finding difficulty reported. Spouse reported significant difficulties with short-term memory. Spouse aids in majority of ADLs (medication and food preparation). Patient presents with decreased field of vision due to suspected left-sided neglect. Patient is not interested in returning to work at this time.  Past Medical History: Sarbjit Brewer Sr.  has a past medical history of A-fib, CHF (congestive heart failure), Hypertension, and Insomnia.  Sarbjit Brewer Sr.  has a past surgical history that includes Kidney stone surgery.  Medical Hx and Allergies: Sarbjit has a current medication list which includes the following prescription(s): " "acetaminophen, albuterol, apixaban, ascorbic acid (vitamin c), aspirin, atorvastatin, furosemide, losartan, metoprolol succinate, mirtazapine, nicotine, nicotine (polacrilex), and thiamine. Review of patient's allergies indicates:  No Known Allergies  Prior Therapy: None.  Social History: Lives with spouse, brother, and sister-in-law.   Prior Level of Function: Patient worked as a . No difficulty with auditory comprehension or short-term memory.   Current Level of Function:  Significantly impaired cognitive-communication skills; poor initiation   Pain: 0/10  Pain Location / Description: None reported.  Nutrition: No coughing or choking reported. Spouse reported weight loss due to loss of appetite. Patient reported change of taste, "food tastes awful".   Patient's Therapy Goals:  Increasing comprehension skills and memory.   Objective   Formal Assessment:     Scales of Cognitive and Communicative Ability for Neurorehabilitation (SCCAN) was administered to assess cognitive and communicative functioning.        Raw Scores  Percentage Score  Oral Expression (OE)  3/19   15%   Orientation (OR)   5/12   42%  Memory (ME)    5/19   26%  Speech Comprehension (SP)  5/13   38%  Reading Comprehension (RD)  0/12   0%  Writing (WR)    0/7   0%  Attention (AT)    0/16   0%  Problem Solving (PS)   0/23   0%    Total Raw Score 17 %ile Rank <1 SCCAN Index 50 Degree of Severity: Severe Impairment    Description:  Cognitive-communication: Patient completed the SCCAN with results indicating severe cognitive-communication impairment in all subtest areas. Patient required maximum cues and encouragement. Patient displayed suspected left-sided neglect/inattention throughout assessment. Patient required redirecting throughout the assessment. Apparent confusion noted. Difficulty engaging patient in task. Patient with severely impaired initiation skills. Overall reduced awareness.       Treatment   Total Treatment Time " Separate from Evaluation: n/a   no treatment performed secondary to time to complete evaluation.    Education: Plan of Care, role of SLP in care, course of medical disease affect on therapy diagnosis  and scheduling/ cancellation policy were discussed with pt. Patient and family members expressed understanding.     Home Program: To be established next session.   Assessment   Sarbjit presents to [facility] status post medical diagnosis of History of CVA (cerebrovascular accident). Demonstrates impairments including limitations as described in the problem list. He presents with a severe cognitive-communication deficit characterized by severely impaired orientation, oral expression, memory, auditory comprehension, reading comprehension, written expression, attention, and problem solving. Left sided neglect present. Positive prognostic factors include spousal support. Negative prognostic factors include complex medical history, decrease in field of vision and poor insight into deficits. No barriers to therapy identified. Patient will benefit from skilled, outpatient neurological rehabilitation speech therapy.    Rehab Potential: fair  Pt's spiritual, cultural, and educational needs considered and patient agreeable to plan of care and goals.    Short Term Goals (4 weeks):   1. Given min cues. patient will sustain attention for 1 minute to complete structured activity to improve sustained attention.  2. Patient will recall and utilize 3/4 memory retrieval strategies over 3 sessions given in cues.  3. Patient will complete simple immediate memory tasks (auditory and/or visual) using memory retrieval strategies with 80% accuracy given min cues to improve short-term memory.  4. Patient will complete simple visual scanning tasks with 80% acc to improve visual scanning for reading.  5. The patient will complete 1 unit auditory comprehension tasks (I.e., answer 1 unit yes/no questions/ follow 1 unit commands)  with 80% accuracy given min cues to improve auditory comprehension.   6. Patient will answer personal orientation questions with 80% accuracy given minimum cues to improve orientation skills.    Long Term Goals (8 weeks):   1. He will be appropriately oriented to time, date, person, place, city with cues to improve safety and awareness in functional living environment.   2. He will improve attention skills to effectively attend to and communicate in simple daily living tasks in functional living environment.   3. He will use appropriate memory strategies to schedule and recall weekly activities, express needs and recall names to maintain safety and participate socially in functional living environment.   4. He will develop functional reading skills and utilize compensatory strategies to maintain safety during ADL's and read and understand simple adult material independently.  5. He will demonstrate appropriate visual scanning and awareness of objects and during reading activities to maintain safety and awareness in functional living environment.     Plan     Plan of Care Certification Period: 2/9/2022 to 4/6/2022    Recommended Treatment Plan:  Patient will participate in the [facility] rehabilitation program for speech therapy 2 times per week to address his Communication and Cognition deficits, to educate patient and their family, and to participate in a home exercise program.    Other Recommendations: Recommended neurophthalmology assessment to address decreased field of vision.     DARIAN Mccollum Speech-Language Pathology   2/9/2022     I, Doreen Shelton, certify that I was present in the room directing the student and service delivery and guiding them using my skilled judgement. As the co-signing therapist, I have reviewed the student's documentation, and am responsible for the treatment, assessment and plan.   NEO Crocker, CCC-SLP  Speech Language Pathologist   2/9/2022

## 2022-02-10 NOTE — PATIENT INSTRUCTIONS
"Memory Strategies:   · Write: make a list or utilize a calendar   · Repeat:  Repeat information out loud or internally   · Associate:  Connect desired information with something you already recall. For example, when in the grocery, group items by meal or taste (sweet vs salty) or by category (vegetables, cold items, etc).  · Picture: try to mentally picture what you are trying to remember  · Mental Rehearsal: think through how you're going to complete a task before completing it.     CIERRA Patel, SINAI Gong, & NANO Chavarria (2012). Cognitive rehabilitation manual: Translating evidence-based recommendations into practice. [place], [place]: [facility] [employer].  ·     Word Retrieval Strategies:   · Visualization: try to see the thing in your head. Concentrate on the details of the picture and sometimes the word will come  · Association:  Think of things that go with the word. For example, bread goes with butter, so if you are trying to think of the word "butter", you may think of the word "bread".  · Gesture: use the hand motion you would use with the thing you are thinking of. For example, if you are thinking of the word "wash", you might make a motion as if you are washing your hands.   · Description:  Describe the thing to the other person you are talking to. Even if the word does not come to you, the other person may be able to guess what you are trying to say.   · First Letter or Sound:  Try to think of the first letter of the word. Sometimes you can think of the first letter even if you cannot think of the word. The letter may "lead" you to the word. You might even try going down the alphabet to find the first letter.      WRAP Write, Repeat, Associate, Picture - group of strategies for recall   Mental Rehearsal For "thinking through" your plan for the next day   Lists Create a list each night for the next day   Sticky Notes Bright and with specific instructions   Alarms  For things you need to remember regularly " (i.e. Meals)   Mental Review How did I do with my strategies today?   Goal  Plan  Action  Review Goal - what is my goal?  Plan - how will I do it?  Action - try to complete goal  Review- how did it go?

## 2022-02-15 ENCOUNTER — TELEPHONE (OUTPATIENT)
Dept: PRIMARY CARE CLINIC | Facility: CLINIC | Age: 57
End: 2022-02-15
Payer: MEDICAID

## 2022-02-15 NOTE — PROGRESS NOTES
"[facility]  Speech Therapy Treatment Note- Neurological Rehabilitation  Date: 2/16/2022     Name: Sarbjit Brewer Sr.   MRN: [id number]   Therapy Diagnosis:   Encounter Diagnosis   Name Primary?    Cognitive communication deficit Yes      Physician: Dinesh Chavez DO  Physician Orders: Z86.73 (ICD-10-CM) - History of CVA (cerebrovascular accident)  Medical Diagnosis: [id number] - AMB REFERRAL/CONSULT TO SPEECH THERAPY    Visit #/ Visits Authorized: 1 / pending (plus eval)  Date of Evaluation:  2/9/2022   Insurance Authorization Period: 2/4/2022 to 12/31/2022  Plan of Care Expiration Date:    4/6/2022  Extended Plan of Care:  N/A   Progress Note: 3/9/2022   Visits Cancelled: 0  Visits No Show: 0    Time In:  *** am  Time Out:  11:00 am  Total Billable Time: *** min     Precautions: Decreased field of vision (left-sided)  Subjective:   Patient reports: ***   He was compliant to home exercise program.  [facility] established this session. ***  Response to previous treatment: ***   Pain Scale:  {0-10:16236::"0"}/10 on a Visual Analog Scale currently.   Pain Location: {right/left:33554}  Objective:   TIMED  Procedure Min.                 UNTIMED  Procedure Min.   Speech- Language- Voice Therapy ***  ***   {CPT ST Treat:72459}  ***   Total Timed Units: 0  Total Untimed Units: ***  Charges Billed/Number of units: ***      Short Term Goals: (4 weeks) Current Progress:   1. Given min cues. patient will sustain attention for 1 minute to complete structured activity to improve sustained attention.    Progressing/ Not Met 2/16/2022   ***    2. Patient will recall and utilize 3/4 memory retrieval strategies over 3 sessions given in cues.    Progressing/ Not Met 2/16/2022   ***    3. Patient will complete simple immediate memory tasks (auditory and/or visual) using memory retrieval strategies with 80% accuracy given min cues to improve short-term memory.     Progressing/ Not Met 2/16/2022   ***    4. Patient will " complete simple visual scanning tasks with 80% acc to improve visual scanning for reading.    Progressing/ Not Met 2/16/2022   ***    5. The patient will complete 1 unit auditory comprehension tasks (I.e., answer 1 unit yes/no questions/ follow 1 unit commands) with 80% accuracy given min cues to improve auditory comprehension.     Progressing/ Not Met 2/16/2022   ***    6. Patient will answer personal orientation questions with 80% accuracy given minimum cues to improve orientation skills.    Progressing/ Not Met 2/16/2022   ***        Patient Education/Response:   ***    Home program established: {HEP:72183}  Exercises were reviewed and Sarbjit was able to demonstrate them prior to the end of the session.  Sarbjit demonstrated {Desc; good/fair/poor:33961} understanding of the education provided.     See Electronic Medical Record under Patient Instructions for exercises provided throughout therapy.  Assessment:   Sarbjit is progressing well towards his goals. *** Current goals remain appropriate. Goals to be updated as necessary.     Patient prognosis is {REHAB PROGNOSIS OHS:80533}. Patient will continue to benefit from skilled outpatient speech and language therapy to address the deficits listed in the problem list on initial evaluation, provide patient/family education and to maximize patient's level of independence in the home and community environment.   Medical necessity is demonstrated by the following IMPAIRMENTS:  Deficits in executive functioning, attention, and memory prevent the pt from relaying medically and safety relevant information in a timely manner in a state of emergency.    Barriers to Therapy: ***  Patient's spiritual, cultural and educational needs considered and patient agreeable to plan of care and goals.  Plan:   Continue Plan of Care with focus on ***    RIGO Mccollum.  Alf Speech-Language Pathology   2/16/2022

## 2022-02-16 ENCOUNTER — TELEPHONE (OUTPATIENT)
Dept: REHABILITATION | Facility: HOSPITAL | Age: 57
End: 2022-02-16
Payer: MEDICAID

## 2022-02-16 ENCOUNTER — CLINICAL SUPPORT (OUTPATIENT)
Dept: REHABILITATION | Facility: HOSPITAL | Age: 57
End: 2022-02-16
Payer: MEDICAID

## 2022-02-16 DIAGNOSIS — R41.841 COGNITIVE COMMUNICATION DEFICIT: Primary | ICD-10-CM

## 2022-02-16 PROCEDURE — 92507 TX SP LANG VOICE COMM INDIV: CPT | Mod: PN | Performed by: SPEECH-LANGUAGE PATHOLOGIST

## 2022-02-16 NOTE — PROGRESS NOTES
Please call patient re no blood flow problems to heart muscle noted on stress test. Additionally, the pumping function of the heart appears improved on this study.

## 2022-02-16 NOTE — PATIENT INSTRUCTIONS
"Memory Strategies:   · Write: make a list or utilize a calendar   · Repeat:  Repeat information out loud or internally   · Associate:  Connect desired information with something you already recall. For example, when in the grocery, group items by meal or taste (sweet vs salty) or by category (vegetables, cold items, etc).  · Picture: try to mentally picture what you are trying to remember  · Mental Rehearsal: think through how you're going to complete a task before completing it.     CIERRA Patel., SINAI Gong, & NANO Chavarria (2012). Cognitive rehabilitation manual: Translating evidence-based recommendations into practice. [place], [place]: [employer].  ·     WRAP Write, Repeat, Associate, Picture - group of strategies for recall   Mental Rehearsal For "thinking through" your plan for the next day   Lists Create a list each night for the next day   Sticky Notes Bright and with specific instructions   Alarms  For things you need to remember regularly (i.e. Meals)   Mental Review How did I do with my strategies today?   Goal  Plan  Action  Review Goal - what is my goal?  Plan - how will I do it?  Action - try to complete goal  Review- how did it go?       "

## 2022-02-16 NOTE — PROGRESS NOTES
[facility]  Speech Therapy Treatment Note- Neurological Rehabilitation  Date: 2/16/2022     Name: Sarbjit Brewer Sr.   MRN: [id number]   Therapy Diagnosis:   Encounter Diagnosis   Name Primary?    Cognitive communication deficit Yes   Physician: Dinesh Chavez DO  Physician Orders: EST SPEECH THERAPY - 45 MINS  Medical Diagnosis: Z86.73 (ICD-10-CM) - Personal history of transient ischemic attack (TIA), and cerebral infarction without residual deficits    Visit #/ Visits Authorized: 1/16 (plus eval)  Date of Evaluation:  2/9/2022   Insurance Authorization Period: 02/16/22-04/16/22  Plan of Care Expiration Date:    2/9/2022 to 4/6/2022  Extended Plan of Care:  n/a   Progress Note: due 3/9/22   Visits Cancelled: 0  Visits No Show: 0    Time In:  4:19 pm   Time Out:  5:05 pm   Total Billable Time: 46 min      Precautions: Standard  Subjective:   Patient reports: Patient's wife present and reports that she feels he is improving.  Subjectively, patient appears to have significantly improved since initial evaluation. He feels like he is getting better but has not reached baseline yet.   He was not compliant to home exercise program 2/2 not yet established  Response to previous treatment: positive    Pain Scale:  0/10 on a Visual Analog Scale currently.   Pain Location: n/a  Objective:   UNTIMED  Procedure Min.   Speech- Language- Voice-Cognitive Therapy  46   Total Timed Units: 0  Total Untimed Units: 1  Charges Billed/Number of units: 1    Short Term Goals: (4 weeks) Current Progress:   1. Given min cues. patient will sustain attention for 1 minute to complete structured activity to improve sustained attention.     Patient sustained attention for entirety of session with no redirection  MET Goal Met / Discontinue     Probe: patient completed selective attention tasks with 73% acc independently NEW GOAL: Patient will complete selective attention tasks with 90% accuracy given min cues to improve  attention skills   2. Patient will recall and utilize 3/4 memory retrieval strategies over 3 sessions given in cues.    Progressing/ Not Met 2/16/2022   Discussed the following memory strategies at length and provided an example of functional implementation of each:  · Write: make a list or utilize a calendar   · Repeat:  Repeat information out loud or internally   · Associate:  Connect desired information with something you already recall. For example, when in the grocery, group items by meal or taste (sweet vs salty) or by category (vegetables, cold items, etc).  · Picture: try to mentally picture what you are trying to remember  · Mental Rehearsal: think through how you're going to complete a task before completing it.     CIERRA Patel., SINAI Gong, & NANO Chavarria (2012). Cognitive rehabilitation manual: Translating evidence-based recommendations into practice. [place], [place]: [facility].   3. Patient will complete simple immediate memory tasks (auditory and/or visual) using memory retrieval strategies with 80% accuracy given min cues to improve short-term memory.    Progressing/ Not Met 2/16/2022   Immediate memory tasks (visual) completed with 70% acc independently; 100% acc given min cues   4. Patient will complete simple visual scanning tasks with 80% acc to improve visual scanning for reading.    Progressing/ Not Met 2/16/2022   Not formally addressed however significant improvement in functional scanning   5. The patient will complete 1 unit auditory comprehension tasks (I.e., answer 1 unit yes/no questions/ follow 1 unit commands) with 80% accuracy given min cues to improve auditory comprehension.      1 unit yes/no: 100% acc independently    2 unit yes/no: 90% acc independently    3 unit yes/no: 100% acc independently    Probe: patient answered questions following a story with 100% accuracy independently    Goal Met / Discontinue     6. Patient will answer personal orientation questions with 80%  accuracy given minimum cues to improve orientation skills.        Progressing/ Not Met 2/16/2022   64% accuracy answering personal orientation questions. Significantly improved since evaluation    NEW GOAL 2/16/2022:   7. Patient will complete selective attention tasks with 90% accuracy given min cues to improve attention skills New goal         Patient Education/Response:   Extensive education provided re: typical stroke recovery trajectory, spontaneous recovery, neuroplasticity, memory strategies, and progress since initial evaluation. Patient and Elise verbalized understanding of all discussed.     Home program established: yes-review memory strategies  Exercises were reviewed and Sarbjit was able to demonstrate them prior to the end of the session.  Sarbjit demonstrated good  understanding of the education provided.     See Electronic Medical Record under Patient Instructions for exercises provided throughout therapy.  Assessment:   Sarbjit is progressing well towards his goals. Significant improvement in all areas since initial evaluation. Improvement to insight noted. Able to complete auditory comprehension tasks without difficulty - meeting stg 5. Able to sustain attention for full session with no redirection cues - meeting stg 1. Probed for selective attention which revealed mild difficulty - added as short term goal above. Current goals remain appropriate. Goals to be updated as necessary.     Patient prognosis is Good. Patient will continue to benefit from skilled outpatient speech and language therapy to address the deficits listed in the problem list on initial evaluation, provide patient/family education and to maximize patient's level of independence in the home and community environment.   Medical necessity is demonstrated by the following IMPAIRMENTS:  Reduced cognitive-linguistic and speech skills negatively impact patient's ability to act efficiently and independently in emergent situations.  Barriers  to Therapy: none  Patient's spiritual, cultural and educational needs considered and patient agreeable to plan of care and goals.  Plan:   Continue Plan of Care with focus on improving cognitive-communication skills.     Other Recommendations: F/u with audiology; hearing assessment    NEO Crocker, CCC-SLP  Speech Language Pathologist   2/16/2022

## 2022-02-16 NOTE — TELEPHONE ENCOUNTER
Spoke with patient's wife to discuss visit - they had the time wrong for ST appointment and would like to r/s to today at 4:15. No further questions. Clinician rescheduled appointment appropriately.     NEO Crocker, CCC-SLP  Speech Language Pathologist   2/16/2022

## 2022-02-17 ENCOUNTER — OFFICE VISIT (OUTPATIENT)
Dept: PRIMARY CARE CLINIC | Facility: CLINIC | Age: 57
End: 2022-02-17
Payer: MEDICAID

## 2022-02-17 ENCOUNTER — OFFICE VISIT (OUTPATIENT)
Dept: CARDIOLOGY | Facility: CLINIC | Age: 57
End: 2022-02-17
Payer: MEDICAID

## 2022-02-17 VITALS
WEIGHT: 176 LBS | BODY MASS INDEX: 24.55 KG/M2 | DIASTOLIC BLOOD PRESSURE: 66 MMHG | SYSTOLIC BLOOD PRESSURE: 101 MMHG | HEART RATE: 78 BPM

## 2022-02-17 VITALS
HEART RATE: 78 BPM | HEIGHT: 71 IN | WEIGHT: 178.81 LBS | BODY MASS INDEX: 25.03 KG/M2 | SYSTOLIC BLOOD PRESSURE: 108 MMHG | TEMPERATURE: 98 F | OXYGEN SATURATION: 98 % | DIASTOLIC BLOOD PRESSURE: 70 MMHG

## 2022-02-17 DIAGNOSIS — I50.42 CHRONIC COMBINED SYSTOLIC (CONGESTIVE) AND DIASTOLIC (CONGESTIVE) HEART FAILURE: ICD-10-CM

## 2022-02-17 DIAGNOSIS — E78.5 HYPERLIPIDEMIA, UNSPECIFIED HYPERLIPIDEMIA TYPE: ICD-10-CM

## 2022-02-17 DIAGNOSIS — E11.65 TYPE 2 DIABETES MELLITUS WITH HYPERGLYCEMIA, WITHOUT LONG-TERM CURRENT USE OF INSULIN: ICD-10-CM

## 2022-02-17 DIAGNOSIS — I63.89 ACUTE BILAT WATERSHED INFARCTION: Primary | ICD-10-CM

## 2022-02-17 DIAGNOSIS — Z72.0 TOBACCO ABUSE: ICD-10-CM

## 2022-02-17 DIAGNOSIS — I48.0 PAROXYSMAL ATRIAL FIBRILLATION: ICD-10-CM

## 2022-02-17 DIAGNOSIS — I50.20 HFREF (HEART FAILURE WITH REDUCED EJECTION FRACTION): ICD-10-CM

## 2022-02-17 DIAGNOSIS — Z79.01 ON CONTINUOUS ORAL ANTICOAGULATION: ICD-10-CM

## 2022-02-17 DIAGNOSIS — I10 HYPERTENSION, UNSPECIFIED TYPE: ICD-10-CM

## 2022-02-17 DIAGNOSIS — I48.0 PAROXYSMAL ATRIAL FIBRILLATION: Primary | ICD-10-CM

## 2022-02-17 PROBLEM — U07.1 COVID-19 VIRUS INFECTION: Status: RESOLVED | Noted: 2022-01-12 | Resolved: 2022-02-17

## 2022-02-17 PROBLEM — G93.40 ACUTE ENCEPHALOPATHY: Status: RESOLVED | Noted: 2022-01-12 | Resolved: 2022-02-17

## 2022-02-17 PROBLEM — R42 DIZZINESS: Status: RESOLVED | Noted: 2022-02-02 | Resolved: 2022-02-17

## 2022-02-17 PROBLEM — N17.9 AKI (ACUTE KIDNEY INJURY): Status: RESOLVED | Noted: 2022-01-13 | Resolved: 2022-02-17

## 2022-02-17 PROBLEM — R79.89 ELEVATED TROPONIN: Status: RESOLVED | Noted: 2022-02-02 | Resolved: 2022-02-17

## 2022-02-17 PROBLEM — I16.0 HYPERTENSIVE URGENCY: Status: RESOLVED | Noted: 2021-12-30 | Resolved: 2022-02-17

## 2022-02-17 PROCEDURE — 3044F PR MOST RECENT HEMOGLOBIN A1C LEVEL <7.0%: ICD-10-PCS | Mod: CPTII,,, | Performed by: INTERNAL MEDICINE

## 2022-02-17 PROCEDURE — 99999 PR PBB SHADOW E&M-EST. PATIENT-LVL III: CPT | Mod: PBBFAC,,, | Performed by: INTERNAL MEDICINE

## 2022-02-17 PROCEDURE — 99215 PR OFFICE/OUTPT VISIT, EST, LEVL V, 40-54 MIN: ICD-10-PCS | Mod: S$PBB,,, | Performed by: INTERNAL MEDICINE

## 2022-02-17 PROCEDURE — 3008F BODY MASS INDEX DOCD: CPT | Mod: CPTII,,, | Performed by: INTERNAL MEDICINE

## 2022-02-17 PROCEDURE — 3078F PR MOST RECENT DIASTOLIC BLOOD PRESSURE < 80 MM HG: ICD-10-PCS | Mod: CPTII,,, | Performed by: INTERNAL MEDICINE

## 2022-02-17 PROCEDURE — 3074F PR MOST RECENT SYSTOLIC BLOOD PRESSURE < 130 MM HG: ICD-10-PCS | Mod: CPTII,,, | Performed by: INTERNAL MEDICINE

## 2022-02-17 PROCEDURE — 93010 ELECTROCARDIOGRAM REPORT: CPT | Mod: S$PBB,,, | Performed by: INTERNAL MEDICINE

## 2022-02-17 PROCEDURE — 93010 EKG 12-LEAD: ICD-10-PCS | Mod: S$PBB,,, | Performed by: INTERNAL MEDICINE

## 2022-02-17 PROCEDURE — 3008F PR BODY MASS INDEX (BMI) DOCUMENTED: ICD-10-PCS | Mod: CPTII,,, | Performed by: INTERNAL MEDICINE

## 2022-02-17 PROCEDURE — 1159F PR MEDICATION LIST DOCUMENTED IN MEDICAL RECORD: ICD-10-PCS | Mod: CPTII,,, | Performed by: INTERNAL MEDICINE

## 2022-02-17 PROCEDURE — 4010F ACE/ARB THERAPY RXD/TAKEN: CPT | Mod: CPTII,,, | Performed by: INTERNAL MEDICINE

## 2022-02-17 PROCEDURE — 99999 PR PBB SHADOW E&M-EST. PATIENT-LVL V: ICD-10-PCS | Mod: PBBFAC,,, | Performed by: INTERNAL MEDICINE

## 2022-02-17 PROCEDURE — 3074F SYST BP LT 130 MM HG: CPT | Mod: CPTII,,, | Performed by: INTERNAL MEDICINE

## 2022-02-17 PROCEDURE — 3078F DIAST BP <80 MM HG: CPT | Mod: CPTII,,, | Performed by: INTERNAL MEDICINE

## 2022-02-17 PROCEDURE — 99215 OFFICE O/P EST HI 40 MIN: CPT | Mod: S$PBB,,, | Performed by: INTERNAL MEDICINE

## 2022-02-17 PROCEDURE — 3044F HG A1C LEVEL LT 7.0%: CPT | Mod: CPTII,,, | Performed by: INTERNAL MEDICINE

## 2022-02-17 PROCEDURE — 4010F PR ACE/ARB THEARPY RXD/TAKEN: ICD-10-PCS | Mod: CPTII,,, | Performed by: INTERNAL MEDICINE

## 2022-02-17 PROCEDURE — 1160F PR REVIEW ALL MEDS BY PRESCRIBER/CLIN PHARMACIST DOCUMENTED: ICD-10-PCS | Mod: CPTII,,, | Performed by: INTERNAL MEDICINE

## 2022-02-17 PROCEDURE — 99999 PR PBB SHADOW E&M-EST. PATIENT-LVL III: ICD-10-PCS | Mod: PBBFAC,,, | Performed by: INTERNAL MEDICINE

## 2022-02-17 PROCEDURE — 99215 PR OFFICE/OUTPT VISIT, EST, LEVL V, 40-54 MIN: ICD-10-PCS | Mod: S$PBB,25,, | Performed by: INTERNAL MEDICINE

## 2022-02-17 PROCEDURE — 93005 ELECTROCARDIOGRAM TRACING: CPT | Mod: PBBFAC,PO | Performed by: INTERNAL MEDICINE

## 2022-02-17 PROCEDURE — 99999 PR PBB SHADOW E&M-EST. PATIENT-LVL V: CPT | Mod: PBBFAC,,, | Performed by: INTERNAL MEDICINE

## 2022-02-17 PROCEDURE — 1160F RVW MEDS BY RX/DR IN RCRD: CPT | Mod: CPTII,,, | Performed by: INTERNAL MEDICINE

## 2022-02-17 PROCEDURE — 99215 OFFICE O/P EST HI 40 MIN: CPT | Mod: S$PBB,25,, | Performed by: INTERNAL MEDICINE

## 2022-02-17 PROCEDURE — 99215 OFFICE O/P EST HI 40 MIN: CPT | Mod: PBBFAC,PO | Performed by: INTERNAL MEDICINE

## 2022-02-17 PROCEDURE — 99213 OFFICE O/P EST LOW 20 MIN: CPT | Mod: PBBFAC,27,PO | Performed by: INTERNAL MEDICINE

## 2022-02-17 PROCEDURE — 1159F MED LIST DOCD IN RCRD: CPT | Mod: CPTII,,, | Performed by: INTERNAL MEDICINE

## 2022-02-17 RX ORDER — ASPIRIN 81 MG/1
81 TABLET ORAL DAILY
Qty: 90 TABLET | Refills: 3 | Status: SHIPPED | OUTPATIENT
Start: 2022-02-17 | End: 2022-10-28

## 2022-02-17 NOTE — PROGRESS NOTES
Cardiology Clinic note    Subjective:   Patient ID:  Sarbjit Brewer Sr. is a 56 y.o. male who presents for HFrEF FUP    HPI:   HFrEF: Denies orthopnea, PND, LE swelling. Weight 178 lbs - stable. Following salt restriction    Afib: No palpitations, irregular heart beats, syncope or near syncope    HTN: On medications    HLD: Tolerating statin    SH Tobacco active; cessation referral deferred  FH No premature CAD    Patient Active Problem List    Diagnosis Date Noted    Cognitive communication deficit 02/10/2022    Acute bilat watershed infarction     History of CVA (cerebrovascular accident) 02/02/2022    Anxiety 01/14/2022    Insomnia 01/14/2022    Adjustment disorder with mixed disturbance of emotions and conduct 01/14/2022    Chronic combined systolic (congestive) and diastolic (congestive) heart failure 01/05/2022    On continuous oral anticoagulation 01/05/2022    Atrial fibrillation 12/31/2021    Alcoholism /alcohol abuse 02/03/2020 2021 IMO Regulatory Import      Tobacco abuse 02/03/2020    H. pylori infection 08/17/2014    Calcification of tendon 08/17/2014    Back pain 07/31/2014    Essential hypertension 07/31/2014       Patient's Medications   New Prescriptions    No medications on file   Previous Medications    ALBUTEROL (PROVENTIL/VENTOLIN HFA) 90 MCG/ACTUATION INHALER    Inhale 2 puffs into the lungs every 6 (six) hours as needed for Wheezing. Rescue    APIXABAN (ELIQUIS) 5 MG TAB    Take 1 tablet (5 mg total) by mouth 2 (two) times daily.    ASCORBIC ACID, VITAMIN C, (VITAMIN C) 500 MG TABLET    Take 1 tablet (500 mg total) by mouth 2 (two) times daily.    ASPIRIN (ECOTRIN) 81 MG EC TABLET    Take 1 tablet (81 mg total) by mouth once daily.    ATORVASTATIN (LIPITOR) 40 MG TABLET    Take 1 tablet (40 mg total) by mouth every evening.    FUROSEMIDE (LASIX) 20 MG TABLET    Take 1 tablet (20 mg total) by mouth once daily.    LOSARTAN (COZAAR) 50 MG TABLET    Take 1 tablet (50 mg total)  by mouth once daily.    METOPROLOL SUCCINATE (TOPROL-XL) 50 MG 24 HR TABLET    Take 1 tablet (50 mg total) by mouth once daily.    MIRTAZAPINE (REMERON) 15 MG TABLET    Take 1 tablet (15 mg total) by mouth every evening.    NICOTINE (NICODERM CQ) 21 MG/24 HR    Place 1 patch onto the skin once daily.    NICOTINE, POLACRILEX, (NICORETTE) 2 MG GUM    Take 1 each (2 mg total) by mouth as needed.   Modified Medications    No medications on file   Discontinued Medications    No medications on file        Review of Systems   Constitutional: Negative for fever.   HENT: Negative for nosebleeds.    Cardiovascular: Negative for chest pain, dyspnea on exertion, irregular heartbeat, leg swelling, near-syncope, orthopnea, palpitations, paroxysmal nocturnal dyspnea and syncope.        As above   Respiratory: Negative for hemoptysis.    Hematologic/Lymphatic: Negative for bleeding problem.   Musculoskeletal: Negative for arthritis.   Gastrointestinal: Negative for hematochezia.   Genitourinary: Negative for hematuria.   Neurological: Negative for seizures.   Allergic/Immunologic: Negative for environmental allergies.         Objective:   Vitals  Vitals:    02/17/22 1001   BP: 101/66   Pulse: 78   Weight: 79.8 kg (176 lb)          Physical Exam  Constitutional:       General: He is not in acute distress.     Appearance: He is well-developed. He is not diaphoretic.   HENT:      Head: Normocephalic.   Neck:      Vascular: No JVD.   Cardiovascular:      Rate and Rhythm: Normal rate and regular rhythm.      Heart sounds: No murmur heard.  No friction rub. No gallop.    Pulmonary:      Effort: Pulmonary effort is normal. No respiratory distress.      Breath sounds: Normal breath sounds.   Abdominal:      Palpations: Abdomen is soft.      Tenderness: There is no abdominal tenderness.   Musculoskeletal:         General: No swelling.      Cervical back: Normal range of motion.   Skin:     General: Skin is warm.   Neurological:      Mental  "Status: He is alert.   Psychiatric:         Mood and Affect: Mood normal.             Assessment:     1. Paroxysmal atrial fibrillation    2. HFrEF (heart failure with reduced ejection fraction)    3. Hypertension, unspecified type    4. Hyperlipidemia, unspecified hyperlipidemia type        Plan:   Sarbjit Brewer Sr. is a 56 y.o. male h/o HFrEF, Afib, HTN, HLD    Accompanied by wife Elise Melton Dec -Jan 2022 for ADHF (new onset) in the setting of running out of his medications for HTN for 2 months (?amlodipine, chlorthalidone)    - EKG personally reviewed. My interpretation:  2/17/22: SR 70s, normal axis. LVH with repol abn. Possible septal infarct. QTc 483    Atrial Fibrillation, Paroxysmal  - CHADS2-VASc 2 (CHF, HTN)  - HASBLED 0  - Apixaban 5 mg bid (Age < 80 y, Wt > 60 kg, Cr <1.5)  Estimated body mass index is 24.55 kg/m² as calculated from the following:    Height as of an earlier encounter on 2/17/22: 5' 11" (1.803 m).    Weight as of this encounter: 79.8 kg (176 lb).  56 y.o.  Lab Results   Component Value Date    CREATININE 1.4 02/04/2022    AST 26 02/04/2022    BILITOT 0.5 02/04/2022     HFrEF  - NYHA II  - Echo Dec 2021: LVEF 40%, GHK, DD. Normal RVSF. Mild LAE. SoV mildly dilated. Mod AI, mod MR, mild-mod AL. PASP 30, CVP 3  - Exercise nuclear stress negative for ischemia - however was in AFib. LVEF appeared improved. If low on repeat echo, will do regadenoson nuclear stress test  - Losartan, metoprolol succinate  Lab Results   Component Value Date    CREATININE 1.4 02/04/2022    K 4.8 02/04/2022   - Furosemide  - Daily weights  - First thing in the morning: Pee, take off clothes, step on the scale.  - Write down the date, and the weight. Maintain this chart everyday  - If weight increases by > 3 lbs in a day, or > 5 lbs in a week, take an extra pill of furosemide. Call the office.  - If worsening symptoms, go to the ED  - Salt restriction    HTN  Losartan, metoprolol succinate    HLD  Lab Results "   Component Value Date    LDLCALC 66.2 02/02/2022   Statin as above      Continue with current medical plan and lifestyle changes.    Orders Placed This Encounter   Procedures    EKG 12-lead     Order Specific Question:   Diagnosis     Answer:   A-fib [[id number]]    Echo     Standing Status:   Future     Standing Expiration Date:   2/17/2023     Order Specific Question:   Release to patient     Answer:   Immediate       Follow up as scheduled  Return sooner for concerns or questions. If symptoms persist go to the ED    He expressed verbal understanding and agreed with the plan      Gloria Jaramillo MD  Interventional Cardiology  [facility]  Phone: [phone]

## 2022-02-17 NOTE — PROGRESS NOTES
[facility]   New Visit Progress Note   Recent Hospital Discharge     PRESENTING HISTORY     Chief Complaint/Reason for Admission:  Follow up Hospital Discharge   PCP: Primary Doctor No    History of Present Illness:  Mr. Sarbjit Brewer Sr. is a 56 y.o. male who was recently admitted to the hospital.    [facility] Medicine  Discharge Summary        Patient Name: Sarbjit Brewer Sr.  MRN: [id number]  Patient Class: OP- Observation  Admission Date: 2/2/2022  Hospital Length of Stay: 0 days  Discharge Date and Time:  02/08/2022 2:07 PM  Attending Physician: Magaly att. providers found   Discharging Provider: iDnesh Chavez DO  Primary Care Provider: Primary Doctor No  ___________________________________________________________________    Today:  [facility] for [facility] follow up.  Recently hospitalized for acute watershed CVA.  Presented following witnessed syncopal episode with reported dizziness/hand numbness/rhythmic kicking of his legs.   In ED patient was found to be altered, slow to respond to questions.  EKG with A fib (chronic, on long term anticoagulation with Eliquis).  U tox NEG .  Serum EtOH < 10.   Patient admitted to [facility].  Seen in consultation with Neurology and therapy services.  Aspirin added to his regimen.  Outpatient EEG recommended.  Outpatient speech therapy recommended.  Patient discharged to home.    Accompanied today by his wife.  Patient reports compliance with all medications, except Aspirin (not taking due to misunderstanding instructions).  Attending outpatient speech therapy and reports good response.  Prior expressive aphasia greatly improved.  Previously smoked 1-2 ppd, now down to ~ 3 cigarettes per day.  Wearing nicotine patches.    Review of Systems  General ROS: negative for chills, fever or weight loss  Psychological ROS: negative for hallucination, depression or suicidal ideation  Ophthalmic ROS: negative for blurry  vision, photophobia or eye pain  ENT ROS: negative for epistaxis, sore throat or rhinorrhea  Respiratory ROS: no cough, shortness of breath, or wheezing  Cardiovascular ROS: no chest pain or dyspnea on exertion  Gastrointestinal ROS: no abdominal pain, change in bowel habits, or black/ bloody stools  Genito-Urinary ROS: no dysuria, trouble voiding, or hematuria  Musculoskeletal ROS: negative for gait disturbance or muscular weakness  Neurological ROS: no syncope or seizures; no ataxia  Dermatological ROS: negative for pruritis, rash and jaundice      PAST HISTORY:     Past Medical History:   Diagnosis Date    A-fib     CHF (congestive heart failure)     Hypertension     Insomnia        Past Surgical History:   Procedure Laterality Date    KIDNEY STONE SURGERY         History reviewed. No pertinent family history.      MEDICATIONS & ALLERGIES:     Current Outpatient Medications on File Prior to Visit   Medication Sig Dispense Refill    albuterol (PROVENTIL/VENTOLIN HFA) 90 mcg/actuation inhaler Inhale 2 puffs into the lungs every 6 (six) hours as needed for Wheezing. Rescue 18 g 0    apixaban (ELIQUIS) 5 mg Tab Take 1 tablet (5 mg total) by mouth 2 (two) times daily. 180 tablet 3    ascorbic acid, vitamin C, (VITAMIN C) 500 MG tablet Take 1 tablet (500 mg total) by mouth 2 (two) times daily. 30 tablet 0    atorvastatin (LIPITOR) 40 MG tablet Take 1 tablet (40 mg total) by mouth every evening. 90 tablet 3    furosemide (LASIX) 20 MG tablet Take 1 tablet (20 mg total) by mouth once daily. 90 tablet 4    losartan (COZAAR) 50 MG tablet Take 1 tablet (50 mg total) by mouth once daily. 90 tablet 3    metoprolol succinate (TOPROL-XL) 50 MG 24 hr tablet Take 1 tablet (50 mg total) by mouth once daily. 90 tablet 3    mirtazapine (REMERON) 15 MG tablet Take 1 tablet (15 mg total) by mouth every evening. 30 tablet 11    nicotine (NICODERM CQ) 21 mg/24 hr Place 1 patch onto the skin once daily. 28 patch 2     "acetaminophen (TYLENOL) 500 MG tablet Take 500 mg by mouth every 6 (six) hours as needed for Pain.      aspirin (ECOTRIN) 81 MG EC tablet Take 1 tablet (81 mg total) by mouth once daily. (Patient not taking: Reported on 2/17/2022) 30 tablet 11    nicotine, polacrilex, (NICORETTE) 2 mg Gum Take 1 each (2 mg total) by mouth as needed. (Patient not taking: Reported on 2/17/2022) 100 each 0    thiamine 100 MG tablet Take 1 tablet (100 mg total) by mouth once daily. (Patient not taking: Reported on 2/17/2022) 30 tablet 4     No current facility-administered medications on file prior to visit.        Review of patient's allergies indicates:  No Known Allergies    OBJECTIVE:     Vital Signs:  /70   Pulse 78   Temp 97.6 °F (36.4 °C)   Ht 5' 11" (1.803 m)   Wt 81.1 kg (178 lb 12.7 oz)   SpO2 98%   BMI 24.94 kg/m²   Wt Readings from Last 3 Encounters:   02/17/22 0842 81.1 kg (178 lb 12.7 oz)   02/02/22 2259 79.9 kg (176 lb 2.4 oz)   02/02/22 1336 83.9 kg (185 lb)   01/20/22 1540 82.7 kg (182 lb 5.1 oz)     Body mass index is 24.94 kg/m².   98%    Physical Exam:  /70   Pulse 78   Temp 97.6 °F (36.4 °C)   Ht 5' 11" (1.803 m)   Wt 81.1 kg (178 lb 12.7 oz)   SpO2 98%   BMI 24.94 kg/m²   General appearance: alert, cooperative, no distress  Constitutional:Oriented to person, place, and time  + appears well-developed and well-nourished.   HEENT: Normocephalic, atraumatic, neck symmetrical, no nasal discharge   Eyes: conjunctivae/corneas clear, PERRL, EOM's intact  Lungs: clear to auscultation bilaterally, no dullness to percussion bilaterally  Heart: regular rate and rhythm without rub; no displacement of the PMI   Abdomen: soft, non-tender; bowel sounds normoactive; no organomegaly  Extremities: extremities symmetric; no clubbing, cyanosis, or edema  Integument: Skin color, texture, turgor normal; no rashes; hair distrubution normal  Neurologic: Alert and oriented X 3, normal strength, normal coordination " and gait  Psychiatric: no pressured speech; normal affect; no evidence of impaired cognition     Laboratory  Lab Results   Component Value Date    WBC 7.69 02/04/2022    HGB 12.3 (L) 02/04/2022    HCT 36.6 (L) 02/04/2022    MCV 86 02/04/2022     02/04/2022     BMP  Lab Results   Component Value Date     (L) 02/04/2022    K 4.8 02/04/2022     02/04/2022    CO2 21 (L) 02/04/2022    BUN 22 (H) 02/04/2022    CREATININE 1.4 02/04/2022    CALCIUM 8.8 02/04/2022    ANIONGAP 13 02/04/2022    ESTGFRAFRICA >60 02/04/2022    EGFRNONAA 55 (A) 02/04/2022     Lab Results   Component Value Date    ALT 20 02/04/2022    AST 26 02/04/2022    ALKPHOS 78 02/04/2022    BILITOT 0.5 02/04/2022     Lab Results   Component Value Date    INR 1.1 02/02/2022    INR 1.0 01/12/2022     Lab Results   Component Value Date    HGBA1C 6.0 (H) 02/02/2022       Diagnostic Results:    2 D echo 12/30/21-  · The left ventricle is mildly enlarged with concentric hypertrophy and mildly decreased systolic function.  · The estimated ejection fraction is 40%.  · There is left ventricular global hypokinesis.  · Left ventricular diastolic dysfunction.  · Normal right ventricular size with normal right ventricular systolic function.  · Mild left atrial enlargement.  · The sinuses of Valsalva is mildly dilated.  · Moderate aortic regurgitation.  · Moderate mitral regurgitation.  · Mild to moderate pulmonic regurgitation.  · Normal central venous pressure (3 mmHg).  · The estimated PA systolic pressure is 30 mmHg.    CTA Head/Neck 2/3/22-  Bilateral symmetric subcortical infarctions in the parieto-occipital subcortical white matter, grossly stable from recent MRI.  No associated mass effect or hemorrhage.  Distribution again raising the possibility of watershed infarct.  Moderate chronic ischemic change elsewhere as above.  No new infarct or hemorrhage.  Modest atherosclerotic change in the cervical vasculature but advanced intracranial  atherosclerotic disease with multifocal moderate to severe stenoses as further detailed above.  Endodontal disease and additional incidental findings as above.    MRI Brain 2/2/22-   Subtle increase in areas of restricted diffusion in the watershed distribution in both posterior right temporoparietal lobes suggesting extension of micro infarcts.  No evidence of major arterial infarct hemorrhage or mass.    Diffuse chronic small vessel and involutional changes with areas of hemosiderin in lacunar infarcts mainly in the basal ganglia and thalamus on the right.      ASSESSMENT & PLAN:       Acute bilat watershed infarction  - continue statin/Eliquis  - start Aspirin as directed by hospital team- he will pick this up today  - Neurology follow up Dr Reeves, 5/10/22  - outpatient speech therapy   -     aspirin (ECOTRIN) 81 MG EC tablet; Take 1 tablet (81 mg total) by mouth once daily.  Dispense: 90 tablet; Refill: 3    Paroxysmal atrial fibrillation  On continuous oral anticoagulation  - continue Toprol XL, Eliquis  - see Cardiology team, Dr Jaramillo 2/17/22    Chronic combined systolic (congestive) and diastolic (congestive) heart failure  - on appropriate medication regimen  - appears euvolemic presently  - continue sodium restriction    Type 2 diabetes mellitus with hyperglycemia, without long-term current use of insulin  - new diagnosis, HgBA1C 6.0  - conservative management at this time  - counseled on diet, exercise, weight loss  - monitor closely   -     Ambulatory referral/consult to Diabetes Education; Future; Expected date: 02/24/2022    Tobacco abuse  - 1-2 ppd now down to -> ~ 3 cigarettes per day  - wearing nicotine patches  - enrolled in tobacco cessation program     [facility] from [facility].  He will see his PCP, Dr Tadeo Gleason, in one month.     Instructions for the patient:      Scheduled Follow-up :  Future Appointments   Date Time Provider Department Center   2/17/2022 11:20 AM Gloria  MARCELO Jaramillo MD [facility] CARDIO [place] [facility]   2/21/2022  2:00 PM Daisy Muro [facility] SMOKE [place] [facility]   2/22/2022  3:30 PM Iesha Cortes, CCC-SLP [facility] OP [facility] [place] [facility]   2/24/2022  2:45 PM Iesha Cortes, CCC-SLP [facility] [place] [facility]   3/4/2022  9:30 AM Savannah Omalley RD [facility] MEGHAN [place]   3/7/2022  4:15 PM Doeren Nikita, CCC-SLP [facility] [place]   3/9/2022  4:15 PM Doreen Nikita, CCC-SLP [facility] [place]   3/14/2022  4:15 PM Doreen Nikita, CCC-SLP [facility] [place]   3/16/2022  4:15 PM Doreen Nikita, CCC-SLP [facility] [place]   3/21/2022  4:15 PM Doreen Nikita, CCC-SLP [facility] [place] [facility]   3/23/2022  4:15 PM Doreen Nikita, CCC-SLP [facility] [place]   3/28/2022  4:15 PM Doreen Nikita, CCC-SLP [facility] [place] [facility]   3/30/2022  4:15 PM Doreen Nikita, CCC-SLP [facility] [place]   4/4/2022  4:15 PM Doreen Nikita, CCC-SLP [facility] [place]   4/6/2022  4:15 PM Doreen Nikita, CCC-SLP [facility] [place]   4/11/2022  4:15 PM Doreen Nikita, CCC-SLP [facility] [place]   4/13/2022  4:15 PM Doreen Nikita, CCC-SLP [facility] [place]   4/19/2022  3:30 PM Iesha Cortes, CCC-SLP [facility] [place] [facility]   4/22/2022  2:30 PM Iesha Cortes, CCC-SLP [facility] [place]   4/25/2022  4:15 PM Doreen Shelton [facility] [place]   4/27/2022  4:15 PM Doreen Shelton [facility] [place]   5/10/2022  1:00 PM Jean Carlos Reeves DO [facility]       Post Visit Medication List:     Medication List          Accurate as of February 17, 2022  9:56 AM. If you have any questions, ask your nurse or doctor.            CONTINUE taking these medications    albuterol 90 mcg/actuation inhaler  Commonly known as: PROVENTIL/VENTOLIN HFA  Inhale 2 puffs into the lungs every 6 (six) hours as needed for Wheezing. Rescue     aspirin 81 MG EC tablet  Commonly known as: ECOTRIN  Take 1 tablet (81 mg total) by mouth once daily.      atorvastatin 40 MG tablet  Commonly known as: LIPITOR  Take 1 tablet (40 mg total) by mouth every evening.     ELIQUIS 5 mg Tab  Generic drug: apixaban  Take 1 tablet (5 mg total) by mouth 2 (two) times daily.     furosemide 20 MG tablet  Commonly known as: LASIX  Take 1 tablet (20 mg total) by mouth once daily.     losartan 50 MG tablet  Commonly known as: COZAAR  Take 1 tablet (50 mg total) by mouth once daily.     metoprolol succinate 50 MG 24 hr tablet  Commonly known as: TOPROL-XL  Take 1 tablet (50 mg total) by mouth once daily.     mirtazapine 15 MG tablet  Commonly known as: REMERON  Take 1 tablet (15 mg total) by mouth every evening.     nicotine (polacrilex) 2 mg Gum  Commonly known as: NICORETTE  Take 1 each (2 mg total) by mouth as needed.     nicotine 21 mg/24 hr  Commonly known as: NICODERM CQ  Place 1 patch onto the skin once daily.     VITAMIN C WITH JESSICA HIPS 500 MG tablet  Generic drug: ascorbic acid (vitamin C)  Take 1 tablet (500 mg total) by mouth 2 (two) times daily.           Where to Get Your Medications      These medications were sent to [facility]  [address], [place] [address]    Hours: Mon-Fri, 8a-5:30p Phone: [phone]   · aspirin 81 MG EC tablet         Signing Physician:  Sheila Singh MD

## 2022-02-21 ENCOUNTER — TELEPHONE (OUTPATIENT)
Dept: SMOKING CESSATION | Facility: CLINIC | Age: 57
End: 2022-02-21
Payer: MEDICAID

## 2022-02-21 ENCOUNTER — TELEPHONE (OUTPATIENT)
Dept: CARDIOLOGY | Facility: CLINIC | Age: 57
End: 2022-02-21
Payer: MEDICAID

## 2022-02-21 NOTE — TELEPHONE ENCOUNTER
Attempted to call patient for their smoking cessation appointment. Left message with my contact information to reschedule the appointment. Daisy Muro [phone]

## 2022-02-21 NOTE — TELEPHONE ENCOUNTER
Called pt to inform of stress test results    The pt did not answer, but I left a detailed voice message as well as a call back number.    ND

## 2022-02-21 NOTE — TELEPHONE ENCOUNTER
----- Message from Gloria Jaramillo MD sent at 2/16/2022 10:04 AM CST -----  Please call patient re no blood flow problems to heart muscle noted on stress test. Additionally, the pumping function of the heart appears improved on this study.

## 2022-02-24 ENCOUNTER — CLINICAL SUPPORT (OUTPATIENT)
Dept: REHABILITATION | Facility: HOSPITAL | Age: 57
End: 2022-02-24
Payer: MEDICAID

## 2022-02-24 DIAGNOSIS — R41.841 COGNITIVE COMMUNICATION DEFICIT: Primary | ICD-10-CM

## 2022-02-24 PROCEDURE — 92507 TX SP LANG VOICE COMM INDIV: CPT | Mod: PN

## 2022-02-24 NOTE — PROGRESS NOTES
[facility]  Speech Therapy Treatment Note- Neurological Rehabilitation  Date: 2/24/2022     Name: Sarbjit Brewer Sr.   MRN: [id number]   Therapy Diagnosis:   Encounter Diagnosis   Name Primary?    Cognitive communication deficit Yes      Physician: Dinesh Chavez DO  Physician Orders: EST SPEECH THERAPY - 45 MINS  Medical Diagnosis: Z86.73 (ICD-10-CM) - Personal history of transient ischemic attack (TIA), and cerebral infarction without residual deficits    Visit #/ Visits Authorized: 2 / 16 (plus eval)  Date of Evaluation:  2/9/2022   Insurance Authorization Period: 02/16/22-04/16/22  Plan of Care Expiration Date: 2/9/2022 to 4/6/2022  Extended Plan of Care:  n/a   Progress Note: due 3/9/22   Visits Cancelled: 0  Visits No Show: 0    Time In:  2:48 pm   Time Out:  3:29 pm    Total Billable Time: 41 min      Precautions: Standard  Subjective:   Patient reports: Patient pleasant. Reported improvement in vision noted.    He was not compliant to home exercise program.   Response to previous treatment: positive    Pain Scale:  0/10 on a Visual Analog Scale currently.   Pain Location: n/a  Objective:   UNTIMED  Procedure Min.   Speech- Language- Voice Therapy 41   Total Timed Units: 0  Total Untimed Units: 1  Charges Billed/Number of units: 1    Short Term Goals: (4 weeks) Current Progress:   1. Given min cues. patient will sustain attention for 1 minute to complete structured activity to improve sustained attention. Goal Met / Discontinue    2. Patient will recall and utilize 3/4 memory retrieval strategies over 3 sessions given in cues.    Progressing/ Not Met 2/24/2022   Discussed the following memory strategies at length and provided an example of functional implementation of each:  · Write: make a list or utilize a calendar   · Repeat:  Repeat information out loud or internally   · Associate:  Connect desired information with something you already recall. For example, when in the grocery, group  items by meal or taste (sweet vs salty) or by category (vegetables, cold items, etc).  · Picture: try to mentally picture what you are trying to remember  · Mental Rehearsal: think through how you're going to complete a task before completing it.     CIERRA Patel., SINAI Gong, & NANO Chavarria (2012). Cognitive rehabilitation manual: Translating evidence-based recommendations into practice. [place], [place]: [facility] [employer].   3. Patient will complete simple immediate memory tasks (auditory and/or visual) using memory retrieval strategies with 80% accuracy given min cues to improve short-term memory.    Progressing/ Not Met 2/24/2022   Immediate memory tasks (auditory) completed with 40% acc independently; 100% acc given repetitons   4. Patient will complete simple visual scanning tasks with 80% acc to improve visual scanning for reading.    Progressing/ Not Met 2/24/2022   Patient completed simple visual scanning tasks with 90% accuracy given moderate cues.   5. The patient will complete 1 unit auditory comprehension tasks (I.e., answer 1 unit yes/no questions/ follow 1 unit commands) with 80% accuracy given min cues to improve auditory comprehension.  Goal Met / Discontinue     6. Patient will answer personal orientation questions with 80% accuracy given minimum cues to improve orientation skills.        Progressing/ Not Met 2/24/2022   90% accuracy answering personal orientation questions given repetitions   7. Patient will complete selective attention tasks with 90% accuracy given min cues to improve attention skills.    Progressing/ Not Met 2/24/2022 Patient completed selective attention task with 100% accuracy given moderate cues to complete task.       Patient Education/Response:   Discussed POC, scheduling/cancellation protocol, spontaneous recovery, memory strategies, and progress since initial evaluation. Patient verbalized understanding of all discussed. Gave patient contact information for ENT to make  "appointment for hearing assessment; spouse reported understanding.  During session, patient stated, "What if I don't want to come to class?"; explained scheduling/cancellation policy and POC.  Pt reported understanding.     Home program established: yes-review memory strategies  Exercises were reviewed and Sarbjit was able to demonstrate them prior to the end of the session.  Sarbjit demonstrated good  understanding of the education provided.     See Electronic Medical Record under Patient Instructions for exercises provided throughout therapy.  Assessment:   Sarbjit is progressing well towards his goals. Significant improvement in patient orientation noted.  Patient completed immediate memory tasks given multiple repetitions. Sustained attention for full session with occasional redirection required. Patient benefited from moderate cues and models when completing selective attention task. Discussed with patient and spouse about making appointment with ENT for a hearing assessment. Current goals remain appropriate. Goals to be updated as necessary.     Patient prognosis is Good. Patient will continue to benefit from skilled outpatient speech and language therapy to address the deficits listed in the problem list on initial evaluation, provide patient/family education and to maximize patient's level of independence in the home and community environment.   Medical necessity is demonstrated by the following IMPAIRMENTS:  Reduced cognitive-linguistic and speech skills negatively impact patient's ability to act efficiently and independently in emergent situations.  Barriers to Therapy: none  Patient's spiritual, cultural and educational needs considered and patient agreeable to plan of care and goals.  Plan:   Continue Plan of Care with focus on improving cognitive-communication skills.     Other Recommendations: Contact ENT for hearing assessment    RIGO Mccollum.  [employer] Speech-Language Pathology graduate " "student  2/24/2022    "I certify that I was present in the room directing the student and service delivery and guiding them using my skilled judgement. As the co-signing therapist, I have reviewed the student's documentation, and am responsible for the treatment, assessment and plan."     Iesha Cortes M.S.CCC-SLP  Speech Language Pathologist     "

## 2022-03-07 ENCOUNTER — CLINICAL SUPPORT (OUTPATIENT)
Dept: REHABILITATION | Facility: HOSPITAL | Age: 57
End: 2022-03-07
Payer: MEDICAID

## 2022-03-07 DIAGNOSIS — R41.841 COGNITIVE COMMUNICATION DEFICIT: Primary | ICD-10-CM

## 2022-03-07 PROCEDURE — 92507 TX SP LANG VOICE COMM INDIV: CPT | Mod: PN | Performed by: SPEECH-LANGUAGE PATHOLOGIST

## 2022-03-07 NOTE — PROGRESS NOTES
"[facility]  Speech Therapy Treatment Note- Neurological Rehabilitation  Date: 3/7/2022     Name: Sarbjit Brewer Sr.   MRN: [id number]   Therapy Diagnosis:   Encounter Diagnosis   Name Primary?    Cognitive communication deficit Yes      Physician: Dinesh Chavez DO  Physician Orders: EST SPEECH THERAPY - 45 MINS  Medical Diagnosis: Z86.73 (ICD-10-CM) - Personal history of transient ischemic attack (TIA), and cerebral infarction without residual deficits    Visit #/ Visits Authorized: 3 / 16 (plus eval)  Date of Evaluation:  2/9/2022   Insurance Authorization Period: 02/16/22-04/16/22  Plan of Care Expiration Date: 2/9/2022 to 4/6/2022  Extended Plan of Care:  n/a   Progress Note: due 3/9/22   Visits Cancelled: 0  Visits No Show: 0    Time In: 4:21 pm   Time Out:  5:00 pm    Total Billable Time: 39 min     Precautions: Standard  Subjective:   Patient reports: Patient pleasant. Patient reported requiring a referral for audiology appointment. Patient reported difficulties signing his name and writing in general, "It just doesn't work".     He was not compliant to home exercise program.   Response to previous treatment: positive    Pain Scale:  0/10 on a Visual Analog Scale currently.   Pain Location: n/a  Objective:   UNTIMED  Procedure Min.   Speech- Language- Voice Therapy 39   Total Timed Units: 0  Total Untimed Units: 1  Charges Billed/Number of units: 1    Short Term Goals: (4 weeks) Current Progress:   1. Given min cues. patient will sustain attention for 1 minute to complete structured activity to improve sustained attention. Goal Met / Discontinue    2. Patient will recall and utilize 3/4 memory retrieval strategies over 3 sessions given in cues.                          Progressing/ Not Met 3/7/2022  Discussed memory strategies and provided an example of functional implementation of each:  Write: make a list or utilize a calendar   Repeat:  Repeat information out loud or internally "   Associate:  Connect desired information with something you already recall. For example, when in the grocery, group items by meal or taste (sweet vs salty) or by category (vegetables, cold items, etc).  Picture: try to mentally picture what you are trying to remember  Mental Rehearsal: think through how you're going to complete a task before completing it.     CIERRA Patel., SINAI Gong, & NANO Chavarria (2012). Cognitive rehabilitation manual: Translating evidence-based recommendations into practice. [place], [place]: [facility] Publishing.   3. Patient will complete simple immediate memory tasks (auditory and/or visual) using memory retrieval strategies with 80% accuracy given min cues to improve short-term memory.    Progressing/ Not Met 3/7/2022   Immediate memory tasks (auditory) completed with 60% acc independently; 100% acc given repetitions and binary choices.   4. Patient will complete simple visual scanning tasks with 80% acc to improve visual scanning for reading.    Progressing/ Not Met 3/7/2022   Patient completed simple visual scanning task with 80% accuracy given minimum-moderate cues and models.      5. The patient will complete 1 unit auditory comprehension tasks (I.e., answer 1 unit yes/no questions/ follow 1 unit commands) with 80% accuracy given min cues to improve auditory comprehension.  Goal Met / Discontinue     6. Patient will answer personal orientation questions with 80% accuracy given minimum cues to improve orientation skills.     100% accuracy answering personal orientation questions given repetitions.      Goal Met / Discontinue    7. Patient will complete selective attention tasks with 90% accuracy given min cues to improve attention skills.    Progressing/ Not Met 3/7/2022 Not formally addressed          Patient Education/Response:   Discussed ENT appointment, potential occupational evaluation, memory strategies, and progress since initial evaluation. Encouraged patient to bring picture of  family members to practice facial discrimination. Discussed that clinician contacted referring provider to request referrals for hearing assessment and occupational therapy. Discussed potential occupational evaluation to aid in strengthening fine motor skills in right hand pending MD agreement. Patient and wife reported understanding of all discussed.    Home program established: yes-review memory strategies   Exercises were reviewed and Sarbjit was able to demonstrate them prior to the end of the session.  Sarbjit demonstrated good  understanding of the education provided.     See Electronic Medical Record under Patient Instructions for exercises provided throughout therapy.  Assessment:   Sarbjit is progressing well towards his goals. Patient reported difficulties with writing; discussed working with occupational therapy to address fine motor skills. Significant improvement in patient orientation noted across two sessions. Patient benefited from repetitions and binary choices during immediate recall of pictures. Encouraged patient to bring picture of family members to practice facial discrimination/recognition. Discussed with patient and spouse re: clinian contacted referring provider for referral for audiologist and occupational therapy to address right hand weakness. Current goals remain appropriate. Goals to be updated as necessary.     Patient prognosis is Good. Patient will continue to benefit from skilled outpatient speech and language therapy to address the deficits listed in the problem list on initial evaluation, provide patient/family education and to maximize patient's level of independence in the home and community environment.   Medical necessity is demonstrated by the following IMPAIRMENTS:  Reduced cognitive-linguistic and speech skills negatively impact patient's ability to act efficiently and independently in emergent situations.    Barriers to Therapy: none  Patient's spiritual, cultural and  educational needs considered and patient agreeable to plan of care and goals.  Plan:   Continue Plan of Care with focus on improving cognitive-communication skills.     Other Recommendations:   -occupational therapy   - hearing assessment.     DARIAN Mccollum Speech-Language Pathology   3/7/2022       I, Doreen Shelton, certify that I was present in the room directing the student and service delivery and guiding them using my skilled judgement. As the co-signing therapist, I have reviewed the student's documentation, and am responsible for the treatment, assessment and plan.   NEO Crocker, CCC-SLP  Speech Language Pathologist   3/7/2022

## 2022-03-09 ENCOUNTER — TELEPHONE (OUTPATIENT)
Dept: REHABILITATION | Facility: HOSPITAL | Age: 57
End: 2022-03-09
Payer: MEDICAID

## 2022-03-09 ENCOUNTER — DOCUMENTATION ONLY (OUTPATIENT)
Dept: REHABILITATION | Facility: HOSPITAL | Age: 57
End: 2022-03-09
Payer: MEDICAID

## 2022-03-09 DIAGNOSIS — R41.841 COGNITIVE COMMUNICATION DEFICIT: Primary | ICD-10-CM

## 2022-03-09 NOTE — PROGRESS NOTES
No Show Note/Documentation    Patient: Sarbjit Brewer Sr.  Date of Session: 3/9/2022  Diagnosis:   1. Cognitive communication deficit       MRN: [id number]    Sarbjit Brewer Sr. did not attend his scheduled therapy appointment today. He did not call to cancel nor reschedule. Next appointment is scheduled for 3/14/22 and will follow up with patient at that time. No charges have been posted today.     Cancel: 0  No show: 1    NEO Crocker, CCC-SLP  Speech Language Pathologist   3/9/2022

## 2022-03-09 NOTE — TELEPHONE ENCOUNTER
Attempted to call patient however no answer to discuss 3/9/22 ST visit however no answer.    NEO Crocker, CCC-SLP  Speech Language Pathologist   3/9/2022

## 2022-03-14 ENCOUNTER — CLINICAL SUPPORT (OUTPATIENT)
Dept: REHABILITATION | Facility: HOSPITAL | Age: 57
End: 2022-03-14
Payer: MEDICAID

## 2022-03-14 DIAGNOSIS — R41.841 COGNITIVE COMMUNICATION DEFICIT: Primary | ICD-10-CM

## 2022-03-14 PROCEDURE — 92507 TX SP LANG VOICE COMM INDIV: CPT | Mod: PN | Performed by: SPEECH-LANGUAGE PATHOLOGIST

## 2022-03-14 NOTE — PROGRESS NOTES
"[facility]  Speech Therapy Treatment Note- Neurological Rehabilitation  Date: 3/14/2022     Name: Sarbjit Brewer Sr.   MRN: [id number]   Therapy Diagnosis:   Encounter Diagnosis   Name Primary?    Cognitive communication deficit Yes      Physician: Dinesh Chavez DO  Physician Orders: EST SPEECH THERAPY - 45 MINS  Medical Diagnosis: Z86.73 (ICD-10-CM) - Personal history of transient ischemic attack (TIA), and cerebral infarction without residual deficits    Visit #/ Visits Authorized: 4 / 16 (plus eval)  Date of Evaluation:  2/9/2022   Insurance Authorization Period: 02/16/22-04/16/22  Plan of Care Expiration Date: 2/9/2022 to 4/6/2022  Extended Plan of Care:  n/a   Progress Note: due 3/9/22   Visits Cancelled: 0  Visits No Show: 0    Time In: 4:17 pm    Time Out:  5:00 pm    Total Billable Time: 43 min     Precautions: Standard  Subjective:   Patient reports: Patient pleasant. Patient denied contact scheduling an appointment with audiology. Patient denied contact with [facility] regarding potential  occupational evaluation. Patient reported post-morbid difficulties with vision, "blurry vision".   He was not compliant to home exercise program.   Response to previous treatment: positive    Pain Scale:  0/10 on a Visual Analog Scale currently.   Pain Location: n/a  Objective:   UNTIMED  Procedure Min.   Speech- Language- Voice Therapy 43   Total Timed Units: 0  Total Untimed Units: 1  Charges Billed/Number of units: 1    Short Term Goals: (4 weeks) Current Progress:   1. Given min cues. patient will sustain attention for 1 minute to complete structured activity to improve sustained attention. Goal Met / Discontinue    2. Patient will recall and utilize 3/4 memory retrieval strategies over 3 sessions given in cues.                          Progressing/ Not Met 3/14/2022  Discussed memory strategies and provided an example of functional implementation of each:  Write: make a list or utilize a calendar "   Repeat:  Repeat information out loud or internally   Associate:  Connect desired information with something you already recall. For example, when in the grocery, group items by meal or taste (sweet vs salty) or by category (vegetables, cold items, etc).  Picture: try to mentally picture what you are trying to remember  Mental Rehearsal: think through how you're going to complete a task before completing it.     Reviewed memory strategies. Patient unable to recall memory strategies independently.   3. Patient will complete simple immediate memory tasks (auditory and/or visual) using memory retrieval strategies with 80% accuracy given min cues to improve short-term memory.      Progressing/ Not Met 3/14/2022   Immediate memory tasks (auditory - simple paragaphs) completed with 67% acc independently; 93% acc given repetitions and binary choices.    Immediate memory tasks (visual - pictures) completed with 31% acc independently; 69% acc given repetitions and binary choices.   4. Patient will complete simple visual scanning tasks with 80% acc to improve visual scanning for reading.    Progressing/ Not Met 3/14/2022   Patient completed simple visual scanning task (Constant Therapy: Find the Same Symbol - Level 1) with 78% accuracy given moderate cues and models.    5. The patient will complete 1 unit auditory comprehension tasks (I.e., answer 1 unit yes/no questions/ follow 1 unit commands) with 80% accuracy given min cues to improve auditory comprehension.  Goal Met / Discontinue     6. Patient will answer personal orientation questions with 80% accuracy given minimum cues to improve orientation skills.     Goal Met / Discontinue    7. Patient will complete selective attention tasks with 90% accuracy given min cues to improve attention skills.    Progressing/ Not Met 3/14/2022 Patient completed selective attention task (Constant Therapy: Find the Same Symbol - Level 1) with 78% accuracy for 3-minutes given multiple  "redirections and moderate cues.   8. Patient will complete simple planning/sequencing tasks with 90% accuracy given minimum cues to improve executive functioning skills.    NEW GOAL           NEW GOAL     Probed Executive Functioning Skills:   - Sequencing 4-step activities with 75% accuracy given moderate cues  - Organized schedule including 4 activites with 0% accuracy given maximum cues     Patient Education/Response:   Discussed contact from ENT, potential occupational therapy evaluation, memory strategies, and scheduling. Discussed vision difficulties; encouraged utilization of magnifying glasses. Patient and wife reported understanding of all discussed.    Home program established: yes-instructed to utilize memory strategies during functional communciation/tasks   Exercises were reviewed and Sarbjit was able to demonstrate them prior to the end of the session.  Sarbjit demonstrated good  understanding of the education provided.     See Electronic Medical Record under Patient Instructions for exercises provided throughout therapy.  Assessment:   Sarbjit is progressing well towards his goals. Patient reported post-morbid difficulties with vision, "blurry vision"; client utilized magnifying glasses during therapy, patient endorsed "yeah this helps me see better". As a result, discussed with patient and wife regarding benefiting from magnifying glasses during therapy today. Patient unable to recall memory strategies independently; reviewed memory strategies. Patient benefited from repetitions and binary choices during immediate recall of visual and auditory information. Probed executive functioning skills, patient demonstrated deficits in sequencing (75% accuracy) and planning (0% accuracy). Goal added to address executive functioning skills. Current goals remain appropriate. Goals to be updated as necessary.    Patient prognosis is Good. Patient will continue to benefit from skilled outpatient speech and language " therapy to address the deficits listed in the problem list on initial evaluation, provide patient/family education and to maximize patient's level of independence in the home and community environment.   Medical necessity is demonstrated by the following IMPAIRMENTS:  Reduced cognitive-linguistic and speech skills negatively impact patient's ability to act efficiently and independently in emergent situations.    Barriers to Therapy: none  Patient's spiritual, cultural and educational needs considered and patient agreeable to plan of care and goals.  Plan:   Continue Plan of Care with focus on improving cognitive-communication skills.     Other Recommendations:   - occupational therapy evaluation   - hearing assessment  - Neuro-Opthamology    DARIAN Mccollum Speech-Language Pathology   3/14/2022     I, Doreen Shelton, certify that I was present in the room directing the student and service delivery and guiding them using my skilled judgement. As the co-signing therapist, I have reviewed the student's documentation, and am responsible for the treatment, assessment and plan.   NEO Crocker, CCC-SLP  Speech Language Pathologist   3/14/2022

## 2022-03-16 DIAGNOSIS — I63.9 CEREBROVASCULAR ACCIDENT (CVA), UNSPECIFIED MECHANISM: Primary | ICD-10-CM

## 2022-03-16 DIAGNOSIS — H91.90 HEARING LOSS, UNSPECIFIED HEARING LOSS TYPE, UNSPECIFIED LATERALITY: ICD-10-CM

## 2022-03-17 ENCOUNTER — TELEPHONE (OUTPATIENT)
Dept: ADMINISTRATIVE | Facility: HOSPITAL | Age: 57
End: 2022-03-17
Payer: MEDICAID

## 2022-03-18 ENCOUNTER — TELEPHONE (OUTPATIENT)
Dept: OTOLARYNGOLOGY | Facility: CLINIC | Age: 57
End: 2022-03-18
Payer: MEDICAID

## 2022-03-21 ENCOUNTER — CLINICAL SUPPORT (OUTPATIENT)
Dept: REHABILITATION | Facility: HOSPITAL | Age: 57
End: 2022-03-21
Payer: MEDICAID

## 2022-03-21 DIAGNOSIS — R41.841 COGNITIVE COMMUNICATION DEFICIT: Primary | ICD-10-CM

## 2022-03-21 PROCEDURE — 92507 TX SP LANG VOICE COMM INDIV: CPT | Mod: PN | Performed by: SPEECH-LANGUAGE PATHOLOGIST

## 2022-03-21 NOTE — PROGRESS NOTES
[facility]  Speech Therapy PROGRESS Note- Neurological Rehabilitation  Date: 3/21/2022     Name: Sarbjit Brewer Sr.   MRN: [id number]   Therapy Diagnosis:   Encounter Diagnosis   Name Primary?    Cognitive communication deficit Yes      Physician: Dinesh Chavez DO  Physician Orders: EST SPEECH THERAPY - 45 MINS  Medical Diagnosis: Z86.73 (ICD-10-CM) - Personal history of transient ischemic attack (TIA), and cerebral infarction without residual deficits    Visit #/ Visits Authorized: 4 / 16 (plus eval)  Date of Evaluation:  2/9/2022   Insurance Authorization Period: 02/16/22-04/16/22  Plan of Care Expiration Date: 2/9/2022 to 4/6/2022  Extended Plan of Care:  n/a   Progress Note: due 4/21/22    Visits Cancelled: 0  Visits No Show: 0    Time In: 4:20 pm   Time Out:  5:04 pm   Total Billable Time:  44 min     Precautions: Standard  Subjective:   Patient reports: that he feels like everything is okay. Lost his drivers license over the weekend. Audiology eval scheduled this week.  He was not compliant to home exercise program.   Response to previous treatment: positive    Pain Scale:  0/10 on a Visual Analog Scale currently.   Pain Location: n/a  Objective:   UNTIMED  Procedure Min.   Speech- Language- Voice Therapy 44    Total Timed Units: 0  Total Untimed Units: 1  Charges Billed/Number of units: 1    Short Term Goals: (4 weeks) Current Progress:   1. Given min cues. patient will sustain attention for 1 minute to complete structured activity to improve sustained attention. Goal Met / Discontinue    2. Patient will recall and utilize 3/4 memory retrieval strategies over 3 sessions given in cues.                          Progressing/ Not Met 3/21/2022  Patient unable to recall memory strategies independently.  Discussed memory strategies and provided an example of functional implementation of each:  Write: make a list or utilize a calendar   Repeat:  Repeat information out loud or internally    Associate:  Connect desired information with something you already recall. For example, when in the grocery, group items by meal or taste (sweet vs salty) or by category (vegetables, cold items, etc).  Picture: try to mentally picture what you are trying to remember  Mental Rehearsal: think through how you're going to complete a task before completing it.        3. Patient will complete simple immediate memory tasks (auditory and/or visual) using memory retrieval strategies with 80% accuracy given min cues to improve short-term memory.      Progressing/ Not Met 3/21/2022   Simple paragraph (2 sentences): with 75% accuracy independently     Moderate paragraph  (3 sentences): with 75% accuracy independently;    4. Patient will complete simple visual scanning tasks with 80% acc to improve visual scanning for reading.    Progressing/ Not Met 3/21/2022   Patient completed selective attention task (Tactus visual attention - 1 target) with 56% accuracy .independently; 100% acc given moderate cues.   5. The patient will complete 1 unit auditory comprehension tasks (I.e., answer 1 unit yes/no questions/ follow 1 unit commands) with 80% accuracy given min cues to improve auditory comprehension.  Goal Met / Discontinue     6. Patient will answer personal orientation questions with 80% accuracy given minimum cues to improve orientation skills.     Goal Met / Discontinue    7. Patient will complete selective attention tasks with 90% accuracy given min cues to improve attention skills.    Progressing/ Not Met 3/21/2022 Patient completed selective attention task (Tactus visual attention - 1 target) with 56% accuracy .independently; 100% acc given moderate cues.   8. Patient will complete simple planning/sequencing tasks with 90% accuracy given minimum cues to improve executive functioning skills.    NEW GOAL     Not formally addressed       Probed Executive Functioning Skills:   - Sequencing 4-step activities with 75% accuracy  "given moderate cues  - Organized schedule including 4 activites with 0% accuracy given maximum cues     Patient Education/Response:   Discussed contact from ENT, potential occupational therapy evaluation, memory strategies, and scheduling. Discussed vision difficulties; encouraged utilization of magnifying glasses. Patient and wife reported understanding of all discussed.    Home program established: yes-instructed to utilize memory strategies during functional communciation/tasks   Exercises were reviewed and Sarbjit was able to demonstrate them prior to the end of the session.  Sarbjit demonstrated good  understanding of the education provided.     See Electronic Medical Record under Patient Instructions for exercises provided throughout therapy.  Assessment:   Sarbjit is progressing well towards his goals. Though difficulty recalling memory strategies, patient demonstrated good understanding of strategies. Patient comleted simple and moderate auditory memory tasks with 75% accuracy, states that "picturing" the story was helpful. Patient benefited from repetition of stimuli x1-2. Poor performance with visual scanning and selective attention however suspect attention skills to have negatively impacted visual scanning. Met the goal for auditory comprehension with no further concerns for receptive language. Met the goal for orientation questions. Simple planning/sequencing not addressed today though difficulty noted in prior sessions. Overall, patient continues with good progress towards established goals and continues to benefit from ongoing skilled ST services. Patient has met 3/8 short term goals throughout current plan of care. POC to be considered for extension vs.discharge on 4/6/22 pending determination of presence/absence of functional progress.   Current goals remain appropriate. Goals to be updated as necessary.    Patient prognosis is Good. Patient will continue to benefit from skilled outpatient speech and " language therapy to address the deficits listed in the problem list on initial evaluation, provide patient/family education and to maximize patient's level of independence in the home and community environment.   Medical necessity is demonstrated by the following IMPAIRMENTS:  Reduced cognitive-linguistic and speech skills negatively impact patient's ability to act efficiently and independently in emergent situations.    Barriers to Therapy: none  Patient's spiritual, cultural and educational needs considered and patient agreeable to plan of care and goals.  Plan:   Continue Plan of Care with focus on improving cognitive-communication skills.        NEO Crocker, CCC-SLP  Speech Language Pathologist   3/21/2022

## 2022-03-23 ENCOUNTER — CLINICAL SUPPORT (OUTPATIENT)
Dept: REHABILITATION | Facility: HOSPITAL | Age: 57
End: 2022-03-23
Payer: MEDICAID

## 2022-03-23 DIAGNOSIS — R41.841 COGNITIVE COMMUNICATION DEFICIT: Primary | ICD-10-CM

## 2022-03-23 DIAGNOSIS — I63.9 CEREBROVASCULAR ACCIDENT (CVA), UNSPECIFIED MECHANISM: ICD-10-CM

## 2022-03-23 PROCEDURE — 92507 TX SP LANG VOICE COMM INDIV: CPT | Mod: PN | Performed by: SPEECH-LANGUAGE PATHOLOGIST

## 2022-03-23 PROCEDURE — 97162 PT EVAL MOD COMPLEX 30 MIN: CPT | Mod: PN

## 2022-03-23 NOTE — PLAN OF CARE
"[facility]  Physical Therapy Neurological Rehabilitation Initial Evaluation    Name: Sarbjit Brewer Sr.  Clinic Number: [id number]    Therapy Diagnosis:   Encounter Diagnosis   Name Primary?    Cerebrovascular accident (CVA), unspecified mechanism      Physician: Dinesh Chavez DO    Physician Orders: PT Eval and Treat   Medical Diagnosis from Referral: I63.9 (ICD-10-CM) - Cerebrovascular accident (CVA), unspecified mechanism   Evaluation Date: 3/23/2022  Authorization Period Expiration: 12/31/2022   Plan of Care Expiration: 3/23/2022  Visit # / Visits authorized: 1/1    Time In: 3:30 PM  Time Out: 4:13 PM  Total Billable Time: 43 minutes    Precautions: Standard    Subjective   Date of onset: January 2022  History of current condition - Sarbjit reports: primary difficulty is right hand weakness/difficulty with handwriting.  Pt does not feel that he has any issues with balance or strength.  As per both patient and wife, his blood pressure goes "way up" whenever he walks.     Medical History:   Past Medical History:   Diagnosis Date    A-fib     CHF (congestive heart failure)     Hypertension     Insomnia        Surgical History:   Sarbjit Brewer Sr.  has a past surgical history that includes Kidney stone surgery.    Medications:   Sarbjit has a current medication list which includes the following prescription(s): albuterol, apixaban, ascorbic acid (vitamin c), aspirin, atorvastatin, furosemide, losartan, metoprolol succinate, mirtazapine, nicotine, and nicotine (polacrilex).    Allergies:   Review of patient's allergies indicates:  No Known Allergies     Imaging, CTA head/neck   FINDINGS:  The ventricles are stable in size, without evidence of hydrocephalus.     Patchy symmetric hypoattenuation in the subcortical white matter of parieto-occipital regions keeping with areas of recent infarction when correlated with MRI.  Moderate chronic microvascular ischemic change throughout the " supratentorial white matter.  Moderate-sized remote left anterior temporal infarct.  Small remote bilateral cerebellar infarcts.  Remote right caudate lacunar type infarct.  No new major vascular distribution infarct.  No acute parenchymal hemorrhage.  No significant mass effect or midline shift.  No enhancing lesions     No extra-axial blood or fluid collections.     The cranium appears intact.     Patchy mucosal thickening particularly along the floor the maxillary sinuses bilaterally.  There is endodontal disease with missing teeth in the sizable left maxillary periapical lucency remodeling the floor of maxillary sinus.     Mild thyromegaly with several subcentimeter thyroid nodules.     Modest cervical spondylosis.        CTA:     The aortic arch maintains a normal branching pattern.  No significant atherosclerotic plaque or stenosis at the major branch vessel origins.     Right common carotid artery normal in caliber.  Mild calcified and noncalcified plaque at the carotid bifurcation without associated narrowing.  Mild plaque in the cervical internal carotid artery without associated narrowing.  Moderate atherosclerotic calcification with mild narrowing in the cavernous and supraclinoid segments.     The left common carotid artery is normal in caliber.  Probable long segment noncalcified plaque without significant narrowing.  No significant plaque or stenosis at the carotid bifurcation.  The left internal carotid artery normal caliber throughout its cervical and petrous segments but there is extensive atherosclerotic change in the cervical and proximal supraclinoid segments with at least moderate stenosis.  Possible severe stenosis at the paraclinoid segment.     The vertebral origins are patent.  Circumferential noncalcified plaque with mild focal narrowing in the proximal cervical segment.  Mild multifocal narrowing in the intracranial (V 4 segment).  Left vertebral artery normal in caliber throughout its  cervical course with a mild to moderate stenosis intracranially after origin of the posteroinferior cerebellar artery.  Mild irregularity and narrowing throughout the basilar artery.     Proximal right MCA is patent.  There is a tapered stenosis in the distal M1 segment with moderate multifocal irregularity and narrowing distally.  Mild tapered stenosis of the distal M1 segment of the left MCA with moderate multifocal irregularity narrowing distally, particularly affecting the superior division.  Probable severe stenosis at the origin the anterior temporal artery.     Developmentally hypoplastic or absent A1 segment of left CHRYSTAL.  A1 segment the right ACAs within normal limits.  Mild multifocal irregularity narrowing throughout the distal ACAs.     There is multifocal severe stenosis involving the P2 and distal left PCA.  Mild to moderate narrowing of the proximal right PCA with severe stenosis or occlusion distally.     No evidence of intracranial aneurysm or vascular malformation.     The anterior, middle, and posterior cerebral arteries are within normal limits, without evidence of significant stenosis, focal occlusion, or intracranial aneurysm formation.     Impression:     Bilateral symmetric subcortical infarctions in the parieto-occipital subcortical white matter, grossly stable from recent MRI.  No associated mass effect or hemorrhage.  Distribution again raising the possibility of watershed infarct.     Moderate chronic ischemic change elsewhere as above.     No new infarct or hemorrhage.     Modest atherosclerotic change in the cervical vasculature but advanced intracranial atherosclerotic disease with multifocal moderate to severe stenoses as further detailed above.     Endodontal disease and additional incidental findings as above.    Prior Therapy: none  Social History:  lives with their spouse  Falls: no   DME: none    Home Environment: apartment, single story, threshold step to enter   Exercise Routine  "/ History: none  Family Present at time of Eval: wife   Occupation: not currently working, previously worked as   Prior Level of Function: independent  Current Level of Function: independent    Pain:  Current 0/10, worst 0/10, best 0/10   Location: N/A    Patient's goals: "I'm not sure, I do pretty good."    Objective   Vitals - BP at start of session 138/96 mmHg   BP post evaluation 141/112 mmHg      - Command following: single step   - Speech: see SLP evaluation    Mental status: alert, oriented to person, place, and time  Behavior:  cooperative  Attention Span and Concentration:  Normal    Dominant hand:  right     Sensation:  Light Touch: Intact           Proprioception:   Intact    Tone: 0 - No increase in muscle tone    Visual/Auditory: very [place], audiogram scheduled for tomorrow AM    Coordination:   - fine motor: impaired right upper extremity   - UE coordination: impaired right upper extremity     - LE coordination:  Grossly intact      RANGE OF MOTION--LOWER EXTREMITIES  (R) LE Hip: full   Knee: full   Ankle: full    (L) LE: Hip: full   Knee: full   Ankle: full      Lower Extremity Strength   RLE LLE   Hip Flexion: 5/5 5/5   Hip Extension:  Refer to 30 sec STS Refer to 30 sec STS   Hip Abduction: 5/5 5/5   Hip Adduction: 5/5 5/5   Knee Extension: 5/5 5/5   Knee Flexion: 4+/5 4+/5   Ankle Dorsiflexion: 5/5 5/5   Ankle Plantarflexion: 5/5 5/5       Gait Assessment:   - AD used: none  - Assistance: independent  - Distance: > 300 feet during eval, able to walk 2 community blocks (per patient/family)  - Stairs: I    Timed Up and Go: 10.5 sec without assistive device  30 second sit to stand: 10 reps    Balance Assessment:    Sitting balance (static,dynamic): Normal  Standing balance (static, dynamic):    Functional Gait Assessment:   1. Gait on level surface =  3   (3) Normal: less than 5.5 sec, no A.D., no imbalance, normal gait pattern, deviates< 6in   (2) Mild impairment: 7-5.6 sec, uses A.D., mild " gait deviations, or deviates 6-10 in   (1) Moderate impairment: > 7 sec, slow speed, imbalance, deviates 10-15 in.   (0) Severe impairment: needs assist, deviates >15 in, reach/touch wall  2. Change in Gait Speed = 3   (3) Normal: smooth change w/o loss of balance or gait deviation, deviates < 6 in, significant difference between speeds   (2) Mild impairment: changes speed, but demonstrates mild gait deviations, deviates 6-10 in, OR no deviations but unable to significantly speed, OR uses A.D.   (1) Moderate impairment: minor changes to speed, OR changes speed w/ significant deviations, deviates 10-15 in, OR  Changes speed , but loses balance & recovers   (0) Severe impairment: cannot change speed, deviates >15 in, or loses balance & needs assist  3. Gait with horizontal head turns  = 3   (3) Normal: no change in gait, deviates <6 in   (2) Mild impairment: slight change in speed, deviates 6-10 in, OR uses A.D.   (1) Moderate impairment: moderate change in speed, deviates 10-15 in   (0) Severe impairment: severe disruption of gait, deviates >15in  4. Gait with vertical head turns = 3   (3) Normal: no change in gait, deviates <6 in   (2) Mild impairment: slight change in speed, deviates 6-10 in OR uses A.D.   (1) Moderate impairment: moderate change in speed, deviates 10-15 in   (0) Severe impairment: severe disruption of gait, deviates >15 in  5. Gait with pivot turns = 3   (3) Normal: performs safely in 3 sec, no LOB   (2) Mild impairment: performs in >3 sec & no LOB, OR turns safely & requires several steps to regain LOB   (1) Moderate impairment: turns slow, OR requires several small steps for balance following turn & stop   (0) Severe impairment: cannot turn safely, needs assist  6. Step over obstacle = 3   (3) Normal: steps over 2 stacked boxes w/o change in speed or LOB   (2) Mild impairment: able to step over 1 box w/o change in speed or LOB   (1) Moderate impairment: steps over 1 box but must slow down, may  require VC   (0) Severe impairment: cannot perform w/o assist  7. Gait with Narrow BRANDY = 2   (3) Normal: 10 steps no staggering   (2) Mild impairment: 7-9 steps   (1) Moderate impairment: 4-7 steps   (0) Severe impairment: < 4 steps or cannot perform w/o assist  8. Gait with eyes closed = 3   (3) Normal: < 7 sec, no A.D., no LOB, normal gait pattern, deviates <6 in   (2) Mild impairment: 7.1-9 sec, mild gait deviations, deviates 6-10 in   (1) Moderate impairment: > 9 sec, abnormal pattern, LOB, deviates 10-15 in   (0) Severe impairment: cannot perform w/o assist, LOB, deviates >15in  9. Ambulating Backwards = 3   (3) Normal: no A.D., no LOB, normal gait pattern, deviates <6in   (2) Mild impairment: uses A.D., slower speed, mild gait deviations, deviates 6-10 in   (1) Moderate impairment: slow speed, abnormal gait pattern, LOB, deviates 10-15 in   (0) Severe impairment: severe gait deviations or LOB, deviates >15in  10. Steps = 2   (3) Normal: alternating feet, no rail   (2) Mild Impairment: alternating feet, uses rail   (1) Moderate impairment: step-to, uses rail   (0) Severe impairment: cannot perform safely    Score 28/30     Score:   <22/30 fall risk   <20/30 fall risk in older adults   <18/30 fall risk in Parkinsons         Functional Mobility (Bed mobility, transfers)  Bed mobility: I  Supine to sit: I  Sit to supine: I  Rolling: I  Transfers to bed: I  Sit to stand:  I  Stand pivot:  I  Stairs: I      CMS Impairment/Limitation/Restriction for FOTO Stroke lower extremity  Survey    Therapist reviewed FOTO scores for Sarbjit Brewer SrYuridia on 3/23/2022.   FOTO documents entered into [employer] - see Media section.    Limitation Score: 30%         TREATMENT   Treatment Time In: 3:30 PM  Treatment Time Out: 4:13 PM  Total Treatment time separate from Evaluation: 0 minutes    Home Exercises and Patient Education Provided    Education provided:   - walking program  - role of PT    Written Home Exercises Provided:  yes.  Exercises were reviewed and Sarbjit was able to demonstrate them prior to the end of the session.  Sarbjit demonstrated good  understanding of the education provided.     See EMR under Patient Instructions for exercises provided 3/23/2022.    Assessment   Sarbjit is a 56 y.o. male referred to outpatient Physical Therapy with a medical diagnosis of CVA. Patient presents with good strength and balance assessment with Functional Gait Assessment indicates patient is at a low risk of falls.  Pt's BP did increase post assessment but did return to baseline when reassessed after speech therapy session post PT evaluation.  No further physical therapy intervention is indicated at this time, patient in agreement, feels that memory is primary deficit.     Medical Necessity is demonstrated by the following  History  Co-morbidities and personal factors that may impact the plan of care Co-morbidities:   CHF, history of CVA and HTN    Personal Factors:   no deficits     moderate   Examination  Body Structures and Functions, activity limitations and participation restrictions that may impact the plan of care Body Regions:   lower extremities  trunk    Body Systems:    gross symmetry  ROM  strength  gross coordinated movement  balance  gait  transfers  transitions  motor control    Participation Restrictions:   none    Activity limitations:   Learning and applying knowledge  solving problems    General Tasks and Commands  undertaking multiple tasks    Communication  no deficits    Mobility  driving (bike, car, motorcycle)    Self care  no deficits    Domestic Life  no deficits    Interactions/Relationships  complex interpersonal interactions    Life Areas  employment    Community and Social Life  community life  recreation and leisure         moderate   Clinical Presentation stable and uncomplicated moderate   Decision Making/ Complexity Score: moderate       Plan   Plan of care Certification: 3/23/2022 to 3/23/2022.    No further PT  intervention indicated at this time.    Apolonia Key, PT

## 2022-03-23 NOTE — PROGRESS NOTES
[facility]  Speech Therapy Treatment Note- Neurological Rehabilitation  Date: 3/23/2022     Name: Sarbjit Brewer Sr.   MRN: [id number]   Therapy Diagnosis:   Encounter Diagnosis   Name Primary?    Cognitive communication deficit Yes      Physician: Dinesh Chavez DO  Physician Orders: EST SPEECH THERAPY - 45 MINS  Medical Diagnosis: Z86.73 (ICD-10-CM) - Personal history of transient ischemic attack (TIA), and cerebral infarction without residual deficits    Visit #/ Visits Authorized: 5 / 16 (plus eval)  Date of Evaluation:  2/9/2022   Insurance Authorization Period: 02/16/22-04/16/22  Plan of Care Expiration Date: 2/9/2022 to 4/6/2022  Extended Plan of Care:  n/a   Progress Note: due 4/21/22    Visits Cancelled: 0  Visits No Show: 0    Time In: 4:19 pm   Time Out: 5:00 pm   Total Billable Time:  41 min     Precautions: Standard  Subjective:   Patient reports: that his blood pressure usually goes up when he exercises. Reports that he can't wait to go to his hearing test tomorrow. Did not get any chances to use memory notebook because he forgot his phone. Endorses that he likes to fish. Requested for Physical therapist to take blood pressure reading prior to leaving the clinic. Patient's wife able to r/s 4/13 speech therapy appointment from 4:15 to 1:15.   He was compliant to home exercise program.   Response to previous treatment: positive    Pain Scale:  0/10 on a Visual Analog Scale currently.   Pain Location: n/a  Objective:   UNTIMED  Procedure Min.   Speech- Language- Voice Therapy 41     Total Timed Units: 0  Total Untimed Units: 1  Charges Billed/Number of units: 1    Short Term Goals: (4 weeks) Current Progress:   1. Given min cues. patient will sustain attention for 1 minute to complete structured activity to improve sustained attention. Goal Met / Discontinue    2. Patient will recall and utilize 3/4 memory retrieval strategies over 3 sessions given in  cues.                          Progressing/ Not Met 3/23/2022  Patient able to recall 2/4 memory strategies independently; 4/4 given minimal cues    Discussed memory strategies and provided an example of functional implementation of each:  Write: make a list or utilize a calendar   Repeat:  Repeat information out loud or internally   Associate:  Connect desired information with something you already recall. For example, when in the grocery, group items by meal or taste (sweet vs salty) or by category (vegetables, cold items, etc).  Picture: try to mentally picture what you are trying to remember  Mental Rehearsal: think through how you're going to complete a task before completing it.             3. Patient will complete simple immediate memory tasks (auditory and/or visual) using memory retrieval strategies with 80% accuracy given min cues to improve short-term memory.      Progressing/ Not Met 3/23/2022   Simple paragraph (2 sentences): with 75% accuracy independently; repetition required to achieve 100% accuracy         4. Patient will complete simple visual scanning tasks with 80% acc to improve visual scanning for reading.    Progressing/ Not Met 3/23/2022   Patient completed selective attention task: 1 target letter cancellation task with 50% accuracy. Extended time required. Discussed nature of task and how attention is multifactorially affected. Discussed strategy use to improve performance(i.e., dragging finger across each line to ensure attention to detail and self talk) and patients performance increased to 80% on regarding-attempt (extended time continued to be beneficial).     5. The patient will complete 1 unit auditory comprehension tasks (I.e., answer 1 unit yes/no questions/ follow 1 unit commands) with 80% accuracy given min cues to improve auditory comprehension.  Goal Met / Discontinue     6. Patient will answer personal orientation questions with 80% accuracy given minimum cues to improve  orientation skills.     Goal Met / Discontinue    7. Patient will complete selective attention tasks with 90% accuracy given min cues to improve attention skills.    Progressing/ Not Met 3/23/2022 Patient completed selective attention task: 1 target letter cancellation task with 50% accuracy. Extended time required. Discussed nature of task and how attention is multifactorially affected. Discussed strategy use to improve performance(i.e., dragging finger across each line to ensure attention to detail and self talk) and patients performance increased to 80% on regarding-attempt (extended time continued to be beneficial).     8. Patient will complete simple planning/sequencing tasks with 90% accuracy given minimum cues to improve executive functioning skills.    Progressing/ Not Met 3/23/2022     Not formally addressed            Patient Education/Response:   Extensive education provided regarding: purpose of speech therapy, plan of care, and role of speech therapist in plan of care. Discussed patient's hobbies (fishing previously; owning home aquarium, etc) and current limitations. He reports that he would like to potentially identify volunteer opportunities to stay active. Extensive education provided regarding: memory strategies/attention strategies/general cognitive strategies in addition to explanation of how these strategies are trained within skilled speech therapy sessions. Patient and wife reported understanding of all discussed.    Home program established: yes-instructed to utilize memory strategies during functional communciation/tasks   Exercises were reviewed and Sarbjit was able to demonstrate them prior to the end of the session.  Sarbjit demonstrated good  understanding of the education provided.     See Electronic Medical Record under Patient Instructions for exercises provided throughout therapy.  Assessment:   Sarbjit is progressing well towards his goals. Extended time required for selective attention  task though able to complete fairly accurately given cues to implement attention strategies to encourage attention to detail. Steady accuracy with auditory recall tasks.    Current goals remain appropriate. Goals to be updated as necessary.    Patient prognosis is Good. Patient will continue to benefit from skilled outpatient speech and language therapy to address the deficits listed in the problem list on initial evaluation, provide patient/family education and to maximize patient's level of independence in the home and community environment.   Medical necessity is demonstrated by the following IMPAIRMENTS:  Reduced cognitive-linguistic and speech skills negatively impact patient's ability to act efficiently and independently in emergent situations.    Barriers to Therapy: none  Patient's spiritual, cultural and educational needs considered and patient agreeable to plan of care and goals.  Plan:   Continue Plan of Care with focus on improving cognitive-communication skills.        NEO Crocker, CCC-SLP  Speech Language Pathologist   3/23/2022

## 2022-03-24 ENCOUNTER — CLINICAL SUPPORT (OUTPATIENT)
Dept: AUDIOLOGY | Facility: CLINIC | Age: 57
End: 2022-03-24
Payer: MEDICAID

## 2022-03-24 DIAGNOSIS — H90.3 SENSORINEURAL HEARING LOSS (SNHL), BILATERAL: Primary | ICD-10-CM

## 2022-03-24 PROCEDURE — 92567 TYMPANOMETRY: CPT | Mod: PBBFAC

## 2022-03-24 PROCEDURE — 92557 COMPREHENSIVE HEARING TEST: CPT | Mod: PBBFAC

## 2022-03-24 NOTE — PROGRESS NOTES
Sarbjit Brewer Sr. was seen today in the clinic for an audiologic evaluation.  Patient's main complaint was decreased hearing in both ears. Mr. Brewer reported a history of occupational noise exposure. Mr. Brewer denied tinnitus, otalgia, aural fullness and true vertigo.    Tympanometry revealed Type A in the right ear and Type A in the left ear.     Audiogram results revealed a mild to profound sensorineural hearing loss (SNHL) in the right ear and a mild to profound SNHL in the left ear.      Speech reception thresholds were noted at 25 dBHL in the right ear and 25 dBHL in the left ear.    Speech discrimination scores were 92% in the right ear and 88% in the left ear.    Today's results were discussed with the patient and hearing aids were recommended bilaterally. Mr. Brewer is interested in a hearing aid consultation and would like to utilize any insurance benefits he has for hearing aids through [employer]. At this time, our clinic is not able to utilize insurance benefits for hearing aid coverage. I recommended that Mr. Brewer contact his insurance provider to assess his benefits and request a list of providers who accept coverage for hearing aids through his plan. Mr. Brewer was also provided information for a local clinic (i.e. [facility]) that utilizes [employer] and financial assistance programs to obtain hearing aids. Mr. Brewer expressed understanding of today's results and the plan.    Recommendations:  1. Otologic evaluation  2. Hearing aid consultation  3. Annual audiogram or sooner if change perceived  4. Hearing protection in noise

## 2022-03-28 ENCOUNTER — CLINICAL SUPPORT (OUTPATIENT)
Dept: REHABILITATION | Facility: HOSPITAL | Age: 57
End: 2022-03-28
Payer: MEDICAID

## 2022-03-28 DIAGNOSIS — R41.841 COGNITIVE COMMUNICATION DEFICIT: Primary | ICD-10-CM

## 2022-03-28 PROCEDURE — 92507 TX SP LANG VOICE COMM INDIV: CPT | Mod: PN | Performed by: SPEECH-LANGUAGE PATHOLOGIST

## 2022-03-28 NOTE — PROGRESS NOTES
"[facility]  Speech Therapy Treatment Note- Neurological Rehabilitation  Date: 3/28/2022     Name: Sarbjit Brewer Sr.   MRN: [id number]   Therapy Diagnosis:   Encounter Diagnosis   Name Primary?    Cognitive communication deficit Yes     Physician: Dinesh Chavez DO  Physician Orders: EST SPEECH THERAPY - 45 MINS  Medical Diagnosis: Z86.73 (ICD-10-CM) - Personal history of transient ischemic attack (TIA), and cerebral infarction without residual deficits    Visit #/ Visits Authorized: 6 / 16 (plus eval)  Date of Evaluation:  2/9/2022   Insurance Authorization Period: 02/16/22-04/16/22  Plan of Care Expiration Date: 2/9/2022 to 4/6/2022  Extended Plan of Care:  n/a   Progress Note: due 4/21/22    Visits Cancelled: 0  Visits No Show: 0    Time In: 4:35 pm (late patient arrival)  Time Out: 5:00 pm   Total Billable Time: 25 min     Precautions: Standard  Subjective:   Patient reports: Patient arrived late to therapy today. Patient pleasant upon arrival. Patient reported the hearing assessment revealed a necessity for hearing aids; see results below. Patient displeased with results of audiogram; "I don't want hearing aids". Patient reported needing an eye exam; SLP recommended conversing with primary care physician for a referral. Patient reported increased blood pressure, "I walk from here to the car and it shoots up"; SLP recommended speaking with primary care physician to discuss medication dosage. Post-therapy patient returned and inquired to speak with SLP regarding when next session is booked.       Jennifer Thornton, CCC-A - Audiogram results revealed a mild to profound sensorineural hearing loss (SNHL) in the right ear and a mild to  profound SNHL in the left ear.      He was compliant to home exercise program.   Response to previous treatment: positive    Pain Scale:  0/10 on a Visual Analog Scale currently.   Pain Location: n/a  Objective:   UNTIMED  Procedure Min.   Speech- Language- " Voice Therapy 41       Total Timed Units: 0  Total Untimed Units: 1  Charges Billed/Number of units: 1    Short Term Goals: (4 weeks) Current Progress:   1. Given min cues. patient will sustain attention for 1 minute to complete structured activity to improve sustained attention. Goal Met / Discontinue    2. Patient will recall and utilize 3/4 memory retrieval strategies over 3 sessions given in cues.                            Progressing/ Not Met 3/28/2022  Patient able to recall 2/4 memory strategies independently; 4/4 given minimal cues. Reviewed memory strategies.    Discussed memory strategies and provided an example of functional implementation of each:  Write: make a list or utilize a calendar   Repeat:  Repeat information out loud or internally   Associate:  Connect desired information with something you already recall. For example, when in the grocery, group items by meal or taste (sweet vs salty) or by category (vegetables, cold items, etc).  Picture: try to mentally picture what you are trying to remember  Mental Rehearsal: think through how you're going to complete a task before completing it.    3. Patient will complete simple immediate memory tasks (auditory and/or visual) using memory retrieval strategies with 80% accuracy given min cues to improve short-term memory.    Progressing/ Not Met 3/28/2022   Simple paragraph (2 sentences): with 60% accuracy independently; repetitions and binary choices required to achieve 80% accuracy     4. Patient will complete simple visual scanning tasks with 80% acc to improve visual scanning for reading.    Progressing/ Not Met 3/28/2022   Patient completed selective attention task: 1 target symbol cancellation task with 43% accuracy. Extended time required.    Discussed nature of task and how attention is incorporated. Discussed strategy use to improve performance(i.e., dragging finger across each line to ensure attention to detail) and patients performance  "increased to 67% on regarding-attempt (extended time continued to be beneficial).     5. The patient will complete 1 unit auditory comprehension tasks (I.e., answer 1 unit yes/no questions/ follow 1 unit commands) with 80% accuracy given min cues to improve auditory comprehension.  Goal Met / Discontinue     6. Patient will answer personal orientation questions with 80% accuracy given minimum cues to improve orientation skills.     Goal Met / Discontinue    7. Patient will complete selective attention tasks with 90% accuracy given min cues to improve attention skills.    Progressing/ Not Met 3/28/2022 Patient completed selective attention task: 1 target symbol cancellation task with 43% accuracy. Extended time required.    Discussed nature of task and how attention is incorporated. Discussed strategy use to improve performance(i.e., dragging finger across each line to ensure attention to detail) and patients performance increased to 67% on regarding-attempt (extended time continued to be beneficial).    8. Patient will complete simple planning/sequencing taskst with 90% accuracy given minimum cues to improve executive functioning skills.    Progressing/ Not Met 3/28/2022   Not formally addressed            Patient Education/Response:   Extensive education provided regarding: memory strategies and attention strategies. Discussed scheduling. Patient reported needing an eye exam; SLP recommended conversing with primary care physician for a referral. Patient reported increased blood pressure, "I walk from here to the car and it shoots up"; SLP recommended speaking with primary care physician to discuss medication dosage. Patient reported understanding of all discussed.     Jennifer Thornton, CCC-A - Audiogram results revealed a mild to profound sensorineural hearing loss (SNHL) in the right ear and a mild to  profound SNHL in the left ear.      Home program established: yes-instructed to utilize memory strategies " during functional communciation/tasks   Exercises were reviewed and Sarbjit was able to demonstrate them prior to the end of the session.  Sarbjit demonstrated good  understanding of the education provided.     See Electronic Medical Record under Patient Instructions for exercises provided throughout therapy.  Assessment:   Sarbjit is progressing well towards his goals.  Patient arrived late to therapy today. Patient pleasant upon arrival. Patient reported the hearing assessment revealed a necessity for hearing aids; see results below. Extended time and moderate cues to engage patient in utilizing attention strategies given for visual scanning task; patient able to complete task accurately. Steady accuracy with auditory recall tasks given repetitions and binary choices. Post-therapy patient returned and inquired to speak with SLP regarding when next session is booked. Current goals remain appropriate. Goals to be updated as necessary.    Patient prognosis is Good. Patient will continue to benefit from skilled outpatient speech and language therapy to address the deficits listed in the problem list on initial evaluation, provide patient/family education and to maximize patient's level of independence in the home and community environment.   Medical necessity is demonstrated by the following IMPAIRMENTS:  Reduced cognitive-linguistic and speech skills negatively impact patient's ability to act efficiently and independently in emergent situations.    Barriers to Therapy: none  Patient's spiritual, cultural and educational needs considered and patient agreeable to plan of care and goals.  Plan:   Continue Plan of Care with focus on improving cognitive-communication skills.        RIGO Mccollum.  [facility] Speech-Language Pathology   3/28/2022

## 2022-03-30 ENCOUNTER — CLINICAL SUPPORT (OUTPATIENT)
Dept: REHABILITATION | Facility: HOSPITAL | Age: 57
End: 2022-03-30
Payer: MEDICAID

## 2022-03-30 DIAGNOSIS — R41.841 COGNITIVE COMMUNICATION DEFICIT: Primary | ICD-10-CM

## 2022-03-30 PROCEDURE — 92507 TX SP LANG VOICE COMM INDIV: CPT | Mod: PN | Performed by: SPEECH-LANGUAGE PATHOLOGIST

## 2022-03-30 NOTE — PROGRESS NOTES
[facility]  Speech Therapy Treatment Note- Neurological Rehabilitation  Date: 3/30/2022     Name: Sarbjit Breewr Sr.   MRN: [id number]   Therapy Diagnosis:   Encounter Diagnosis   Name Primary?    Cognitive communication deficit Yes     Physician: Dinesh Chavez DO  Physician Orders: EST SPEECH THERAPY - 45 MINS  Medical Diagnosis: Z86.73 (ICD-10-CM) - Personal history of transient ischemic attack (TIA), and cerebral infarction without residual deficits    Visit #/ Visits Authorized: 7 / 16 (plus eval)  Date of Evaluation:  2/9/2022   Insurance Authorization Period: 02/16/22-04/16/22  Plan of Care Expiration Date: 2/9/2022 to 4/6/2022  Extended Plan of Care:  n/a   Progress Note: due 4/21/22    Visits Cancelled: 0  Visits No Show: 0    Time In: 4:15 pm   Time Out: 4:59 pm   Total Billable Time: 44 min      Precautions: Standard  Subjective:   Patient reports: that he hopes the weather does not get too bad tonight.   States his wife has to start work next week and might need to change appointments to morning times - will change next week once she knows her schedule.   He was compliant to home exercise program.   Response to previous treatment: positive    Pain Scale:  0/10 on a Visual Analog Scale currently.   Pain Location: n/a  Objective:   UNTIMED  Procedure Min.   Speech- Language- Voice Therapy 44      Total Timed Units: 0  Total Untimed Units: 1  Charges Billed/Number of units: 1    Short Term Goals: (4 weeks) Current Progress:   1. Given min cues. patient will sustain attention for 1 minute to complete structured activity to improve sustained attention. Goal Met / Discontinue    2. Patient will recall and utilize 3/4 memory retrieval strategies over 3 sessions given in cues.                            Progressing/ Not Met 3/30/2022  Discussed memory strategies and provided an example of functional implementation of each:  Write: make a list or utilize a calendar   Repeat:  Repeat  information out loud or internally   Associate:  Connect desired information with something you already recall. For example, when in the grocery, group items by meal or taste (sweet vs salty) or by category (vegetables, cold items, etc).  Picture: try to mentally picture what you are trying to remember  Mental Rehearsal: think through how you're going to complete a task before completing it.    3. Patient will complete simple immediate memory tasks (auditory and/or visual) using memory retrieval strategies with 80% accuracy given min cues to improve short-term memory.    Progressing/ Not Met 3/30/2022   Moderate paragraph (4 sentences): with 40% accuracy independently; max cues (ie repetitions ) required to achieve 80% accuracy      Moderate paragraph (2-3 sentences): with 60% accuracy independently; repetitions and binary choices required to achieve 100% accuracy      Constant therapy picture N-Back level 1: 82% accuracy independently. Accuracy increased to 100% once task progressed.    Constant therapy picture N-Back level 2: 56% accuracy independently       Constant therapy remember the same card level 2: 60% accuracy independently. On 2nd attempt once task was understood better by patient and once implementing memory/attention strategy (self talk aloud), accuracy increased to 100% independently     4. Patient will complete simple visual scanning tasks with 80% acc to improve visual scanning for reading.    Progressing/ Not Met 3/30/2022   Patient completed selective attention task: 1 target symbol cancellation task with 70% accuracy on first trial; 88% accuracy on 2nd trial given strategy use instruction (revealing 1 line at a time by covering up lines below); 100% accuracy given direct cues.         5. The patient will complete 1 unit auditory comprehension tasks (I.e., answer 1 unit yes/no questions/ follow 1 unit commands) with 80% accuracy given min cues to improve auditory comprehension.  Goal Met /  Discontinue     6. Patient will answer personal orientation questions with 80% accuracy given minimum cues to improve orientation skills.     Goal Met / Discontinue    7. Patient will complete selective attention tasks with 90% accuracy given min cues to improve attention skills.    Progressing/ Not Met 3/30/2022 Patient completed selective attention task: 1 target symbol cancellation task with 70% accuracy on first trial; 88% accuracy on 2nd trial given strategy use instruction (revealing 1 line at a time by covering up lines below); 100% accuracy given direct cues. Extended time required. Auditory and visual distractions present throughout task without need for redirection    Constant therapy remember the same card level 2: 60% accuracy independently. On 2nd attempt once task was understood better by patient and once implementing memory/attention strategy (self talk aloud), accuracy increased to 100% independently        8. Patient will complete simple planning/sequencing taskst with 90% accuracy given minimum cues to improve executive functioning skills.    Progressing/ Not Met 3/30/2022   Not formally addressed            Patient Education/Response:   Extensive education provided regarding: memory strategies and attention strategies. Patient reported understanding of all discussed.  Home program established: yes-instructed to utilize memory strategies during functional communciation/tasks   Exercises were reviewed and Sarbjti was able to demonstrate them prior to the end of the session.  Sarbjit demonstrated good  understanding of the education provided.     See Electronic Medical Record under Patient Instructions for exercises provided throughout therapy.  Assessment:   Sarbjit is progressing well towards his goals.  Patient with improved performance on both memory and attention tasks when implementing memory/attention strategies. No redirection cues required throughout session. Discussed purpose of tasks at length.   Current goals remain appropriate. Goals to be updated as necessary.  Patient prognosis is Good. Patient will continue to benefit from skilled outpatient speech and language therapy to address the deficits listed in the problem list on initial evaluation, provide patient/family education and to maximize patient's level of independence in the home and community environment.   Medical necessity is demonstrated by the following IMPAIRMENTS:  Reduced cognitive-linguistic and speech skills negatively impact patient's ability to act efficiently and independently in emergent situations.    Barriers to Therapy: none  Patient's spiritual, cultural and educational needs considered and patient agreeable to plan of care and goals.  Plan:   Continue Plan of Care with focus on improving cognitive-communication skills.        NEO Crocker, CCC-SLP  Speech Language Pathologist   3/30/2022

## 2022-04-08 ENCOUNTER — CLINICAL SUPPORT (OUTPATIENT)
Dept: REHABILITATION | Facility: HOSPITAL | Age: 57
End: 2022-04-08
Payer: MEDICAID

## 2022-04-08 DIAGNOSIS — R41.841 COGNITIVE COMMUNICATION DEFICIT: Primary | ICD-10-CM

## 2022-04-08 PROCEDURE — 92507 TX SP LANG VOICE COMM INDIV: CPT | Mod: PN | Performed by: SPEECH-LANGUAGE PATHOLOGIST

## 2022-04-08 NOTE — PROGRESS NOTES
"[facility]  Speech Therapy Treatment Note- Neurological Rehabilitation  Date: 4/8/2022     Name: Sarbjit Brewer Sr.   MRN: [id number]   Therapy Diagnosis:   Encounter Diagnosis   Name Primary?    Cognitive communication deficit Yes   Physician: Dinesh Chavez DO  Physician Orders: EST SPEECH THERAPY - 45 MINS  Medical Diagnosis: Z86.73 (ICD-10-CM) - Personal history of transient ischemic attack (TIA), and cerebral infarction without residual deficits    Visit #/ Visits Authorized: 7 / 16 (plus eval)  Date of Evaluation:  2/9/2022   Insurance Authorization Period: 02/16/22-04/16/22  Plan of Care Expiration Date: 2/9/2022 to 4/6/2022   Extended Plan of Care:  n/a   Progress Note: due 4/21/22    Visits Cancelled: 0  Visits No Show: 0    Time In: 1:18 pm   Time Out: 2:00 pm   Total Billable Time: 42 min      Precautions: Standard  Subjective:   Patient reports: Patient pleasant upon arrival. Patient inquired about weekend plans. Patient denied establishing plan for hearing aids. Patient denied an appointment with neurophthalmology. Patient reported "Feeling great, I'm feeling a lot more alert". Reported trouble sleeping has not started taking prescribed medication yet.    He was compliant to home exercise program.   Response to previous treatment: positive    Pain Scale:  0/10 on a Visual Analog Scale currently.  Pain Location: n/a   Objective:   UNTIMED  Procedure Min.   Speech- Language- Voice Therapy 42     Total Timed Units: 0  Total Untimed Units: 1  Charges Billed/Number of units: 1    Short Term Goals: (4 weeks) Current Progress:   1. Given min cues. patient will sustain attention for 1 minute to complete structured activity to improve sustained attention. Goal Met / Discontinue    2. Patient will recall and utilize 3/4 memory retrieval strategies over 3 sessions given in cues.    Progressing/ Not Met 4/8/2022  Not formally addressed      3. Patient will complete simple immediate memory tasks " (auditory and/or visual) using memory retrieval strategies with 80% accuracy given min cues to improve short-term memory.    Progressing/ Not Met 4/8/2022  Not formally addressed      4. Patient will complete simple visual scanning tasks with 80% acc to improve visual scanning for reading.      Progressing/ Not Met 4/8/2022   Not formally addressed   .   5. The patient will complete 1 unit auditory comprehension tasks (I.e., answer 1 unit yes/no questions/ follow 1 unit commands) with 80% accuracy given min cues to improve auditory comprehension.  Goal Met / Discontinue     6. Patient will answer personal orientation questions with 80% accuracy given minimum cues to improve orientation skills.     Goal Met / Discontinue    7. Patient will complete selective attention tasks with 90% accuracy given min cues to improve attention skills.    Progressing/ Not Met 4/8/2022 Not formally addressed      8. Patient will complete simple planning/sequencing taskst with 90% accuracy given minimum cues to improve executive functioning skills.    Progressing/ Not Met 4/8/2022   Not formally addressed       Cognitive Linguistic Quick Test (CLQT) was administered to quickly assess the patient's overall cognitive-linguistic function and to determine cognitive strengths and weaknesses.        Cognitive Domain Score   Severity Rating    Attention    33 / 215  severe  Memory    80 / 185  severe  Executive Functions   5 / 40   severe  Language    22 / 37   moderate  Visuospatial Skills    16 / 105  severe  Clock Drawing    4 / 13   severe    Composite Severity Rating  1.2 / 4.0 severe    Task Score Ages 18-69 Cut Score Below?   Personal Facts  7  8 below average   Symbol Cancellation 2  11 below average   Confrontational naming  10  10 average   Clock drawing  4  12 below average   Story Retelling 3  6 below average   Symbol Trails 1  9 below average   Generative Naming 2  5 below average   Design Memory 2  5 below average   Mazes 0  7  "below average   Design Generation  2  6 below average     Description: Patient presents with a severe cognitive-linguistic impairment characterized by deficits in attention, memory, executive functioning, language, and visuospatial skills. Of note, patient reported difficulties with vision across all visual tasks.Patient answered personal orientation questions with 87% accuracy, indicating deficits in personal orientation skills. Patient canceled pre-determined symbols with 18% accuracy, indicating deficits in selective attention. Patient named common objects with 100% accuracy, indicating adequate confrontational naming skills. Patient completed the clock drawing task with 33% accuracy, indicating deficits in planning/organization and self-monitoring skills. Patient recalled 22% (4/18) details from a paragraph auditorily indicating deficits in immediate recall. Patient answered 4/6 (67% accuracy) yes/no questions about the short paragraph indicating deficits in delayed auditory comprehension skills. Patient completed a symbol trails task (alternating shapes and sizes) with 11% accuracy, indicating deficits in divided attention and comprehending/following complex directions. Patient named 10 items during generative naming task (9 animals and 1 "m" words), indicating deficits word finding skills. Patient recalled 2 out of 6 designs in a design memory task, indicating deficits in immediate memory recall. Patient completed maze tasks with 0% accuracy indicating deficits in planning/organization and visuospatial skills. Patient demonstrated deficits in design generation (2 designs drawn by patient) indicating deficits in mental flexibility. Patient was alert and cooperative throughout the session.     Patient Education/Response:   Discussed plan of care - goals to be added. Discussed progress made since evaluation. Discussed CLQT assessment results. Patient reported understanding of all discussed.    Home program " established: yes-instructed to utilize memory strategies during functional communciation/tasks   Exercises were reviewed and Sarbjit was able to demonstrate them prior to the end of the session.  Sarbjit demonstrated good  understanding of the education provided.     See Electronic Medical Record under Patient Instructions for exercises provided throughout therapy.  Assessment:   Sarbjit is progressing well towards his goals.  Patient presents with a severe cognitive-linguistic impairment characterized by deficits in attention, memory, executive functioning, language, and visuospatial skills. Patient will continue to benefit from skilled speech and language therapy. Current goals remain appropriate. Goals to be updated as necessary.    Patient prognosis is Good. Patient will continue to benefit from skilled outpatient speech and language therapy to address the deficits listed in the problem list on initial evaluation, provide patient/family education and to maximize patient's level of independence in the home and community environment.   Medical necessity is demonstrated by the following IMPAIRMENTS:  Reduced cognitive-linguistic and speech skills negatively impact patient's ability to act efficiently and independently in emergent situations.    Barriers to Therapy: none  Patient's spiritual, cultural and educational needs considered and patient agreeable to plan of care and goals.  Plan:   See updated plan of care.    DARIAN Mccollum Speech-Language Pathology   4/8/2022

## 2022-04-11 ENCOUNTER — CLINICAL SUPPORT (OUTPATIENT)
Dept: REHABILITATION | Facility: HOSPITAL | Age: 57
End: 2022-04-11
Payer: MEDICAID

## 2022-04-11 DIAGNOSIS — R41.841 COGNITIVE COMMUNICATION DEFICIT: Primary | ICD-10-CM

## 2022-04-11 PROCEDURE — 92507 TX SP LANG VOICE COMM INDIV: CPT | Mod: PN | Performed by: SPEECH-LANGUAGE PATHOLOGIST

## 2022-04-11 NOTE — PROGRESS NOTES
[facility]  Speech Therapy Treatment Note- Neurological Rehabilitation  Date: 4/11/2022     Name: Sarbjit Brewer Sr.   MRN: [id number]   Therapy Diagnosis:   Encounter Diagnosis   Name Primary?    Cognitive communication deficit Yes     Physician: Dinesh Chavez DO  Physician Orders: EST SPEECH THERAPY - 45 MINS  Medical Diagnosis: Z86.73 (ICD-10-CM) - Personal history of transient ischemic attack (TIA), and cerebral infarction without residual deficits    Visit #/ Visits Authorized: 8 / 16 (plus eval)  Date of Evaluation:  2/9/2022   Insurance Authorization Period: 02/16/22-04/16/22  Plan of Care Expiration Date: 4/8/2022 to 6/3/2022  Extended Plan of Care:  n/a   Progress Note: due 4/21/22   Visits Cancelled: 0  Visits No Show: 0    Time In: 11:02 am   Time Out: 11:45 am   Total Billable Time: 43 min      Precautions: Standard  Subjective:   Patient reports: Patient pleasant upon arrival. Patient denied appointment with neurophthalmology.     He was compliant to home exercise program.   Response to previous treatment: positive    Pain Scale:  0/10 on a Visual Analog Scale currently.   Pain Location: n/a   Objective:   UNTIMED  Procedure Min.   Speech- Language- Voice Therapy 43     Total Timed Units: 0  Total Untimed Units: 1  Charges Billed/Number of units: 1    Short Term Goals: 4 weeks  1. Patient will recall and utilize 3/4 memory retrieval strategies over 3 sessions given in cues.    Progressing/ Not Met 4/11/2022 Reviewed memory strategies (WRAP).  Memory Strategies:   Write: make a list or utilize a calendar   Repeat:  Repeat information out loud or internally   Associate:  Connect desired information with something you already recall. For example, when in the grocery, group items by meal or taste (sweet vs salty) or by category (vegetables, cold items, etc).  Picture: try to mentally picture what you are trying to remember  Mental Rehearsal: think through how you're going to complete a  task before completing it.     CIERRA Patel., SINAI Gong, & NANO Chavarria (2012). Cognitive rehabilitation manual: Translating evidence-based recommendations into practice. [place], [place]: [facility] [employer]   2. Patient will complete simple immediate memory tasks (auditory and/or visual) using memory retrieval strategies with 80% accuracy given min cues to improve short-term memory.              Progressing/ Not Met 4/11/2022 Patient completed immediate memory tasks using auditory stimuli (simple paragraphs) with 90% accuracy independently; 90% accuracy given binary choices    Patient completed immediate memory tasks using auditory stimuli (moderate paragraphs) with 70% accuracy independently; 90% accuracy given binary choices.    Met x1: 4/11/22   3. Patient will complete simple visual scanning tasks with 80% acc to improve visual scanning for reading.    Progressing/ Not Met 4/11/2022 Not formally addressed      4. Patient will complete selective attention tasks with 90% accuracy given min cues to improve attention skills.    Progressing/ Not Met 4/11/2022 Patient completed selective attention task: Constant Therapy: Remembering The Right Card - Level 1 with 88% accuracy given additional instructions and redirections.   5. Patient will complete simple planning/sequencing taskst with 90% accuracy given minimum cues to improve executive functioning skills.    Progressing/ Not Met 4/11/2022 Not formally addressed    6. Patient will answer multi-unit yes/no questions with 90% accuracy given minimum cues to improve auditory comprehension skills.       Goal Updated 4/11/22 - answer moderate-complex yes/no questions given auditory stimuli to improve comprehension skills                        Progressing/ Not Met 4/11/2022  Patient answered single-unit yes/no questions with 100% accuracy (5/5) independently.    Patient answered two-unit yes/no questions with 90% accuracy (9/10) independently.    Patient answered  three-unit yes/no questions with 100% accuracy (5/5) independently.    Patient answered multi-unit yes/no questions with 100% accuracy (5/5) independently.    Patient answered yes/no questions from a simple paragraph read auditoraly with 100% accuracy (6/6) independently.    Patient answered yes/no questions from a moderate paragraph read auditoraly with 70% accuracy (7/10) independently; 80% accuracy given repetitions and binary choices.   7. Patient will complete 3-4 unit mental manipulation tasks with 90% accuracy given min cues to improve working memory/mental flexibility.                     Progressing/ Not Met 4/11/2022 Patient completed 3-unit simple mental manipulation task (alaphabetically) with 17% accuracy; 67% accuracy given maximum cues - repetitions and additional instructions    Patient completed simple mental manipulation task (opposites) with 100% accuracy (5/5) independently.    Patient completed 3-unit simple mental manipulation task (Largest Number) with 90% accuracy independently; 90% accuracy given repetitions   8. Patient will identify 3 possible volunteer opportunities to increase community engagement within his functional environment.     Progressing/ Not Met 4/11/2022 Not formally addressed        Probe: patient named 14 concrete items during 60 seconds.  Patient Education/Response:   Discussed plan of care - goals added. Discussed CLQT assessment results. Discussed scheduling. Reviewed memory strategies (WRAP). Patient reported understanding of all discussed.     Home program established: yes-instructed to utilize memory strategies during functional communciation/tasks   Exercises were reviewed and Sarbjit was able to demonstrate them prior to the end of the session.  Sarbjit demonstrated good  understanding of the education provided.     See Electronic Medical Record under Patient Instructions for exercises provided throughout therapy.  Assessment:   Sarbjit is progressing well towards his  goals. Reviewed memory strategies. Patient benefited from binary choices during immediate recall tasks; continue plan of care utilizing moderate paragraphs. Patient benefited from additional instructions during selective attention task; patient required redirections. Patient demonstrated adequate auditory comprehension skills given multi-unit yes/no questions; goal updated to answer moderate-complex yes/no questions given auditory stimuli to improve comprehension skills. Slight progress noted for divergent naming of concrete items. Patient required maximum cues - repetitions and additional instructions to complete 3-4 unit mental manipulation tasks. Current goals remain appropriate. Goals to be updated as necessary.    Patient prognosis is Good. Patient will continue to benefit from skilled outpatient speech and language therapy to address the deficits listed in the problem list on initial evaluation, provide patient/family education and to maximize patient's level of independence in the home and community environment.   Medical necessity is demonstrated by the following IMPAIRMENTS:  Reduced cognitive-linguistic and speech skills negatively impact patient's ability to act efficiently and independently in emergent situations.    Barriers to Therapy: none  Patient's spiritual, cultural and educational needs considered and patient agreeable to plan of care and goals.  Plan:   Continue Plan of Care implementing memory/attention strategies into his functional environment.     RIGO Mccollum.  Alf Speech-Language Pathology   4/11/2022     I, Doreen Shelton, certify that I was present in the room directing the student and service delivery and guiding them using my skilled judgement. As the co-signing therapist, I have reviewed the student's documentation, and am responsible for the treatment, assessment and plan.   NEO Crocker, CCC-SLP  Speech Language Pathologist   4/11/2022

## 2022-04-11 NOTE — PLAN OF CARE
"[facility]  Speech Therapy Updated Plan of Care-Neurological Rehabilitation    Date: 4/8/2022   Name: Sarbjit Brewer Sr.  Clinic Number: [id number]    Therapy Diagnosis:   Encounter Diagnosis   Name Primary?    Cognitive communication deficit Yes     Physician: Dinesh Chavez DO    Physician Orders: EST SPEECH THERAPY - 45 MINS  Medical Diagnosis: Z86.73 (ICD-10-CM) - Personal history of transient ischemic attack (TIA), and cerebral infarction without residual deficits  Visit #/ Visits Authorized:  7 /16 (plus evaluation)   Evaluation Date: 2/9/2022  Insurance Authorization Period: 2/16/22 - 4/16/22  Plan of Care Expiration:    6/3/2022  New POC Certification Period:  4/8/2022 to 6/3/2022    Total Visits Received: 7    Precautions:Standard     Subjective     Update: Patient reported "Feeling great, I'm feeling a lot more alert". Though significant progress has been observed since initial evaluation, patient continues to present with a severe cognitive-linguistic communication impairment characterized by deficits in attention, memory, executive functioning, language, and visuospatial skills. Patient requires moderate-maximum cues for tasks regarding memory, attention, and executive functioning skills. Patient participates and is engaged throughout the session. Patient is motivated to master attention, memory, and executive functioning strategies.     Objective     Update: see follow up note dated 4/8/2022    Assessment     Update: Sarbjit Brewer Sr. presents to [facility] status post medical diagnosis of Personal history of transient ischemic attack and cerebral infarction without residual deficits. Demonstrates impairments including limitations as described in the problem list. Positive prognostic factors include patient motivation and spousal support. Negative prognostic factors include none. He presents with a severe cognitive-linguistic impairment characterized by deficits in " attention, memory, executive functioning, language, and visuospatial skills. No barriers to therapy identified. Patient will benefit from skilled, outpatient rehabilitation speech therapy.    Rehab Potential: good   Pt's spiritual, cultural, and educational needs considered and patient agreeable to plan of care and goals.    Education: Plan of Care, role of SLP in care and scheduling/ cancellation policy     Previous Short Term Goals Status: 4 weeks  1. Given min cues. patient will sustain attention for 1 minute to complete structured activity to improve sustained attention. Goal Met / Discontinue    2. Patient will recall and utilize 3/4 memory retrieval strategies over 3 sessions given in cues. Goal Not Met / Continue   3. Patient will complete simple immediate memory tasks (auditory and/or visual) using memory retrieval strategies with 80% accuracy given min cues to improve short-term memory. Goal Not Met / Continue   4. Patient will complete simple visual scanning tasks with 80% acc to improve visual scanning for reading. Goal Not Met / Continue   5. The patient will complete 1 unit auditory comprehension tasks (I.e., answer 1 unit yes/no questions/ follow 1 unit commands) with 80% accuracy given min cues to improve auditory comprehension.  Goal Met / Discontinue     6. Patient will answer personal orientation questions with 80% accuracy given minimum cues to improve orientation skills.     Goal Met / Discontinue    7. Patient will complete selective attention tasks with 90% accuracy given min cues to improve attention skills. Goal Not Met / Continue     8. Patient will complete simple planning/sequencing taskst with 90% accuracy given minimum cues to improve executive functioning skills. Goal Not Met / Continue        New Short Term Goals: 4 weeks  1. Patient will recall and utilize 3/4 memory retrieval strategies over 3 sessions given in cues. Goal Not Met / Continue   2. Patient will complete simple immediate  memory tasks (auditory and/or visual) using memory retrieval strategies with 80% accuracy given min cues to improve short-term memory. Goal Not Met / Continue   3. Patient will complete simple visual scanning tasks with 80% acc to improve visual scanning for reading. Goal Not Met / Continue   4. Patient will complete selective attention tasks with 90% accuracy given min cues to improve attention skills. Goal Not Met / Continue     5. Patient will complete simple planning/sequencing taskst with 90% accuracy given minimum cues to improve executive functioning skills. Goal Not Met / Continue   6. Patient will answer multi-unit yes/no questions with 90% accuracy given minimum cues to improve auditory comprehension skills.     NEW GOAL               NEW GOAL   7. Patient will complete 3-4 unit mental manipulation tasks with 90% accuracy given min cues to improve working memory/mental flexibility.    NEW GOAL               NEW GOAL   8. Patient will identify 3 possible volunteer opportunities to increase community engagement within his functional environment.      NEW GOAL             NEW GOAL           Long Term Goal Status:  8 weeks  1. He will be appropriately oriented to time, date, person, place, city with cues to improve safety and awareness in functional living environment. Goal Met / Discontinue   2. He will improve attention skills to effectively attend to and communicate in simple daily living tasks in functional living environment. Goal Not Met / Continue  3. He will use appropriate memory strategies to schedule and recall weekly activities, express needs and recall names to maintain safety and participate socially in functional living environment. Goal Not Met / Continue  4. He will develop functional reading skills and utilize compensatory strategies to maintain safety during ADL's and read and understand simple adult material independently. Goal Not Met / Continue  5. He will demonstrate appropriate visual  scanning and awareness of objects and during reading activities to maintain safety and awareness in functional living environment. Goal Not Met / Continue  6. He  will develop functional cognitive-linguistic based skills and utilize compensatory strategies to communicate wants and needs effectively to different conversational partners, maintain safety, and participate socially in functional living environment.  NEW GOAL      Goals Previously Met:  Given min cues. patient will sustain attention for 1 minute to complete structured activity to improve sustained attention. Goal Met / Discontinue    The patient will complete 1 unit auditory comprehension tasks (I.e., answer 1 unit yes/no questions/ follow 1 unit commands) with 80% accuracy given min cues to improve auditory comprehension.  Goal Met / Discontinue     Patient will answer personal orientation questions with 80% accuracy given minimum cues to improve orientation skills.     Goal Met / Discontinue      Reasons for Recertification of Therapy:  Deficits in executive functioning, attention, and memory prevent the pt from relaying medically and safety relevant information in a timely manner in a state of emergency.    Plan     Updated Certification Period: 4/8/2022 to 6/3/2022    Recommended Treatment Plan: Patient will participate in the [facility] rehabilitation program for speech therapy 2 times per week to address his Communication and Cognition deficits, to educate patient and their family, and to participate in a home exercise program.     Other recommendations: None     Therapist's Name:  DARIAN Mccollum Speech-Language Pathology   4/8/2022      I, Doreen Shelton, certify that I was present in the room directing the student and service delivery and guiding them using my skilled judgement. As the co-signing therapist, I have reviewed the student's documentation, and am responsible for the treatment, assessment and plan.   Doreen Shelton,  NEO CCC-SLP  Speech Language Pathologist   4/8/2022         I CERTIFY THE NEED FOR THESE SERVICES FURNISHED UNDER THIS PLAN OF TREATMENT AND WHILE UNDER MY CARE      Physician Name: _______________________________    Physician Signature: ____________________________

## 2022-04-13 ENCOUNTER — CLINICAL SUPPORT (OUTPATIENT)
Dept: REHABILITATION | Facility: HOSPITAL | Age: 57
End: 2022-04-13
Payer: MEDICAID

## 2022-04-13 DIAGNOSIS — R41.841 COGNITIVE COMMUNICATION DEFICIT: Primary | ICD-10-CM

## 2022-04-13 PROCEDURE — 92507 TX SP LANG VOICE COMM INDIV: CPT | Mod: PN | Performed by: SPEECH-LANGUAGE PATHOLOGIST

## 2022-04-13 NOTE — PROGRESS NOTES
[facility]  Speech Therapy Treatment Note- Neurological Rehabilitation  Date: 4/13/2022     Name: Sarbjit Brewer Sr.   MRN: [id number]   Therapy Diagnosis:   Encounter Diagnosis   Name Primary?   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 No plans for Easter holidays. Recalled various details from previous session.   He was compliant to home exercise program.   Response to previous treatment: positive    Pain Scale:  0/10 on a Visual Analog Scale currently.   Pain Location: n/a   Objective:   UNTIMED  Procedure Min.   Speech- Language- Voice Therapy 41      Total Timed Units: 0  Total Untimed Units: 1  Charges Billed/Number of units: 1    Short Term Goals: 4 weeks  1. Patient will recall and utilize 3/4 memory retrieval strategies over 3 sessions given in cues.                              Progressing/ Not Met 4/13/2022 Reviewed memory strategies (WRAP). Patient unable to recall memory strategies.    Memory Strategies:   Write: make a list or utilize a calendar   Repeat:  Repeat information out loud or internally   Associate:  Connect desired information with something you already recall. For example, when in the grocery, group items by meal or taste (sweet vs salty) or by category (vegetables, cold items, etc).  Picture: try to mentally  picture what you are trying to remember  Mental Rehearsal: think through how you're going to complete a task before completing it.     CIERRA Patel., SINAI Gong, & NANO Chavarria (2012). Cognitive rehabilitation manual: Translating evidence-based recommendations into practice. [place], [place]: [facility] Publishing   2. Patient will complete simple immediate memory tasks (auditory and/or visual) using memory retrieval strategies with 80% accuracy given min cues to improve short-term memory.    Progressing/ Not Met 4/13/2022 Patient completed immediate memory tasks using auditory stimuli (moderate paragraphs) with 76% accuracy independently; 76% accuracy despite 1 repetition; 92% accuracy achieved given binary choice cue      Met x1: 4/11/22   3. Patient will complete simple visual scanning tasks with 80% acc to improve visual scanning for reading.    Progressing/ Not Met 4/13/2022 Not formally addressed    4. Patient will complete selective attention tasks with 90% accuracy given min cues to improve attention skills.    Progressing/ Not Met 4/13/2022 Patient completed selective attention task: Constant Therapy: Remembering The Right Card - Level 1 with 100% accuracy given additional instructions and redirections.   5. Patient will complete simple planning/sequencing taskst with 90% accuracy given minimum cues to improve executive functioning skills.    Progressing/ Not Met 4/13/2022 Not formally addressed.    6. Patient will answer multi-unit yes/no questions with 90% accuracy given minimum cues to improve auditory comprehension skills.       Goal Updated 4/11/22 - answer moderate-complex yes/no questions given auditory stimuli to improve comprehension skills    Progressing/ Not Met 4/13/2022  Patient answered yes/no questions from a moderate paragraph read auditoraly with 70% accuracy (7/10) independently; 80% accuracy given repetitions and binary choices.   7. Patient will complete 3-4 unit mental manipulation tasks  with 90% accuracy given min cues to improve working memory/mental flexibility.               Progressing/ Not Met 4/13/2022 Not formally addressed     8. Patient will identify 3 possible volunteer opportunities to increase community engagement within his functional environment.     Progressing/ Not Met 4/13/2022 Initiated discussion           Patient Education/Response:   Ongoing skilled education regarding: memory strategies (WRAP) and attention/cognitive strategies in addition to progress. Patient reported understanding of all discussed.     Home program established: yes-instructed to utilize memory strategies during functional communciation/tasks   Exercises were reviewed and Sarbjit was able to demonstrate them prior to the end of the session.  Sarbjit demonstrated good  understanding of the education provided.     See Electronic Medical Record under Patient Instructions for exercises provided throughout therapy.  Assessment:   Sarbjit is progressing well towards his goals. Slightly increased accuracy with immediate recall. Increased accuracy with selective attention.  Good participation and engagement noted. Current goals remain appropriate. Goals to be updated as necessary.    Patient prognosis is Good. Patient will continue to benefit from skilled outpatient speech and language therapy to address the deficits listed in the problem list on initial evaluation, provide patient/family education and to maximize patient's level of independence in the home and community environment.   Medical necessity is demonstrated by the following IMPAIRMENTS:  Reduced cognitive-linguistic and speech skills negatively impact patient's ability to act efficiently and independently in emergent situations.    Barriers to Therapy: none  Patient's spiritual, cultural and educational needs considered and patient agreeable to plan of care and goals.  Plan:   Continue Plan of Care implementing memory/attention strategies into his functional  environment.       NEO Crocker, CCC-SLP  Speech Language Pathologist   4/13/2022

## 2022-04-14 ENCOUNTER — HOSPITAL ENCOUNTER (OUTPATIENT)
Dept: CARDIOLOGY | Facility: HOSPITAL | Age: 57
Discharge: HOME | End: 2022-04-14
Attending: INTERNAL MEDICINE
Payer: MEDICAID

## 2022-04-14 VITALS — WEIGHT: 176 LBS | BODY MASS INDEX: 24.64 KG/M2 | HEIGHT: 71 IN

## 2022-04-14 DIAGNOSIS — I50.20 HFREF (HEART FAILURE WITH REDUCED EJECTION FRACTION): ICD-10-CM

## 2022-04-14 LAB
AORTIC ROOT ANNULUS: 3.83 CM
AORTIC VALVE CUSP SEPERATION: 2.05 CM
AV INDEX (PROSTH): 0.71
AV MEAN GRADIENT: 6 MMHG
AV PEAK GRADIENT: 13 MMHG
AV VALVE AREA: 2.46 CM2
AV VELOCITY RATIO: 0.74
BSA FOR ECHO PROCEDURE: 2 M2
CV ECHO LV RWT: 0.65 CM
DOP CALC AO PEAK VEL: 1.79 M/S
DOP CALC AO VTI: 30.37 CM
DOP CALC LVOT AREA: 3.5 CM2
DOP CALC LVOT DIAMETER: 2.11 CM
DOP CALC LVOT PEAK VEL: 1.33 M/S
DOP CALC LVOT STROKE VOLUME: 74.86 CM3
DOP CALC MV VTI: 19.82 CM
DOP CALCLVOT PEAK VEL VTI: 21.42 CM
E WAVE DECELERATION TIME: 259.79 MSEC
E/A RATIO: 0.77
E/E' RATIO: 7.67 M/S
ECHO LV POSTERIOR WALL: 1.59 CM (ref 0.6–1.1)
EJECTION FRACTION: 60 %
FRACTIONAL SHORTENING: 44 % (ref 28–44)
INTERVENTRICULAR SEPTUM: 1.44 CM (ref 0.6–1.1)
IVRT: 77.07 MSEC
LA MAJOR: 6.32 CM
LA MINOR: 4.89 CM
LA WIDTH: 3.83 CM
LEFT ATRIUM SIZE: 2.88 CM
LEFT ATRIUM VOLUME INDEX MOD: 24.8 ML/M2
LEFT ATRIUM VOLUME INDEX: 25.8 ML/M2
LEFT ATRIUM VOLUME MOD: 49.57 CM3
LEFT ATRIUM VOLUME: 51.7 CM3
LEFT INTERNAL DIMENSION IN SYSTOLE: 2.74 CM (ref 2.1–4)
LEFT VENTRICLE DIASTOLIC VOLUME INDEX: 56.86 ML/M2
LEFT VENTRICLE DIASTOLIC VOLUME: 113.71 ML
LEFT VENTRICLE MASS INDEX: 160 G/M2
LEFT VENTRICLE SYSTOLIC VOLUME INDEX: 14 ML/M2
LEFT VENTRICLE SYSTOLIC VOLUME: 27.94 ML
LEFT VENTRICULAR INTERNAL DIMENSION IN DIASTOLE: 4.92 CM (ref 3.5–6)
LEFT VENTRICULAR MASS: 319.56 G
LV LATERAL E/E' RATIO: 6.57 M/S
LV SEPTAL E/E' RATIO: 9.2 M/S
MV A" WAVE DURATION": 14.84 MSEC
MV MEAN GRADIENT: 0 MMHG
MV PEAK A VEL: 0.6 M/S
MV PEAK E VEL: 0.46 M/S
MV PEAK GRADIENT: 2 MMHG
MV STENOSIS PRESSURE HALF TIME: 75.34 MS
MV VALVE AREA BY CONTINUITY EQUATION: 3.78 CM2
MV VALVE AREA P 1/2 METHOD: 2.92 CM2
PISA TR MAX VEL: 2.46 M/S
PULM VEIN S/D RATIO: 1.38
PV PEAK D VEL: 0.42 M/S
PV PEAK S VEL: 0.58 M/S
PV PEAK VELOCITY: 0.86 CM/S
RA MAJOR: 5.7 CM
RA PRESSURE: 3 MMHG
RA WIDTH: 2.97 CM
RIGHT VENTRICULAR END-DIASTOLIC DIMENSION: 2.91 CM
TDI LATERAL: 0.07 M/S
TDI SEPTAL: 0.05 M/S
TDI: 0.06 M/S
TR MAX PG: 24 MMHG
TRICUSPID ANNULAR PLANE SYSTOLIC EXCURSION: 1.81 CM
TV REST PULMONARY ARTERY PRESSURE: 27 MMHG

## 2022-04-14 PROCEDURE — 93306 TTE W/DOPPLER COMPLETE: CPT

## 2022-04-14 PROCEDURE — 93306 ECHO (CUPID ONLY): ICD-10-PCS | Mod: 26,,, | Performed by: INTERNAL MEDICINE

## 2022-04-14 PROCEDURE — 93306 TTE W/DOPPLER COMPLETE: CPT | Mod: 26,,, | Performed by: INTERNAL MEDICINE

## 2022-04-20 NOTE — PROGRESS NOTES
Please call patient re normal pumping function of heart on echocardiogram (improved from prior). Mild leaky valves. Will continue to monitor. 36.5

## 2022-04-21 ENCOUNTER — OFFICE VISIT (OUTPATIENT)
Dept: CARDIOLOGY | Facility: CLINIC | Age: 57
End: 2022-04-21
Payer: MEDICAID

## 2022-04-21 ENCOUNTER — CLINICAL SUPPORT (OUTPATIENT)
Dept: REHABILITATION | Facility: HOSPITAL | Age: 57
End: 2022-04-21
Payer: MEDICAID

## 2022-04-21 VITALS
HEART RATE: 87 BPM | SYSTOLIC BLOOD PRESSURE: 117 MMHG | BODY MASS INDEX: 27.06 KG/M2 | WEIGHT: 194 LBS | DIASTOLIC BLOOD PRESSURE: 78 MMHG | OXYGEN SATURATION: 99 %

## 2022-04-21 DIAGNOSIS — I48.0 PAROXYSMAL ATRIAL FIBRILLATION: ICD-10-CM

## 2022-04-21 DIAGNOSIS — R41.841 COGNITIVE COMMUNICATION DEFICIT: Primary | ICD-10-CM

## 2022-04-21 DIAGNOSIS — I50.20 HFREF (HEART FAILURE WITH REDUCED EJECTION FRACTION): Primary | ICD-10-CM

## 2022-04-21 DIAGNOSIS — E78.5 HYPERLIPIDEMIA, UNSPECIFIED HYPERLIPIDEMIA TYPE: ICD-10-CM

## 2022-04-21 DIAGNOSIS — I10 HYPERTENSION, UNSPECIFIED TYPE: ICD-10-CM

## 2022-04-21 PROCEDURE — 3044F PR MOST RECENT HEMOGLOBIN A1C LEVEL <7.0%: ICD-10-PCS | Mod: CPTII,,, | Performed by: INTERNAL MEDICINE

## 2022-04-21 PROCEDURE — 4010F PR ACE/ARB THEARPY RXD/TAKEN: ICD-10-PCS | Mod: CPTII,,, | Performed by: INTERNAL MEDICINE

## 2022-04-21 PROCEDURE — 4010F ACE/ARB THERAPY RXD/TAKEN: CPT | Mod: CPTII,,, | Performed by: INTERNAL MEDICINE

## 2022-04-21 PROCEDURE — 99214 PR OFFICE/OUTPT VISIT, EST, LEVL IV, 30-39 MIN: ICD-10-PCS | Mod: S$PBB,,, | Performed by: INTERNAL MEDICINE

## 2022-04-21 PROCEDURE — 1159F MED LIST DOCD IN RCRD: CPT | Mod: CPTII,,, | Performed by: INTERNAL MEDICINE

## 2022-04-21 PROCEDURE — 99999 PR PBB SHADOW E&M-EST. PATIENT-LVL III: CPT | Mod: PBBFAC,,, | Performed by: INTERNAL MEDICINE

## 2022-04-21 PROCEDURE — 1160F PR REVIEW ALL MEDS BY PRESCRIBER/CLIN PHARMACIST DOCUMENTED: ICD-10-PCS | Mod: CPTII,,, | Performed by: INTERNAL MEDICINE

## 2022-04-21 PROCEDURE — 92507 TX SP LANG VOICE COMM INDIV: CPT | Mod: PN

## 2022-04-21 PROCEDURE — 1160F RVW MEDS BY RX/DR IN RCRD: CPT | Mod: CPTII,,, | Performed by: INTERNAL MEDICINE

## 2022-04-21 PROCEDURE — 3074F SYST BP LT 130 MM HG: CPT | Mod: CPTII,,, | Performed by: INTERNAL MEDICINE

## 2022-04-21 PROCEDURE — 99214 OFFICE O/P EST MOD 30 MIN: CPT | Mod: S$PBB,,, | Performed by: INTERNAL MEDICINE

## 2022-04-21 PROCEDURE — 3078F PR MOST RECENT DIASTOLIC BLOOD PRESSURE < 80 MM HG: ICD-10-PCS | Mod: CPTII,,, | Performed by: INTERNAL MEDICINE

## 2022-04-21 PROCEDURE — 3008F PR BODY MASS INDEX (BMI) DOCUMENTED: ICD-10-PCS | Mod: CPTII,,, | Performed by: INTERNAL MEDICINE

## 2022-04-21 PROCEDURE — 3074F PR MOST RECENT SYSTOLIC BLOOD PRESSURE < 130 MM HG: ICD-10-PCS | Mod: CPTII,,, | Performed by: INTERNAL MEDICINE

## 2022-04-21 PROCEDURE — 3044F HG A1C LEVEL LT 7.0%: CPT | Mod: CPTII,,, | Performed by: INTERNAL MEDICINE

## 2022-04-21 PROCEDURE — 3078F DIAST BP <80 MM HG: CPT | Mod: CPTII,,, | Performed by: INTERNAL MEDICINE

## 2022-04-21 PROCEDURE — 1159F PR MEDICATION LIST DOCUMENTED IN MEDICAL RECORD: ICD-10-PCS | Mod: CPTII,,, | Performed by: INTERNAL MEDICINE

## 2022-04-21 PROCEDURE — 3008F BODY MASS INDEX DOCD: CPT | Mod: CPTII,,, | Performed by: INTERNAL MEDICINE

## 2022-04-21 PROCEDURE — 99999 PR PBB SHADOW E&M-EST. PATIENT-LVL III: ICD-10-PCS | Mod: PBBFAC,,, | Performed by: INTERNAL MEDICINE

## 2022-04-21 PROCEDURE — 99213 OFFICE O/P EST LOW 20 MIN: CPT | Mod: PBBFAC,PO | Performed by: INTERNAL MEDICINE

## 2022-04-21 NOTE — PROGRESS NOTES
"[facility]  Speech Therapy Progress Note- Neurological Rehabilitation  Date: 4/21/2022     Name: Sarbjit Brewer Sr.   MRN: [id number]   Therapy Diagnosis:   Encounter Diagnosis   Name Primary?    Cognitive communication deficit Yes     Physician: Dinesh Chavez DO  Physician Orders: EST SPEECH THERAPY - 45 MINS  Medical Diagnosis: Z86.73 (ICD-10-CM) - Personal history of transient ischemic attack (TIA), and cerebral infarction without residual deficits    Visit #/ Visits Authorized: 11 / 16 (plus eval)  Date of Evaluation:  2/9/2022   Insurance Authorization Period: 02/16/22-04/16/22  Plan of Care Expiration Date: 4/8/2022 to 6/3/2022  Extended Plan of Care:  n/a   Progress Note: DUE 4/21/22   Visits Cancelled: 0  Visits No Show: 0    Time In: 8:45am   Time Out: 9:30 am   Total Billable Time: 45 min      Precautions: Standard  Subjective:   Patient reports: Reports he is getting hearing aids soon and gong to the eye doctor because he has "bad eyes".   He was compliant to home exercise program.   Response to previous treatment: positive    Pain Scale:  0/10 on a Visual Analog Scale currently.   Pain Location: n/a   Objective:   UNTIMED  Procedure Min.   Speech- Language- Voice Therapy 45     Total Timed Units: 0  Total Untimed Units: 1  Charges Billed/Number of units: 1    Short Term Goals: 4 weeks  1. Patient will recall and utilize 3/4 memory retrieval strategies over 3 sessions given in cues.                              Progressing/ Not Met 4/21/2022 Reviewed memory strategies (WRAP). Patient unable to recall memory strategies.    Memory Strategies:   Write: make a list or utilize a calendar   Repeat:  Repeat information out loud or internally   Associate:  Connect desired information with something you already recall. For example, when in the grocery, group items by meal or taste (sweet vs salty) or by category (vegetables, cold items, etc).  Picture: try to mentally picture what you are " trying to remember  Mental Rehearsal: think through how you're going to complete a task before completing it.     CIERRA Patel., SINAI Gong, & NANO Chavarria (2012). Cognitive rehabilitation manual: Translating evidence-based recommendations into practice. [place], [place]: [facility] [employer]   2. Patient will complete simple immediate memory tasks (auditory and/or visual) using memory retrieval strategies with 80% accuracy given min cues to improve short-term memory.    Progressing/ Not Met 4/21/2022 Patient completed immediate memory tasks using auditory stimuli (moderate paragraphs) with 71% accuracy independently; 71% accuracy despite one repetition per paragraph; 100% accuracy achieved given binary choice cue      Met x1: 4/11/22   3. Patient will complete simple visual scanning tasks with 80% acc to improve visual scanning for reading.    Progressing/ Not Met 4/21/2022 Completed today with 0% accuracy Independently due to scanning and crossing out wrong target. Within target, patient completed 83% accuracy.     Stimuli was enlarged for patient to be able to see per reports of bad eyesight.    4. Patient will complete selective attention tasks with 90% accuracy given min cues to improve attention skills.    Progressing/ Not Met 4/21/2022 Not formally addressed      5. Patient will complete simple planning/sequencing taskst with 90% accuracy given minimum cues to improve executive functioning skills.    Progressing/ Not Met 4/21/2022 Completed 4 step sequencing with 50% accuracy Independently  And 100% with max A.       Stimuli was enlarged for patient to be able to see per reports of bad eyesight.   6. Patient will answer multi-unit yes/no questions with 90% accuracy given minimum cues to improve auditory comprehension skills.       Goal Updated 4/11/22 - answer moderate-complex yes/no questions given auditory stimuli to improve comprehension skills    Progressing/ Not Met 4/21/2022  Not formally addressed    ..       7. Patient will complete 3-4 unit mental manipulation tasks with 90% accuracy given min cues to improve working memory/mental flexibility.               Progressing/ Not Met 4/21/2022 Not formally addressed     8. Patient will identify 3 possible volunteer opportunities to increase community engagement within his functional environment.     Progressing/ Not Met 4/21/2022 Initiated discussion           Patient Education/Response:   Ongoing skilled education regarding: memory strategies (WRAP) and attention/cognitive strategies in addition to progress. Patient reported understanding of all discussed.     Home program established: yes-instructed to utilize memory strategies during functional communciation/tasks   Exercises were reviewed and Sarbjit was able to demonstrate them prior to the end of the session.  Sarbjit demonstrated good  understanding of the education provided.     See Electronic Medical Record under Patient Instructions for exercises provided throughout therapy.  Assessment:   Sarbjit is progressing well towards his goals. Continued difficulty with memory strategies recall and use, provided take home of large print strategies. Repetitions and binary choice provided when targeting auditory memory recall. Continue to target step sequence with large print for patient to read. Good participation and engagement noted. Overall, patient continues with good progress towards established goals and continues to benefit from ongoing skilled ST services. Patient has met 1/8 short term goals throughout current plan of care. POC to be considered for extension vs.discharge on 6/3/22 pending determination of presence/absence of functional progress.     Patient prognosis is Good. Patient will continue to benefit from skilled outpatient speech and language therapy to address the deficits listed in the problem list on initial evaluation, provide patient/family education and to maximize patient's level of independence  in the home and community environment.   Medical necessity is demonstrated by the following IMPAIRMENTS:  Reduced cognitive-linguistic and speech skills negatively impact patient's ability to act efficiently and independently in emergent situations.    Barriers to Therapy: none  Patient's spiritual, cultural and educational needs considered and patient agreeable to plan of care and goals.  Plan:   Continue Plan of Care implementing memory/attention strategies into his functional environment.     NEO Frank, CCC-SLP  Speech Language Pathologist   4/21/2022

## 2022-04-21 NOTE — PROGRESS NOTES
Cardiology Clinic note    Subjective:   Patient ID:  Sarbjit Brewer Sr. is a 56 y.o. male who presents for HFrEF FUP    HPI:   HFrEF: Denies orthopnea, PND, LE swelling. Weight not following at home. Weight was 178 lbs at home 2 months back. Following salt restriction    Afib: No palpitations, irregular heart beats, syncope or near syncope    HTN: On medications    HLD: Tolerating statin    SH Tobacco active; cessation referral deferred  FH No premature CAD    Patient Active Problem List    Diagnosis Date Noted    Altered mental status     Cognitive communication deficit 02/10/2022    Acute bilat watershed infarction     History of CVA (cerebrovascular accident) 02/02/2022    Anxiety 01/14/2022    Insomnia 01/14/2022    Adjustment disorder with mixed disturbance of emotions and conduct 01/14/2022    Chronic combined systolic (congestive) and diastolic (congestive) heart failure 01/05/2022    On continuous oral anticoagulation 01/05/2022    Atrial fibrillation 12/31/2021    Alcoholism /alcohol abuse 02/03/2020 2021 IMO Regulatory Import      Tobacco abuse 02/03/2020    H. pylori infection 08/17/2014    Calcification of tendon 08/17/2014    Back pain 07/31/2014    Essential hypertension 07/31/2014       Patient's Medications   New Prescriptions    No medications on file   Previous Medications    ALBUTEROL (PROVENTIL/VENTOLIN HFA) 90 MCG/ACTUATION INHALER    Inhale 2 puffs into the lungs every 6 (six) hours as needed for Wheezing. Rescue    APIXABAN (ELIQUIS) 5 MG TAB    Take 1 tablet (5 mg total) by mouth 2 (two) times daily.    ASCORBIC ACID, VITAMIN C, (VITAMIN C) 500 MG TABLET    Take 1 tablet (500 mg total) by mouth 2 (two) times daily.    ASPIRIN (ECOTRIN) 81 MG EC TABLET    Take 1 tablet (81 mg total) by mouth once daily.    ATORVASTATIN (LIPITOR) 40 MG TABLET    Take 1 tablet (40 mg total) by mouth every evening.    FUROSEMIDE (LASIX) 20 MG TABLET    Take 1 tablet (20 mg total) by mouth  once daily.    LOSARTAN (COZAAR) 50 MG TABLET    Take 1 tablet (50 mg total) by mouth once daily.    METOPROLOL SUCCINATE (TOPROL-XL) 50 MG 24 HR TABLET    Take 1 tablet (50 mg total) by mouth once daily.    MIRTAZAPINE (REMERON) 15 MG TABLET    Take 1 tablet (15 mg total) by mouth every evening.    NICOTINE (NICODERM CQ) 21 MG/24 HR    Place 1 patch onto the skin once daily.    NICOTINE, POLACRILEX, (NICORETTE) 2 MG GUM    Take 1 each (2 mg total) by mouth as needed.   Modified Medications    No medications on file   Discontinued Medications    No medications on file        Review of Systems   Constitutional: Negative for fever.   HENT: Negative for nosebleeds.    Cardiovascular: Negative for chest pain, dyspnea on exertion, irregular heartbeat, leg swelling, near-syncope, orthopnea, palpitations, paroxysmal nocturnal dyspnea and syncope.        As above   Respiratory: Negative for hemoptysis.    Hematologic/Lymphatic: Negative for bleeding problem.   Musculoskeletal: Negative for arthritis.   Gastrointestinal: Negative for hematochezia.   Genitourinary: Negative for hematuria.   Neurological: Negative for seizures.   Allergic/Immunologic: Negative for environmental allergies.         Objective:   Vitals  Vitals:    04/21/22 1152   BP: 117/78   Pulse: 87   SpO2: 99%   Weight: 88 kg (194 lb 0.1 oz)          Physical Exam  Constitutional:       General: He is not in acute distress.     Appearance: He is well-developed. He is not diaphoretic.   HENT:      Head: Normocephalic.   Neck:      Vascular: No JVD.   Cardiovascular:      Rate and Rhythm: Normal rate and regular rhythm.      Heart sounds: No murmur heard.    No friction rub. No gallop.   Pulmonary:      Effort: Pulmonary effort is normal. No respiratory distress.      Breath sounds: Normal breath sounds.   Abdominal:      Palpations: Abdomen is soft.      Tenderness: There is no abdominal tenderness.   Musculoskeletal:         General: No swelling.       "Cervical back: Normal range of motion.   Skin:     General: Skin is warm.   Neurological:      Mental Status: He is alert.   Psychiatric:         Mood and Affect: Mood normal.             Assessment:     1. HFrEF (heart failure with reduced ejection fraction)    2. Paroxysmal atrial fibrillation    3. Hypertension, unspecified type    4. Hyperlipidemia, unspecified hyperlipidemia type        Plan:   Sarbjit Brewer Sr. is a 56 y.o. male h/o HFrEF, Afib, HTN, HLD    Accompanied by wife Elise Melton Dec -Jan 2022 for ADHF (new onset) in the setting of running out of his medications for HTN for 2 months (?amlodipine, chlorthalidone)    - EKG personally reviewed. My interpretation:  2/17/22: SR 70s, normal axis. LVH with repol abn. Possible septal infarct. QTc 483    Atrial Fibrillation, Paroxysmal  - CHADS2-VASc 2 (CHF, HTN)  - HASBLED 0  - Apixaban 5 mg bid (Age < 80 y, Wt > 60 kg, Cr <1.5)  Estimated body mass index is 27.06 kg/m² as calculated from the following:    Height as of 4/14/22: 5' 11" (1.803 m).    Weight as of this encounter: 88 kg (194 lb 0.1 oz).  56 y.o.  Lab Results   Component Value Date    CREATININE 1.4 02/04/2022    AST 26 02/04/2022    BILITOT 0.5 02/04/2022     HFrEF  - NYHA II  - Echo April 2022  · The left ventricle is normal in size with moderate concentric hypertrophy and normal systolic function.  · The estimated ejection fraction is 60%.  · Grade I left ventricular diastolic dysfunction.  · Mild aortic regurgitation.  · Mild pulmonic regurgitation.  · Mild tricuspid regurgitation.  · Normal right ventricular size with normal right ventricular systolic function.  · There is no pulmonary hypertension.  - Echo Dec 2021: LVEF 40%, GHK, DD. Normal RVSF. Mild LAE. SoV mildly dilated. Mod AI, mod MR, mild-mod FL. PASP 30, CVP 3  - Exercise nuclear stress negative for ischemia - however was in AFib. LVEF appeared improved. LVEF improved on echo April 2022  - Losartan, metoprolol succinate  Lab " Results   Component Value Date    CREATININE 1.4 02/04/2022    K 4.8 02/04/2022   - Furosemide  - Daily weights  - First thing in the morning: Pee, take off clothes, step on the scale.  - Write down the date, and the weight. Maintain this chart everyday  - If weight increases by > 3 lbs in a day, or > 5 lbs in a week, take an extra pill of furosemide. Call the office.  - If worsening symptoms, go to the ED  - Salt restriction    HTN  BP well controlled  Losartan, metoprolol succinate    HLD  Lab Results   Component Value Date    LDLCALC 66.2 02/02/2022   Statin as above      Continue with current medical plan and lifestyle changes.    No orders of the defined types were placed in this encounter.      Follow up as scheduled  Return sooner for concerns or questions. If symptoms persist go to the ED    He expressed verbal understanding and agreed with the plan      Gloria Jaramillo MD  Interventional Cardiology  [facility]  Phone: [phone]

## 2022-04-25 ENCOUNTER — CLINICAL SUPPORT (OUTPATIENT)
Dept: REHABILITATION | Facility: HOSPITAL | Age: 57
End: 2022-04-25
Payer: MEDICAID

## 2022-04-25 DIAGNOSIS — R41.841 COGNITIVE COMMUNICATION DEFICIT: Primary | ICD-10-CM

## 2022-04-25 PROCEDURE — 92507 TX SP LANG VOICE COMM INDIV: CPT | Mod: PN | Performed by: SPEECH-LANGUAGE PATHOLOGIST

## 2022-04-25 NOTE — PROGRESS NOTES
[facility]  Speech Therapy Treatment Note- Neurological Rehabilitation  Date: 4/25/2022     Name: Sarbjit Brewer Sr.   MRN: [id number]   Therapy Diagnosis:   Encounter Diagnosis   Name Primary?    Cognitive communication deficit Yes     Physician: Dinesh Chavez DO  Physician Orders: EST SPEECH THERAPY - 45 MINS  Medical Diagnosis: Z86.73 (ICD-10-CM) - Personal history of transient ischemic attack (TIA), and cerebral infarction without residual deficits    Visit #/ Visits Authorized: 12/ 16 (plus eval)  Date of Evaluation:  2/9/2022   Insurance Authorization Period: 02/16/22-04/16/22  Plan of Care Expiration Date: 4/8/2022 to 6/3/2022  Extended Plan of Care:  n/a   Progress Note: DUE 5/21/22  Visits Cancelled: 0  Visits No Show: 0    Time In: 4:21 pm    Time Out: 5:01 pm    Total Billable Time: 40 min      Precautions: Standard  Subjective:   Patient reports: Patient able to recall previous speech therapists name and dates of future appointments. Discussed need to schedule additional speech therapy appointments however he is unsure of his wife's new work schedule so he will bring her work schedule to next appointment.   He was compliant to home exercise program.   Response to previous treatment: positive    Pain Scale:  0/10 on a Visual Analog Scale currently.   Pain Location: n/a   Objective:   UNTIMED  Procedure Min.   Speech- Language- Voice Therapy 40     Total Timed Units: 0  Total Untimed Units: 1  Charges Billed/Number of units: 1    Short Term Goals: 4 weeks  1. Patient will recall and utilize 3/4 memory retrieval strategies over 3 sessions given in cues.                              Progressing/ Not Met 4/25/2022 Reviewed memory strategies (WRAP). Patient able to describe aspects of various memory strategies but unable to name/statue memory strategies - improvement     Memory Strategies reviewed:  Write: make a list or utilize a calendar   Repeat:  Repeat information out loud or  "internally   Associate:  Connect desired information with something you already recall. For example, when in the grocery, group items by meal or taste (sweet vs salty) or by category (vegetables, cold items, etc).  Picture: try to mentally picture what you are trying to remember  Mental Rehearsal: think through how you're going to complete a task before completing it.     CIERRA Patel., SINAI Gong, & NANO Chavarria (2012). Cognitive rehabilitation manual: Translating evidence-based recommendations into practice. [place], [place]: [facility] Publishing   2. Patient will complete simple immediate memory tasks (auditory and/or visual) using memory retrieval strategies with 80% accuracy given min cues to improve short-term memory.    Progressing/ Not Met 4/25/2022 Patient completed immediate memory tasks using auditory stimuli (moderate paragraphs) with 50% accuracy independently; 75% accuracy despite one-two repetitions per paragraph; 80% acccuracy achieved given binary choice cue. Max cues required for patient to engage in written strategy use     3. Patient will complete simple visual scanning tasks with 80% acc to improve visual scanning for reading.    Progressing/ Not Met 4/25/2022 Initial trials x2 single targets: patient completed with 75% accuracy given moderate-max cues    Secondary trials x2 single targets: patient completed with 90% accuracy given minimum-moderate visual and verbal cues    Stimuli was enlarged for patient to be able to see per reports of bad eyesight.        4. Patient will complete selective attention tasks with 90% accuracy given min cues to improve attention skills.    Progressing/ Not Met 4/25/2022 Moderate cues overall required through session to enhance attention skills (explicit requests for patient to "focus"), occasional redirection required    5. Patient will complete simple planning/sequencing taskst with 90% accuracy given minimum cues to improve executive functioning " skills.    Progressing/ Not Met 4/25/2022 Not formally addressed     6. Patient will answer multi-unit yes/no questions with 90% accuracy given minimum cues to improve auditory comprehension skills.     Goal Updated 4/11/22 - answer moderate-complex yes/no questions given auditory stimuli to improve comprehension skills    Progressing/ Not Met 4/25/2022  Not formally addressed      7. Patient will complete 3-4 unit mental manipulation tasks with 90% accuracy given min cues to improve working memory/mental flexibility.               Progressing/ Not Met 4/25/2022 Not formally addressed     8. Patient will identify 3 possible volunteer opportunities to increase community engagement within his functional environment.     Progressing/ Not Met 4/25/2022 Initiated discussion           Patient Education/Response:   Ongoing skilled education regarding: memory strategies (WRAP) and attention/cognitive strategies in addition to progress. Patient reported understanding of all discussed.     Home program established: yes-instructed to utilize memory strategies during functional communciation/tasks   Exercises were reviewed and Sarbjit was able to demonstrate them prior to the end of the session.  Sarbjit demonstrated good  understanding of the education provided.     See Electronic Medical Record under Patient Instructions for exercises provided throughout therapy.  Assessment:   Sarbjit is progressing well towards his goals. Minimum-moderate visual and verbal cues required for visual scanning - though suspect attention negatively impacting this skill. Repetition required for moderate level auditory memory tasks. Max direct, verbal cues required for patient to engage in strategy use (I.e., taking notes with modification: writing large print which did improve recall. Patient required cues for appropriate note taking. Patient prognosis is Good. Patient will continue to benefit from skilled outpatient speech and language therapy to  address the deficits listed in the problem list on initial evaluation, provide patient/family education and to maximize patient's level of independence in the home and community environment.   Medical necessity is demonstrated by the following IMPAIRMENTS:  Reduced cognitive-linguistic and speech skills negatively impact patient's ability to act efficiently and independently in emergent situations.    Barriers to Therapy: none  Patient's spiritual, cultural and educational needs considered and patient agreeable to plan of care and goals.  Plan:   Continue Plan of Care implementing memory/attention strategies into his functional environment.     NEO Crocker, CCC-SLP  Speech Language Pathologist   4/25/2022

## 2022-05-04 NOTE — PROGRESS NOTES
[facility]  Speech Therapy Treatment Note- Neurological Rehabilitation  Date: 5/5/2022     Name: Sarbjit Brewer Sr.   MRN: [id number]   Therapy Diagnosis:   Encounter Diagnosis   Name Primary?    Cognitive communication deficit Yes       Physician: Dinesh Chavez DO  Physician Orders: EST SPEECH THERAPY - 45 MINS  Medical Diagnosis: Z86.73 (ICD-10-CM) - Personal history of transient ischemic attack (TIA), and cerebral infarction without residual deficits    Visit #/ Visits Authorized: 13 / 16 (plus eval)  Date of Evaluation:  2/9/2022   Insurance Authorization Period: 02/16/22-04/16/22  Plan of Care Expiration Date: 4/8/2022 to 6/3/2022  Extended Plan of Care:  n/a   Progress Note:  DUE 5/21/22  Visits Cancelled: 0  Visits No Show: 0     Time In: 8:08 am    Time Out: 8:45 am    Total Billable Time: 37 min      Precautions: Standard  Subjective:   Patient reports: Patient reported being tired.     He was compliant to home exercise program.   Response to previous treatment: positive    Pain Scale:  0/10 on a Visual Analog Scale currently.   Pain Location: n/a   Objective:   UNTIMED  Procedure Min.   Speech- Language- Voice Therapy 37     Total Timed Units: 0  Total Untimed Units: 1  Charges Billed/Number of units: 1    Short Term Goals: 4 weeks  1. Patient will recall and utilize 3/4 memory retrieval strategies over 3 sessions given in cues.                            Progressing/ Not Met 5/5/2022 Reviewed memory strategies (WRAP). Patient able to recall 1/4 strategies independently.    Memory Strategies reviewed:  Write: make a list or utilize a calendar   Repeat:  Repeat information out loud or internally   Associate:  Connect desired information with something you already recall. For example, when in the grocery, group items by meal or taste (sweet vs salty) or by category (vegetables, cold items, etc).  Picture: try to mentally picture what you are trying to remember  Mental Rehearsal: think  through how you're going to complete a task before completing it.   CIERRA Patel., SINAI Gong, & NANO Chavarria (2012). Cognitive rehabilitation manual: Translating evidence-based recommendations into practice. [place], [place]: [facility] Publishing   2. Patient will complete simple immediate memory tasks (auditory and/or visual) using memory retrieval strategies with 80% accuracy given min cues to improve short-term memory.    Progressing/ Not Met 5/5/2022 Patient completed immediate memory tasks using auditory stimuli (moderate paragraphs) with 100% accuracy independently.      Met x1: 5/5/2022   3. Patient will complete simple visual scanning tasks with 80% acc to improve visual scanning for reading.                Progressing/ Not Met 5/5/2022 Simple x2 targets: patient completed with 100% accuracy independently    Moderate x2 targets: patient completed with 97% accuracy given repetitions of instructions    Stimuli was enlarged for patient to be able to see per reports of bad eyesight.     Met x1: 5/5/2022   4. Patient will complete selective attention tasks with 90% accuracy given min cues to improve attention skills.    Progressing/ Not Met 5/5/2022 Moderate cues required throughout the session to enhance attention skills.    5. Patient will complete simple planning/sequencing taskst with 90% accuracy given minimum cues to improve executive functioning skills.    Progressing/ Not Met 5/5/2022 Attempted planning/sequencing task - patient reported stimuli text was too small.    6. Patient will answer multi-unit yes/no questions with 90% accuracy given minimum cues to improve auditory comprehension skills.     Goal Updated 4/11/22 - answer moderate-complex yes/no questions given auditory stimuli to improve comprehension skills    Progressing/ Not Met 5/5/2022  Patient answered moderate multi-unit yes/no questions with 85% accuracy independently; 100% accuracy given repetitions.         7. Patient will complete 3-4  unit mental manipulation tasks with 90% accuracy given min cues to improve working memory/mental flexibility.        Progressing/ Not Met 5/5/2022 Patient completed 3 unit mental manipulation tasks:    Opposites - 100% accuracy independently    Patient unable to complete alphabetical order and numerical order mental manipulation tasks. Patient benefited from additional instructions.   8. Patient will identify 3 possible volunteer opportunities to increase community engagement within his functional environment.     Progressing/ Not Met 5/5/2022 -Not addressed this session.   Discussion previously initiated.        Patient Education/Response:   Ongoing skilled education regarding: memory strategies (WRAP). Discussed scheduling - need to schedule additional appointments. Patient reported understanding of all discussed.     Home program established: yes-instructed to utilize memory strategies during functional communciation/tasks    Exercises were reviewed and Sarbjit was able to demonstrate them prior to the end of the session.  Sarbjit demonstrated good  understanding of the education provided.     See Electronic Medical Record under Patient Instructions for exercises provided throughout therapy.  Assessment:   Sarbjit is progressing well towards his goals. Patient unable to recall all memory strategies; patient benefited from reviewing memory strategies. Patient demonstrated adequate immediate recall of moderate paragraphs; goal met x 1. Patient benefited from large text stimuli due to patient reports of bad vision. Patient benefited from repetitions during multi-unit yes/no questions task. Patient benefited from additional instructions during mental manipulation tasks. Patient unable to complete alphabetical order and numerical order mental manipulation tasks. Minimum verbal cues required for visual scanning. Patient prognosis is Good. Patient will continue to benefit from skilled outpatient speech and language therapy to  "address the deficits listed in the problem list on initial evaluation, provide patient/family education and to maximize patient's level of independence in the home and community environment.     Medical necessity is demonstrated by the following IMPAIRMENTS:  Reduced cognitive-linguistic and speech skills negatively impact patient's ability to act efficiently and independently in emergent situations.    Barriers to Therapy: none  Patient's spiritual, cultural and educational needs considered and patient agreeable to plan of care and goals.  Plan:   Continue Plan of Care implementing memory/attention strategies into his functional environment.     RIGO Mccollum.  Alf Speech-Language Pathology   5/5/2022     "I, Iesha Cortes, certify that I was present in the room directing the student and service delivery and guiding them using my skilled judgement. As the co-signing therapist, I have reviewed the student's documentation, and am responsible for the treatment, assessment and plan."     Iesha Cortes M.S.CCC-SLP  Speech Language Pathologist        "

## 2022-05-05 ENCOUNTER — CLINICAL SUPPORT (OUTPATIENT)
Dept: REHABILITATION | Facility: HOSPITAL | Age: 57
End: 2022-05-05
Payer: MEDICAID

## 2022-05-05 DIAGNOSIS — R41.841 COGNITIVE COMMUNICATION DEFICIT: Primary | ICD-10-CM

## 2022-05-05 PROCEDURE — 92507 TX SP LANG VOICE COMM INDIV: CPT | Mod: PN

## 2022-05-18 ENCOUNTER — TELEPHONE (OUTPATIENT)
Dept: REHABILITATION | Facility: HOSPITAL | Age: 57
End: 2022-05-18
Payer: MEDICAID

## 2022-05-18 NOTE — TELEPHONE ENCOUNTER
No Show Note/Documentation    Patient: Sarbjit Brewer Sr.  Date of Session: 5/18/2022  Diagnosis: No diagnosis found.  MRN: [id number]    Sarbjit Brewer Sr. did not attend his scheduled therapy appointment today. He did not call to cancel nor reschedule. Next appointment is scheduled for 5/20/22 and will follow up with patient at that time. No charges have been posted today. Attempted to call patient to discuss consecutive missed visits and to reiterate attendance policy however no answer and no option to leave voicemail.    Cancel: 2  No show: 2    NEO Crocker, CCC-SLP  Speech Language Pathologist   5/18/2022

## 2022-05-25 ENCOUNTER — DOCUMENTATION ONLY (OUTPATIENT)
Dept: REHABILITATION | Facility: HOSPITAL | Age: 57
End: 2022-05-25
Payer: MEDICAID

## 2022-05-25 DIAGNOSIS — R41.841 COGNITIVE COMMUNICATION DEFICIT: Primary | ICD-10-CM

## 2022-05-25 NOTE — PROGRESS NOTES
No Show Note/Documentation    Patient: Sarbjit Brewer Sr.  Date of Session: 5/25/2022  Diagnosis:   1. Cognitive communication deficit       MRN: [id number]    Sarbjit Brewer Sr. did not attend his scheduled therapy appointment today. He did not call to cancel nor reschedule. Next appointment is scheduled for 6/1/22 and will follow up with patient at that time. No charges have been posted today. Attempted to call patient to discuss consecutive missed visits and to reiterate attendance policy however no answer and no option to leave voicemail. Per attendance policy, patient to be removed from schedule 2/2 3 consecutive missed visits.     Cancel: 2  No show: 3    NEO Crocker, CCC-SLP  Speech Language Pathologist   5/25/2022

## 2022-06-01 ENCOUNTER — TELEPHONE (OUTPATIENT)
Dept: REHABILITATION | Facility: HOSPITAL | Age: 57
End: 2022-06-01
Payer: MEDICAID

## 2022-06-01 DIAGNOSIS — R41.841 COGNITIVE COMMUNICATION DEFICIT: Primary | ICD-10-CM

## 2022-06-01 NOTE — TELEPHONE ENCOUNTER
No Show Note/Documentation/ Telephone Call    Patient: Sarbjit Brewer Sr.  Date of Session: 6/1/2022  Diagnosis:   1. Cognitive communication deficit       MRN: [id number]    Sarbjit Brewer Sr. did not attend his scheduled therapy appointment today. He did not call to cancel nor reschedule. Patient has consecutively missed >3 appointments; per attendance policy, patient to be removed from the schedule. Attempted to call patient to discuss plan for future speech therapy visits and to reiterate attendance policy however no answer. Left voicemail regarding reason for call and informed patient that the remainder of his speech therapy appointments will be canceled but that he can call the clinic to make appointments on a [id number] basis.      No charges have been posted today.     Cancel: 2  No show: 4    NEO Crocker, CCC-SLP  Speech Language Pathologist     6/1/2022

## 2022-06-14 ENCOUNTER — DOCUMENTATION ONLY (OUTPATIENT)
Dept: REHABILITATION | Facility: HOSPITAL | Age: 57
End: 2022-06-14
Payer: MEDICAID

## 2022-06-14 DIAGNOSIS — R41.841 COGNITIVE COMMUNICATION DEFICIT: Primary | ICD-10-CM

## 2022-06-14 NOTE — PROGRESS NOTES
Outpatient Therapy Discharge Summary   Discharge Date: 6/14/2022   Name: Sarbjit Brewer Sr.  Clinic Number: [id number]  Therapy Diagnosis:   Encounter Diagnosis   Name Primary?    Cognitive communication deficit Yes     Physician: Dinesh Chavez DO  Physician Orders: EST SPEECH THERAPY - 45 MINS  Medical Diagnosis: Z86.73 (ICD-10-CM) - Personal history of transient ischemic attack (TIA), and cerebral infarction without residual deficits     Evaluation Date: 2/9/22  Date of Last visit: 5/5/22  Total Visits Received: 14  Cancelled Visits: 2  No Show Visits: 4    Assessment    Assessment of Current Status: Patient participated in skiled speech therapy services for approximately 12 weeks and demonstrated significant improvement. Patient missed 6 consecutive speech therapy appointments. Per attendance policy, patient to be discharged from skilled speech therapy services. Attempted to contact patient multiple times with no response.     Goals:      1. Patient will recall and utilize 3/4 memory retrieval strategies over 3 sessions given in cues. Goal Not Met / Discontinue     2. Patient will complete simple immediate memory tasks (auditory and/or visual) using memory retrieval strategies with 80% accuracy given min cues to improve short-term memory. Goal Partially Met / Discontinue     3. Patient will complete simple visual scanning tasks with 80% acc to improve visual scanning for reading. Goal Partially Met / Discontinue     4. Patient will complete selective attention tasks with 90% accuracy given min cues to improve attention skills.  Goal Not Met / Discontinue     5. Patient will complete simple planning/sequencing taskst with 90% accuracy given minimum cues to improve executive functioning skills. Goal Not Met / Discontinue     6. Patient will answer multi-unit yes/no questions with 90% accuracy given minimum cues to improve auditory comprehension skills.      Goal Updated 4/11/22 - answer moderate-complex  yes/no questions given auditory stimuli to improve comprehension skills Goal Not Met / Discontinue     7. Patient will complete 3-4 unit mental manipulation tasks with 90% accuracy given min cues to improve working memory/mental flexibility. Goal Not Met / Discontinue     8. Patient will identify 3 possible volunteer opportunities to increase community engagement within his functional environment. Goal Not Met / Discontinue            Long Term Goals:  1. He will improve attention skills to effectively attend to and communicate in simple daily living tasks in functional living environment. Goal Not Met / Discontinue    2. He will use appropriate memory strategies to schedule and recall weekly activities, express needs and recall names to maintain safety and participate socially in functional living environment. Goal Not Met / Discontinue    3. He will develop functional reading skills and utilize compensatory strategies to maintain safety during ADL's and read and understand simple adult material independently. Goal Not Met / Discontinue    4. He will demonstrate appropriate visual scanning and awareness of objects and during reading activities to maintain safety and awareness in functional living environment. Goal Not Met / Discontinue    5. He  will develop functional cognitive-linguistic based skills and utilize compensatory strategies to communicate wants and needs effectively to different conversational partners, maintain safety, and participate socially in functional living environment.  Goal Not Met / Discontinue          Discharge reason: Patient has not attended therapy since 2022 and plan of care .     Plan   This patient is discharged from Speech Therapy    NEO Crocker, L-SLP, CCC-SLP  Speech Language Pathologist   2022

## 2022-08-15 ENCOUNTER — OFFICE VISIT (OUTPATIENT)
Dept: OPTOMETRY | Facility: CLINIC | Age: 57
End: 2022-08-15
Payer: MEDICAID

## 2022-08-15 DIAGNOSIS — H52.03 HYPEROPIA WITH PRESBYOPIA OF BOTH EYES: ICD-10-CM

## 2022-08-15 DIAGNOSIS — I63.9 CEREBROVASCULAR ACCIDENT (CVA), UNSPECIFIED MECHANISM: ICD-10-CM

## 2022-08-15 DIAGNOSIS — H52.4 HYPEROPIA WITH PRESBYOPIA OF BOTH EYES: ICD-10-CM

## 2022-08-15 DIAGNOSIS — H25.13 NUCLEAR SCLEROSIS, BILATERAL: Primary | ICD-10-CM

## 2022-08-15 PROCEDURE — 92004 PR EYE EXAM, NEW PATIENT,COMPREHESV: ICD-10-PCS | Mod: S$PBB,,, | Performed by: OPTOMETRIST

## 2022-08-15 PROCEDURE — 99999 PR PBB SHADOW E&M-EST. PATIENT-LVL I: CPT | Mod: PBBFAC,,, | Performed by: OPTOMETRIST

## 2022-08-15 PROCEDURE — 92004 COMPRE OPH EXAM NEW PT 1/>: CPT | Mod: S$PBB,,, | Performed by: OPTOMETRIST

## 2022-08-15 PROCEDURE — 4010F ACE/ARB THERAPY RXD/TAKEN: CPT | Mod: CPTII,,, | Performed by: OPTOMETRIST

## 2022-08-15 PROCEDURE — 4010F PR ACE/ARB THEARPY RXD/TAKEN: ICD-10-PCS | Mod: CPTII,,, | Performed by: OPTOMETRIST

## 2022-08-15 PROCEDURE — 3044F PR MOST RECENT HEMOGLOBIN A1C LEVEL <7.0%: ICD-10-PCS | Mod: CPTII,,, | Performed by: OPTOMETRIST

## 2022-08-15 PROCEDURE — 99999 PR PBB SHADOW E&M-EST. PATIENT-LVL I: ICD-10-PCS | Mod: PBBFAC,,, | Performed by: OPTOMETRIST

## 2022-08-15 PROCEDURE — 92015 DETERMINE REFRACTIVE STATE: CPT | Mod: ,,, | Performed by: OPTOMETRIST

## 2022-08-15 PROCEDURE — 1159F PR MEDICATION LIST DOCUMENTED IN MEDICAL RECORD: ICD-10-PCS | Mod: CPTII,,, | Performed by: OPTOMETRIST

## 2022-08-15 PROCEDURE — 92015 PR REFRACTION: ICD-10-PCS | Mod: ,,, | Performed by: OPTOMETRIST

## 2022-08-15 PROCEDURE — 1159F MED LIST DOCD IN RCRD: CPT | Mod: CPTII,,, | Performed by: OPTOMETRIST

## 2022-08-15 PROCEDURE — 99211 OFF/OP EST MAY X REQ PHY/QHP: CPT | Mod: PBBFAC | Performed by: OPTOMETRIST

## 2022-08-15 PROCEDURE — 3044F HG A1C LEVEL LT 7.0%: CPT | Mod: CPTII,,, | Performed by: OPTOMETRIST

## 2022-08-15 NOTE — PROGRESS NOTES
HPI     Annual eye exam following CVA 1 yr  First Time Patient    GTTS:None    Pt states this his first eye exam ever  Pt states had a stroke December 2021  Pt states he have vision lost since stroke    Hemoglobin A1C       Date                     Value               Ref Range             Status                02/02/2022               6.0 (H)             4.0 - 5.6 %           Final                 01/13/2022               5.7 (H)             4.0 - 5.6 %           Final                 12/30/2021               5.6                 4.0 - 5.6 %           Final              Last edited by Kevin Weldon, OD on 8/15/2022  9:50 AM. (History)            Assessment /Plan     For exam results, see Encounter Report.    Nuclear sclerosis, bilateral  -Educated patient on presence of cataracts at today's exam, monitor at annual dilated fundus exam. 5+ years surgical estimate.    Cerebrovascular accident (CVA), unspecified mechanism  -no signs of impact to eyes, near blur consistent with Presbyopia and small cataract impact    Hyperopia with presbyopia of both eyes  Eyeglass Final Rx     Eyeglass Final Rx       Sphere Cylinder    Right +3.00 Sphere    Left +3.00 Sphere    Type: near only    Expiration Date: 8/15/2023                  RTC 1 yr

## 2022-09-14 ENCOUNTER — CLINICAL SUPPORT (OUTPATIENT)
Dept: SMOKING CESSATION | Facility: CLINIC | Age: 57
End: 2022-09-14
Payer: COMMERCIAL

## 2022-09-14 DIAGNOSIS — F17.200 NICOTINE DEPENDENCE: Primary | ICD-10-CM

## 2022-09-14 PROCEDURE — 99407 PR TOBACCO USE CESSATION INTENSIVE >10 MINUTES: ICD-10-PCS | Mod: S$GLB,,,

## 2022-09-14 PROCEDURE — 99407 BEHAV CHNG SMOKING > 10 MIN: CPT | Mod: S$GLB,,,

## 2022-09-14 NOTE — PROGRESS NOTES
Called pt to f/u on his 3/6 month smoking cessation quit status. Pt stated he is still smoking. Informed him he has benefits available and is able to rejoin. Pt not ready to make appointment. He will call back when ready. Informed him of benefit period, phone follow ups, and contact information. Will complete smart form for 3/6 months and will continue to follow up on quit #1 episode.

## 2022-10-27 ENCOUNTER — HOSPITAL ENCOUNTER (OUTPATIENT)
Facility: HOSPITAL | Age: 57
Discharge: HOME | End: 2022-10-29
Attending: EMERGENCY MEDICINE | Admitting: FAMILY MEDICINE
Payer: MEDICAID

## 2022-10-27 DIAGNOSIS — R29.810 FACIAL DROOP: ICD-10-CM

## 2022-10-27 DIAGNOSIS — I63.9 STROKE: ICD-10-CM

## 2022-10-27 DIAGNOSIS — G45.9 TIA (TRANSIENT ISCHEMIC ATTACK): ICD-10-CM

## 2022-10-27 DIAGNOSIS — R47.81 SLURRED SPEECH: Primary | ICD-10-CM

## 2022-10-27 DIAGNOSIS — R29.898 LEFT ARM WEAKNESS: ICD-10-CM

## 2022-10-27 LAB
ALBUMIN SERPL BCP-MCNC: 3.8 G/DL (ref 3.5–5.2)
ALP SERPL-CCNC: 110 U/L (ref 55–135)
ALT SERPL W/O P-5'-P-CCNC: 23 U/L (ref 10–44)
ANION GAP SERPL CALC-SCNC: 14 MMOL/L (ref 8–16)
AST SERPL-CCNC: 24 U/L (ref 10–40)
BASOPHILS # BLD AUTO: 0.04 K/UL (ref 0–0.2)
BASOPHILS NFR BLD: 0.4 % (ref 0–1.9)
BILIRUB SERPL-MCNC: 0.3 MG/DL (ref 0.1–1)
BUN SERPL-MCNC: 13 MG/DL (ref 6–20)
CALCIUM SERPL-MCNC: 9 MG/DL (ref 8.7–10.5)
CHLORIDE SERPL-SCNC: 105 MMOL/L (ref 95–110)
CHOLEST SERPL-MCNC: 184 MG/DL (ref 120–199)
CHOLEST/HDLC SERPL: 5.8 {RATIO} (ref 2–5)
CO2 SERPL-SCNC: 21 MMOL/L (ref 23–29)
CREAT SERPL-MCNC: 1.5 MG/DL (ref 0.5–1.4)
CREAT SERPL-MCNC: 1.6 MG/DL (ref 0.5–1.4)
DIFFERENTIAL METHOD: ABNORMAL
EOSINOPHIL # BLD AUTO: 0.7 K/UL (ref 0–0.5)
EOSINOPHIL NFR BLD: 7.6 % (ref 0–8)
ERYTHROCYTE [DISTWIDTH] IN BLOOD BY AUTOMATED COUNT: 16.2 % (ref 11.5–14.5)
EST. GFR  (NO RACE VARIABLE): 50 ML/MIN/1.73 M^2
GLUCOSE SERPL-MCNC: 99 MG/DL (ref 70–110)
HCT VFR BLD AUTO: 41.5 % (ref 40–54)
HDLC SERPL-MCNC: 32 MG/DL (ref 40–75)
HDLC SERPL: 17.4 % (ref 20–50)
HGB BLD-MCNC: 13.8 G/DL (ref 14–18)
IMM GRANULOCYTES # BLD AUTO: 0.04 K/UL (ref 0–0.04)
IMM GRANULOCYTES NFR BLD AUTO: 0.4 % (ref 0–0.5)
INR PPP: 1 (ref 0.8–1.2)
LDLC SERPL CALC-MCNC: 103.8 MG/DL (ref 63–159)
LYMPHOCYTES # BLD AUTO: 3.8 K/UL (ref 1–4.8)
LYMPHOCYTES NFR BLD: 43.2 % (ref 18–48)
MCH RBC QN AUTO: 29.1 PG (ref 27–31)
MCHC RBC AUTO-ENTMCNC: 33.3 G/DL (ref 32–36)
MCV RBC AUTO: 87 FL (ref 82–98)
MONOCYTES # BLD AUTO: 1 K/UL (ref 0.3–1)
MONOCYTES NFR BLD: 10.7 % (ref 4–15)
NEUTROPHILS # BLD AUTO: 3.3 K/UL (ref 1.8–7.7)
NEUTROPHILS NFR BLD: 37.7 % (ref 38–73)
NONHDLC SERPL-MCNC: 152 MG/DL
NRBC BLD-RTO: 0 /100 WBC
PLATELET # BLD AUTO: 233 K/UL (ref 150–450)
PMV BLD AUTO: 11.6 FL (ref 9.2–12.9)
POC PTINR: 1.2 (ref 0.9–1.2)
POC PTWBT: 14.6 SEC (ref 9.7–14.3)
POCT GLUCOSE: 102 MG/DL (ref 70–110)
POTASSIUM SERPL-SCNC: 5.2 MMOL/L (ref 3.5–5.1)
PROT SERPL-MCNC: 8 G/DL (ref 6–8.4)
PROTHROMBIN TIME: 10.2 SEC (ref 9–12.5)
RBC # BLD AUTO: 4.75 M/UL (ref 4.6–6.2)
SAMPLE: ABNORMAL
SAMPLE: ABNORMAL
SODIUM SERPL-SCNC: 140 MMOL/L (ref 136–145)
TRIGL SERPL-MCNC: 241 MG/DL (ref 30–150)
WBC # BLD AUTO: 8.89 K/UL (ref 3.9–12.7)

## 2022-10-27 PROCEDURE — 93010 EKG 12-LEAD: ICD-10-PCS | Mod: ,,, | Performed by: INTERNAL MEDICINE

## 2022-10-27 PROCEDURE — 99900035 HC TECH TIME PER 15 MIN (STAT)

## 2022-10-27 PROCEDURE — 80061 LIPID PANEL: CPT | Performed by: EMERGENCY MEDICINE

## 2022-10-27 PROCEDURE — 85610 PROTHROMBIN TIME: CPT | Performed by: EMERGENCY MEDICINE

## 2022-10-27 PROCEDURE — 85025 COMPLETE CBC W/AUTO DIFF WBC: CPT | Performed by: EMERGENCY MEDICINE

## 2022-10-27 PROCEDURE — 80053 COMPREHEN METABOLIC PANEL: CPT | Performed by: EMERGENCY MEDICINE

## 2022-10-27 PROCEDURE — 99291 CRITICAL CARE FIRST HOUR: CPT | Mod: 25

## 2022-10-27 PROCEDURE — 85610 PROTHROMBIN TIME: CPT

## 2022-10-27 PROCEDURE — 84443 ASSAY THYROID STIM HORMONE: CPT | Performed by: EMERGENCY MEDICINE

## 2022-10-27 PROCEDURE — 93010 ELECTROCARDIOGRAM REPORT: CPT | Mod: ,,, | Performed by: INTERNAL MEDICINE

## 2022-10-27 PROCEDURE — 99214 PR OFFICE/OUTPT VISIT, EST, LEVL IV, 30-39 MIN: ICD-10-PCS | Mod: 95,,, | Performed by: PSYCHIATRY & NEUROLOGY

## 2022-10-27 PROCEDURE — 82565 ASSAY OF CREATININE: CPT

## 2022-10-27 PROCEDURE — 93005 ELECTROCARDIOGRAM TRACING: CPT

## 2022-10-27 PROCEDURE — 99214 OFFICE O/P EST MOD 30 MIN: CPT | Mod: 95,,, | Performed by: PSYCHIATRY & NEUROLOGY

## 2022-10-27 PROCEDURE — 82962 GLUCOSE BLOOD TEST: CPT

## 2022-10-28 PROBLEM — I50.30 (HFPEF) HEART FAILURE WITH PRESERVED EJECTION FRACTION: Status: ACTIVE | Noted: 2022-10-28

## 2022-10-28 PROBLEM — G45.9 TIA (TRANSIENT ISCHEMIC ATTACK): Status: ACTIVE | Noted: 2022-10-28

## 2022-10-28 LAB
ALBUMIN SERPL BCP-MCNC: 3.8 G/DL (ref 3.5–5.2)
ALP SERPL-CCNC: 112 U/L (ref 55–135)
ALT SERPL W/O P-5'-P-CCNC: 23 U/L (ref 10–44)
ANION GAP SERPL CALC-SCNC: 11 MMOL/L (ref 8–16)
AST SERPL-CCNC: 18 U/L (ref 10–40)
AV INDEX (PROSTH): 0.78
AV MEAN GRADIENT: 4 MMHG
AV PEAK GRADIENT: 8 MMHG
AV REGURGITATION PRESSURE HALF TIME: 471.31 MS
AV VALVE AREA: 2.72 CM2
AV VELOCITY RATIO: 0.72
BASOPHILS # BLD AUTO: 0.03 K/UL (ref 0–0.2)
BASOPHILS NFR BLD: 0.4 % (ref 0–1.9)
BILIRUB SERPL-MCNC: 0.4 MG/DL (ref 0.1–1)
BUN SERPL-MCNC: 14 MG/DL (ref 6–20)
CALCIUM SERPL-MCNC: 9.3 MG/DL (ref 8.7–10.5)
CHLORIDE SERPL-SCNC: 106 MMOL/L (ref 95–110)
CO2 SERPL-SCNC: 20 MMOL/L (ref 23–29)
CREAT SERPL-MCNC: 1.6 MG/DL (ref 0.5–1.4)
CV ECHO LV RWT: 0.81 CM
DIFFERENTIAL METHOD: ABNORMAL
DOP CALC AO PEAK VEL: 1.4 M/S
DOP CALC AO VTI: 28.5 CM
DOP CALC LVOT AREA: 3.5 CM2
DOP CALC LVOT DIAMETER: 2.11 CM
DOP CALC LVOT PEAK VEL: 1.01 M/S
DOP CALC LVOT STROKE VOLUME: 77.59 CM3
DOP CALC MV VTI: 26.9 CM
DOP CALCLVOT PEAK VEL VTI: 22.2 CM
E WAVE DECELERATION TIME: 166.46 MSEC
E/A RATIO: 1.2
E/E' RATIO: 8.35 M/S
ECHO LV POSTERIOR WALL: 1.61 CM (ref 0.6–1.1)
EJECTION FRACTION: 60 %
EOSINOPHIL # BLD AUTO: 0.5 K/UL (ref 0–0.5)
EOSINOPHIL NFR BLD: 7.1 % (ref 0–8)
ERYTHROCYTE [DISTWIDTH] IN BLOOD BY AUTOMATED COUNT: 16.1 % (ref 11.5–14.5)
EST. GFR  (NO RACE VARIABLE): 50 ML/MIN/1.73 M^2
ESTIMATED AVG GLUCOSE: 120 MG/DL (ref 68–131)
FOLATE SERPL-MCNC: 10.8 NG/ML (ref 4–24)
FRACTIONAL SHORTENING: 26 % (ref 28–44)
GLUCOSE SERPL-MCNC: 96 MG/DL (ref 70–110)
HBA1C MFR BLD: 5.8 % (ref 4–5.6)
HCT VFR BLD AUTO: 40.7 % (ref 40–54)
HGB BLD-MCNC: 13.8 G/DL (ref 14–18)
HIV 1+2 AB+HIV1 P24 AG SERPL QL IA: NORMAL
IMM GRANULOCYTES # BLD AUTO: 0.03 K/UL (ref 0–0.04)
IMM GRANULOCYTES NFR BLD AUTO: 0.4 % (ref 0–0.5)
INTERVENTRICULAR SEPTUM: 1.36 CM (ref 0.6–1.1)
IVC DIAMETER: 2.19 CM
LA MAJOR: 6.62 CM
LA MINOR: 3.68 CM
LEFT ATRIUM SIZE: 3.35 CM
LEFT ATRIUM VOLUME MOD: 45.91 CM3
LEFT INTERNAL DIMENSION IN SYSTOLE: 2.93 CM (ref 2.1–4)
LEFT VENTRICLE DIASTOLIC VOLUME: 68.5 ML
LEFT VENTRICLE SYSTOLIC VOLUME: 32.9 ML
LEFT VENTRICULAR INTERNAL DIMENSION IN DIASTOLE: 3.96 CM (ref 3.5–6)
LEFT VENTRICULAR MASS: 225.83 G
LV LATERAL E/E' RATIO: 8.88 M/S
LV SEPTAL E/E' RATIO: 7.89 M/S
LVOT MG: 2.18 MMHG
LVOT MV: 0.71 CM/S
LYMPHOCYTES # BLD AUTO: 2.6 K/UL (ref 1–4.8)
LYMPHOCYTES NFR BLD: 34.9 % (ref 18–48)
MAGNESIUM SERPL-MCNC: 1.9 MG/DL (ref 1.6–2.6)
MCH RBC QN AUTO: 29.2 PG (ref 27–31)
MCHC RBC AUTO-ENTMCNC: 33.9 G/DL (ref 32–36)
MCV RBC AUTO: 86 FL (ref 82–98)
MONOCYTES # BLD AUTO: 0.8 K/UL (ref 0.3–1)
MONOCYTES NFR BLD: 10.8 % (ref 4–15)
MV MEAN GRADIENT: 1 MMHG
MV PEAK A VEL: 0.59 M/S
MV PEAK E VEL: 0.71 M/S
MV PEAK GRADIENT: 4 MMHG
MV STENOSIS PRESSURE HALF TIME: 48.27 MS
MV VALVE AREA BY CONTINUITY EQUATION: 2.88 CM2
MV VALVE AREA P 1/2 METHOD: 4.56 CM2
NEUTROPHILS # BLD AUTO: 3.5 K/UL (ref 1.8–7.7)
NEUTROPHILS NFR BLD: 46.4 % (ref 38–73)
NRBC BLD-RTO: 0 /100 WBC
PHOSPHATE SERPL-MCNC: 3.9 MG/DL (ref 2.7–4.5)
PISA AR MAX VEL: 4.6 M/S
PISA TR MAX VEL: 2.04 M/S
PLATELET # BLD AUTO: 318 K/UL (ref 150–450)
PMV BLD AUTO: 10 FL (ref 9.2–12.9)
POCT GLUCOSE: 89 MG/DL (ref 70–110)
POCT GLUCOSE: 90 MG/DL (ref 70–110)
POCT GLUCOSE: 99 MG/DL (ref 70–110)
POTASSIUM SERPL-SCNC: 4.2 MMOL/L (ref 3.5–5.1)
PROT SERPL-MCNC: 7.5 G/DL (ref 6–8.4)
PV MV: 0.61 M/S
PV PEAK VELOCITY: 0.82 CM/S
RA MAJOR: 5.33 CM
RA PRESSURE: 8 MMHG
RA WIDTH: 3.07 CM
RBC # BLD AUTO: 4.73 M/UL (ref 4.6–6.2)
RPR SER QL: NORMAL
SODIUM SERPL-SCNC: 137 MMOL/L (ref 136–145)
TDI LATERAL: 0.08 M/S
TDI SEPTAL: 0.09 M/S
TDI: 0.09 M/S
TR MAX PG: 17 MMHG
TSH SERPL DL<=0.005 MIU/L-ACNC: 3.82 UIU/ML (ref 0.4–4)
TV REST PULMONARY ARTERY PRESSURE: 25 MMHG
VIT B12 SERPL-MCNC: 295 PG/ML (ref 210–950)
WBC # BLD AUTO: 7.47 K/UL (ref 3.9–12.7)

## 2022-10-28 PROCEDURE — 87389 HIV-1 AG W/HIV-1&-2 AB AG IA: CPT | Performed by: STUDENT IN AN ORGANIZED HEALTH CARE EDUCATION/TRAINING PROGRAM

## 2022-10-28 PROCEDURE — 97116 GAIT TRAINING THERAPY: CPT

## 2022-10-28 PROCEDURE — G0378 HOSPITAL OBSERVATION PER HR: HCPCS

## 2022-10-28 PROCEDURE — 83735 ASSAY OF MAGNESIUM: CPT | Performed by: STUDENT IN AN ORGANIZED HEALTH CARE EDUCATION/TRAINING PROGRAM

## 2022-10-28 PROCEDURE — 25000003 PHARM REV CODE 250: Performed by: STUDENT IN AN ORGANIZED HEALTH CARE EDUCATION/TRAINING PROGRAM

## 2022-10-28 PROCEDURE — 83036 HEMOGLOBIN GLYCOSYLATED A1C: CPT | Performed by: STUDENT IN AN ORGANIZED HEALTH CARE EDUCATION/TRAINING PROGRAM

## 2022-10-28 PROCEDURE — 80053 COMPREHEN METABOLIC PANEL: CPT | Performed by: STUDENT IN AN ORGANIZED HEALTH CARE EDUCATION/TRAINING PROGRAM

## 2022-10-28 PROCEDURE — 82607 VITAMIN B-12: CPT | Performed by: STUDENT IN AN ORGANIZED HEALTH CARE EDUCATION/TRAINING PROGRAM

## 2022-10-28 PROCEDURE — 97165 OT EVAL LOW COMPLEX 30 MIN: CPT

## 2022-10-28 PROCEDURE — 63600175 PHARM REV CODE 636 W HCPCS

## 2022-10-28 PROCEDURE — 82962 GLUCOSE BLOOD TEST: CPT

## 2022-10-28 PROCEDURE — 84100 ASSAY OF PHOSPHORUS: CPT | Performed by: STUDENT IN AN ORGANIZED HEALTH CARE EDUCATION/TRAINING PROGRAM

## 2022-10-28 PROCEDURE — 86592 SYPHILIS TEST NON-TREP QUAL: CPT | Performed by: STUDENT IN AN ORGANIZED HEALTH CARE EDUCATION/TRAINING PROGRAM

## 2022-10-28 PROCEDURE — 99900035 HC TECH TIME PER 15 MIN (STAT)

## 2022-10-28 PROCEDURE — 92610 EVALUATE SWALLOWING FUNCTION: CPT

## 2022-10-28 PROCEDURE — 94761 N-INVAS EAR/PLS OXIMETRY MLT: CPT

## 2022-10-28 PROCEDURE — 97162 PT EVAL MOD COMPLEX 30 MIN: CPT

## 2022-10-28 PROCEDURE — 97535 SELF CARE MNGMENT TRAINING: CPT

## 2022-10-28 PROCEDURE — 25500020 PHARM REV CODE 255: Performed by: EMERGENCY MEDICINE

## 2022-10-28 PROCEDURE — 82746 ASSAY OF FOLIC ACID SERUM: CPT | Performed by: STUDENT IN AN ORGANIZED HEALTH CARE EDUCATION/TRAINING PROGRAM

## 2022-10-28 PROCEDURE — 96374 THER/PROPH/DIAG INJ IV PUSH: CPT | Mod: 59

## 2022-10-28 PROCEDURE — 25000003 PHARM REV CODE 250

## 2022-10-28 PROCEDURE — 85025 COMPLETE CBC W/AUTO DIFF WBC: CPT | Performed by: STUDENT IN AN ORGANIZED HEALTH CARE EDUCATION/TRAINING PROGRAM

## 2022-10-28 PROCEDURE — 96375 TX/PRO/DX INJ NEW DRUG ADDON: CPT

## 2022-10-28 RX ORDER — SODIUM CHLORIDE 0.9 % (FLUSH) 0.9 %
5 SYRINGE (ML) INJECTION
Status: DISCONTINUED | OUTPATIENT
Start: 2022-10-28 | End: 2022-10-29 | Stop reason: HOSPADM

## 2022-10-28 RX ORDER — FUROSEMIDE 20 MG/1
20 TABLET ORAL DAILY
Status: DISCONTINUED | OUTPATIENT
Start: 2022-10-28 | End: 2022-10-29 | Stop reason: HOSPADM

## 2022-10-28 RX ORDER — CEPHALEXIN 500 MG/1
500 CAPSULE ORAL 3 TIMES DAILY
COMMUNITY
Start: 2022-09-22 | End: 2022-10-28

## 2022-10-28 RX ORDER — LOSARTAN POTASSIUM 100 MG/1
100 TABLET ORAL DAILY
COMMUNITY
Start: 2022-07-11 | End: 2023-07-21 | Stop reason: ALTCHOICE

## 2022-10-28 RX ORDER — LIDOCAINE 50 MG/G
1 PATCH TOPICAL DAILY PRN
Status: DISCONTINUED | OUTPATIENT
Start: 2022-10-28 | End: 2022-10-29 | Stop reason: HOSPADM

## 2022-10-28 RX ORDER — TALC
9 POWDER (GRAM) TOPICAL NIGHTLY PRN
Status: DISCONTINUED | OUTPATIENT
Start: 2022-10-28 | End: 2022-10-29 | Stop reason: HOSPADM

## 2022-10-28 RX ORDER — HYDRALAZINE HYDROCHLORIDE 20 MG/ML
10 INJECTION INTRAMUSCULAR; INTRAVENOUS EVERY 6 HOURS PRN
Status: DISCONTINUED | OUTPATIENT
Start: 2022-10-28 | End: 2022-10-29 | Stop reason: HOSPADM

## 2022-10-28 RX ORDER — METOPROLOL SUCCINATE 50 MG/1
50 TABLET, EXTENDED RELEASE ORAL DAILY
Status: DISCONTINUED | OUTPATIENT
Start: 2022-10-28 | End: 2022-10-28

## 2022-10-28 RX ORDER — ASPIRIN 81 MG/1
81 TABLET ORAL DAILY
Status: DISCONTINUED | OUTPATIENT
Start: 2022-10-28 | End: 2022-10-29 | Stop reason: HOSPADM

## 2022-10-28 RX ORDER — AMOXICILLIN 250 MG
1 CAPSULE ORAL 2 TIMES DAILY PRN
Status: DISCONTINUED | OUTPATIENT
Start: 2022-10-28 | End: 2022-10-29 | Stop reason: HOSPADM

## 2022-10-28 RX ORDER — LOSARTAN POTASSIUM 50 MG/1
50 TABLET ORAL DAILY
Status: DISCONTINUED | OUTPATIENT
Start: 2022-10-28 | End: 2022-10-29

## 2022-10-28 RX ORDER — ZOLPIDEM TARTRATE 10 MG/1
10 TABLET ORAL DAILY
COMMUNITY
Start: 2022-07-11

## 2022-10-28 RX ORDER — LABETALOL HYDROCHLORIDE 5 MG/ML
10 INJECTION, SOLUTION INTRAVENOUS
Status: DISCONTINUED | OUTPATIENT
Start: 2022-10-28 | End: 2022-10-28

## 2022-10-28 RX ORDER — ATORVASTATIN CALCIUM 40 MG/1
40 TABLET, FILM COATED ORAL NIGHTLY
Status: DISCONTINUED | OUTPATIENT
Start: 2022-10-28 | End: 2022-10-29 | Stop reason: HOSPADM

## 2022-10-28 RX ORDER — METOPROLOL SUCCINATE 50 MG/1
50 TABLET, EXTENDED RELEASE ORAL DAILY
Status: DISCONTINUED | OUTPATIENT
Start: 2022-10-28 | End: 2022-10-29 | Stop reason: HOSPADM

## 2022-10-28 RX ORDER — ONDANSETRON 2 MG/ML
4 INJECTION INTRAMUSCULAR; INTRAVENOUS EVERY 8 HOURS PRN
Status: DISCONTINUED | OUTPATIENT
Start: 2022-10-28 | End: 2022-10-29 | Stop reason: HOSPADM

## 2022-10-28 RX ORDER — NALOXONE HCL 0.4 MG/ML
0.02 VIAL (ML) INJECTION
Status: DISCONTINUED | OUTPATIENT
Start: 2022-10-28 | End: 2022-10-29 | Stop reason: HOSPADM

## 2022-10-28 RX ORDER — ACETAMINOPHEN 325 MG/1
650 TABLET ORAL EVERY 8 HOURS PRN
Status: DISCONTINUED | OUTPATIENT
Start: 2022-10-28 | End: 2022-10-29 | Stop reason: HOSPADM

## 2022-10-28 RX ORDER — CLONIDINE HYDROCHLORIDE 0.1 MG/1
0.1 TABLET ORAL 2 TIMES DAILY
COMMUNITY
Start: 2022-09-23

## 2022-10-28 RX ORDER — ALBUTEROL SULFATE 90 UG/1
2 AEROSOL, METERED RESPIRATORY (INHALATION) EVERY 6 HOURS PRN
Status: DISCONTINUED | OUTPATIENT
Start: 2022-10-28 | End: 2022-10-29 | Stop reason: HOSPADM

## 2022-10-28 RX ORDER — METFORMIN HYDROCHLORIDE 500 MG/1
500 TABLET ORAL DAILY
COMMUNITY
Start: 2022-10-21

## 2022-10-28 RX ORDER — LOSARTAN POTASSIUM 50 MG/1
50 TABLET ORAL DAILY
Status: DISCONTINUED | OUTPATIENT
Start: 2022-10-28 | End: 2022-10-28

## 2022-10-28 RX ADMIN — APIXABAN 5 MG: 5 TABLET, FILM COATED ORAL at 08:10

## 2022-10-28 RX ADMIN — METOPROLOL SUCCINATE 50 MG: 50 TABLET, EXTENDED RELEASE ORAL at 08:10

## 2022-10-28 RX ADMIN — IOHEXOL 100 ML: 350 INJECTION, SOLUTION INTRAVENOUS at 12:10

## 2022-10-28 RX ADMIN — HYDRALAZINE HYDROCHLORIDE 10 MG: 20 INJECTION, SOLUTION INTRAMUSCULAR; INTRAVENOUS at 07:10

## 2022-10-28 RX ADMIN — ATORVASTATIN CALCIUM 40 MG: 40 TABLET, FILM COATED ORAL at 04:10

## 2022-10-28 RX ADMIN — ATORVASTATIN CALCIUM 40 MG: 40 TABLET, FILM COATED ORAL at 08:10

## 2022-10-28 RX ADMIN — LOSARTAN POTASSIUM 50 MG: 50 TABLET, FILM COATED ORAL at 04:10

## 2022-10-28 RX ADMIN — FUROSEMIDE 20 MG: 20 TABLET ORAL at 08:10

## 2022-10-28 RX ADMIN — ASPIRIN 81 MG: 81 TABLET, COATED ORAL at 07:10

## 2022-10-28 NOTE — ASSESSMENT & PLAN NOTE
-Last ECHO EF 60% w/ G1 Diastolic Dysfxn  -No signs of volume overload on admission    Plan:  -Cnt home Arb, BB, and lasix

## 2022-10-28 NOTE — PT/OT/SLP EVAL
"Physical Therapy Evaluation    Patient Name:  Sarbjit Brewer Sr.   MRN:  [id number]    Recommendations:     Discharge Recommendations:  outpatient PT   Discharge Equipment Recommendations: none   Barriers to discharge: None    Assessment:     Sarbjit Brewer Sr. is a 56 y.o. male admitted with a medical diagnosis of TIA (transient ischemic attack).  He presents with the following impairments/functional limitations:  weakness, gait instability, impaired endurance, impaired balance, decreased lower extremity function, impaired sensation, decreased safety awareness, impaired cognition, impaired self care skills, impaired functional mobility Patient seen for physical therapy evaluation on this date.  Full report to follow.  Patient will benefit from inpatient physical therapy to address mobility limitations. Anticipate patient will benefit from outpatient physical therapy to address limitation.  No DME Needs..    Rehab Prognosis: Good; patient would benefit from acute skilled PT services to address these deficits and reach maximum level of function.    Recent Surgery: * No surgery found *      Plan:     During this hospitalization, patient to be seen 3 x/week to address the identified rehab impairments via gait training, therapeutic activities, therapeutic exercises, neuromuscular re-education and progress toward the following goals:    Plan of Care Expires:  11/27/22    Subjective     Chief Complaint: "I don't work because they say I am too slow"  Patient/Family Comments/goals: To return home at [facility]  Pain/Comfort:  Pain Rating 1: 0/10  Pain Rating Post-Intervention 1: 0/10    Patients cultural, spiritual, Jain conflicts given the current situation: no    Living Environment:  Patient lives with brother and brother's girlfriend in a 1 SH, threshold to enter, T/S in bathroom.  Prior to admission, patients level of function was independent, does not work.  Equipment used at home: none.  DME owned (not currently used): " none.  Upon discharge, patient will have assistance from family.    Objective:     Communicated with nurse Baxter prior to session.  Patient found supine with blood pressure cuff, telemetry, peripheral IV  upon PT entry to room.    General Precautions: Standard, fall   Orthopedic Precautions:N/A   Braces: N/A  Respiratory Status: Room air    Exams:  Cognitive Exam:  Patient is oriented to Person, Place, Time, and Situation.  Poor ability to follow multi step commands.    Fine Motor Coordination:    -       Impaired   See OT evaluation, L UE weakness limiting FTN, Finger Opposition, Manipulation of objects  Gross Motor Coordination:  Minimally decreased L LE as compared to Right.  Tandem Ambulation:  decreased accuracy.  Single Leg Stance:  Decreased Bilaterally, 2-3 seconds max.  Reaching for object on floor:  No issues.    Postural Exam:  Patient presented with the following abnormalities:    -       No postural abnormalities identified  Sensation:    -       Impaired   Complains of numbness in L UE/hand  RLE ROM: WFL  RLE Strength: WFL  LLE ROM: WFL  LLE Strength: Deficits: 4+/5 grossly    Functional Mobility:  Bed Mobility:     Rolling Left:  stand by assistance  Scooting: stand by assistance  Supine to Sit: stand by assistance  Sit to Supine: stand by assistance  Transfers:     Sit to Stand:  stand by assistance with no AD  Gait: 1. On level surfaces x 150' with SBA, no L UE arm swing, short step lengths, shuffling pattern, No full Step Through  Balance: Seated:  NO LOB, Mod independent                         Standin. Tandem Ambulation: Decreased accuracy, slow pace.  2. Single Leg Stance:  decreased, 2-3 sec max  3. Picking up Object                                        From floor, SBA, no LOB      AM-PAC 6 CLICK MOBILITY  Total Score:21       Treatment & Education:  Patient agreeable to therapy.  Patient with poor ability to follow multi step commands. Poor motor planning.  Educated patient on role of  physical therapy and POC.      Patient left supine with all lines intact, call button in reach, and nurse notified.    GOALS:   Multidisciplinary Problems       Physical Therapy Goals          Problem: Physical Therapy    Goal Priority Disciplines Outcome Goal Variances Interventions   Physical Therapy Goal     PT, PT/OT Ongoing, Progressing     Description: Goals to be met by: 2022     Patient will increase functional independence with mobility by performin. Supine to sit with Modified [place]  2. Sit to supine with Modified [place]  3. Sit to stand transfer with Modified [place]  4. Gait  x 200 feet with Modified [place] using No Assistive Device.   5. Ascend/descend 12 stair with  Modified [place] using No Assistive Device.                          History:     Past Medical History:   Diagnosis Date    A-fib     CHF (congestive heart failure)     Hypertension     Insomnia        Past Surgical History:   Procedure Laterality Date    KIDNEY STONE SURGERY         Time Tracking:     PT Received On: 10/28/22  PT Start Time: 924     PT Stop Time: 958  PT Total Time (min): 34 min Total Time with Occupational Therapy    Billable Minutes: Evaluation 15 and Gait Training 10      10/28/2022

## 2022-10-28 NOTE — SUBJECTIVE & OBJECTIVE
Interval History: Pt comfortable and alert. Pt c/o Left hand numbness but sensation intact on exam. L sided residual weakness but appears to be at baseline.     Review of Systems   Constitutional:  Negative for activity change, chills, fatigue and fever.   HENT:  Negative for sinus pressure, sinus pain, sore throat and trouble swallowing.    Eyes:  Negative for visual disturbance.   Respiratory:  Negative for cough, shortness of breath and wheezing.    Cardiovascular:  Negative for chest pain, palpitations and leg swelling.   Gastrointestinal:  Negative for abdominal pain, constipation, diarrhea, nausea and vomiting.   Genitourinary:  Negative for dysuria, frequency and hematuria.   Musculoskeletal:  Negative for back pain and myalgias.   Skin:  Negative for rash and wound.   Neurological:  Negative for tremors, weakness, numbness and headaches.   Psychiatric/Behavioral:  Negative for confusion. The patient is not nervous/anxious.      Objective:     Vital Signs (Most Recent):  Temp: 98.2 °F (36.8 °C) (10/28/22 0311)  Pulse: 62 (10/28/22 1101)  Resp: 13 (10/28/22 1101)  BP: (!) 166/79 (10/28/22 1101)  SpO2: 97 % (10/28/22 0702)   Vital Signs (24h Range):  Temp:  [98.1 °F (36.7 °C)-98.2 °F (36.8 °C)] 98.2 °F (36.8 °C)  Pulse:  [60-82] 62  Resp:  [13-24] 13  SpO2:  [97 %-99 %] 97 %  BP: (162-195)/() 166/79     Weight: 95.7 kg (211 lb)  Body mass index is 29.43 kg/m².    Intake/Output Summary (Last 24 hours) at 10/28/2022 1237  Last data filed at 10/28/2022 0821  Gross per 24 hour   Intake --   Output 350 ml   Net -350 ml      Physical Exam  Constitutional:       General: He is not in acute distress.  HENT:      Head: Normocephalic and atraumatic.      Right Ear: External ear normal.      Left Ear: External ear normal.      Nose: Nose normal.   Eyes:      Extraocular Movements: Extraocular movements intact.      Conjunctiva/sclera: Conjunctivae normal.      Pupils: Pupils are equal, round, and reactive to light.    Cardiovascular:      Rate and Rhythm: Normal rate and regular rhythm.      Pulses: Normal pulses.      Heart sounds: Normal heart sounds. No murmur heard.    No friction rub. No gallop.   Pulmonary:      Effort: Pulmonary effort is normal. No respiratory distress.      Breath sounds: Normal breath sounds. No wheezing or rhonchi.   Abdominal:      General: Bowel sounds are normal. There is no distension.      Palpations: There is no mass.      Tenderness: There is no abdominal tenderness.   Musculoskeletal:         General: No swelling or tenderness. Normal range of motion.      Cervical back: Normal range of motion. No tenderness.   Lymphadenopathy:      Cervical: No cervical adenopathy.   Skin:     General: Skin is warm and dry.      Capillary Refill: Capillary refill takes less than 2 seconds.      Findings: No rash.   Neurological:      General: No focal deficit present.      Mental Status: He is alert and oriented to person, place, and time. Mental status is at baseline.      Cranial Nerves: No cranial nerve deficit.      Sensory: No sensory deficit.      Motor: No weakness.      Coordination: Coordination normal.      Gait: Gait normal.      Deep Tendon Reflexes: Reflexes normal.      Comments: BL dysarthria no L sided deficits seen on exam       Significant Labs: All pertinent labs within the past 24 hours have been reviewed.    A1C:   Recent Labs   Lab 10/28/22  0632   HGBA1C 5.8*     Bilirubin:   Recent Labs   Lab 10/27/22  2319 10/28/22  0632   BILITOT 0.3 0.4     CBC:   Recent Labs   Lab 10/27/22  2319 10/28/22  0632   WBC 8.89 7.47   HGB 13.8* 13.8*   HCT 41.5 40.7    318     CMP:   Recent Labs   Lab 10/27/22  2319 10/28/22  0632    137   K 5.2* 4.2    106   CO2 21* 20*   GLU 99 96   BUN 13 14   CREATININE 1.6* 1.6*   CALCIUM 9.0 9.3   PROT 8.0 7.5   ALBUMIN 3.8 3.8   BILITOT 0.3 0.4   ALKPHOS 110 112   AST 24 18   ALT 23 23   ANIONGAP 14 11     Coagulation:   Recent Labs   Lab  10/27/22  2319   INR 1.0     Lipid Panel:   Recent Labs   Lab 10/27/22  2319   CHOL 184   HDL 32*   LDLCALC 103.8   TRIG 241*   CHOLHDL 17.4*     Magnesium:   Recent Labs   Lab 10/28/22  0632   MG 1.9     Pathology Results  (Last 10 years)      None          POCT Glucose:   Recent Labs   Lab 10/27/22  2256 10/28/22  0812   POCTGLUCOSE 102 89     TSH:   Recent Labs   Lab 10/27/22  2319   TSH 3.819     Significant Imaging: I have reviewed all pertinent imaging results/findings within the past 24 hours.    Imaging Results              MRI Brain Without Contrast (In process)                      CTA Head and Neck (xpd) (Final result)  Result time 10/28/22 00:44:43      Final result by Jed Barbosa MD (10/28/22 00:44:43)                   Impression:      No acute abnormality.    Limited evaluation of the intracranial circulation due to motion on angiographic sequences but with no abnormality seen on thick slab postcontrast images.    No high-grade stenosis or major vessel occlusion in the cervical carotid or vertebral circulation.      Electronically signed by: Jed Barbosa  Date:    10/28/2022  Time:    00:44               Narrative:    EXAMINATION:  CTA HEAD AND NECK (XPD)    CLINICAL HISTORY:  Stroke/TIA, determine embolic source;    TECHNIQUE:  Non contrast low dose axial images were obtained through the head. CT angiogram was performed from the level of the caitie to the top of the head following the IV administration of 100mL of Omnipaque 350.   Sagittal and coronal reconstructions and maximum intensity projection reconstructions were performed. Arterial stenosis percentages are based on NASCET measurement criteria.  Rapid AI was used.  Motion limits the exam.    COMPARISON:  02/03/2022    FINDINGS:  Intracranial Compartment:    Ventricles and sulci are stable in size for age without evidence of hydrocephalus. No extra-axial blood or fluid collections.    The brain parenchyma appears stable, with focal  encephalomalacia involving the left temporal lobe anteriorly and encephalomalacia posteriorly within the watershed territories of the right left parietal temporal occipital junctions..  No parenchymal mass, hemorrhage, edema, or major vascular distribution infarct.    Skull/Extracranial Contents (limited evaluation): No fracture. Mastoid air cells and paranasal sinuses are essentially clear.    Non-Vascular Structures of the Neck/Thoracic Inlet (limited evaluation): Normal.    Aorta: Normal 3 vessel arch.    Extracranial carotid circulation: No hemodynamically significant stenosis, aneurysmal dilatation, or dissection.    Extracranial vertebral circulation: No hemodynamically significant stenosis, aneurysmal dilatation, or dissection.    Intracranial Arteries: No focal high-grade stenosis, occlusion, or aneurysm on thick slab images with the angiographic sequences marred by motion..    Venous structures (limited evaluation): Normal.                                       CT Head Without Contrast (Final result)  Result time 10/27/22 23:44:21      Final result by Lilia Barbosa MD (10/27/22 23:44:21)                   Impression:      No evidence of acute major arterial infarct, hemorrhage or mass effect.    Patchy moderate low density in deep white matter as seen with microvascular chronic ischemic changes.  This limits assessment for nonhemorrhagic lacunar type infarcts.  Additional imaging can be obtained as clinically indicated with MRI.    Encephalomalacia in the left temporal and bilateral parietal regions.  Remote lacunar infarcts as described.      Electronically signed by: Lilia Barbosa  Date:    10/27/2022  Time:    23:44               Narrative:    EXAMINATION:  CT HEAD WITHOUT CONTRAST    CLINICAL HISTORY:  Neuro deficit, acute, stroke suspected;    TECHNIQUE:  Low dose axial images were obtained through the head.  Coronal and sagittal reformations were also performed. Contrast was not  administered.    COMPARISON:  None.    FINDINGS:  There is no evidence of acute hemorrhage or hematoma.    There is prominence ventricles, cisterns and sulci as seen with senescent atrophic changes.  Foci of encephalomalacia noted in the left temporal and bilateral parietal regions.  Remote cerebellar infarcts bilaterally also noted.    Confluent moderate low density in deep white matter as seen with microvascular chronic ischemic changes noted.    There is no mass or mass effect.  Remote lacunar infarcts in the bilateral caudate nuclei and left damion.  Paranasal sinuses and mastoid air cells are clear.  Orbital structures grossly appear intact.  Posterior fossa structures and pituitary gland appear intact.    Bony calvarium is unremarkable.

## 2022-10-28 NOTE — HPI
57yo M w/ PMHx of Afib (on eliquis), HFpEF (EF 60% w/ G1D Dysfxn), CVA, EtOH use d/o, and HTN who presents with concern for TIA. Pt reports he was in his normal state of health and has been complaint with his medications outpt; however, this evening around 1900 pt noticed L sided facial droop w/ slurred speech as well as facial and arm numbness. Pt has residual dysarthria and expressive aphasia which he was seeing outpt SLP after previous CVA. He reports by the time he arrived to the ED all his sx's had resolved. He and his family at [facility] endorse pt is returned to his BL.     In the ED T 98 HR 81 RR 16 /107 sat 97% on RA. EKG w/ NSR. CT head and CTA h/n w/o any acute abnormalities but chonic microvascular changes and Encephalomalacia in the left temporal and bilateral parietal regions as well as remote lacunar infarcts. CBC wnl. CMP w/ K 5.2 and BUN/Crt 13/1.6 at BL. Code stroke activated and tele-stroke evaluted pt w/ NIHSS 1. Did not rec Asa/Plavix load or tPA at this time but admission for obs and TIA w/u. Pt was admitted to the  service for TIA.

## 2022-10-28 NOTE — ED NOTES
Pt BIB wife w/ possible stroke symptoms. Pt was found by wife at ~19:00 hrs w/ slurred speech and LUE limb ataxia. Pt last seen normal at ~ 18:00 hrs. Currently pt has slightly slurred speech and decrease in sensation on L of body. Pt denies visual disturbances. No recent trauma or illness. Hx of CVA and per wife, no residual weaknesses. +A/O x 4 w/ ABCs intact, NAD. VSS.

## 2022-10-28 NOTE — ASSESSMENT & PLAN NOTE
-Pt on losartan 50mg qd and Metoprolol Succinate 50mg 24hr qd at home    Plan:  -Allow for permissive HTN <160  -Restart medications when tolerated

## 2022-10-28 NOTE — PROGRESS NOTES
[facility]  [facility] Medicine  Progress Note    Patient Name: Sarbjit Brewer Sr.  MRN: [id number]  Patient Class: OP- Observation   Admission Date: 10/27/2022  Length of Stay: 0 days  Attending Physician: Casper Segura III, MD  Primary Care Provider: Tadeo Gleason MD        Subjective:     Principal Problem:TIA (transient ischemic attack)        HPI:  57yo M w/ PMHx of Afib (on eliquis), HFpEF (EF 60% w/ G1D Dysfxn), CVA, EtOH use d/o, and HTN who presents with concern for TIA. Pt reports he was in his normal state of health and has been complaint with his medications outpt; however, this evening around 1900 pt noticed L sided facial droop w/ slurred speech as well as facial and arm numbness. Pt has residual dysarthria and expressive aphasia which he was seeing outpt SLP after previous CVA. He reports by the time he arrived to the ED all his sx's had resolved. He and his family at [facility] endorse pt is returned to his BL.     In the ED T 98 HR 81 RR 16 /107 sat 97% on RA. EKG w/ NSR. CT head and CTA h/n w/o any acute abnormalities but chonic microvascular changes and Encephalomalacia in the left temporal and bilateral parietal regions as well as remote lacunar infarcts. CBC wnl. CMP w/ K 5.2 and BUN/Crt 13/1.6 at BL. Code stroke activated and tele-stroke evaluted pt w/ NIHSS 1. Did not rec Asa/Plavix load or tPA at this time but admission for obs and TIA w/u. Pt was admitted to the  service for TIA.       Overview/Hospital Course:  No notes on file    Interval History: Pt comfortable and alert. Pt c/o Left hand numbness but sensation intact on exam. L sided residual weakness but appears to be at baseline.     Review of Systems   Constitutional:  Negative for activity change, chills, fatigue and fever.   HENT:  Negative for sinus pressure, sinus pain, sore throat and trouble swallowing.    Eyes:  Negative for visual disturbance.   Respiratory:  Negative for cough, shortness of breath and wheezing.     Cardiovascular:  Negative for chest pain, palpitations and leg swelling.   Gastrointestinal:  Negative for abdominal pain, constipation, diarrhea, nausea and vomiting.   Genitourinary:  Negative for dysuria, frequency and hematuria.   Musculoskeletal:  Negative for back pain and myalgias.   Skin:  Negative for rash and wound.   Neurological:  Negative for tremors, weakness, numbness and headaches.   Psychiatric/Behavioral:  Negative for confusion. The patient is not nervous/anxious.      Objective:     Vital Signs (Most Recent):  Temp: 98.2 °F (36.8 °C) (10/28/22 0311)  Pulse: 62 (10/28/22 1101)  Resp: 13 (10/28/22 1101)  BP: (!) 166/79 (10/28/22 1101)  SpO2: 97 % (10/28/22 0702)   Vital Signs (24h Range):  Temp:  [98.1 °F (36.7 °C)-98.2 °F (36.8 °C)] 98.2 °F (36.8 °C)  Pulse:  [60-82] 62  Resp:  [13-24] 13  SpO2:  [97 %-99 %] 97 %  BP: (162-195)/() 166/79     Weight: 95.7 kg (211 lb)  Body mass index is 29.43 kg/m².    Intake/Output Summary (Last 24 hours) at 10/28/2022 1237  Last data filed at 10/28/2022 0821  Gross per 24 hour   Intake --   Output 350 ml   Net -350 ml      Physical Exam  Constitutional:       General: He is not in acute distress.  HENT:      Head: Normocephalic and atraumatic.      Right Ear: External ear normal.      Left Ear: External ear normal.      Nose: Nose normal.   Eyes:      Extraocular Movements: Extraocular movements intact.      Conjunctiva/sclera: Conjunctivae normal.      Pupils: Pupils are equal, round, and reactive to light.   Cardiovascular:      Rate and Rhythm: Normal rate and regular rhythm.      Pulses: Normal pulses.      Heart sounds: Normal heart sounds. No murmur heard.    No friction rub. No gallop.   Pulmonary:      Effort: Pulmonary effort is normal. No respiratory distress.      Breath sounds: Normal breath sounds. No wheezing or rhonchi.   Abdominal:      General: Bowel sounds are normal. There is no distension.      Palpations: There is no mass.       Tenderness: There is no abdominal tenderness.   Musculoskeletal:         General: No swelling or tenderness. Normal range of motion.      Cervical back: Normal range of motion. No tenderness.   Lymphadenopathy:      Cervical: No cervical adenopathy.   Skin:     General: Skin is warm and dry.      Capillary Refill: Capillary refill takes less than 2 seconds.      Findings: No rash.   Neurological:      General: No focal deficit present.      Mental Status: He is alert and oriented to person, place, and time. Mental status is at baseline.      Cranial Nerves: No cranial nerve deficit.      Sensory: No sensory deficit.      Motor: No weakness.      Coordination: Coordination normal.      Gait: Gait normal.      Deep Tendon Reflexes: Reflexes normal.      Comments: BL dysarthria no L sided deficits seen on exam       Significant Labs: All pertinent labs within the past 24 hours have been reviewed.    A1C:   Recent Labs   Lab 10/28/22  0632   HGBA1C 5.8*     Bilirubin:   Recent Labs   Lab 10/27/22  2319 10/28/22  0632   BILITOT 0.3 0.4     CBC:   Recent Labs   Lab 10/27/22  2319 10/28/22  0632   WBC 8.89 7.47   HGB 13.8* 13.8*   HCT 41.5 40.7    318     CMP:   Recent Labs   Lab 10/27/22  2319 10/28/22  0632    137   K 5.2* 4.2    106   CO2 21* 20*   GLU 99 96   BUN 13 14   CREATININE 1.6* 1.6*   CALCIUM 9.0 9.3   PROT 8.0 7.5   ALBUMIN 3.8 3.8   BILITOT 0.3 0.4   ALKPHOS 110 112   AST 24 18   ALT 23 23   ANIONGAP 14 11     Coagulation:   Recent Labs   Lab 10/27/22  2319   INR 1.0     Lipid Panel:   Recent Labs   Lab 10/27/22  2319   CHOL 184   HDL 32*   LDLCALC 103.8   TRIG 241*   CHOLHDL 17.4*     Magnesium:   Recent Labs   Lab 10/28/22  0632   MG 1.9     Pathology Results  (Last 10 years)      None          POCT Glucose:   Recent Labs   Lab 10/27/22  2256 10/28/22  0812   POCTGLUCOSE 102 89     TSH:   Recent Labs   Lab 10/27/22  2319   TSH 3.819     Significant Imaging: I have reviewed all pertinent  imaging results/findings within the past 24 hours.    Imaging Results              MRI Brain Without Contrast (In process)                      CTA Head and Neck (xpd) (Final result)  Result time 10/28/22 00:44:43      Final result by Jed Barbosa MD (10/28/22 00:44:43)                   Impression:      No acute abnormality.    Limited evaluation of the intracranial circulation due to motion on angiographic sequences but with no abnormality seen on thick slab postcontrast images.    No high-grade stenosis or major vessel occlusion in the cervical carotid or vertebral circulation.      Electronically signed by: Jed Barbosa  Date:    10/28/2022  Time:    00:44               Narrative:    EXAMINATION:  CTA HEAD AND NECK (XPD)    CLINICAL HISTORY:  Stroke/TIA, determine embolic source;    TECHNIQUE:  Non contrast low dose axial images were obtained through the head. CT angiogram was performed from the level of the caitie to the top of the head following the IV administration of 100mL of Omnipaque 350.   Sagittal and coronal reconstructions and maximum intensity projection reconstructions were performed. Arterial stenosis percentages are based on NASCET measurement criteria.  Rapid AI was used.  Motion limits the exam.    COMPARISON:  02/03/2022    FINDINGS:  Intracranial Compartment:    Ventricles and sulci are stable in size for age without evidence of hydrocephalus. No extra-axial blood or fluid collections.    The brain parenchyma appears stable, with focal encephalomalacia involving the left temporal lobe anteriorly and encephalomalacia posteriorly within the watershed territories of the right left parietal temporal occipital junctions..  No parenchymal mass, hemorrhage, edema, or major vascular distribution infarct.    Skull/Extracranial Contents (limited evaluation): No fracture. Mastoid air cells and paranasal sinuses are essentially clear.    Non-Vascular Structures of the Neck/Thoracic Inlet  (limited evaluation): Normal.    Aorta: Normal 3 vessel arch.    Extracranial carotid circulation: No hemodynamically significant stenosis, aneurysmal dilatation, or dissection.    Extracranial vertebral circulation: No hemodynamically significant stenosis, aneurysmal dilatation, or dissection.    Intracranial Arteries: No focal high-grade stenosis, occlusion, or aneurysm on thick slab images with the angiographic sequences marred by motion..    Venous structures (limited evaluation): Normal.                                       CT Head Without Contrast (Final result)  Result time 10/27/22 23:44:21      Final result by Lilia Barbosa MD (10/27/22 23:44:21)                   Impression:      No evidence of acute major arterial infarct, hemorrhage or mass effect.    Patchy moderate low density in deep white matter as seen with microvascular chronic ischemic changes.  This limits assessment for nonhemorrhagic lacunar type infarcts.  Additional imaging can be obtained as clinically indicated with MRI.    Encephalomalacia in the left temporal and bilateral parietal regions.  Remote lacunar infarcts as described.      Electronically signed by: Lilia Barbosa  Date:    10/27/2022  Time:    23:44               Narrative:    EXAMINATION:  CT HEAD WITHOUT CONTRAST    CLINICAL HISTORY:  Neuro deficit, acute, stroke suspected;    TECHNIQUE:  Low dose axial images were obtained through the head.  Coronal and sagittal reformations were also performed. Contrast was not administered.    COMPARISON:  None.    FINDINGS:  There is no evidence of acute hemorrhage or hematoma.    There is prominence ventricles, cisterns and sulci as seen with senescent atrophic changes.  Foci of encephalomalacia noted in the left temporal and bilateral parietal regions.  Remote cerebellar infarcts bilaterally also noted.    Confluent moderate low density in deep white matter as seen with microvascular chronic ischemic changes noted.    There is  no mass or mass effect.  Remote lacunar infarcts in the bilateral caudate nuclei and left damion.  Paranasal sinuses and mastoid air cells are clear.  Orbital structures grossly appear intact.  Posterior fossa structures and pituitary gland appear intact.    Bony calvarium is unremarkable.                                          Assessment/Plan:      * TIA (transient ischemic attack)  -Pt w/ known hx of CVA's and residual dysarthria as well as expressive aphasia who presented with acute onset L sided weakness, numbness, and tingling starting ~1900 PTA  -Sxs since resolved in ED and reported by pt and family he is back to   -NIHSS 1 on admit and Tele-Stroke contacted who did not rec tPA or ASA/Plavix but requested permissive HTN < 160  -CT and CTA H/N without any acute abnormalities just old lacunar infarct and chronic microvascular changes  -RPR negative  -HbA1c 5.8%  -TSH 3.819  -Lipid: chol 184, , HDL 32,     Plan:  -Cnt home statin  -MRI brain pending  -ECHO w/ bubble study pending  -Folate/B12/HIV pending  -F/u neuro    (HFpEF) heart failure with preserved ejection fraction  -Last ECHO EF 60% w/ G1 Diastolic Dysfxn  -No signs of volume overload on admission    Plan:  -Cnt home Arb, BB, and lasix       Atrial fibrillation  -Cnt home eliquis but hold metoprolol on admit for permissive HTN       Alcoholism /alcohol abuse  -Pt denies any recent EtOH use at this admission  -Hx of EtOH related admissions    Plan:  -Monitor for withdrawals w/ CIWA q4hr and ativan prn if > 8      Essential hypertension  -Pt on losartan 50mg qd and Metoprolol Succinate 50mg 24hr qd at home    Plan:  -Allow for permissive HTN <160  -Restart medications when tolerated      VTE Risk Mitigation (From admission, onward)         Ordered     apixaban tablet 5 mg  2 times daily         10/28/22 0316     IP VTE HIGH RISK PATIENT  Once         10/28/22 0316     Place sequential compression device  Until discontinued         10/28/22  0316                Discharge Planning   SANTOS:      Code Status: Full Code   Is the patient medically ready for discharge?:     Reason for patient still in hospital (select all that apply): Laboratory test, Treatment and Consult recommendations               Harmony Matthews MD  Department of Hospital Medicine   [place] - Emergency Dept

## 2022-10-28 NOTE — H&P
[facility] Medicine  History & Physical    Patient Name: Sarbjit Brewer Sr.  MRN: [id number]  Patient Class: OP- Observation  Admission Date: 10/27/2022  Attending Physician: Casper Segura III, MD   Primary Care Provider: Tadeo Gleason MD         Patient information was obtained from patient and ER records.     Subjective:     Principal Problem:TIA (transient ischemic attack)    Chief Complaint:   Chief Complaint   Patient presents with    Stroke     Patient reports approximately one hour ago he believes symptoms started.  Wife noted symptoms two hours ago.  Left arm numbness now resolved, ataxia reported, left facial droop, and slurred speech.  Slight left facial droop and slight slur speech remains, but other symptoms resolved.        HPI: 57yo M w/ PMHx of Afib (on eliquis), HFpEF (EF 60% w/ G1D Dysfxn), CVA, EtOH use d/o, and HTN who presents with concern for TIA. Pt reports he was in his normal state of health and has been complaint with his medications outpt; however, this evening around 1900 pt noticed L sided facial droop w/ slurred speech as well as facial and arm numbness. Pt has residual dysarthria and expressive aphasia which he was seeing outpt SLP after previous CVA. He reports by the time he arrived to the ED all his sx's had resolved. He and his family at [facility] endorse pt is returned to his BL.     In the ED T 98 HR 81 RR 16 /107 sat 97% on RA. EKG w/ NSR. CT head and CTA h/n w/o any acute abnormalities but chonic microvascular changes and Encephalomalacia in the left temporal and bilateral parietal regions as well as remote lacunar infarcts. CBC wnl. CMP w/ K 5.2 and BUN/Crt 13/1.6 at BL. Code stroke activated and tele-stroke evaluted pt w/ NIHSS 1. Did not rec Asa/Plavix load or tPA at this time but admission for obs and TIA w/u. Pt was admitted to the  service for TIA.       Past Medical History:   Diagnosis Date    A-fib     CHF (congestive heart failure)      Hypertension     Insomnia        Past Surgical History:   Procedure Laterality Date    KIDNEY STONE SURGERY         Review of patient's allergies indicates:  No Known Allergies    No current facility-administered medications on file prior to encounter.     Current Outpatient Medications on File Prior to Encounter   Medication Sig    albuterol (PROVENTIL/VENTOLIN HFA) 90 mcg/actuation inhaler Inhale 2 puffs into the lungs every 6 (six) hours as needed for Wheezing. Rescue    apixaban (ELIQUIS) 5 mg Tab Take 1 tablet (5 mg total) by mouth 2 (two) times daily.    ascorbic acid, vitamin C, (VITAMIN C) 500 MG tablet Take 1 tablet (500 mg total) by mouth 2 (two) times daily.    aspirin (ECOTRIN) 81 MG EC tablet Take 1 tablet (81 mg total) by mouth once daily.    atorvastatin (LIPITOR) 40 MG tablet Take 1 tablet (40 mg total) by mouth every evening.    furosemide (LASIX) 20 MG tablet Take 1 tablet (20 mg total) by mouth once daily.    losartan (COZAAR) 50 MG tablet Take 1 tablet (50 mg total) by mouth once daily.    metoprolol succinate (TOPROL-XL) 50 MG 24 hr tablet Take 1 tablet (50 mg total) by mouth once daily.    mirtazapine (REMERON) 15 MG tablet Take 1 tablet (15 mg total) by mouth every evening.    nicotine (NICODERM CQ) 21 mg/24 hr Place 1 patch onto the skin once daily.    nicotine, polacrilex, (NICORETTE) 2 mg Gum Take 1 each (2 mg total) by mouth as needed.     Family History    None       Tobacco Use    Smoking status: Every Day     Packs/day: 1.00     Years: 41.00     Pack years: 41.00     Types: Cigarettes     Start date: 1981    Smokeless tobacco: Never    Tobacco comments:     Pt enrolled in the [employer] on 1/29/20 (SCT Member ID # [id number]). He declines Ambulatory referral to Smoking Cessation Program.   Substance and Sexual Activity    Alcohol use: Yes     Comment: 12 years ago    Drug use: No    Sexual activity: Not on file     Review of Systems   Constitutional:  Negative for  activity change, chills, fatigue and fever.   HENT:  Negative for sinus pressure, sinus pain, sore throat and trouble swallowing.    Eyes:  Negative for visual disturbance.   Respiratory:  Negative for cough, shortness of breath and wheezing.    Cardiovascular:  Negative for chest pain, palpitations and leg swelling.   Gastrointestinal:  Negative for abdominal pain, constipation, diarrhea, nausea and vomiting.   Genitourinary:  Negative for dysuria, frequency and hematuria.   Musculoskeletal:  Negative for back pain and myalgias.   Skin:  Negative for rash and wound.   Neurological:  Negative for tremors, weakness, numbness and headaches.   Psychiatric/Behavioral:  Negative for confusion. The patient is not nervous/anxious.    Objective:     Vital Signs (Most Recent):  Temp: 98.2 °F (36.8 °C) (10/27/22 2239)  Pulse: 66 (10/28/22 0305)  Resp: (!) 24 (10/27/22 2317)  BP: (!) 181/98 (10/28/22 0305)  SpO2: 98 % (10/27/22 2317)   Vital Signs (24h Range):  Temp:  [98.2 °F (36.8 °C)] 98.2 °F (36.8 °C)  Pulse:  [66-81] 66  Resp:  [16-24] 24  SpO2:  [97 %-98 %] 98 %  BP: (170-195)/() 181/98     Weight: 95.7 kg (211 lb)  Body mass index is 29.43 kg/m².    Physical Exam  Constitutional:       General: He is not in acute distress.  HENT:      Head: Normocephalic and atraumatic.      Right Ear: External ear normal.      Left Ear: External ear normal.      Nose: Nose normal.   Eyes:      Extraocular Movements: Extraocular movements intact.      Conjunctiva/sclera: Conjunctivae normal.      Pupils: Pupils are equal, round, and reactive to light.   Cardiovascular:      Rate and Rhythm: Normal rate and regular rhythm.      Pulses: Normal pulses.      Heart sounds: Normal heart sounds. No murmur heard.    No friction rub. No gallop.   Pulmonary:      Effort: Pulmonary effort is normal. No respiratory distress.      Breath sounds: Normal breath sounds. No wheezing or rhonchi.   Abdominal:      General: Bowel sounds are normal.  There is no distension.      Palpations: There is no mass.      Tenderness: There is no abdominal tenderness.   Musculoskeletal:         General: No swelling or tenderness. Normal range of motion.      Cervical back: Normal range of motion. No tenderness.   Lymphadenopathy:      Cervical: No cervical adenopathy.   Skin:     General: Skin is warm and dry.      Capillary Refill: Capillary refill takes less than 2 seconds.      Findings: No rash.   Neurological:      General: No focal deficit present.      Mental Status: He is alert and oriented to person, place, and time. Mental status is at baseline.      Cranial Nerves: No cranial nerve deficit.      Sensory: No sensory deficit.      Motor: No weakness.      Coordination: Coordination normal.      Gait: Gait normal.      Deep Tendon Reflexes: Reflexes normal.      Comments: BL dysarthria no L sided deficits seen on exam       Recent Labs   Lab 10/27/22  2319   WBC 8.89   HGB 13.8*   HCT 41.5   MCV 87   RBC 4.75   MCH 29.1   MCHC 33.3   RDW 16.2*      MPV 11.6   GRAN 37.7*  3.3   LYMPH 43.2  3.8   MONO 10.7  1.0   EOSINOPHIL 7.6   BASOPHIL 0.4     Recent Labs   Lab 10/27/22  2319      K 5.2*      CO2 21*   ANIONGAP 14   BUN 13   CREATININE 1.6*   GLU 99   CALCIUM 9.0   PROT 8.0   ALBUMIN 3.8   ALKPHOS 110   BILITOT 0.3   ALT 23   AST 24     No results for input(s): COLORU, APPEARANCEUA, PHUR, SPECGRAV, PROTEINUA, GLUCUA, KETONESU, BILIRUBINUA, OCCULTUA, UROBILINOGEN, NITRITE, LEUKOCYTESUR, RBCUA, WBCUA, BACTERIA, SQUAMEPITHEL, HYALINECASTS, GRANULARCAST, MICROCMT in the last 168 hours.    Invalid input(s): SPECIMENU, AMORPHOUSU  No results for input(s): TROPONINI, CPK, CPKMB in the last 168 hours.  Recent Labs   Lab 10/27/22  2319   INR 1.0     Recent Labs   Lab 10/27/22  2319   TSH 3.819     CT Head Without Contrast    Result Date: 10/27/2022  EXAMINATION: CT HEAD WITHOUT CONTRAST CLINICAL HISTORY: Neuro deficit, acute, stroke suspected;  TECHNIQUE: Low dose axial images were obtained through the head.  Coronal and sagittal reformations were also performed. Contrast was not administered. COMPARISON: None. FINDINGS: There is no evidence of acute hemorrhage or hematoma. There is prominence ventricles, cisterns and sulci as seen with senescent atrophic changes.  Foci of encephalomalacia noted in the left temporal and bilateral parietal regions.  Remote cerebellar infarcts bilaterally also noted. Confluent moderate low density in deep white matter as seen with microvascular chronic ischemic changes noted. There is no mass or mass effect.  Remote lacunar infarcts in the bilateral caudate nuclei and left damion.  Paranasal sinuses and mastoid air cells are clear.  Orbital structures grossly appear intact.  Posterior fossa structures and pituitary gland appear intact. Bony calvarium is unremarkable.     No evidence of acute major arterial infarct, hemorrhage or mass effect. Patchy moderate low density in deep white matter as seen with microvascular chronic ischemic changes.  This limits assessment for nonhemorrhagic lacunar type infarcts.  Additional imaging can be obtained as clinically indicated with MRI. Encephalomalacia in the left temporal and bilateral parietal regions.  Remote lacunar infarcts as described. Electronically signed by: Lilia Barbosa Date:    10/27/2022 Time:    23:44    CTA Head and Neck (xpd)    Result Date: 10/28/2022  EXAMINATION: CTA HEAD AND NECK (XPD) CLINICAL HISTORY: Stroke/TIA, determine embolic source; TECHNIQUE: Non contrast low dose axial images were obtained through the head. CT angiogram was performed from the level of the caitie to the top of the head following the IV administration of 100mL of Omnipaque 350.   Sagittal and coronal reconstructions and maximum intensity projection reconstructions were performed. Arterial stenosis percentages are based on NASCET measurement criteria.  Rapid AI was used.  Motion limits the  exam. COMPARISON: 02/03/2022 FINDINGS: Intracranial Compartment: Ventricles and sulci are stable in size for age without evidence of hydrocephalus. No extra-axial blood or fluid collections. The brain parenchyma appears stable, with focal encephalomalacia involving the left temporal lobe anteriorly and encephalomalacia posteriorly within the watershed territories of the right left parietal temporal occipital junctions..  No parenchymal mass, hemorrhage, edema, or major vascular distribution infarct. Skull/Extracranial Contents (limited evaluation): No fracture. Mastoid air cells and paranasal sinuses are essentially clear. Non-Vascular Structures of the Neck/Thoracic Inlet (limited evaluation): Normal. Aorta: Normal 3 vessel arch. Extracranial carotid circulation: No hemodynamically significant stenosis, aneurysmal dilatation, or dissection. Extracranial vertebral circulation: No hemodynamically significant stenosis, aneurysmal dilatation, or dissection. Intracranial Arteries: No focal high-grade stenosis, occlusion, or aneurysm on thick slab images with the angiographic sequences marred by motion.. Venous structures (limited evaluation): Normal.     No acute abnormality. Limited evaluation of the intracranial circulation due to motion on angiographic sequences but with no abnormality seen on thick slab postcontrast images. No high-grade stenosis or major vessel occlusion in the cervical carotid or vertebral circulation. Electronically signed by: Jed Barbosa Date:    10/28/2022 Time:    00:44    Microbiology Results (last 7 days)       ** No results found for the last 168 hours. **              Assessment/Plan:     * TIA (transient ischemic attack)  -Pt w/ known hx of CVA's and residual dysarthria as well as expressive aphasia who presented with acute onset L sided weakness, numbness, and tingling starting ~1900 PTA  -Sxs since resolved in ED and reported by pt and family he is back to BL  -NIHSS 1 on admit and  Tele-Stroke contacted who did not rec tPA or ASA/Plavix but requested permissive HTN < 160  -CT and CTA H/N without any acute abnormalities just old lacunar infarct and chronic microvascular changes    Plan:  -Cnt home statin  -MRI brain pending  -ECHO w/ bubble study pending  -Lipids/TSH/A1C/Folate/B12/HIV/RPR pending      Atrial fibrillation  -Cnt home eliquis but hold metoprolol on admit for permissive HTN       Alcoholism /alcohol abuse  -Pt denies any recent EtOH use at this admission  -Hx of EtOH related admissions    Plan:  -Monitor for withdrawals w/ CIWA q4hr and ativan prn if > 8      Essential hypertension  -Pt on losartan 50mg qd and Metoprolol Succinate 50mg 24hr qd at home    Plan:  -Allow for permissive HTN <160  -Restart medications when tolerated    VTE Risk Mitigation (From admission, onward)         Ordered     apixaban tablet 5 mg  2 times daily         10/28/22 0316     IP VTE HIGH RISK PATIENT  Once         10/28/22 0316     Place sequential compression device  Until discontinued         10/28/22 0316                   Tay Emmanuel MD  Department of Hospital Medicine   [place] - Emergency Dept

## 2022-10-28 NOTE — ED PROVIDER NOTES
Encounter Date: 10/27/2022       History     Chief Complaint   Patient presents with    Stroke     Patient reports approximately one hour ago he believes symptoms started.  Wife noted symptoms two hours ago.  Left arm numbness now resolved, ataxia reported, left facial droop, and slurred speech.  Slight left facial droop and slight slur speech remains, but other symptoms resolved.     Sarbjit Brewer Sr. is a 56 y.o. male who  has a past medical history of A-fib, CHF (congestive heart failure), Hypertension, and Insomnia.    The patient presents to the ED due to slurred speech and L arm weakness/numbness.   Patient reports symptoms started 1 hour ago. Family noticed his speech was slurred and he had a facial droop. He also states he felt off-balance when trying to walk.  He reports no history of stroke. Currently takes blood thinner for a-fib. Denies any headache, vision changes, fever, recent illness, N/V/D, or any other concerns.    Code stroke activated on patient arrival.       Review of patient's allergies indicates:  No Known Allergies  Past Medical History:   Diagnosis Date    A-fib     CHF (congestive heart failure)     Hypertension     Insomnia      Past Surgical History:   Procedure Laterality Date    KIDNEY STONE SURGERY       No family history on file.  Social History     Tobacco Use    Smoking status: Every Day     Packs/day: 1.00     Years: 41.00     Pack years: 41.00     Types: Cigarettes     Start date: 1981    Smokeless tobacco: Never    Tobacco comments:     Pt enrolled in the [employer] on 1/29/20 (SCT Member ID # [id number]). He declines Ambulatory referral to Smoking Cessation Program.   Substance Use Topics    Alcohol use: Yes     Comment: 12 years ago    Drug use: No     Review of Systems   Constitutional:  Negative for chills and fever.   HENT:  Negative for sore throat.    Respiratory:  Negative for shortness of breath.    Cardiovascular:  Negative for chest pain.   Gastrointestinal:   Negative for constipation, diarrhea, nausea and vomiting.   Genitourinary:  Negative for dysuria, frequency and urgency.   Musculoskeletal:  Negative for back pain.   Skin:  Negative for rash and wound.   Neurological:  Positive for facial asymmetry, speech difficulty and weakness. Negative for headaches.   Hematological:  Does not bruise/bleed easily.   Psychiatric/Behavioral:  Negative for agitation, behavioral problems and confusion.      Physical Exam     Initial Vitals [10/27/22 2239]   BP Pulse Resp Temp SpO2   (!) 170/107 81 16 98.2 °F (36.8 °C) 97 %      MAP       --         Physical Exam    Nursing note and vitals reviewed.  Constitutional: He appears well-developed and well-nourished. He is not diaphoretic. No distress.   HENT:   Head: Normocephalic and atraumatic. Head is without abrasion and without contusion.   Mouth/Throat: Uvula is midline and oropharynx is clear and moist.   Eyes: EOM are normal. Pupils are equal, round, and reactive to light.   Neck: No tracheal deviation present.   Cardiovascular:  Normal rate, regular rhythm, normal heart sounds and intact distal pulses.           Pulmonary/Chest: Breath sounds normal. No stridor. No respiratory distress.   Abdominal: Abdomen is soft. He exhibits no distension and no mass. There is no abdominal tenderness.   Musculoskeletal:         General: No edema. Normal range of motion.     Neurological: He is alert and oriented to person, place, and time. He has normal strength. A cranial nerve deficit is present. No sensory deficit. He exhibits normal muscle tone. Gait normal. GCS eye subscore is 4. GCS verbal subscore is 5. GCS motor subscore is 6.   Slight slurred speech, mild facial droop.   No extremity weakness.    Skin: Skin is warm and dry. Capillary refill takes less than 2 seconds. No rash noted.   Psychiatric: He has a normal mood and affect. His behavior is normal. Thought content normal.       ED Course   Procedures  Labs Reviewed   CBC W/ AUTO  DIFFERENTIAL - Abnormal; Notable for the following components:       Result Value    Hemoglobin 13.8 (*)     RDW 16.2 (*)     Eos # 0.7 (*)     Gran % 37.7 (*)     All other components within normal limits   COMPREHENSIVE METABOLIC PANEL - Abnormal; Notable for the following components:    Potassium 5.2 (*)     CO2 21 (*)     Creatinine 1.6 (*)     eGFR 50 (*)     All other components within normal limits   LIPID PANEL - Abnormal; Notable for the following components:    Triglycerides 241 (*)     HDL 32 (*)     HDL/Cholesterol Ratio 17.4 (*)     Total Cholesterol/HDL Ratio 5.8 (*)     All other components within normal limits   COMPREHENSIVE METABOLIC PANEL - Abnormal; Notable for the following components:    CO2 20 (*)     Creatinine 1.6 (*)     eGFR 50 (*)     All other components within normal limits    Narrative:     Release to patient->Immediate  Release to patient->Immediate   HEMOGLOBIN A1C - Abnormal; Notable for the following components:    Hemoglobin A1C 5.8 (*)     All other components within normal limits    Narrative:     Release to patient->Immediate  Release to patient->Immediate   CBC W/ AUTO DIFFERENTIAL - Abnormal; Notable for the following components:    Hemoglobin 13.8 (*)     RDW 16.1 (*)     All other components within normal limits    Narrative:     Release to patient->Immediate  Release to patient->Immediate   ISTAT PROCEDURE - Abnormal; Notable for the following components:    POC PTWBT 14.6 (*)     All other components within normal limits   ISTAT CREATININE - Abnormal; Notable for the following components:    POC Creatinine 1.5 (*)     All other components within normal limits   PROTIME-INR   TSH   MAGNESIUM    Narrative:     Release to patient->Immediate  Release to patient->Immediate   PHOSPHORUS    Narrative:     Release to patient->Immediate  Release to patient->Immediate   RPR    Narrative:     Release to patient->Immediate  Release to patient->Immediate   POCT GLUCOSE   POCT GLUCOSE         ECG Results              ECG 12 lead (Final result)  Result time 11/02/22 08:39:48      Final result by Interface, [facility] (11/02/22 08:39:48)                   Narrative:    Test Reason : I63.9,    Vent. Rate : 076 BPM     Atrial Rate : 076 BPM     P-R Int : 194 ms          QRS Dur : 086 ms      QT Int : 390 ms       P-R-T Axes : 067 025 051 degrees     QTc Int : 438 ms    Normal sinus rhythm  Normal ECG  When compared with ECG of 17-FEB-2022 10:38,  TX interval has decreased  T wveinversion inferiorly resolved   Confirmed by Kurt SOSA, Lalitha ([id number]) on 11/2/2022 8:39:36 AM    Referred By: [email or web address]   SELF           Confirmed By:Lalitha Hicks MD                                  Imaging Results               MRI Brain Without Contrast (Final result)  Result time 10/28/22 13:46:51      Final result by Vipin Garcia MD (10/28/22 13:46:51)                   Impression:      This report was flagged in Epic as abnormal.    1. Multiple punctate foci of restricted diffusion involving the right parietooccipital region as well as the right insular region consistent with acute infarcts.  Previous restricted diffusion within the bilateral occipital lobes has resolved.  2. Multiple scattered remote appearing infarcts including within the bilateral basal ganglia, right thalamus, right caudate, adjacent to the bilateral posterior horns of the lateral ventricles, left eve damion, and cerebellum.  3. Sequela of chronic microvascular ischemic change and senescent change.  4. Stable scattered regions of hemosiderin deposition.  5. Apparent slow flow through the left sigmoid sinus.      Electronically signed by: Vipin Garcia MD  Date:    10/28/2022  Time:    13:46               Narrative:    EXAMINATION:  MRI BRAIN WITHOUT CONTRAST    CLINICAL HISTORY:  Stroke, follow up;    TECHNIQUE:  Multiplanar multisequence MR imaging of the brain was performed without contrast.    COMPARISON:  CTA 10/27/2022,  MRI 02/02/2022    FINDINGS:  There is motion artifact.    There is no intracranial mass, or acute hemorrhage.  There are multiple patchy foci of T2/FLAIR signal abnormality throughout the supratentorial white matter and damion suggesting sequela of chronic microvascular ischemic change.  There is bilateral encephalomalacia adjacent to the posterior horns of the bilateral lateral ventricles.  Remote infarct noted involving the posterior aspect of the right caudate, right cerebellum and left eve damion.  Additional scattered lacunar infarcts are noted within the basal ganglia bilaterally as well as bilateral cerebellum.  There are scattered punctate foci of gradient susceptibility particularly within the basal ganglia and right thalamus consistent with remote hemosiderin deposition.  There are several punctate foci of restricted diffusion within the posterior aspect of the right parietooccipital region as well as the medial temporal/insular region, new since the previous examination.  Previous bilateral foci of restricted diffusion within the occipital lobes has resolved.  There is no hydrocephalus. There are no significant extra-axial or extracranial abnormalities.    The globes, orbits, pituitary gland, pineal gland and craniocervical junction are normal in configuration.  The major vascular flow voids are patent, noting apparent slow flow through the sigmoid sinus on the left..                                       CTA Head and Neck (xpd) (Final result)  Result time 10/28/22 00:44:43      Final result by Jed Barbosa MD (10/28/22 00:44:43)                   Impression:      No acute abnormality.    Limited evaluation of the intracranial circulation due to motion on angiographic sequences but with no abnormality seen on thick slab postcontrast images.    No high-grade stenosis or major vessel occlusion in the cervical carotid or vertebral circulation.      Electronically signed by: Jed  Chelsey  Date:    10/28/2022  Time:    00:44               Narrative:    EXAMINATION:  CTA HEAD AND NECK (XPD)    CLINICAL HISTORY:  Stroke/TIA, determine embolic source;    TECHNIQUE:  Non contrast low dose axial images were obtained through the head. CT angiogram was performed from the level of the caitie to the top of the head following the IV administration of 100mL of Omnipaque 350.   Sagittal and coronal reconstructions and maximum intensity projection reconstructions were performed. Arterial stenosis percentages are based on NASCET measurement criteria.  Rapid AI was used.  Motion limits the exam.    COMPARISON:  02/03/2022    FINDINGS:  Intracranial Compartment:    Ventricles and sulci are stable in size for age without evidence of hydrocephalus. No extra-axial blood or fluid collections.    The brain parenchyma appears stable, with focal encephalomalacia involving the left temporal lobe anteriorly and encephalomalacia posteriorly within the watershed territories of the right left parietal temporal occipital junctions..  No parenchymal mass, hemorrhage, edema, or major vascular distribution infarct.    Skull/Extracranial Contents (limited evaluation): No fracture. Mastoid air cells and paranasal sinuses are essentially clear.    Non-Vascular Structures of the Neck/Thoracic Inlet (limited evaluation): Normal.    Aorta: Normal 3 vessel arch.    Extracranial carotid circulation: No hemodynamically significant stenosis, aneurysmal dilatation, or dissection.    Extracranial vertebral circulation: No hemodynamically significant stenosis, aneurysmal dilatation, or dissection.    Intracranial Arteries: No focal high-grade stenosis, occlusion, or aneurysm on thick slab images with the angiographic sequences marred by motion..    Venous structures (limited evaluation): Normal.                                       CT Head Without Contrast (Final result)  Result time 10/27/22 23:44:21      Final result by Lilia UP  MD Chelsey (10/27/22 23:44:21)                   Impression:      No evidence of acute major arterial infarct, hemorrhage or mass effect.    Patchy moderate low density in deep white matter as seen with microvascular chronic ischemic changes.  This limits assessment for nonhemorrhagic lacunar type infarcts.  Additional imaging can be obtained as clinically indicated with MRI.    Encephalomalacia in the left temporal and bilateral parietal regions.  Remote lacunar infarcts as described.      Electronically signed by: Lilia Barbosa  Date:    10/27/2022  Time:    23:44               Narrative:    EXAMINATION:  CT HEAD WITHOUT CONTRAST    CLINICAL HISTORY:  Neuro deficit, acute, stroke suspected;    TECHNIQUE:  Low dose axial images were obtained through the head.  Coronal and sagittal reformations were also performed. Contrast was not administered.    COMPARISON:  None.    FINDINGS:  There is no evidence of acute hemorrhage or hematoma.    There is prominence ventricles, cisterns and sulci as seen with senescent atrophic changes.  Foci of encephalomalacia noted in the left temporal and bilateral parietal regions.  Remote cerebellar infarcts bilaterally also noted.    Confluent moderate low density in deep white matter as seen with microvascular chronic ischemic changes noted.    There is no mass or mass effect.  Remote lacunar infarcts in the bilateral caudate nuclei and left damion.  Paranasal sinuses and mastoid air cells are clear.  Orbital structures grossly appear intact.  Posterior fossa structures and pituitary gland appear intact.    Bony calvarium is unremarkable.                                       Medications   iohexoL (OMNIPAQUE 350) injection 100 mL (100 mLs Intravenous Given 10/28/22 0011)   magnesium sulfate 2g in water 50mL IVPB (premix) (0 g Intravenous Stopped 10/29/22 1105)   iohexoL (OMNIPAQUE 350) injection 100 mL (100 mLs Intravenous Given 10/29/22 0930)     Medical Decision Making:    History:   Old Medical Records: I decided to obtain old medical records.  Old Records Summarized: records from clinic visits and other records.       <> Summary of Records: History of a-fib, prior CVA. On Eliquis.   Initial Assessment:   55 yo M with history of a-fib on Eliquis presents with slurred speech, facial droop, LUE weakness x 1 hour, improving on arrival.  Code stroke activated on patient arrival, and patient was taken directly to CT.    Plan to obtain EKG, labs, and discuss with Vascular Neurology for further recommendations.      Differential Diagnosis:   Differential Diagnosis includes, but is not limited to:  CVA/TIA, intracranial mass/hemorrhage, head trauma, seizure, status epilepticus, post-ictal state, meningitis/encephalitis, sepsis, MI/ACS, arrhythmia, syncope,   anaphylaxis, thyroid disease, neuroleptic malignant syndrome, serotonin syndrome, CO poisoning, hypoxia/hypercapnea, uremic/hepatic encephalopathy, medication reaction, intentional overdose, metabolic derangement, psychiatric disturbance, substance abuse, alcohol intoxication/withdrawal, hypoglycemia/hyperglycemia, complicated migraine.    Clinical Tests:   Lab Tests: Ordered and Reviewed  Radiological Study: Reviewed and Ordered  Medical Tests: Ordered and Reviewed  ED Management:  Discussed with Vascular Neuro, feel patient is not candidate for tPA or acute intervention. Recommend CTA head/neck and admission for MRI and further management of likely hypertensive urgency vs TIA.     On re-evaluation, the patient's status has improved.  At this time, I believe the patient should be admitted to the hospital for further evaluation and management of TIA.  [facility] FM service was contacted and the case was discussed.   The consulting physician/team agrees with plan and will admit under their service.   The patient and family were updated with test results, overall impression, and further plan of care. All questions were answered. The patient  expressed understanding and agrees with the current plan.      Additional MDM:     NIH Stroke Scale:   Interval = baseline (upon arrival/admit)  Level of consciousness = 0 - alert  LOC questions = 0 - answers both correctly  LOC commands = 0 - performs both correctly  Best gaze = 0 - normal  Visual = 0 - no visual loss  Facial palsy = 1 - minor  Motor left arm =  0 - no drift  Motor right arm =  0 - no drift  Motor left leg = 0 - no drift  Motor right leg =  0 - no drift  Limb ataxia = 0 - absent  Sensory = 0 - normal  Best language = 0 - no aphasia  Dysarthria = 1 - mild to moderate dysarthria  Extinction and inattention = 0 - no neglect  NIH Stroke Scale Total = 2       Attending Attestation:         Attending Critical Care:   Critical Care Times:   ==============================================================  Total Critical Care Time - exclusive of procedural time: 45 minutes.  ==============================================================  Critical care was necessary to treat or prevent imminent or life-threatening deterioration of the following conditions: stroke.   Critical Care Condition: critical                      Clinical Impression:   Final diagnoses:  [I63.9] Stroke  [R47.81] Slurred speech (Primary)  [R29.810] Facial droop  [R29.898] Left arm weakness  [G45.9] TIA (transient ischemic attack)      ED Disposition Condition    Observation                      Gal Cabezas MD  11/02/22 0919

## 2022-10-28 NOTE — SUBJECTIVE & OBJECTIVE
Woke up with symptoms?: no    Recent bleeding noted: no  Does the patient take any Blood Thinners? yes  Medications: Antiplatelets:  aspirin and Anticoagulants:  apixaban/Eliquis      Past Medical History: hypertension and stroke    Past Surgical History: no major surgeries within the last 2 weeks    Family History: no relevant history    Social History: unable to obtain    Allergies: No Known Allergies No relevant allergies    Review of Systems  Objective:   Vitals: Blood pressure (!) 170/107, pulse 76, temperature 98.2 °F (36.8 °C), temperature source Oral, resp. rate 20, weight 95.7 kg (211 lb), SpO2 97 %. BP: 195/90    CT READ: No    Physical Exam

## 2022-10-28 NOTE — HPI
Patient at [facility] in the ED. Patient in with left facial droop and numbness, slurred speech, and left arm numbness. Facial numbness and arm numbness has resolved. Last known well today at 1800.    No current facility-administered medications for this encounter.    Current Outpatient Medications:     albuterol (PROVENTIL/VENTOLIN HFA) 90 mcg/actuation inhaler, Inhale 2 puffs into the lungs every 6 (six) hours as needed for Wheezing. Rescue, Disp: 18 g, Rfl: 0    apixaban (ELIQUIS) 5 mg Tab, Take 1 tablet (5 mg total) by mouth 2 (two) times daily., Disp: 180 tablet, Rfl: 3    ascorbic acid, vitamin C, (VITAMIN C) 500 MG tablet, Take 1 tablet (500 mg total) by mouth 2 (two) times daily., Disp: 30 tablet, Rfl: 0    aspirin (ECOTRIN) 81 MG EC tablet, Take 1 tablet (81 mg total) by mouth once daily., Disp: 90 tablet, Rfl: 3    atorvastatin (LIPITOR) 40 MG tablet, Take 1 tablet (40 mg total) by mouth every evening., Disp: 90 tablet, Rfl: 3    furosemide (LASIX) 20 MG tablet, Take 1 tablet (20 mg total) by mouth once daily., Disp: 90 tablet, Rfl: 4    losartan (COZAAR) 50 MG tablet, Take 1 tablet (50 mg total) by mouth once daily., Disp: 90 tablet, Rfl: 3    metoprolol succinate (TOPROL-XL) 50 MG 24 hr tablet, Take 1 tablet (50 mg total) by mouth once daily., Disp: 90 tablet, Rfl: 3    mirtazapine (REMERON) 15 MG tablet, Take 1 tablet (15 mg total) by mouth every evening., Disp: 30 tablet, Rfl: 11    nicotine (NICODERM CQ) 21 mg/24 hr, Place 1 patch onto the skin once daily., Disp: 28 patch, Rfl: 2    nicotine, polacrilex, (NICORETTE) 2 mg Gum, Take 1 each (2 mg total) by mouth as needed., Disp: 100 each, Rfl: 0

## 2022-10-28 NOTE — ED NOTES
"OT/PT at bedside for eval. Pt ate appx 20% of breakfast tray. Provided patient with cereal upon request. Pt states, "I cant have that milk i'm lactose."   "

## 2022-10-28 NOTE — ASSESSMENT & PLAN NOTE
-Pt w/ known hx of CVA's and residual dysarthria as well as expressive aphasia who presented with acute onset L sided weakness, numbness, and tingling starting ~1900 PTA  -Sxs since resolved in ED and reported by pt and family he is back to [facility]  -NIHSS 1 on admit and Tele-Stroke contacted who did not rec tPA or ASA/Plavix but requested permissive HTN < 160  -CT and CTA H/N without any acute abnormalities just old lacunar infarct and chronic microvascular changes    Plan:  -Cnt home statin  -MRI brain pending  -ECHO w/ bubble study pending  -Lipids/TSH/A1C/Folate/B12/HIV/RPR pending

## 2022-10-28 NOTE — CONSULTS
[facility] NEUROLOGY CONSULT  EVALUATION    Reason for consult:  TIA / stroke  Informant:  Primary    Other sources of information : Patient , chart review    CC:  Stroke (Patient reports approximately one hour ago he believes symptoms started.  Wife noted symptoms two hours ago.  Left arm numbness now resolved, ataxia reported, left facial droop, and slurred speech.  Slight left facial droop and slight slur speech remains, but other symptoms resolved.)       HPI: Sarbjit Brewer Sr. is a 56 y.o. right handed male with  has a past medical history of A-fib on eliquis, HFpEF (EF 60% w/ G1D Dysfxn), Hypertension, and ETOH use disorder  who presents for L sided facial droop w/ slurred speech as well as facial and arm numbness  Patient was in his normal state of health  watching Football and smoking on 10/27 (DOA) around 1900 pt noticed L sided facial droop w/ slurred speech as well as facial and arm numbness while attempting to get his ashtray. Pt has residual dysarthria and expressive aphasia which he was seeing outpt SLP after previous CVA. He reports by the time he arrived to the ED all his sx's had resolved.   He has been complaint with his medications outpt . He is a current smoker of 1 pack per day and drinks  3- 32oz beers daily.  In the ED T 98 HR 81 RR 16 /107 sat 97% on RA. EKG w/ NSR. CT head and CTA h/n w/o any acute abnormalities but chonic microvascular changes and Encephalomalacia in the left temporal and bilateral parietal regions as well as remote lacunar infarcts. CBC wnl. CMP w/ K 5.2 and BUN/Crt 13/1.6 at [facility]. Code stroke activated and tele-stroke evaluted pt w/ NIHSS 1.      Outpatient Neurologist: None       ROS:   12pt ROS negative other then mentioned above    Histories:     Allergies:  Patient has no known allergies.    Current Medications:    Current Facility-Administered Medications   Medication Dose Route Frequency Provider Last Rate Last Admin    acetaminophen tablet 650 mg  650 mg Oral Q8H PRN  Tay Emmanuel MD        albuterol inhaler 2 puff  2 puff Inhalation Q6H PRN Tay Emmanuel MD        apixaban tablet 5 mg  5 mg Oral BID Tay Emmanuel MD   5 mg at 10/28/22 0816    atorvastatin tablet 40 mg  40 mg Oral QHS Tay Emmanuel MD   40 mg at 10/28/22 0415    furosemide tablet 20 mg  20 mg Oral Daily Tay Emmanuel MD   20 mg at 10/28/22 0817    LIDOcaine 5 % patch 1 patch  1 patch Transdermal Daily PRN Tay Emmanuel MD        losartan tablet 50 mg  50 mg Oral Daily Tay Emmanuel MD   50 mg at 10/28/22 0400    melatonin tablet 9 mg  9 mg Oral Nightly PRN Tay Emmanuel MD        metoprolol succinate (TOPROL-XL) 24 hr tablet 50 mg  50 mg Oral Daily Tay Emmanuel MD   50 mg at 10/28/22 0816    naloxone 0.4 mg/mL injection 0.02 mg  0.02 mg Intravenous PRN Tay Emmanuel MD        ondansetron injection 4 mg  4 mg Intravenous Q8H PRN Tay Emmanuel MD        senna-docusate 8.6-50 mg per tablet 1 tablet  1 tablet Oral BID PRN Tay Emmanuel MD        sodium chloride 0.9% flush 5 mL  5 mL Intravenous PRN Tay Emmanuel MD         Current Outpatient Medications   Medication Sig Dispense Refill    albuterol (PROVENTIL/VENTOLIN HFA) 90 mcg/actuation inhaler Inhale 2 puffs into the lungs every 6 (six) hours as needed for Wheezing. Rescue 18 g 0    apixaban (ELIQUIS) 5 mg Tab Take 1 tablet (5 mg total) by mouth 2 (two) times daily. 180 tablet 3    ascorbic acid, vitamin C, (VITAMIN C) 500 MG tablet Take 1 tablet (500 mg total) by mouth 2 (two) times daily. 30 tablet 0    aspirin (ECOTRIN) 81 MG EC tablet Take 1 tablet (81 mg total) by mouth once daily. 90 tablet 3    atorvastatin (LIPITOR) 40 MG tablet Take 1 tablet (40 mg total) by mouth every evening. 90 tablet 3    furosemide (LASIX) 20 MG tablet Take 1 tablet (20 mg total) by mouth once daily. 90 tablet 4    losartan (COZAAR) 50 MG tablet Take 1 tablet (50 mg total) by mouth once daily. 90 tablet 3    metoprolol succinate (TOPROL-XL) 50 MG 24 hr  tablet Take 1 tablet (50 mg total) by mouth once daily. 90 tablet 3    mirtazapine (REMERON) 15 MG tablet Take 1 tablet (15 mg total) by mouth every evening. 30 tablet 11    nicotine (NICODERM CQ) 21 mg/24 hr Place 1 patch onto the skin once daily. 28 patch 2    nicotine, polacrilex, (NICORETTE) 2 mg Gum Take 1 each (2 mg total) by mouth as needed. 100 each 0         Past Medical/Surgical/Family/Social History:  PMHx:   Past Medical History:   Diagnosis Date    A-fib     CHF (congestive heart failure)     Hypertension     Insomnia       Surgeries:   Past Surgical History:   Procedure Laterality Date    KIDNEY STONE SURGERY        Family  Hx: No family history on file.   Social Hx:   Social History     Tobacco Use    Smoking status: Every Day     Packs/day: 1.00     Years: 41.00     Pack years: 41.00     Types: Cigarettes     Start date:     Smokeless tobacco: Never    Tobacco comments:     Pt enrolled in the [facility] on 20 (SCT Member ID # [id number]). He declines Ambulatory referral to Smoking Cessation Program.   Substance Use Topics    Alcohol use: Yes     Comment: 12 years ago    Drug use: No         Current Evaluation:        NIHSS (National [place] [facility] Stroke Scale)   1a  Level of consciousness: 0=alert; keenly responsive   1b. LOC questions:  0 = Answers both questions correctly   1c. LOC commands: 0=Answers both tasks correctly   2.  Best Gaze: 0=normal   3. Visual: 0=No visual loss   4. Facial Palsy: 1=Minor paralysis (flattened nasolabial fold, asymmetric on smiling)   5a. Motor left arm: 1=Drift, limb holds 90 (or 45) degrees but drifts down before full 10 seconds: does not hit bed   5b.  Motor right arm: 0=No drift, limb holds 90 (or 45) degrees for full 10 seconds   6a. motor left le=No drift, limb holds 90 (or 45) degrees for full 10 seconds   6b  Motor right le=No drift, limb holds 90 (or 45) degrees for full 10 seconds   7. Limb Ataxia: 0=Absent   8.  Sensory: 0=Normal;  no sensory loss   9. Best Language:  1=Mild to moderate aphasia; some obvious loss of fluency or facility of comprehension without significant limitation on ideas expressed or form of expression.   10. Dysarthria: 1=Mild to moderate, patient slurs at least some words and at worst, can be understood with some difficulty   11. Extinction and Inattention: 0=No abnormality         Total:   4       Vital Signs:   Vitals:    10/28/22 1001   BP: (!) 169/101   Pulse: 69   Resp: 20   Temp:         General Exam  No apparent distress  Orientation  Alert, awake, oriented to self, place, time, and situation.  Memory  Recent and remote memory intact.  Language  Fluent speech. No dysarthria, expressive aphasia when asked to follow directions was unable to .   Cranial Nerves  PERRL, VF intact, EOMI, V1-V3 intact, symmetric facial expression, hearing grossly intact, SCM & TPZ 5/5, tongue midline, symmetric palate elevation.  Motor  Normal Bulk, Normal Tone  Right Upper Extremity: Normal 5/5 strength  Left Upper Extremity: Normal 4/5 strength  Right Lower Extremity: Normal 5/5 strength  Left Lower Extremity: Normal 5/5 strength  Dysdiadochokinesia L >> R  Sensory  Normal to light touch throughout  Normal vibration   Romberg Negative  DTR  Upper Extremities:  +3/4, symmetric  Lower Extremities: Patellar  +3/4 and symmetric Achilles +2  Cerebellar/Gait  Normal finger to nose on right and past pointing in left and Normal heel to shin.   Normal gait and stance.       LABORATORY STUDIES:  Labs:  Recent Labs   Lab 10/27/22  2319 10/28/22  0632   WBC 8.89 7.47   HGB 13.8* 13.8*   HCT 41.5 40.7    318   MCV 87 86       Recent Labs   Lab 10/27/22  2319 10/28/22  0632    137   K 5.2* 4.2    106   CO2 21* 20*   BUN 13 14   GLU 99 96   CALCIUM 9.0 9.3   PROT 8.0 7.5   ALBUMIN 3.8 3.8   BILITOT 0.3 0.4   AST 24 18   ALKPHOS 110 112   ALT 23 23       Recent Labs   Lab 10/27/22  2319   INR 1.0       Recent Labs   Lab  10/27/22  2319 10/28/22  0632   GLU 99 96       Urine:   Lab Results   Component Value Date    SPECGRAV 1.020 02/02/2022    NITRITE Negative 02/02/2022    KETONESU Negative 02/02/2022    UROBILINOGEN Negative 02/02/2022    WBCUA 2 02/02/2022       Recent Labs   Lab 10/27/22  2319 10/28/22  0632   HGBA1C  --  5.8*   LDLCALC 103.8  --        Thyroid:   Recent Labs   Lab 10/27/22  2319   TSH 3.819       FLP:   Recent Labs   Lab 10/27/22  2319   CHOL 184   HDL 32*   LDLCALC 103.8   TRIG 241*   CHOLHDL 17.4*       Cardiac markers:  No results for input(s): TROPONINI, BNP in the last 168 hours.    RADIOLOGY STUDIES:  Imaging Results              CTA Head and Neck (xpd) (Final result)  Result time 10/28/22 00:44:43      Final result by Jed Barbosa MD (10/28/22 00:44:43)                   Impression:      No acute abnormality.    Limited evaluation of the intracranial circulation due to motion on angiographic sequences but with no abnormality seen on thick slab postcontrast images.    No high-grade stenosis or major vessel occlusion in the cervical carotid or vertebral circulation.      Electronically signed by: Jed Barbosa  Date:    10/28/2022  Time:    00:44               Narrative:    EXAMINATION:  CTA HEAD AND NECK (XPD)    CLINICAL HISTORY:  Stroke/TIA, determine embolic source;    TECHNIQUE:  Non contrast low dose axial images were obtained through the head. CT angiogram was performed from the level of the caitie to the top of the head following the IV administration of 100mL of Omnipaque 350.   Sagittal and coronal reconstructions and maximum intensity projection reconstructions were performed. Arterial stenosis percentages are based on NASCET measurement criteria.  Rapid AI was used.  Motion limits the exam.    COMPARISON:  02/03/2022    FINDINGS:  Intracranial Compartment:    Ventricles and sulci are stable in size for age without evidence of hydrocephalus. No extra-axial blood or fluid  collections.    The brain parenchyma appears stable, with focal encephalomalacia involving the left temporal lobe anteriorly and encephalomalacia posteriorly within the watershed territories of the right left parietal temporal occipital junctions..  No parenchymal mass, hemorrhage, edema, or major vascular distribution infarct.    Skull/Extracranial Contents (limited evaluation): No fracture. Mastoid air cells and paranasal sinuses are essentially clear.    Non-Vascular Structures of the Neck/Thoracic Inlet (limited evaluation): Normal.    Aorta: Normal 3 vessel arch.    Extracranial carotid circulation: No hemodynamically significant stenosis, aneurysmal dilatation, or dissection.    Extracranial vertebral circulation: No hemodynamically significant stenosis, aneurysmal dilatation, or dissection.    Intracranial Arteries: No focal high-grade stenosis, occlusion, or aneurysm on thick slab images with the angiographic sequences marred by motion..    Venous structures (limited evaluation): Normal.                                       CT Head Without Contrast (Final result)  Result time 10/27/22 23:44:21      Final result by Lilia Barbosa MD (10/27/22 23:44:21)                   Impression:      No evidence of acute major arterial infarct, hemorrhage or mass effect.    Patchy moderate low density in deep white matter as seen with microvascular chronic ischemic changes.  This limits assessment for nonhemorrhagic lacunar type infarcts.  Additional imaging can be obtained as clinically indicated with MRI.    Encephalomalacia in the left temporal and bilateral parietal regions.  Remote lacunar infarcts as described.      Electronically signed by: Lilia Barbosa  Date:    10/27/2022  Time:    23:44               Narrative:    EXAMINATION:  CT HEAD WITHOUT CONTRAST    CLINICAL HISTORY:  Neuro deficit, acute, stroke suspected;    TECHNIQUE:  Low dose axial images were obtained through the head.  Coronal and sagittal  reformations were also performed. Contrast was not administered.    COMPARISON:  None.    FINDINGS:  There is no evidence of acute hemorrhage or hematoma.    There is prominence ventricles, cisterns and sulci as seen with senescent atrophic changes.  Foci of encephalomalacia noted in the left temporal and bilateral parietal regions.  Remote cerebellar infarcts bilaterally also noted.    Confluent moderate low density in deep white matter as seen with microvascular chronic ischemic changes noted.    There is no mass or mass effect.  Remote lacunar infarcts in the bilateral caudate nuclei and left damion.  Paranasal sinuses and mastoid air cells are clear.  Orbital structures grossly appear intact.  Posterior fossa structures and pituitary gland appear intact.    Bony calvarium is unremarkable.                                    CTA H and N 02/2022  The ventricles are stable in size, without evidence of hydrocephalus.     Patchy symmetric hypoattenuation in the subcortical white matter of parieto-occipital regions keeping with areas of recent infarction when correlated with MRI.  Moderate chronic microvascular ischemic change throughout the supratentorial white matter.  Moderate-sized remote left anterior temporal infarct.  Small remote bilateral cerebellar infarcts.  Remote right caudate lacunar type infarct.  No new major vascular distribution infarct.  No acute parenchymal hemorrhage.  No significant mass effect or midline shift.  No enhancing lesions     No extra-axial blood or fluid collections.     The cranium appears intact.     Patchy mucosal thickening particularly along the floor the maxillary sinuses bilaterally.  There is endodontal disease with missing teeth in the sizable left maxillary periapical lucency remodeling the floor of maxillary sinus.     Mild thyromegaly with several subcentimeter thyroid nodules.     Modest cervical spondylosis.        CTA:     The aortic arch maintains a normal branching  pattern.  No significant atherosclerotic plaque or stenosis at the major branch vessel origins.     Right common carotid artery normal in caliber.  Mild calcified and noncalcified plaque at the carotid bifurcation without associated narrowing.  Mild plaque in the cervical internal carotid artery without associated narrowing.  Moderate atherosclerotic calcification with mild narrowing in the cavernous and supraclinoid segments.     The left common carotid artery is normal in caliber.  Probable long segment noncalcified plaque without significant narrowing.  No significant plaque or stenosis at the carotid bifurcation.  The left internal carotid artery normal caliber throughout its cervical and petrous segments but there is extensive atherosclerotic change in the cervical and proximal supraclinoid segments with at least moderate stenosis.  Possible severe stenosis at the paraclinoid segment.     The vertebral origins are patent.  Circumferential noncalcified plaque with mild focal narrowing in the proximal cervical segment.  Mild multifocal narrowing in the intracranial (V 4 segment).  Left vertebral artery normal in caliber throughout its cervical course with a mild to moderate stenosis intracranially after origin of the posteroinferior cerebellar artery.  Mild irregularity and narrowing throughout the basilar artery.     Proximal right MCA is patent.  There is a tapered stenosis in the distal M1 segment with moderate multifocal irregularity and narrowing distally.  Mild tapered stenosis of the distal M1 segment of the left MCA with moderate multifocal irregularity narrowing distally, particularly affecting the superior division.  Probable severe stenosis at the origin the anterior temporal artery.     Developmentally hypoplastic or absent A1 segment of left CHRYSTAL.  A1 segment the right ACAs within normal limits.  Mild multifocal irregularity narrowing throughout the distal ACAs.     There is multifocal severe stenosis  involving the P2 and distal left PCA.  Mild to moderate narrowing of the proximal right PCA with severe stenosis or occlusion distally.     No evidence of intracranial aneurysm or vascular malformation.     The anterior, middle, and posterior cerebral arteries are within normal limits, without evidence of significant stenosis, focal occlusion, or intracranial aneurysm formation.    MRI Brain wo contrast      1. Multiple punctate foci of restricted diffusion involving the right parietooccipital region as well as the right insular region consistent with acute infarcts.  Previous restricted diffusion within the bilateral occipital lobes has resolved.  2. Multiple scattered remote appearing infarcts including within the bilateral basal ganglia, right thalamus, right caudate, adjacent to the bilateral posterior horns of the lateral ventricles, left eve damion, and cerebellum.  3. Sequela of chronic microvascular ischemic change and senescent change.  4. Stable scattered regions of hemosiderin deposition.  5. Apparent slow flow through the left sigmoid sinus.    OTHER STUDIES/PROCEDURES:    EKG  10/2022 : NSR      TTE 10/2022  Concentric hypertrophy and normal systolic function.  The estimated ejection fraction is 60%.  Normal left ventricular diastolic function.  Normal right ventricular size with normal right ventricular systolic function.  Moderate aortic regurgitation.  Intermediate central venous pressure (8 mmHg).  The estimated PA systolic pressure is 25 mmHg.  There is no evidence of intracardiac shunting.         Assessment/Plan:  P   56 y.o. right handed male with past medical history of A-fib on eliquis, HFpEF (EF 60% w/ G1D Dysfxn), Hypertension, HLD Tobacco abuse and ETOH use disorder  who presents for left facial asymmetry, dysarthria , left arm weakness. NIHSS 4 today. Exam significant for left arm weakness , aphasia, dysarthria and left facial droop. TPA not administered as he was out of the window and on  on full dose anticoagulation .   Neuroimaging consistent with Multiple punctate foci of restricted diffusion involving the right parietooccipital region as well as the right insular region . CTA from previous admission with significant advanced intracranial atherosclerotic disease with multifocal moderate to severe stenoses .CTA from previous admission with significant advanced intracranial atherosclerotic disease with multifocal moderate to severe stenoses .Etiology of ischemic infarct possibly due to clearance of microemboli from the ICAs.      Treatment Plan  -NIHSS on Neurology exam: 4  CHADSVASC : 4  -q4 hour neuro checks  Continuous telemetry  -AC/Antiplatelet: Continue Eliquis 5mg BID add aspirin 81mg in conjunction to Eliquis to limit platelet aggregation.  - recommend Repeat CTA H and N   -SBP goal <160  Meds per primary service  -A1c 5.8  SSI w/ accuchecks per primary  goal glucose 100-180  -  Atorvastatin 80 mg daily  LDL goal <70  -B12 low 295  Consider oral Repletion  -PT/OT/SLP evaluation and treatment appreciated  -Discussed stroke risk factors and symptoms to recognize   - Counseled on vascular risk factors including HTN, smoking and alcohol consumption  - Advised to continue Close followup with Cardiology   - Recommend Followup with Vascular Neurology within 2 weeks upon discharge       Differential diagnosis was explained to the patient. All questions were answered. Patient understood and agreed to adhere to plan.     Thank you for your consult.    Electronically signed by:   Wisam Hahn MD   10/28/2022 10:59 AM

## 2022-10-28 NOTE — PHARMACY MED REC
"Admission Medication History     The home medication history was taken by Minnie Erickson CPhT.    Medication history obtained from, Patient's Wife Verified    You may go to "Admission" then "Reconcile Home Medications" tabs to review and/or act upon these items.     The home medication list has been updated by the Pharmacy department.   Please read ALL comments highlighted in yellow.   Please address this information as you see fit.    Feel free to contact us if you have any questions or require assistance.      The medications listed below were removed from the home medication list.  Please reorder if appropriate:  Patient reports no longer taking the following medication(s):  Albuterol HFA 90 mcg  Vitamin C 500 mg  Aspirin 81 mg  Nicotine 21 mg/24H patch  Nicorette 2 mg gum  Cephalexin 500 mg      Minnie Erickson CPhT.  Ext [phone]               .        "

## 2022-10-28 NOTE — ASSESSMENT & PLAN NOTE
-Pt denies any recent EtOH use at this admission  -Hx of EtOH related admissions    Plan:  -Monitor for withdrawals w/ CIWA q4hr and ativan prn if > 8

## 2022-10-28 NOTE — CONSULTS
[facility]  Vascular Neurology  Comprehensive Stroke Center  TeleVascular Neurology Acute Consultation Note      Consults    Consulting Provider: ASHLEY COATES  Current Providers  No providers found    Patient Location:  [facility] EMERGENCY DEPARTMENT Emergency Department  Spoke hospital nurse at bedside with patient assisting consultant.     Patient information was obtained from patient.         Assessment/Plan:     Patient at [facility] in the ED. Patient in with left facial droop and numbness, slurred speech, and left arm numbness. Facial numbness and arm numbness has resolved. Last known well today at 1800. NIHSS-1. CT brain shows old strokes. Alteplase not recommended due to outside of treatment window and full dose anticoagulation.     DDX: Hypertensive urgency vs TIA/stroke      Head of bed flat, IV Fluids, permissive hypertension up to 160 mm Hg especially if CTA is negative for occlusion  CTA head and neck with and without contrast.  Neuro consult if in house available or transfer for neuro consult  Stroke/TIA work-up with MRI brain, Echo, PT/OT/ Speech and swallow evaluation.  They will call me with CTA results if abnormal.        Diagnoses:   Stroke like symptoms    STROKE DOCUMENTATION     Acute Stroke Times:   Acute Stroke Times   Last Known Normal Date: 10/27/22  Last Known Normal Time: 1800  Symptom Onset Date: 10/27/22  Symptom Onset Time: 1800  Stroke Team Called Date: 10/27/22  Stroke Team Called Time: 2252  Stroke Team Arrival Date: 10/27/22  Stroke Team Arrival Time: 2254  Alteplase Recommended: No    NIH Scale:  1a. Level of Consciousness: 0-->Alert, keenly responsive  1b. LOC Questions: 0-->Answers both questions correctly  1c. LOC Commands: 0-->Performs both tasks correctly  2. Best Gaze: 0-->Normal  3. Visual: 0-->No visual loss  4. Facial Palsy: 0-->Normal symmetrical movements  5a. Motor Arm, Left: 0-->No drift, limb holds 90 (or 45) degrees for full  10 secs  5b. Motor Arm, Right: 0-->No drift, limb holds 90 (or 45) degrees for full 10 secs  6a. Motor Leg, Left: 0-->No drift, leg holds 30 degree position for full 5 secs  6b. Motor Leg, Right: 0-->No drift, leg holds 30 degree position for full 5 secs  7. Limb Ataxia: 0-->Absent  8. Sensory: 1-->Mild-to-moderate sensory loss, patient feels pinprick is less sharp or is dull on the affected side, or there is a loss of superficial pain with pinprick, but patient is aware of being touched  9. Best Language: 0-->No aphasia, normal  10. Dysarthria: 0-->Normal  11. Extinction and Inattention (formerly Neglect): 0-->No abnormality  Total (NIH Stroke Scale): 1     Modified Riki    Jey Coma Scale:    ABCD2 Score:    [id number] Score:   HAS -BLED Score:   ICH Score:   Hunt & Marin Classification:       Blood pressure (!) 170/107, pulse 76, temperature 98.2 °F (36.8 °C), temperature source Oral, resp. rate 20, weight 95.7 kg (211 lb), SpO2 97 %.  Eligible for thrombolytic therapy?: Yes  Thrombolytic therapy recomended: Alteplase not recommended due to Outside of treatment window  and Full dose anticoagulation   Possible Interventional Revascularization Candidate? Awaiting CTA results from Spoke for determination     Disposition Recommendation: pending further studies    Subjective:     History of Present Illness:  Patient at [facility] in the ED. Patient in with left facial droop and numbness, slurred speech, and left arm numbness. Facial numbness and arm numbness has resolved. Last known well today at 1800.    No current facility-administered medications for this encounter.    Current Outpatient Medications:     albuterol (PROVENTIL/VENTOLIN HFA) 90 mcg/actuation inhaler, Inhale 2 puffs into the lungs every 6 (six) hours as needed for Wheezing. Rescue, Disp: 18 g, Rfl: 0    apixaban (ELIQUIS) 5 mg Tab, Take 1 tablet (5 mg total) by mouth 2 (two) times daily., Disp: 180 tablet, Rfl: 3    ascorbic acid, vitamin C,  (VITAMIN C) 500 MG tablet, Take 1 tablet (500 mg total) by mouth 2 (two) times daily., Disp: 30 tablet, Rfl: 0    aspirin (ECOTRIN) 81 MG EC tablet, Take 1 tablet (81 mg total) by mouth once daily., Disp: 90 tablet, Rfl: 3    atorvastatin (LIPITOR) 40 MG tablet, Take 1 tablet (40 mg total) by mouth every evening., Disp: 90 tablet, Rfl: 3    furosemide (LASIX) 20 MG tablet, Take 1 tablet (20 mg total) by mouth once daily., Disp: 90 tablet, Rfl: 4    losartan (COZAAR) 50 MG tablet, Take 1 tablet (50 mg total) by mouth once daily., Disp: 90 tablet, Rfl: 3    metoprolol succinate (TOPROL-XL) 50 MG 24 hr tablet, Take 1 tablet (50 mg total) by mouth once daily., Disp: 90 tablet, Rfl: 3    mirtazapine (REMERON) 15 MG tablet, Take 1 tablet (15 mg total) by mouth every evening., Disp: 30 tablet, Rfl: 11    nicotine (NICODERM CQ) 21 mg/24 hr, Place 1 patch onto the skin once daily., Disp: 28 patch, Rfl: 2    nicotine, polacrilex, (NICORETTE) 2 mg Gum, Take 1 each (2 mg total) by mouth as needed., Disp: 100 each, Rfl: 0        Woke up with symptoms?: no    Recent bleeding noted: no  Does the patient take any Blood Thinners? yes  Medications: Antiplatelets:  aspirin and Anticoagulants:  apixaban/Eliquis      Past Medical History: hypertension and stroke    Past Surgical History: no major surgeries within the last 2 weeks    Family History: no relevant history    Social History: unable to obtain    Allergies: No Known Allergies No relevant allergies    Review of Systems  Objective:   Vitals: Blood pressure (!) 170/107, pulse 76, temperature 98.2 °F (36.8 °C), temperature source Oral, resp. rate 20, weight 95.7 kg (211 lb), SpO2 97 %. BP: 195/90    CT READ: No    Physical Exam        Recommended the emergency room physician to have a brief discussion with the patient and/or family if available regarding the  risks and benefits of treatment, and to briefly document the occurrence of that discussion in his clinical encounter note.      The attending portion of this evaluation, treatment, and documentation was performed per Kendall Yin MD via audiovisual.    Billing code:  (non-intervention mild to moderate stroke, TIA, some mimics)      This patient has a critical neurological condition/illness, with some potential for high morbidity and mortality.  There is a moderate probability for acute neurological change leading to clinical and possibly life-threatening deterioration requiring highest level of physician preparedness for urgent intervention.  Care was coordinated with other physicians involved in the patient's care.  Radiologic studies and laboratory data were reviewed and interpreted, and plan of care was re-assessed based on the results.  Diagnosis, treatment options and prognosis may have been discussed with the patient and/or family members or caregiver.    In your opinion, this was a: Tier 1 Van Negative    Consult End Time: 11:13 PM     Kendall Yin MD  Comprehensive Stroke Center  Vascular Neurology   [facility]

## 2022-10-28 NOTE — ED NOTES
Report received. Assumed care of patient. Pt lying in bed connected to BP cuff, pulse ox, and telemetry monitoring. Lights dimmed for comfort. Updated patient regarding admission and plan of care patient verbalized understanding.

## 2022-10-28 NOTE — SUBJECTIVE & OBJECTIVE
Past Medical History:   Diagnosis Date    A-fib     CHF (congestive heart failure)     Hypertension     Insomnia        Past Surgical History:   Procedure Laterality Date    KIDNEY STONE SURGERY         Review of patient's allergies indicates:  No Known Allergies    No current facility-administered medications on file prior to encounter.     Current Outpatient Medications on File Prior to Encounter   Medication Sig    albuterol (PROVENTIL/VENTOLIN HFA) 90 mcg/actuation inhaler Inhale 2 puffs into the lungs every 6 (six) hours as needed for Wheezing. Rescue    apixaban (ELIQUIS) 5 mg Tab Take 1 tablet (5 mg total) by mouth 2 (two) times daily.    ascorbic acid, vitamin C, (VITAMIN C) 500 MG tablet Take 1 tablet (500 mg total) by mouth 2 (two) times daily.    aspirin (ECOTRIN) 81 MG EC tablet Take 1 tablet (81 mg total) by mouth once daily.    atorvastatin (LIPITOR) 40 MG tablet Take 1 tablet (40 mg total) by mouth every evening.    furosemide (LASIX) 20 MG tablet Take 1 tablet (20 mg total) by mouth once daily.    losartan (COZAAR) 50 MG tablet Take 1 tablet (50 mg total) by mouth once daily.    metoprolol succinate (TOPROL-XL) 50 MG 24 hr tablet Take 1 tablet (50 mg total) by mouth once daily.    mirtazapine (REMERON) 15 MG tablet Take 1 tablet (15 mg total) by mouth every evening.    nicotine (NICODERM CQ) 21 mg/24 hr Place 1 patch onto the skin once daily.    nicotine, polacrilex, (NICORETTE) 2 mg Gum Take 1 each (2 mg total) by mouth as needed.     Family History    None       Tobacco Use    Smoking status: Every Day     Packs/day: 1.00     Years: 41.00     Pack years: 41.00     Types: Cigarettes     Start date: 1981    Smokeless tobacco: Never    Tobacco comments:     Pt enrolled in the [employer] on 1/29/20 (SCT Member ID # [id number]). He declines Ambulatory referral to Smoking Cessation Program.   Substance and Sexual Activity    Alcohol use: Yes     Comment: 12 years ago    Drug use: No    Sexual  activity: Not on file     Review of Systems   Constitutional:  Negative for activity change, chills, fatigue and fever.   HENT:  Negative for sinus pressure, sinus pain, sore throat and trouble swallowing.    Eyes:  Negative for visual disturbance.   Respiratory:  Negative for cough, shortness of breath and wheezing.    Cardiovascular:  Negative for chest pain, palpitations and leg swelling.   Gastrointestinal:  Negative for abdominal pain, constipation, diarrhea, nausea and vomiting.   Genitourinary:  Negative for dysuria, frequency and hematuria.   Musculoskeletal:  Negative for back pain and myalgias.   Skin:  Negative for rash and wound.   Neurological:  Negative for tremors, weakness, numbness and headaches.   Psychiatric/Behavioral:  Negative for confusion. The patient is not nervous/anxious.    Objective:     Vital Signs (Most Recent):  Temp: 98.2 °F (36.8 °C) (10/27/22 2239)  Pulse: 66 (10/28/22 0305)  Resp: (!) 24 (10/27/22 2317)  BP: (!) 181/98 (10/28/22 0305)  SpO2: 98 % (10/27/22 2317)   Vital Signs (24h Range):  Temp:  [98.2 °F (36.8 °C)] 98.2 °F (36.8 °C)  Pulse:  [66-81] 66  Resp:  [16-24] 24  SpO2:  [97 %-98 %] 98 %  BP: (170-195)/() 181/98     Weight: 95.7 kg (211 lb)  Body mass index is 29.43 kg/m².    Physical Exam  Constitutional:       General: He is not in acute distress.  HENT:      Head: Normocephalic and atraumatic.      Right Ear: External ear normal.      Left Ear: External ear normal.      Nose: Nose normal.   Eyes:      Extraocular Movements: Extraocular movements intact.      Conjunctiva/sclera: Conjunctivae normal.      Pupils: Pupils are equal, round, and reactive to light.   Cardiovascular:      Rate and Rhythm: Normal rate and regular rhythm.      Pulses: Normal pulses.      Heart sounds: Normal heart sounds. No murmur heard.    No friction rub. No gallop.   Pulmonary:      Effort: Pulmonary effort is normal. No respiratory distress.      Breath sounds: Normal breath sounds.  No wheezing or rhonchi.   Abdominal:      General: Bowel sounds are normal. There is no distension.      Palpations: There is no mass.      Tenderness: There is no abdominal tenderness.   Musculoskeletal:         General: No swelling or tenderness. Normal range of motion.      Cervical back: Normal range of motion. No tenderness.   Lymphadenopathy:      Cervical: No cervical adenopathy.   Skin:     General: Skin is warm and dry.      Capillary Refill: Capillary refill takes less than 2 seconds.      Findings: No rash.   Neurological:      General: No focal deficit present.      Mental Status: He is alert and oriented to person, place, and time. Mental status is at baseline.      Cranial Nerves: No cranial nerve deficit.      Sensory: No sensory deficit.      Motor: No weakness.      Coordination: Coordination normal.      Gait: Gait normal.      Deep Tendon Reflexes: Reflexes normal.      Comments: BL dysarthria no L sided deficits seen on exam       Recent Labs   Lab 10/27/22  2319   WBC 8.89   HGB 13.8*   HCT 41.5   MCV 87   RBC 4.75   MCH 29.1   MCHC 33.3   RDW 16.2*      MPV 11.6   GRAN 37.7*  3.3   LYMPH 43.2  3.8   MONO 10.7  1.0   EOSINOPHIL 7.6   BASOPHIL 0.4     Recent Labs   Lab 10/27/22  2319      K 5.2*      CO2 21*   ANIONGAP 14   BUN 13   CREATININE 1.6*   GLU 99   CALCIUM 9.0   PROT 8.0   ALBUMIN 3.8   ALKPHOS 110   BILITOT 0.3   ALT 23   AST 24     No results for input(s): COLORU, APPEARANCEUA, PHUR, SPECGRAV, PROTEINUA, GLUCUA, KETONESU, BILIRUBINUA, OCCULTUA, UROBILINOGEN, NITRITE, LEUKOCYTESUR, RBCUA, WBCUA, BACTERIA, SQUAMEPITHEL, HYALINECASTS, GRANULARCAST, MICROCMT in the last 168 hours.    Invalid input(s): SPECIMENU, AMORPHOUSU  No results for input(s): TROPONINI, CPK, CPKMB in the last 168 hours.  Recent Labs   Lab 10/27/22  2319   INR 1.0     Recent Labs   Lab 10/27/22  2319   TSH 3.819     CT Head Without Contrast    Result Date: 10/27/2022  EXAMINATION: CT HEAD  WITHOUT CONTRAST CLINICAL HISTORY: Neuro deficit, acute, stroke suspected; TECHNIQUE: Low dose axial images were obtained through the head.  Coronal and sagittal reformations were also performed. Contrast was not administered. COMPARISON: None. FINDINGS: There is no evidence of acute hemorrhage or hematoma. There is prominence ventricles, cisterns and sulci as seen with senescent atrophic changes.  Foci of encephalomalacia noted in the left temporal and bilateral parietal regions.  Remote cerebellar infarcts bilaterally also noted. Confluent moderate low density in deep white matter as seen with microvascular chronic ischemic changes noted. There is no mass or mass effect.  Remote lacunar infarcts in the bilateral caudate nuclei and left damion.  Paranasal sinuses and mastoid air cells are clear.  Orbital structures grossly appear intact.  Posterior fossa structures and pituitary gland appear intact. Bony calvarium is unremarkable.     No evidence of acute major arterial infarct, hemorrhage or mass effect. Patchy moderate low density in deep white matter as seen with microvascular chronic ischemic changes.  This limits assessment for nonhemorrhagic lacunar type infarcts.  Additional imaging can be obtained as clinically indicated with MRI. Encephalomalacia in the left temporal and bilateral parietal regions.  Remote lacunar infarcts as described. Electronically signed by: Lilia Barbosa Date:    10/27/2022 Time:    23:44    CTA Head and Neck (xpd)    Result Date: 10/28/2022  EXAMINATION: CTA HEAD AND NECK (XPD) CLINICAL HISTORY: Stroke/TIA, determine embolic source; TECHNIQUE: Non contrast low dose axial images were obtained through the head. CT angiogram was performed from the level of the caitie to the top of the head following the IV administration of 100mL of Omnipaque 350.   Sagittal and coronal reconstructions and maximum intensity projection reconstructions were performed. Arterial stenosis percentages are  based on NASCET measurement criteria.  Rapid AI was used.  Motion limits the exam. COMPARISON: 02/03/2022 FINDINGS: Intracranial Compartment: Ventricles and sulci are stable in size for age without evidence of hydrocephalus. No extra-axial blood or fluid collections. The brain parenchyma appears stable, with focal encephalomalacia involving the left temporal lobe anteriorly and encephalomalacia posteriorly within the watershed territories of the right left parietal temporal occipital junctions..  No parenchymal mass, hemorrhage, edema, or major vascular distribution infarct. Skull/Extracranial Contents (limited evaluation): No fracture. Mastoid air cells and paranasal sinuses are essentially clear. Non-Vascular Structures of the Neck/Thoracic Inlet (limited evaluation): Normal. Aorta: Normal 3 vessel arch. Extracranial carotid circulation: No hemodynamically significant stenosis, aneurysmal dilatation, or dissection. Extracranial vertebral circulation: No hemodynamically significant stenosis, aneurysmal dilatation, or dissection. Intracranial Arteries: No focal high-grade stenosis, occlusion, or aneurysm on thick slab images with the angiographic sequences marred by motion.. Venous structures (limited evaluation): Normal.     No acute abnormality. Limited evaluation of the intracranial circulation due to motion on angiographic sequences but with no abnormality seen on thick slab postcontrast images. No high-grade stenosis or major vessel occlusion in the cervical carotid or vertebral circulation. Electronically signed by: Jed Barbosa Date:    10/28/2022 Time:    00:44    Microbiology Results (last 7 days)       ** No results found for the last 168 hours. **

## 2022-10-28 NOTE — ASSESSMENT & PLAN NOTE
-Pt w/ known hx of CVA's and residual dysarthria as well as expressive aphasia who presented with acute onset L sided weakness, numbness, and tingling starting ~1900 PTA  -Sxs since resolved in ED and reported by pt and family he is back to BL  -NIHSS 1 on admit and Tele-Stroke contacted who did not rec tPA or ASA/Plavix but requested permissive HTN < 160  -CT and CTA H/N without any acute abnormalities just old lacunar infarct and chronic microvascular changes  -RPR negative  -HbA1c 5.8%  -TSH 3.819  -Lipid: chol 184, , HDL 32,     Plan:  -Cnt home statin  -MRI brain pending  -ECHO w/ bubble study pending  -Folate/B12/HIV pending  -F/u neuro

## 2022-10-28 NOTE — PLAN OF CARE
ST orders received, pt seen in ED, he is somewhat anxious but cooperative. Pt tolerated all PO trials with no s/sx of airway invasion per bedside assessment. Pt with occasional drool from L side of mouth when looking down or attempting to talk. Pt demonstrates baseline speech production at this time.

## 2022-10-28 NOTE — PLAN OF CARE
"   10/28/22 1647   Admission   Initial VN Admission Questions Complete   Communication Issues? Technical Issue  (no vidyo camera in room)   Shift   Virtual Nurse - Rounding Incomplete   Pain Management Interventions care clustered;diversional activity provided;pain management plan reviewed with patient/caregiver;quiet environment facilitated;relaxation techniques promoted   Virtual Nurse - Patient Verbalized Approval Of Camera Use;VN Rounding   Safety/Activity   Safety Promotion/Fall Prevention Fall Risk reviewed with patient/family;medications reviewed;instructed to call staff for mobility   VN called into patient's room for introduction. Spoke with patient's wife to complete admission assessment. VN role explained and informed patient's wife that VN would be working with bedside nurse and rest of care team.  Fall risk and bed alarm protocol education provided.  Instructed patient's wife to call for assistance.  Wife aware and agreeable.  Patient in the background stating, "When are they going to discharge me? I am ready to leave!" Patient's wife explained plan of care to patient and patient would still like to leave the hospital. Patient's chart, labs and vital signs reviewed.  Allowed time for questions. Bedside nurse Dorina notified.  Will continue to be available as needed.     "

## 2022-10-28 NOTE — PLAN OF CARE
Skilled OT evaluation completed, see detailed note to follow. Skilled OT acute services warranted, to follow during hospitalization with recommendations for outpatient OT / PT when medically stable for discharge to address deficits in dynamic balance, sensation, coordination, motor planning, dual tasking, and minimal LUE strength deficits (although with previous CNA). No dme needs at this time.    Problem: Occupational Therapy  Goal: Occupational Therapy Goal  Description: Goals to be met by: 11/25/2022     Patient will increase functional independence with ADLs by performing:    LE Dressing with Modified [place].  Toileting from toilet with Modified [place] for hygiene and clothing management.   Functional mobility in / out of bathroom inclusive of Toilet transfer to toilet with Modified [place].  Increased functional strength to 4/5 for LUE in effort to maximize BUE integration to participate in daily, instrumental, and leisure routines.  Standing with good - balance for at least 5 minutes to progress participation in IADL / home related tasks      Outcome: Ongoing, Progressing

## 2022-10-28 NOTE — PT/OT/SLP EVAL
"Occupational Therapy   Evaluation and Treatment    Name: Sarbjit Brewer Sr.  MRN: [id number]  Admitting Diagnosis:  TIA (transient ischemic attack)  Recent Surgery: * No surgery found *      Recommendations:     Discharge Recommendations: outpatient OT, outpatient PT  Discharge Equipment Recommendations:  none    Assessment:     Sarbjit Brewer Sr. is a 56 y.o. male with a medical diagnosis of TIA (transient ischemic attack).  He presents with ..The primary encounter diagnosis was Slurred speech. Diagnoses of Stroke, Facial droop, Left arm weakness, and TIA (transient ischemic attack) were also pertinent to this visit. Patient with prior CVA and per chart review has in the past seen outpatient SLP  . Performance deficits affecting function: weakness, impaired endurance, impaired sensation, impaired self care skills, impaired functional mobility, gait instability, impaired balance, decreased safety awareness, decreased upper extremity function, decreased coordination, other (comment) (decreased motor planning).      Skilled OT acute services warranted, to follow during hospitalization with recommendations for outpatient OT / PT when medically stable for discharge to address deficits in dynamic balance, sensation, coordination, motor planning, dual tasking and LUE strength deficit (although has had previous CVA). No dme needs at this time.    Rehab Prognosis: Good; patient would benefit from acute skilled OT services to address these deficits and reach maximum level of function.       Plan:     Patient to be seen 5 x/week to address the above listed problems via self-care/home management, therapeutic activities, therapeutic exercises, neuromuscular re-education  Plan of Care Reviewed with: patient    Subjective     Chief Complaint: patient reports feeling well overall but does indicate during occupational profile history development sadness that he is unable to work because "being too slow"  Patient/Family Comments/goals: " eager to return home    Occupational Profile:  Living Environment: initially reports living with 3 adult children but later on he reports living with his brother and brother's girlfriend in a single story home, threshold to enter and a tub shower combo.   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  Patient found HOB elevated on stretcher bed in ER with telemetry, peripheral IV, blood pressure cuff upon OT entry to room.    General Precautions: Standard, fall   Orthopedic Precautions:N/A   Braces: N/A  Respiratory Status: Room air    Occupational Performance:    Bed Mobility:    Patient completed Rolling/Turning to Left with  stand by assistance  Patient completed Rolling/Turning to Right with stand by assistance  Patient completed Supine to Sit with stand by assistance  Patient completed Sit to Supine with stand by assistance    Functional Mobility/Transfers:  Patient completed Sit <> Stand Transfer with stand by assistance  with  no assistive device   Functional Mobility: sit to stand x3 without use of UE s from chair without arm rests    Activities of Daily Living:  Feeding:  able to self feed; extra time, slowed coordination of L hand to open/manage items.  Cued to rest left elbow on body to stablize / improved precision distally  Upper Body Dressing: setup; extra time  Lower Body Dressing: stand by assistance ; however, min safety / fall risk during task (flexes forward to reach BLE, nearing significant edge of EOB); educated to slow down, avoid  excess trunk flexion as increases risk of falling forward - bring LE proximal to trunk  Toileting: does not perform on eval;  urinal with urine noted on bed rail with patient verbalizing use     Cognitive/Visual Perceptual:  Cognitive/Psychosocial Skills:     -       Oriented to: Person, Place, Time, and Situation   -       Follows Commands/attention:Easily distracted and requires visual and verbal demos; unable to follow multistep commands  -       Memory: Impaired STM  -       Safety awareness/insight to disability: some impairment noted; further assessment needed ; decreased safety  -       Mood/Affect/Coping skills/emotional control: Happy, Pleasant, and joking during eval/treat  Visual/Perceptual:      -WFL; denies deficits; wears reading glasses  Decreased spatial orientation     Physical Exam:  Sensation:    -       Impaired  endorses diminished sensation in left hand/digits  Motor Planning:    -       impaired; slight ataxia noted  Dominant hand:    -       right  Upper Extremity Range of Motion:     -       Right Upper Extremity: WFL  -       Left Upper Extremity: WFL  Upper Extremity Strength:    -       Right Upper Extremity: WFL 4+/5 to 5/5   -       Left Upper Extremity: WFL except 4-/5 grossly    Strength: WFL  Fine Motor Coordination: slowed in left hand  Decreased accuracy finger to nose in left hand  Static standing balance: good ; dynamic standing good - ; decreased safety / performance in attempts to transport objects while mobilizing.     AMPAC 6 Click ADL:  AMPAC Total Score: 18    Treatment & Education:  Patient educated on role of OT. BP elevated throughout with RN/ med clearance for continuation of assessment /treatment (207/98 supine; 206/97 sitting eob). Instructed in assessment/ treatment as above with tasks and instructions modified with breakdown/simplification of instructions and use of multimodal visual and verbal cues to promote understanding.      Patient left HOB elevated with  all lines intact, call button in reach, and nursing notified    GOALS:   Multidisciplinary Problems       Occupational Therapy Goals          Problem: Occupational Therapy    Goal Priority Disciplines Outcome Interventions   Occupational Therapy Goal     OT, PT/OT Ongoing, Progressing    Description: Goals to be met by: 11/25/2022     Patient will increase functional independence with ADLs by performing:    LE Dressing with Modified [place].  Toileting from toilet with Modified [place] for hygiene and clothing management.   Functional mobility in / out of bathroom inclusive of Toilet transfer to toilet with Modified [place].  Increased functional strength to 4/5 for LUE in effort to maximize BUE integration to participate in daily, instrumental, and leisure routines.  Standing with good - balance for at least 5 minutes to progress participation in IADL / home related tasks                           History:     Past Medical History:   Diagnosis Date    A-fib     CHF (congestive heart failure)     Hypertension     Insomnia          Past Surgical History:   Procedure Laterality Date    KIDNEY STONE SURGERY         Time Tracking:     OT Date of Treatment: 10/28/22  OT Start Time: 0924  OT Stop Time: 0958  OT Total Time (min): 34 min ; co eval with PT    Billable Minutes:Evaluation 15  Self Care/Home Management 15     10/28/2022

## 2022-10-28 NOTE — PT/OT/SLP EVAL
Speech Language Pathology Evaluation  Bedside Swallow    Patient Name:  Sarbjit Brewer Sr.   MRN:  [id number]  Admitting Diagnosis: TIA (transient ischemic attack)    Recommendations:                 Diet recommendations:  Regular, Thin   Aspiration Precautions: standard precautions, whole meds, no talking while swallowing    General Precautions: Standard, fall  Communication strategies:  redirect when needed    History per MD      Principal Problem:TIA (transient ischemic attack)     Chief Complaint:        Chief Complaint   Patient presents with    Stroke       Patient reports approximately one hour ago he believes symptoms started.  Wife noted symptoms two hours ago.  Left arm numbness now resolved, ataxia reported, left facial droop, and slurred speech.  Slight left facial droop and slight slur speech remains, but other symptoms resolved.         HPI: 57yo M w/ PMHx of Afib (on eliquis), HFpEF (EF 60% w/ G1D Dysfxn), CVA, EtOH use d/o, and HTN who presents with concern for TIA. Pt reports he was in his normal state of health and has been complaint with his medications outpt; however, this evening around 1900 pt noticed L sided facial droop w/ slurred speech as well as facial and arm numbness. Pt has residual dysarthria and expressive aphasia which he was seeing outpt SLP after previous CVA. He reports by the time he arrived to the ED all his sx's had resolved. He and his family at [facility] endorse pt is returned to his BL.      In the ED T 98 HR 81 RR 16 /107 sat 97% on RA. EKG w/ NSR. CT head and CTA h/n w/o any acute abnormalities but chonic microvascular changes and Encephalomalacia in the left temporal and bilateral parietal regions as well as remote lacunar infarcts. CBC wnl. CMP w/ K 5.2 and BUN/Crt 13/1.6 at BL. Code stroke activated and tele-stroke evaluted pt w/ NIHSS 1. Did not rec Asa/Plavix load or tPA at this time but admission for obs and TIA w/u. Pt was admitted to the  service for TIA.        "      Past Medical History:   Diagnosis Date    A-fib     CHF (congestive heart failure)     Hypertension     Insomnia        Past Surgical History:   Procedure Laterality Date    KIDNEY STONE SURGERY         Social History: Patient lives with wife, family members in home.    Prior Intubation HX:  n/a    Modified Barium Swallow: none per admit   CT: No evidence of acute major arterial infarct, hemorrhage or mass effect.   Chest X-Rays: none per EMR     Prior diet: unrestricted diet .    Subjective     ST orders received, pt seen in ED. Pt is awake and alert, seated in edge of bed stretcher, he is asking for water.   Patient goals: "I really don't know how I catch this stroke."     Pain/Comfort:  Pain Rating 1: 0/10    Respiratory Status: Room air    Objective:   Pt seen in ED, he is alert, oriented x3, follows commands but needs some redirection.     Oral Musculature Evaluation  Oral Musculature: WFL  Dentition: scattered dentition  Secretion Management: adequate  Mucosal Quality: adequate  Mandibular Strength and Mobility:  (fair)  Oral Labial Strength and Mobility:  (fair)  Lingual Strength and Mobility:  (fair)  Volitional Cough: elicited  Volitional Swallow: timely  Voice Prior to PO Intake: clear voice    Bedside Swallow Eval:   Consistencies Assessed:  Thin liquids water self fed  Puree pudding 1/2 container self fed  Solids saltine      Oral Phase:   WFL  Adequate mastication    Pharyngeal Phase:   no overt clinical signs/symptoms of aspiration  no overt clinical signs/symptoms of pharyngeal dysphagia  multiple spontaneous swallows  Timely swallow       Treatment: Provided brief education on role of SLP and diet recs, he indicated understanding. Secure chat sent to MD and RN of above info.       Assessment:     Sarbjit Brewer Sr. is a 56 y.o. male admitted for TIA symtoms  with an SLP diagnosis of baseline swallow and communication.    Goals:   Multidisciplinary Problems       SLP Goals       Not on file      "               Plan:     Patient to be seen:      Plan of Care expires:     Plan of Care reviewed with:  patient   SLP Follow-Up:  No       Discharge recommendations:   (pending PT and OT recs)   Barriers to Discharge:  None    Time Tracking:     SLP Treatment Date:   10/28/22  Speech Start Time:  0911  Speech Stop Time:  0923     Speech Total Time (min):  12 min    Billable Minutes: Eval Swallow and Oral Function      10/28/2022

## 2022-10-28 NOTE — PLAN OF CARE
Patient seen for physical therapy evaluation on this date.  Full report to follow.  Patient will benefit from inpatient physical therapy to address mobility limitations. Anticipate patient will benefit from outpatient physical therapy to address limitation.  No DME Needs.    Problem: Physical Therapy  Goal: Physical Therapy Goal  Description: Goals to be met by: 2022     Patient will increase functional independence with mobility by performin. Supine to sit with Modified [place]  2. Sit to supine with Modified [place]  3. Sit to stand transfer with Modified [place]  4. Gait  x 200 feet with Modified [place] using No Assistive Device.   5. Ascend/descend 12 stair with  Modified [place] using No Assistive Device.     Outcome: Ongoing, Progressing

## 2022-10-29 VITALS
HEIGHT: 71 IN | OXYGEN SATURATION: 96 % | HEART RATE: 85 BPM | RESPIRATION RATE: 18 BRPM | DIASTOLIC BLOOD PRESSURE: 100 MMHG | BODY MASS INDEX: 29.48 KG/M2 | WEIGHT: 210.56 LBS | SYSTOLIC BLOOD PRESSURE: 152 MMHG | TEMPERATURE: 98 F

## 2022-10-29 LAB
ALBUMIN SERPL BCP-MCNC: 3.8 G/DL (ref 3.5–5.2)
ALP SERPL-CCNC: 116 U/L (ref 55–135)
ALT SERPL W/O P-5'-P-CCNC: 20 U/L (ref 10–44)
ANION GAP SERPL CALC-SCNC: 9 MMOL/L (ref 8–16)
AST SERPL-CCNC: 20 U/L (ref 10–40)
BASOPHILS # BLD AUTO: 0.02 K/UL (ref 0–0.2)
BASOPHILS NFR BLD: 0.3 % (ref 0–1.9)
BILIRUB SERPL-MCNC: 0.6 MG/DL (ref 0.1–1)
BUN SERPL-MCNC: 13 MG/DL (ref 6–20)
CALCIUM SERPL-MCNC: 9.6 MG/DL (ref 8.7–10.5)
CHLORIDE SERPL-SCNC: 105 MMOL/L (ref 95–110)
CO2 SERPL-SCNC: 24 MMOL/L (ref 23–29)
CREAT SERPL-MCNC: 1.4 MG/DL (ref 0.5–1.4)
DIFFERENTIAL METHOD: ABNORMAL
EOSINOPHIL # BLD AUTO: 0.5 K/UL (ref 0–0.5)
EOSINOPHIL NFR BLD: 7 % (ref 0–8)
ERYTHROCYTE [DISTWIDTH] IN BLOOD BY AUTOMATED COUNT: 15.7 % (ref 11.5–14.5)
EST. GFR  (NO RACE VARIABLE): 58 ML/MIN/1.73 M^2
GLUCOSE SERPL-MCNC: 101 MG/DL (ref 70–110)
HCT VFR BLD AUTO: 40.6 % (ref 40–54)
HGB BLD-MCNC: 13.9 G/DL (ref 14–18)
IMM GRANULOCYTES # BLD AUTO: 0.02 K/UL (ref 0–0.04)
IMM GRANULOCYTES NFR BLD AUTO: 0.3 % (ref 0–0.5)
LYMPHOCYTES # BLD AUTO: 2.4 K/UL (ref 1–4.8)
LYMPHOCYTES NFR BLD: 36.1 % (ref 18–48)
MAGNESIUM SERPL-MCNC: 1.7 MG/DL (ref 1.6–2.6)
MCH RBC QN AUTO: 28.8 PG (ref 27–31)
MCHC RBC AUTO-ENTMCNC: 34.2 G/DL (ref 32–36)
MCV RBC AUTO: 84 FL (ref 82–98)
MONOCYTES # BLD AUTO: 0.9 K/UL (ref 0.3–1)
MONOCYTES NFR BLD: 13.5 % (ref 4–15)
NEUTROPHILS # BLD AUTO: 2.8 K/UL (ref 1.8–7.7)
NEUTROPHILS NFR BLD: 42.8 % (ref 38–73)
NRBC BLD-RTO: 0 /100 WBC
PHOSPHATE SERPL-MCNC: 3.6 MG/DL (ref 2.7–4.5)
PLATELET # BLD AUTO: 312 K/UL (ref 150–450)
PMV BLD AUTO: 10.7 FL (ref 9.2–12.9)
POCT GLUCOSE: 90 MG/DL (ref 70–110)
POCT GLUCOSE: 97 MG/DL (ref 70–110)
POTASSIUM SERPL-SCNC: 3.7 MMOL/L (ref 3.5–5.1)
PROT SERPL-MCNC: 7.8 G/DL (ref 6–8.4)
RBC # BLD AUTO: 4.83 M/UL (ref 4.6–6.2)
SODIUM SERPL-SCNC: 138 MMOL/L (ref 136–145)
WBC # BLD AUTO: 6.57 K/UL (ref 3.9–12.7)

## 2022-10-29 PROCEDURE — 63600175 PHARM REV CODE 636 W HCPCS: Performed by: STUDENT IN AN ORGANIZED HEALTH CARE EDUCATION/TRAINING PROGRAM

## 2022-10-29 PROCEDURE — 25500020 PHARM REV CODE 255: Performed by: FAMILY MEDICINE

## 2022-10-29 PROCEDURE — 96376 TX/PRO/DX INJ SAME DRUG ADON: CPT

## 2022-10-29 PROCEDURE — 83735 ASSAY OF MAGNESIUM: CPT | Performed by: STUDENT IN AN ORGANIZED HEALTH CARE EDUCATION/TRAINING PROGRAM

## 2022-10-29 PROCEDURE — 96365 THER/PROPH/DIAG IV INF INIT: CPT

## 2022-10-29 PROCEDURE — 63600175 PHARM REV CODE 636 W HCPCS

## 2022-10-29 PROCEDURE — 85025 COMPLETE CBC W/AUTO DIFF WBC: CPT | Performed by: STUDENT IN AN ORGANIZED HEALTH CARE EDUCATION/TRAINING PROGRAM

## 2022-10-29 PROCEDURE — 36415 COLL VENOUS BLD VENIPUNCTURE: CPT | Performed by: STUDENT IN AN ORGANIZED HEALTH CARE EDUCATION/TRAINING PROGRAM

## 2022-10-29 PROCEDURE — 25000003 PHARM REV CODE 250

## 2022-10-29 PROCEDURE — 80053 COMPREHEN METABOLIC PANEL: CPT | Performed by: STUDENT IN AN ORGANIZED HEALTH CARE EDUCATION/TRAINING PROGRAM

## 2022-10-29 PROCEDURE — 96366 THER/PROPH/DIAG IV INF ADDON: CPT

## 2022-10-29 PROCEDURE — 25000003 PHARM REV CODE 250: Performed by: STUDENT IN AN ORGANIZED HEALTH CARE EDUCATION/TRAINING PROGRAM

## 2022-10-29 PROCEDURE — 94761 N-INVAS EAR/PLS OXIMETRY MLT: CPT

## 2022-10-29 PROCEDURE — 96361 HYDRATE IV INFUSION ADD-ON: CPT | Mod: 59

## 2022-10-29 PROCEDURE — G0378 HOSPITAL OBSERVATION PER HR: HCPCS

## 2022-10-29 PROCEDURE — 84100 ASSAY OF PHOSPHORUS: CPT | Performed by: STUDENT IN AN ORGANIZED HEALTH CARE EDUCATION/TRAINING PROGRAM

## 2022-10-29 RX ORDER — LANOLIN ALCOHOL/MO/W.PET/CERES
1000 CREAM (GRAM) TOPICAL DAILY
Status: DISCONTINUED | OUTPATIENT
Start: 2022-10-29 | End: 2022-10-29 | Stop reason: HOSPADM

## 2022-10-29 RX ORDER — LOSARTAN POTASSIUM 50 MG/1
100 TABLET ORAL DAILY
Status: DISCONTINUED | OUTPATIENT
Start: 2022-10-29 | End: 2022-10-29 | Stop reason: HOSPADM

## 2022-10-29 RX ORDER — AMLODIPINE BESYLATE 5 MG/1
5 TABLET ORAL DAILY
Qty: 30 TABLET | Refills: 0 | Status: SHIPPED | OUTPATIENT
Start: 2022-10-30 | End: 2023-02-27 | Stop reason: SDUPTHER

## 2022-10-29 RX ORDER — AMLODIPINE BESYLATE 5 MG/1
5 TABLET ORAL DAILY
Status: DISCONTINUED | OUTPATIENT
Start: 2022-10-29 | End: 2022-10-29 | Stop reason: HOSPADM

## 2022-10-29 RX ORDER — ASPIRIN 81 MG/1
81 TABLET ORAL DAILY
Qty: 30 TABLET | Refills: 0 | Status: SHIPPED | OUTPATIENT
Start: 2022-10-30 | End: 2023-02-27

## 2022-10-29 RX ORDER — FOLIC ACID 1 MG/1
1 TABLET ORAL DAILY
Status: DISCONTINUED | OUTPATIENT
Start: 2022-10-29 | End: 2022-10-29 | Stop reason: HOSPADM

## 2022-10-29 RX ORDER — MAGNESIUM SULFATE HEPTAHYDRATE 40 MG/ML
2 INJECTION, SOLUTION INTRAVENOUS ONCE
Status: COMPLETED | OUTPATIENT
Start: 2022-10-29 | End: 2022-10-29

## 2022-10-29 RX ADMIN — CYANOCOBALAMIN TAB 1000 MCG 1000 MCG: 1000 TAB at 09:10

## 2022-10-29 RX ADMIN — ASPIRIN 81 MG: 81 TABLET, COATED ORAL at 09:10

## 2022-10-29 RX ADMIN — METOPROLOL SUCCINATE 50 MG: 50 TABLET, EXTENDED RELEASE ORAL at 09:10

## 2022-10-29 RX ADMIN — IOHEXOL 100 ML: 350 INJECTION, SOLUTION INTRAVENOUS at 09:10

## 2022-10-29 RX ADMIN — HYDRALAZINE HYDROCHLORIDE 10 MG: 20 INJECTION, SOLUTION INTRAMUSCULAR; INTRAVENOUS at 01:10

## 2022-10-29 RX ADMIN — AMLODIPINE BESYLATE 5 MG: 5 TABLET ORAL at 12:10

## 2022-10-29 RX ADMIN — LOSARTAN POTASSIUM 100 MG: 50 TABLET, FILM COATED ORAL at 09:10

## 2022-10-29 RX ADMIN — FOLIC ACID 1 MG: 1 TABLET ORAL at 09:10

## 2022-10-29 RX ADMIN — APIXABAN 5 MG: 5 TABLET, FILM COATED ORAL at 09:10

## 2022-10-29 RX ADMIN — MAGNESIUM SULFATE 2 G: 2 INJECTION INTRAVENOUS at 09:10

## 2022-10-29 RX ADMIN — FUROSEMIDE 20 MG: 20 TABLET ORAL at 09:10

## 2022-10-29 NOTE — PLAN OF CARE
10/29/22 1538   Discharge Assessment   Assessment Type Discharge Planning Assessment   Confirmed/corrected address, phone number and insurance Yes   Confirmed Demographics Correct on Facesheet   Source of Information patient   Does patient/caregiver understand observation status Yes   Communicated SANTOS with patient/caregiver Yes   Reason For Admission patient was admitted for a TIA   Lives With spouse   Facility Arrived From: patient arrived from home   Do you expect to return to your current living situation? Yes   Do you have help at home or someone to help you manage your care at home? Yes   Who are your caregiver(s) and their phone number(s)? Elise Brewer ,spouse at [phone]   Prior to hospitilization cognitive status: Alert/Oriented   Current cognitive status: Alert/Oriented   Readmission within 30 days? No   Patient currently being followed by outpatient case management? No   Do you currently have service(s) that help you manage your care at home? No   Do you take prescription medications? Yes   Do you have prescription coverage? Yes   Coverage [place] medicaid   Do you have any problems affording any of your prescribed medications? No   Is the patient taking medications as prescribed? yes   Who is going to help you get home at discharge? Elise Brewer ,spouse at [phone]   Are you on dialysis? No   Do you take coumadin? No   Discharge Plan A Home   Discharge Plan B Home with family   Discharge Plan discussed with: Spouse/sig other   Name(s) and Number(s) Elise Brewer ,spouse at [phone]   Discharge Barriers Identified Underinsured   Relationship/Environment   Name(s) of Who Lives With Patient Elise Brewer ,spouse at [phone]     The sw met with the pt to complete and assessment.The pt states that she lives at [address] [place] [id number] but he receives all his medical care at [facility] he pt has established care with with PCP /CHIRAG Gerard.The pt has [place] Medicaid. The  pt's independent with his adl's. The pt still drives but his wife will transport him home at d/c.The sw completed the white board in the pt's room and provided the patient with SW's contact info. The sw encouraged them to call if they have any further questions or concerns.The sw will continue to follow the pt throughout her transitions of care and will assist with any d/c needs.  The patient's wife will transport him home at discharge.

## 2022-10-29 NOTE — ASSESSMENT & PLAN NOTE
-Pt on losartan 50mg qd and Metoprolol Succinate 50mg 24hr qd at home    Plan:  -Allow for permissive HTN <160  -Pt continues to be hypertensive, losartan increased to 100 mg

## 2022-10-29 NOTE — PLAN OF CARE
Problem: Adult Inpatient Plan of Care  Goal: Plan of Care Review  Outcome: Ongoing, Progressing     Pt is alert and oriented x4. Breathing on room air.    No pain, nausea/vomiting, facial droop, extremity drifting noted. Pt has mild slurred speech. Pt has no problem swallowing or talking. The only complaint is that his left upper extremity feels numb. No drift noted on all four extremities as well as tingling. Will continue to monitor.

## 2022-10-29 NOTE — HOSPITAL COURSE
Pt presented with Left sided residual weakness (hx of CVA) as well as numbness of Left hand. NIHSS 1 on admit and Tele-Stroke contacted who did not rec tPA or ASA/Plavix but requested permissive HTN <160. Inpatient neuro was consulted for further TIA/stroke workup. CT and CTA H/N without any acute abnormalities, showed old lacunar infarct and chronic microvascular changes. RPR and HIV negative. MRI brain showed multiple punctate foci within the R parieto-occipital region as well as R insular region consistent with acute infarcts. Echo normal with EF 60%.    Repeat CTA head and neck on 10/29 showed significant narrowing of the distal internal carotid arteries. Thyroid hypodensities recommend nonemergent follow-up ultrasound examination. Pt returned to baseline function and was evaluated by PT/OT/SLP. Amlodipine 5 mg was added due to pt's ongoing HTN with SBP in the 180s-200s, despite restarting home losartan and metoprolol. Pt clinically stable for d/c and to continue Eliquis 5 mg BID, ASA 81 mg, amlodipine 5 mg along with home losartan 100 mg and metoprolol 50 mg.    Pt counseled on smoking cessation, controlling HTN, and stroke risk factors. Recommend follow up thyroid US with PCP regarding thyromegaly and several subcentimeter thyroid nodules seen on imaging. Pt will follow up with Vascular Neurology within 2 weeks, cardiology, neurology PCP and OT/PT outpatient.

## 2022-10-29 NOTE — SUBJECTIVE & OBJECTIVE
Interval History: Pt c/o Left hand numbness but sensation intact on exam. Left sided residual weakness.    Review of Systems   Constitutional:  Negative for activity change, chills, fatigue and fever.   HENT:  Negative for sinus pressure, sinus pain, sore throat and trouble swallowing.    Eyes:  Negative for visual disturbance.   Respiratory:  Negative for cough, shortness of breath and wheezing.    Cardiovascular:  Negative for chest pain, palpitations and leg swelling.   Gastrointestinal:  Negative for abdominal pain, constipation, diarrhea, nausea and vomiting.   Genitourinary:  Negative for dysuria, frequency and hematuria.   Musculoskeletal:  Negative for back pain and myalgias.   Skin:  Negative for rash and wound.   Neurological:  Negative for tremors, weakness, numbness and headaches.   Psychiatric/Behavioral:  Negative for confusion. The patient is not nervous/anxious.      Objective:     Vital Signs (Most Recent):  Temp: 97.7 °F (36.5 °C) (10/29/22 0801)  Pulse: 75 (10/29/22 0801)  Resp: 18 (10/29/22 0801)  BP: (!) 170/99 (10/29/22 0801)  SpO2: 96 % (10/29/22 0830)   Vital Signs (24h Range):  Temp:  [97.3 °F (36.3 °C)-98.5 °F (36.9 °C)] 97.7 °F (36.5 °C)  Pulse:  [59-82] 75  Resp:  [13-20] 18  SpO2:  [95 %-99 %] 96 %  BP: (161-203)/() 170/99     Weight: 95.5 kg (210 lb 8.6 oz)  Body mass index is 29.36 kg/m².  No intake or output data in the 24 hours ending 10/29/22 1056     Physical Exam  Constitutional:       General: He is not in acute distress.  HENT:      Head: Normocephalic and atraumatic.      Right Ear: External ear normal.      Left Ear: External ear normal.      Nose: Nose normal.   Eyes:      Extraocular Movements: Extraocular movements intact.      Conjunctiva/sclera: Conjunctivae normal.      Pupils: Pupils are equal, round, and reactive to light.   Cardiovascular:      Rate and Rhythm: Normal rate and regular rhythm.      Pulses: Normal pulses.      Heart sounds: Normal heart sounds. No  murmur heard.    No friction rub. No gallop.   Pulmonary:      Effort: Pulmonary effort is normal. No respiratory distress.      Breath sounds: Normal breath sounds. No wheezing or rhonchi.   Abdominal:      General: Bowel sounds are normal. There is no distension.      Palpations: There is no mass.      Tenderness: There is no abdominal tenderness.   Musculoskeletal:         General: No swelling or tenderness. Normal range of motion.      Cervical back: Normal range of motion. No tenderness.   Lymphadenopathy:      Cervical: No cervical adenopathy.   Skin:     General: Skin is warm and dry.      Capillary Refill: Capillary refill takes less than 2 seconds.      Findings: No rash.   Neurological:      General: No focal deficit present.      Mental Status: He is alert and oriented to person, place, and time. Mental status is at baseline.      Cranial Nerves: No cranial nerve deficit.      Sensory: No sensory deficit.      Motor: No weakness.      Coordination: Coordination normal.      Gait: Gait normal.      Deep Tendon Reflexes: Reflexes normal.      Comments: BL dysarthria no L sided deficits seen on exam       Significant Labs: All pertinent labs within the past 24 hours have been reviewed.    A1C:   Recent Labs   Lab 10/28/22  0632   HGBA1C 5.8*     Bilirubin:   Recent Labs   Lab 10/27/22  2319 10/28/22  0632 10/29/22  0343   BILITOT 0.3 0.4 0.6     CBC:   Recent Labs   Lab 10/27/22  2319 10/28/22  0632 10/29/22  0343   WBC 8.89 7.47 6.57   HGB 13.8* 13.8* 13.9*   HCT 41.5 40.7 40.6    318 312     CMP:   Recent Labs   Lab 10/27/22  2319 10/28/22  0632 10/29/22  0343    137 138   K 5.2* 4.2 3.7    106 105   CO2 21* 20* 24   GLU 99 96 101   BUN 13 14 13   CREATININE 1.6* 1.6* 1.4   CALCIUM 9.0 9.3 9.6   PROT 8.0 7.5 7.8   ALBUMIN 3.8 3.8 3.8   BILITOT 0.3 0.4 0.6   ALKPHOS 110 112 116   AST 24 18 20   ALT 23 23 20   ANIONGAP 14 11 9     Coagulation:   Recent Labs   Lab 10/27/22  2319   INR 1.0      Lipid Panel:   Recent Labs   Lab 10/27/22  2319   CHOL 184   HDL 32*   LDLCALC 103.8   TRIG 241*   CHOLHDL 17.4*     Magnesium:   Recent Labs   Lab 10/28/22  0632 10/29/22  0343   MG 1.9 1.7     Pathology Results  (Last 10 years)      None          POCT Glucose:   Recent Labs   Lab 10/28/22  1654 10/28/22  2043 10/29/22  0522   POCTGLUCOSE 90 99 97     TSH:   Recent Labs   Lab 10/27/22  2319   TSH 3.819       Significant Imaging: I have reviewed all pertinent imaging results/findings within the past 24 hours.    Imaging Results               MRI Brain Without Contrast (Final result)  Result time 10/28/22 13:46:51      Final result by Vipin Garcia MD (10/28/22 13:46:51)                   Impression:      This report was flagged in Epic as abnormal.    1. Multiple punctate foci of restricted diffusion involving the right parietooccipital region as well as the right insular region consistent with acute infarcts.  Previous restricted diffusion within the bilateral occipital lobes has resolved.  2. Multiple scattered remote appearing infarcts including within the bilateral basal ganglia, right thalamus, right caudate, adjacent to the bilateral posterior horns of the lateral ventricles, left eve damion, and cerebellum.  3. Sequela of chronic microvascular ischemic change and senescent change.  4. Stable scattered regions of hemosiderin deposition.  5. Apparent slow flow through the left sigmoid sinus.      Electronically signed by: Vipin Garcia MD  Date:    10/28/2022  Time:    13:46               Narrative:    EXAMINATION:  MRI BRAIN WITHOUT CONTRAST    CLINICAL HISTORY:  Stroke, follow up;    TECHNIQUE:  Multiplanar multisequence MR imaging of the brain was performed without contrast.    COMPARISON:  CTA 10/27/2022, MRI 02/02/2022    FINDINGS:  There is motion artifact.    There is no intracranial mass, or acute hemorrhage.  There are multiple patchy foci of T2/FLAIR signal abnormality throughout the  supratentorial white matter and damion suggesting sequela of chronic microvascular ischemic change.  There is bilateral encephalomalacia adjacent to the posterior horns of the bilateral lateral ventricles.  Remote infarct noted involving the posterior aspect of the right caudate, right cerebellum and left eve damion.  Additional scattered lacunar infarcts are noted within the basal ganglia bilaterally as well as bilateral cerebellum.  There are scattered punctate foci of gradient susceptibility particularly within the basal ganglia and right thalamus consistent with remote hemosiderin deposition.  There are several punctate foci of restricted diffusion within the posterior aspect of the right parietooccipital region as well as the medial temporal/insular region, new since the previous examination.  Previous bilateral foci of restricted diffusion within the occipital lobes has resolved.  There is no hydrocephalus. There are no significant extra-axial or extracranial abnormalities.    The globes, orbits, pituitary gland, pineal gland and craniocervical junction are normal in configuration.  The major vascular flow voids are patent, noting apparent slow flow through the sigmoid sinus on the left..                                       CTA Head and Neck (xpd) (Final result)  Result time 10/28/22 00:44:43      Final result by Jed Barbosa MD (10/28/22 00:44:43)                   Impression:      No acute abnormality.    Limited evaluation of the intracranial circulation due to motion on angiographic sequences but with no abnormality seen on thick slab postcontrast images.    No high-grade stenosis or major vessel occlusion in the cervical carotid or vertebral circulation.      Electronically signed by: Jed Barbosa  Date:    10/28/2022  Time:    00:44               Narrative:    EXAMINATION:  CTA HEAD AND NECK (XPD)    CLINICAL HISTORY:  Stroke/TIA, determine embolic source;    TECHNIQUE:  Non contrast low dose  axial images were obtained through the head. CT angiogram was performed from the level of the caitie to the top of the head following the IV administration of 100mL of Omnipaque 350.   Sagittal and coronal reconstructions and maximum intensity projection reconstructions were performed. Arterial stenosis percentages are based on NASCET measurement criteria.  Rapid AI was used.  Motion limits the exam.    COMPARISON:  02/03/2022    FINDINGS:  Intracranial Compartment:    Ventricles and sulci are stable in size for age without evidence of hydrocephalus. No extra-axial blood or fluid collections.    The brain parenchyma appears stable, with focal encephalomalacia involving the left temporal lobe anteriorly and encephalomalacia posteriorly within the watershed territories of the right left parietal temporal occipital junctions..  No parenchymal mass, hemorrhage, edema, or major vascular distribution infarct.    Skull/Extracranial Contents (limited evaluation): No fracture. Mastoid air cells and paranasal sinuses are essentially clear.    Non-Vascular Structures of the Neck/Thoracic Inlet (limited evaluation): Normal.    Aorta: Normal 3 vessel arch.    Extracranial carotid circulation: No hemodynamically significant stenosis, aneurysmal dilatation, or dissection.    Extracranial vertebral circulation: No hemodynamically significant stenosis, aneurysmal dilatation, or dissection.    Intracranial Arteries: No focal high-grade stenosis, occlusion, or aneurysm on thick slab images with the angiographic sequences marred by motion..    Venous structures (limited evaluation): Normal.                                       CT Head Without Contrast (Final result)  Result time 10/27/22 23:44:21      Final result by Lilia Barbosa MD (10/27/22 23:44:21)                   Impression:      No evidence of acute major arterial infarct, hemorrhage or mass effect.    Patchy moderate low density in deep white matter as seen with  microvascular chronic ischemic changes.  This limits assessment for nonhemorrhagic lacunar type infarcts.  Additional imaging can be obtained as clinically indicated with MRI.    Encephalomalacia in the left temporal and bilateral parietal regions.  Remote lacunar infarcts as described.      Electronically signed by: Lilia Barbosa  Date:    10/27/2022  Time:    23:44               Narrative:    EXAMINATION:  CT HEAD WITHOUT CONTRAST    CLINICAL HISTORY:  Neuro deficit, acute, stroke suspected;    TECHNIQUE:  Low dose axial images were obtained through the head.  Coronal and sagittal reformations were also performed. Contrast was not administered.    COMPARISON:  None.    FINDINGS:  There is no evidence of acute hemorrhage or hematoma.    There is prominence ventricles, cisterns and sulci as seen with senescent atrophic changes.  Foci of encephalomalacia noted in the left temporal and bilateral parietal regions.  Remote cerebellar infarcts bilaterally also noted.    Confluent moderate low density in deep white matter as seen with microvascular chronic ischemic changes noted.    There is no mass or mass effect.  Remote lacunar infarcts in the bilateral caudate nuclei and left damion.  Paranasal sinuses and mastoid air cells are clear.  Orbital structures grossly appear intact.  Posterior fossa structures and pituitary gland appear intact.    Bony calvarium is unremarkable.

## 2022-10-29 NOTE — PLAN OF CARE
[facility] - Telemetry  Discharge Final Note    Primary Care Provider: Tadeo Gleason MD    Expected Discharge Date: 10/29/2022    Final Discharge Note (most recent)       Final Note - 10/29/22 1547          Final Note    Assessment Type Final Discharge Note     Anticipated Discharge Disposition Home or Self Care     Hospital Resources/Appts/Education Provided Appointments scheduled and added to AVS        Post-Acute Status    Discharge Delays None known at this time                     Important Message from Medicare

## 2022-10-29 NOTE — PROGRESS NOTES
[facility] Medicine  Progress Note    Patient Name: Sarbjit Brewer Sr.  MRN: [id number]  Patient Class: OP- Observation   Admission Date: 10/27/2022  Length of Stay: 0 days  Attending Physician: Casper Segura III, MD  Primary Care Provider: Tadeo Gleason MD        Subjective:     Principal Problem:TIA (transient ischemic attack)        HPI:  57yo M w/ PMHx of Afib (on eliquis), HFpEF (EF 60% w/ G1D Dysfxn), CVA, EtOH use d/o, and HTN who presents with concern for TIA. Pt reports he was in his normal state of health and has been complaint with his medications outpt; however, this evening around 1900 pt noticed L sided facial droop w/ slurred speech as well as facial and arm numbness. Pt has residual dysarthria and expressive aphasia which he was seeing outpt SLP after previous CVA. He reports by the time he arrived to the ED all his sx's had resolved. He and his family at [facility] endorse pt is returned to his BL.     In the ED T 98 HR 81 RR 16 /107 sat 97% on RA. EKG w/ NSR. CT head and CTA h/n w/o any acute abnormalities but chonic microvascular changes and Encephalomalacia in the left temporal and bilateral parietal regions as well as remote lacunar infarcts. CBC wnl. CMP w/ K 5.2 and BUN/Crt 13/1.6 at BL. Code stroke activated and tele-stroke evaluted pt w/ NIHSS 1. Did not rec Asa/Plavix load or tPA at this time but admission for obs and TIA w/u. Pt was admitted to the  service for TIA.       Overview/Hospital Course:  No notes on file    Interval History: Pt c/o Left hand numbness but sensation intact on exam. Left sided residual weakness.    Review of Systems   Constitutional:  Negative for activity change, chills, fatigue and fever.   HENT:  Negative for sinus pressure, sinus pain, sore throat and trouble swallowing.    Eyes:  Negative for visual disturbance.   Respiratory:  Negative for cough, shortness of breath and wheezing.    Cardiovascular:  Negative for chest pain, palpitations and  leg swelling.   Gastrointestinal:  Negative for abdominal pain, constipation, diarrhea, nausea and vomiting.   Genitourinary:  Negative for dysuria, frequency and hematuria.   Musculoskeletal:  Negative for back pain and myalgias.   Skin:  Negative for rash and wound.   Neurological:  Negative for tremors, weakness, numbness and headaches.   Psychiatric/Behavioral:  Negative for confusion. The patient is not nervous/anxious.      Objective:     Vital Signs (Most Recent):  Temp: 97.7 °F (36.5 °C) (10/29/22 0801)  Pulse: 75 (10/29/22 0801)  Resp: 18 (10/29/22 0801)  BP: (!) 170/99 (10/29/22 0801)  SpO2: 96 % (10/29/22 0830)   Vital Signs (24h Range):  Temp:  [97.3 °F (36.3 °C)-98.5 °F (36.9 °C)] 97.7 °F (36.5 °C)  Pulse:  [59-82] 75  Resp:  [13-20] 18  SpO2:  [95 %-99 %] 96 %  BP: (161-203)/() 170/99     Weight: 95.5 kg (210 lb 8.6 oz)  Body mass index is 29.36 kg/m².  No intake or output data in the 24 hours ending 10/29/22 1056     Physical Exam  Constitutional:       General: He is not in acute distress.  HENT:      Head: Normocephalic and atraumatic.      Right Ear: External ear normal.      Left Ear: External ear normal.      Nose: Nose normal.   Eyes:      Extraocular Movements: Extraocular movements intact.      Conjunctiva/sclera: Conjunctivae normal.      Pupils: Pupils are equal, round, and reactive to light.   Cardiovascular:      Rate and Rhythm: Normal rate and regular rhythm.      Pulses: Normal pulses.      Heart sounds: Normal heart sounds. No murmur heard.    No friction rub. No gallop.   Pulmonary:      Effort: Pulmonary effort is normal. No respiratory distress.      Breath sounds: Normal breath sounds. No wheezing or rhonchi.   Abdominal:      General: Bowel sounds are normal. There is no distension.      Palpations: There is no mass.      Tenderness: There is no abdominal tenderness.   Musculoskeletal:         General: No swelling or tenderness. Normal range of motion.      Cervical back:  Normal range of motion. No tenderness.   Lymphadenopathy:      Cervical: No cervical adenopathy.   Skin:     General: Skin is warm and dry.      Capillary Refill: Capillary refill takes less than 2 seconds.      Findings: No rash.   Neurological:      General: No focal deficit present.      Mental Status: He is alert and oriented to person, place, and time. Mental status is at baseline.      Cranial Nerves: No cranial nerve deficit.      Sensory: No sensory deficit.      Motor: No weakness.      Coordination: Coordination normal.      Gait: Gait normal.      Deep Tendon Reflexes: Reflexes normal.      Comments: BL dysarthria no L sided deficits seen on exam       Significant Labs: All pertinent labs within the past 24 hours have been reviewed.    A1C:   Recent Labs   Lab 10/28/22  0632   HGBA1C 5.8*     Bilirubin:   Recent Labs   Lab 10/27/22  2319 10/28/22  0632 10/29/22  0343   BILITOT 0.3 0.4 0.6     CBC:   Recent Labs   Lab 10/27/22  2319 10/28/22  0632 10/29/22  0343   WBC 8.89 7.47 6.57   HGB 13.8* 13.8* 13.9*   HCT 41.5 40.7 40.6    318 312     CMP:   Recent Labs   Lab 10/27/22  2319 10/28/22  0632 10/29/22  0343    137 138   K 5.2* 4.2 3.7    106 105   CO2 21* 20* 24   GLU 99 96 101   BUN 13 14 13   CREATININE 1.6* 1.6* 1.4   CALCIUM 9.0 9.3 9.6   PROT 8.0 7.5 7.8   ALBUMIN 3.8 3.8 3.8   BILITOT 0.3 0.4 0.6   ALKPHOS 110 112 116   AST 24 18 20   ALT 23 23 20   ANIONGAP 14 11 9     Coagulation:   Recent Labs   Lab 10/27/22  2319   INR 1.0     Lipid Panel:   Recent Labs   Lab 10/27/22  2319   CHOL 184   HDL 32*   LDLCALC 103.8   TRIG 241*   CHOLHDL 17.4*     Magnesium:   Recent Labs   Lab 10/28/22  0632 10/29/22  0343   MG 1.9 1.7     Pathology Results  (Last 10 years)      None          POCT Glucose:   Recent Labs   Lab 10/28/22  1654 10/28/22  2043 10/29/22  0522   POCTGLUCOSE 90 99 97     TSH:   Recent Labs   Lab 10/27/22  2319   TSH 3.819       Significant Imaging: I have reviewed all  pertinent imaging results/findings within the past 24 hours.    Imaging Results               MRI Brain Without Contrast (Final result)  Result time 10/28/22 13:46:51      Final result by Vipin Garcia MD (10/28/22 13:46:51)                   Impression:      This report was flagged in Epic as abnormal.    1. Multiple punctate foci of restricted diffusion involving the right parietooccipital region as well as the right insular region consistent with acute infarcts.  Previous restricted diffusion within the bilateral occipital lobes has resolved.  2. Multiple scattered remote appearing infarcts including within the bilateral basal ganglia, right thalamus, right caudate, adjacent to the bilateral posterior horns of the lateral ventricles, left eve damion, and cerebellum.  3. Sequela of chronic microvascular ischemic change and senescent change.  4. Stable scattered regions of hemosiderin deposition.  5. Apparent slow flow through the left sigmoid sinus.      Electronically signed by: Vipin Garcia MD  Date:    10/28/2022  Time:    13:46               Narrative:    EXAMINATION:  MRI BRAIN WITHOUT CONTRAST    CLINICAL HISTORY:  Stroke, follow up;    TECHNIQUE:  Multiplanar multisequence MR imaging of the brain was performed without contrast.    COMPARISON:  CTA 10/27/2022, MRI 02/02/2022    FINDINGS:  There is motion artifact.    There is no intracranial mass, or acute hemorrhage.  There are multiple patchy foci of T2/FLAIR signal abnormality throughout the supratentorial white matter and damion suggesting sequela of chronic microvascular ischemic change.  There is bilateral encephalomalacia adjacent to the posterior horns of the bilateral lateral ventricles.  Remote infarct noted involving the posterior aspect of the right caudate, right cerebellum and left eve damion.  Additional scattered lacunar infarcts are noted within the basal ganglia bilaterally as well as bilateral cerebellum.  There are scattered punctate  foci of gradient susceptibility particularly within the basal ganglia and right thalamus consistent with remote hemosiderin deposition.  There are several punctate foci of restricted diffusion within the posterior aspect of the right parietooccipital region as well as the medial temporal/insular region, new since the previous examination.  Previous bilateral foci of restricted diffusion within the occipital lobes has resolved.  There is no hydrocephalus. There are no significant extra-axial or extracranial abnormalities.    The globes, orbits, pituitary gland, pineal gland and craniocervical junction are normal in configuration.  The major vascular flow voids are patent, noting apparent slow flow through the sigmoid sinus on the left..                                       CTA Head and Neck (xpd) (Final result)  Result time 10/28/22 00:44:43      Final result by Jed Barbosa MD (10/28/22 00:44:43)                   Impression:      No acute abnormality.    Limited evaluation of the intracranial circulation due to motion on angiographic sequences but with no abnormality seen on thick slab postcontrast images.    No high-grade stenosis or major vessel occlusion in the cervical carotid or vertebral circulation.      Electronically signed by: Jed Barbosa  Date:    10/28/2022  Time:    00:44               Narrative:    EXAMINATION:  CTA HEAD AND NECK (XPD)    CLINICAL HISTORY:  Stroke/TIA, determine embolic source;    TECHNIQUE:  Non contrast low dose axial images were obtained through the head. CT angiogram was performed from the level of the caitie to the top of the head following the IV administration of 100mL of Omnipaque 350.   Sagittal and coronal reconstructions and maximum intensity projection reconstructions were performed. Arterial stenosis percentages are based on NASCET measurement criteria.  Rapid AI was used.  Motion limits the exam.    COMPARISON:  02/03/2022    FINDINGS:  Intracranial  Compartment:    Ventricles and sulci are stable in size for age without evidence of hydrocephalus. No extra-axial blood or fluid collections.    The brain parenchyma appears stable, with focal encephalomalacia involving the left temporal lobe anteriorly and encephalomalacia posteriorly within the watershed territories of the right left parietal temporal occipital junctions..  No parenchymal mass, hemorrhage, edema, or major vascular distribution infarct.    Skull/Extracranial Contents (limited evaluation): No fracture. Mastoid air cells and paranasal sinuses are essentially clear.    Non-Vascular Structures of the Neck/Thoracic Inlet (limited evaluation): Normal.    Aorta: Normal 3 vessel arch.    Extracranial carotid circulation: No hemodynamically significant stenosis, aneurysmal dilatation, or dissection.    Extracranial vertebral circulation: No hemodynamically significant stenosis, aneurysmal dilatation, or dissection.    Intracranial Arteries: No focal high-grade stenosis, occlusion, or aneurysm on thick slab images with the angiographic sequences marred by motion..    Venous structures (limited evaluation): Normal.                                       CT Head Without Contrast (Final result)  Result time 10/27/22 23:44:21      Final result by Lilia Barbosa MD (10/27/22 23:44:21)                   Impression:      No evidence of acute major arterial infarct, hemorrhage or mass effect.    Patchy moderate low density in deep white matter as seen with microvascular chronic ischemic changes.  This limits assessment for nonhemorrhagic lacunar type infarcts.  Additional imaging can be obtained as clinically indicated with MRI.    Encephalomalacia in the left temporal and bilateral parietal regions.  Remote lacunar infarcts as described.      Electronically signed by: Lilia Barbosa  Date:    10/27/2022  Time:    23:44               Narrative:    EXAMINATION:  CT HEAD WITHOUT CONTRAST    CLINICAL  HISTORY:  Neuro deficit, acute, stroke suspected;    TECHNIQUE:  Low dose axial images were obtained through the head.  Coronal and sagittal reformations were also performed. Contrast was not administered.    COMPARISON:  None.    FINDINGS:  There is no evidence of acute hemorrhage or hematoma.    There is prominence ventricles, cisterns and sulci as seen with senescent atrophic changes.  Foci of encephalomalacia noted in the left temporal and bilateral parietal regions.  Remote cerebellar infarcts bilaterally also noted.    Confluent moderate low density in deep white matter as seen with microvascular chronic ischemic changes noted.    There is no mass or mass effect.  Remote lacunar infarcts in the bilateral caudate nuclei and left damion.  Paranasal sinuses and mastoid air cells are clear.  Orbital structures grossly appear intact.  Posterior fossa structures and pituitary gland appear intact.    Bony calvarium is unremarkable.                                          Assessment/Plan:      * TIA (transient ischemic attack)  -Pt w/ known hx of CVA's and residual dysarthria as well as expressive aphasia who presented with acute onset L sided weakness, numbness, and tingling starting ~1900 PTA  -Sxs since resolved in ED and reported by pt and family he is back to BL  -NIHSS 1 on admit and Tele-Stroke contacted who did not rec tPA or ASA/Plavix but requested permissive HTN < 160  -CT and CTA H/N without any acute abnormalities just old lacunar infarct and chronic microvascular changes  -RPR negative, HIV negative  -HbA1c 5.8%  -TSH 3.819  -Lipid: chol 184, , HDL 32,   -MRI as follows:   1. Multiple punctate foci of restricted diffusion involving the right parietooccipital region as well as the right insular region consistent with acute infarcts.  Previous restricted diffusion within the bilateral occipital lobes has resolved.   2. Multiple scattered remote appearing infarcts including within the bilateral  basal ganglia, right thalamus, right caudate, adjacent to the bilateral posterior horns of the lateral ventricles, left eve damion, and cerebellum.   3. Sequela of chronic microvascular ischemic change and senescent change.   4. Stable scattered regions of hemosiderin deposition.   5. Apparent slow flow through the left sigmoid sinus.   -Echo normal with EF 60%  -B12 was 295, folate 10.8    Plan:  -Cnt home statin  -Repeat CT head and neck pending  -F/u neuro  -B12 and folate supplementation  -PT/OT/SLP    (HFpEF) heart failure with preserved ejection fraction  -Last ECHO EF 60% w/ G1 Diastolic Dysfxn  -No signs of volume overload on admission    Plan:  -Cnt home Arb, BB, and lasix       Atrial fibrillation  -Cnt home eliquis    Alcoholism /alcohol abuse  -Pt denies any recent EtOH use at this admission  -Hx of EtOH related admissions    Plan:  -Monitor for withdrawals w/ CIWA q4hr and ativan prn if > 8      Essential hypertension  -Pt on losartan 50mg qd and Metoprolol Succinate 50mg 24hr qd at home    Plan:  -Allow for permissive HTN <160  -Pt continues to be hypertensive, losartan increased to 100 mg      VTE Risk Mitigation (From admission, onward)         Ordered     apixaban tablet 5 mg  2 times daily         10/28/22 0316     IP VTE HIGH RISK PATIENT  Once         10/28/22 0316     Place sequential compression device  Until discontinued         10/28/22 0316                Discharge Planning   SANTOS: 10/28/2022     Code Status: Full Code   Is the patient medically ready for discharge?:     Reason for patient still in hospital (select all that apply): Laboratory test, Treatment and Consult recommendations               Harmony Matthews MD  Department of Hospital Medicine   [place] - Telemetry

## 2022-10-29 NOTE — DISCHARGE INSTRUCTIONS
Please  your prescriptions for Aspirin 81 mg and Amlodipine 5 mg, which you need to take daily. Continue taking Eliquis 5 mg twice a day.    Please follow up with Vascular Neurology within 2 weeks. You also need to follow up with cardiology, neurology, and physical therapy/occupational therapy outpatient.

## 2022-10-29 NOTE — PLAN OF CARE
Introduced as VN and will be reviewing discharge instructions.  Educated patient on reason for admission, home medication list, and discharge instructions including when to return to ED and the following doctor appointments.  Education per flowsheet.  Opportunity given for questions and questions answered. nurse  notified of   completion of discharge education. Patient waiting on escort

## 2022-10-29 NOTE — NURSING
Avs given to pt at bedside. Virtual nurse notified for review. Iv removed with catheter intact. Pt aaox4. Resp even and labored q 4 neuro WNL. In no acute distres.

## 2022-10-29 NOTE — ASSESSMENT & PLAN NOTE
-Pt w/ known hx of CVA's and residual dysarthria as well as expressive aphasia who presented with acute onset L sided weakness, numbness, and tingling starting ~1900 PTA  -Sxs since resolved in ED and reported by pt and family he is back to   -NIHSS 1 on admit and Tele-Stroke contacted who did not rec tPA or ASA/Plavix but requested permissive HTN < 160  -CT and CTA H/N without any acute abnormalities just old lacunar infarct and chronic microvascular changes  -RPR negative, HIV negative  -HbA1c 5.8%  -TSH 3.819  -Lipid: chol 184, , HDL 32,   -MRI as follows:   1. Multiple punctate foci of restricted diffusion involving the right parietooccipital region as well as the right insular region consistent with acute infarcts.  Previous restricted diffusion within the bilateral occipital lobes has resolved.   2. Multiple scattered remote appearing infarcts including within the bilateral basal ganglia, right thalamus, right caudate, adjacent to the bilateral posterior horns of the lateral ventricles, left eve damion, and cerebellum.   3. Sequela of chronic microvascular ischemic change and senescent change.   4. Stable scattered regions of hemosiderin deposition.   5. Apparent slow flow through the left sigmoid sinus.   -Echo normal with EF 60%  -B12 was 295, folate 10.8    Plan:  -Cnt home statin  -Repeat CT head and neck pending  -F/u neuro  -B12 and folate supplementation  -PT/OT/SLP

## 2022-10-29 NOTE — DISCHARGE SUMMARY
[facility]  Discharge Summary      Patient Name: Sarbjit Brewer Sr.  MRN: [id number]  Patient Class: OP- Observation  Admission Date: 10/27/2022  Hospital Length of Stay: 0 days  Discharge Date and Time: 10/29/2022  4:41 PM  Attending Physician: Dr. Casper Segura   Discharging Provider: Harmony Matthews MD  Primary Care Provider: Tadeo Gleason MD      HPI:   55yo M w/ PMHx of Afib (on eliquis), HFpEF (EF 60% w/ G1D Dysfxn), CVA, EtOH use d/o, and HTN who presents with concern for TIA. Pt reports he was in his normal state of health and has been complaint with his medications outpt; however, this evening around 1900 pt noticed L sided facial droop w/ slurred speech as well as facial and arm numbness. Pt has residual dysarthria and expressive aphasia which he was seeing outpt SLP after previous CVA. He reports by the time he arrived to the ED all his sx's had resolved. He and his family at [facility] endorse pt is returned to his BL.     In the ED T 98 HR 81 RR 16 /107 sat 97% on RA. EKG w/ NSR. CT head and CTA h/n w/o any acute abnormalities but chonic microvascular changes and Encephalomalacia in the left temporal and bilateral parietal regions as well as remote lacunar infarcts. CBC wnl. CMP w/ K 5.2 and BUN/Crt 13/1.6 at BL. Code stroke activated and tele-stroke evaluted pt w/ NIHSS 1. Did not rec Asa/Plavix load or tPA at this time but admission for obs and TIA w/u. Pt was admitted to the  service for TIA.       * No surgery found *      Hospital Course:   Pt presented with Left sided residual weakness (hx of CVA) as well as numbness of Left hand. NIHSS 1 on admit and Tele-Stroke contacted who did not rec tPA or ASA/Plavix but requested permissive HTN <160. Inpatient neuro was consulted for further TIA/stroke workup. CT and CTA H/N without any acute abnormalities, showed old lacunar infarct and chronic microvascular changes. RPR and HIV negative. MRI brain showed multiple punctate foci within  the R parieto-occipital region as well as R insular region consistent with acute infarcts. Echo normal with EF 60%.    Repeat CTA head and neck on 10/29 showed significant narrowing of the distal internal carotid arteries. Thyroid hypodensities recommend nonemergent follow-up ultrasound examination. Pt returned to baseline function and was evaluated by PT/OT/SLP. Amlodipine 5 mg was added due to pt's ongoing HTN with SBP in the 180s-200s, despite restarting home losartan and metoprolol. Pt clinically stable for d/c and to continue Eliquis 5 mg BID, ASA 81 mg, amlodipine 5 mg along with home losartan 100 mg and metoprolol 50 mg.    Pt counseled on smoking cessation, controlling HTN, and stroke risk factors. Recommend follow up thyroid US with PCP regarding thyromegaly and several subcentimeter thyroid nodules seen on imaging. Pt will follow up with Vascular Neurology within 2 weeks, cardiology, neurology PCP and OT/PT outpatient.       Goals of Care Treatment Preferences:  Code Status: Full Code      Consults:   Consults (From admission, onward)        Status Ordering Provider     Neurology  Once        Provider:  (Not yet assigned)    Completed YULIA MALDONADO     Consult to Telemedicine - Acute Stroke  Once        Provider:  (Not yet assigned)    Acknowledged ASHLEY COATES          * TIA (transient ischemic attack)  -Pt w/ known hx of CVA's and residual dysarthria as well as expressive aphasia who presented with acute onset L sided weakness, numbness, and tingling starting ~1900 PTA  -Sxs since resolved in ED and reported by pt and family he is back to BL  -NIHSS 1 on admit and Tele-Stroke contacted who did not rec tPA or ASA/Plavix but requested permissive HTN < 160  -CT and CTA H/N without any acute abnormalities just old lacunar infarct and chronic microvascular changes  -RPR negative, HIV negative  -HbA1c 5.8%  -TSH 3.819  -Lipid: chol 184, , HDL 32,   -MRI as follows:   1. Multiple punctate foci  of restricted diffusion involving the right parietooccipital region as well as the right insular region consistent with acute infarcts.  Previous restricted diffusion within the bilateral occipital lobes has resolved.   2. Multiple scattered remote appearing infarcts including within the bilateral basal ganglia, right thalamus, right caudate, adjacent to the bilateral posterior horns of the lateral ventricles, left eve damion, and cerebellum.   3. Sequela of chronic microvascular ischemic change and senescent change.   4. Stable scattered regions of hemosiderin deposition.   5. Apparent slow flow through the left sigmoid sinus.   -Echo normal with EF 60%  -B12 was 295, folate 10.8    Plan:  -Cnt home statin  -Repeat CT head and neck pending  -F/u neuro  -B12 and folate supplementation  -PT/OT/SLP      Final Active Diagnoses:    Diagnosis Date Noted POA    PRINCIPAL PROBLEM:  TIA (transient ischemic attack) [G45.9] 10/28/2022 Unknown    (HFpEF) heart failure with preserved ejection fraction [I50.30] 10/28/2022 Unknown    Atrial fibrillation [I48.91] 12/31/2021 Yes    Alcoholism /alcohol abuse [F10.20] 02/03/2020 Yes    Essential hypertension [I10] 07/31/2014 Yes      Problems Resolved During this Admission:       Discharged Condition: stable    Disposition: Home or Self Care    Follow Up:   Follow-up Information     Tadeo Gleason MD. Schedule an appointment as soon as possible for a visit in 1 week(s).    Specialty: Nephrology  Why: Follow up  Contact information:  [address] [place] [id number] [phone]             [place] - Emergency Dept Follow up.    Specialty: Emergency Medicine  Why: If symptoms worsen, As needed  Contact information:  [address] [place] [address] [phone]           [facility] - NEUROLOGY Follow up in 2 week(s).    Specialty: Neurology  Why: Neurology- the clinic will contact you directly  Contact information:  [address]  [address]  [phone]             Glroia Jaramillo MD Follow up on 11/4/2022.    Specialty: Interventional Cardiology  Why: OUT PATIENT  SERVICES/Cardiology-Please call to schedule a hospital followup appointment  Contact information:  [address]  [address]  [place] [address]  [phone]                       Patient Instructions:      Ambulatory referral/consult to Vascular Neurology   Standing Status: Future   Referral Priority: Routine Referral Type: Consultation   Referral Reason: Specialty Services Required   Requested Specialty: Vascular Neurology   Number of Visits Requested: 1     Ambulatory referral/consult to Neurology   Standing Status: Future   Referral Priority: Routine Referral Type: Consultation   Referral Reason: Specialty Services Required   Requested Specialty: Neurology   Number of Visits Requested: 1     Ambulatory referral/consult to Cardiology   Standing Status: Future   Referral Priority: Routine Referral Type: Consultation   Referral Reason: Specialty Services Required   Requested Specialty: Cardiology   Number of Visits Requested: 1     Ambulatory referral/consult to Physical/Occupational Therapy   Standing Status: Future   Referral Priority: Routine Referral Type: Physical Medicine   Referral Reason: Specialty Services Required   Number of Visits Requested: 1     Notify your health care provider if you experience any of the following:  temperature >100.4     Notify your health care provider if you experience any of the following:  persistent nausea and vomiting or diarrhea     Notify your health care provider if you experience any of the following:  severe uncontrolled pain     Notify your health care provider if you experience any of the following:  difficulty breathing or increased cough     Notify your health care provider if you experience any of the following:  severe persistent headache     Notify your health care provider if you experience any of the following:  worsening rash      Notify your health care provider if you experience any of the following:  persistent dizziness, light-headedness, or visual disturbances     Notify your health care provider if you experience any of the following:  increased confusion or weakness     Activity as tolerated       Significant Diagnostic Studies:     Labs:   CMP   Recent Labs   Lab 10/27/22  2319 10/28/22  0632 10/29/22  0343    137 138   K 5.2* 4.2 3.7    106 105   CO2 21* 20* 24   GLU 99 96 101   BUN 13 14 13   CREATININE 1.6* 1.6* 1.4   CALCIUM 9.0 9.3 9.6   PROT 8.0 7.5 7.8   ALBUMIN 3.8 3.8 3.8   BILITOT 0.3 0.4 0.6   ALKPHOS 110 112 116   AST 24 18 20   ALT 23 23 20   ANIONGAP 14 11 9   , CBC   Recent Labs   Lab 10/27/22  2319 10/28/22  0632 10/29/22  0343   WBC 8.89 7.47 6.57   HGB 13.8* 13.8* 13.9*   HCT 41.5 40.7 40.6    318 312   , INR   Lab Results   Component Value Date    INR 1.0 10/27/2022    INR 1.1 02/02/2022    INR 1.0 01/12/2022   , Lipid Panel   Lab Results   Component Value Date    CHOL 184 10/27/2022    HDL 32 (L) 10/27/2022    LDLCALC 103.8 10/27/2022    TRIG 241 (H) 10/27/2022    CHOLHDL 17.4 (L) 10/27/2022   , Troponin No results for input(s): TROPONINI in the last 168 hours., A1C:   Recent Labs   Lab 10/28/22  0632   HGBA1C 5.8*    and All labs within the past 24 hours have been reviewed    Microbiology: Blood Culture No results found for: LABBLOO, Sputum Culture No results found for: GSRESP, RESPIRATORYC, Urine Culture  No results found for: LABURIN and Wound Culture: negative    Radiology:    Imaging Results           MRI Brain Without Contrast (Final result)  Result time 10/28/22 13:46:51    Final result by Vipin Garcia MD (10/28/22 13:46:51)                 Impression:      This report was flagged in Epic as abnormal.    1. Multiple punctate foci of restricted diffusion involving the right parietooccipital region as well as the right insular region consistent with acute infarcts.  Previous  restricted diffusion within the bilateral occipital lobes has resolved.  2. Multiple scattered remote appearing infarcts including within the bilateral basal ganglia, right thalamus, right caudate, adjacent to the bilateral posterior horns of the lateral ventricles, left eve damion, and cerebellum.  3. Sequela of chronic microvascular ischemic change and senescent change.  4. Stable scattered regions of hemosiderin deposition.  5. Apparent slow flow through the left sigmoid sinus.      Electronically signed by: Vipin Garcia MD  Date:    10/28/2022  Time:    13:46             Narrative:    EXAMINATION:  MRI BRAIN WITHOUT CONTRAST    CLINICAL HISTORY:  Stroke, follow up;    TECHNIQUE:  Multiplanar multisequence MR imaging of the brain was performed without contrast.    COMPARISON:  CTA 10/27/2022, MRI 02/02/2022    FINDINGS:  There is motion artifact.    There is no intracranial mass, or acute hemorrhage.  There are multiple patchy foci of T2/FLAIR signal abnormality throughout the supratentorial white matter and damion suggesting sequela of chronic microvascular ischemic change.  There is bilateral encephalomalacia adjacent to the posterior horns of the bilateral lateral ventricles.  Remote infarct noted involving the posterior aspect of the right caudate, right cerebellum and left eve damion.  Additional scattered lacunar infarcts are noted within the basal ganglia bilaterally as well as bilateral cerebellum.  There are scattered punctate foci of gradient susceptibility particularly within the basal ganglia and right thalamus consistent with remote hemosiderin deposition.  There are several punctate foci of restricted diffusion within the posterior aspect of the right parietooccipital region as well as the medial temporal/insular region, new since the previous examination.  Previous bilateral foci of restricted diffusion within the occipital lobes has resolved.  There is no hydrocephalus. There are no significant  extra-axial or extracranial abnormalities.    The globes, orbits, pituitary gland, pineal gland and craniocervical junction are normal in configuration.  The major vascular flow voids are patent, noting apparent slow flow through the sigmoid sinus on the left..                               CTA Head and Neck (xpd) (Final result)  Result time 10/28/22 00:44:43    Final result by Jed Barbosa MD (10/28/22 00:44:43)                 Impression:      No acute abnormality.    Limited evaluation of the intracranial circulation due to motion on angiographic sequences but with no abnormality seen on thick slab postcontrast images.    No high-grade stenosis or major vessel occlusion in the cervical carotid or vertebral circulation.      Electronically signed by: Jed Barbosa  Date:    10/28/2022  Time:    00:44             Narrative:    EXAMINATION:  CTA HEAD AND NECK (XPD)    CLINICAL HISTORY:  Stroke/TIA, determine embolic source;    TECHNIQUE:  Non contrast low dose axial images were obtained through the head. CT angiogram was performed from the level of the caitie to the top of the head following the IV administration of 100mL of Omnipaque 350.   Sagittal and coronal reconstructions and maximum intensity projection reconstructions were performed. Arterial stenosis percentages are based on NASCET measurement criteria.  Rapid AI was used.  Motion limits the exam.    COMPARISON:  02/03/2022    FINDINGS:  Intracranial Compartment:    Ventricles and sulci are stable in size for age without evidence of hydrocephalus. No extra-axial blood or fluid collections.    The brain parenchyma appears stable, with focal encephalomalacia involving the left temporal lobe anteriorly and encephalomalacia posteriorly within the watershed territories of the right left parietal temporal occipital junctions..  No parenchymal mass, hemorrhage, edema, or major vascular distribution infarct.    Skull/Extracranial Contents (limited  evaluation): No fracture. Mastoid air cells and paranasal sinuses are essentially clear.    Non-Vascular Structures of the Neck/Thoracic Inlet (limited evaluation): Normal.    Aorta: Normal 3 vessel arch.    Extracranial carotid circulation: No hemodynamically significant stenosis, aneurysmal dilatation, or dissection.    Extracranial vertebral circulation: No hemodynamically significant stenosis, aneurysmal dilatation, or dissection.    Intracranial Arteries: No focal high-grade stenosis, occlusion, or aneurysm on thick slab images with the angiographic sequences marred by motion..    Venous structures (limited evaluation): Normal.                               CT Head Without Contrast (Final result)  Result time 10/27/22 23:44:21    Final result by Lilia Barbosa MD (10/27/22 23:44:21)                 Impression:      No evidence of acute major arterial infarct, hemorrhage or mass effect.    Patchy moderate low density in deep white matter as seen with microvascular chronic ischemic changes.  This limits assessment for nonhemorrhagic lacunar type infarcts.  Additional imaging can be obtained as clinically indicated with MRI.    Encephalomalacia in the left temporal and bilateral parietal regions.  Remote lacunar infarcts as described.      Electronically signed by: Lilia Barbosa  Date:    10/27/2022  Time:    23:44             Narrative:    EXAMINATION:  CT HEAD WITHOUT CONTRAST    CLINICAL HISTORY:  Neuro deficit, acute, stroke suspected;    TECHNIQUE:  Low dose axial images were obtained through the head.  Coronal and sagittal reformations were also performed. Contrast was not administered.    COMPARISON:  None.    FINDINGS:  There is no evidence of acute hemorrhage or hematoma.    There is prominence ventricles, cisterns and sulci as seen with senescent atrophic changes.  Foci of encephalomalacia noted in the left temporal and bilateral parietal regions.  Remote cerebellar infarcts bilaterally also  noted.    Confluent moderate low density in deep white matter as seen with microvascular chronic ischemic changes noted.    There is no mass or mass effect.  Remote lacunar infarcts in the bilateral caudate nuclei and left damion.  Paranasal sinuses and mastoid air cells are clear.  Orbital structures grossly appear intact.  Posterior fossa structures and pituitary gland appear intact.    Bony calvarium is unremarkable.                                  Specimen (24h ago, onward)    None          Pending Diagnostic Studies:     None         Medications:  Reconciled Home Medications:      Medication List      START taking these medications    amLODIPine 5 MG tablet  Commonly known as: NORVASC  Take 1 tablet (5 mg total) by mouth once daily.  Start taking on: October 30, 2022     aspirin 81 MG EC tablet  Commonly known as: ECOTRIN  Take 1 tablet (81 mg total) by mouth once daily.  Start taking on: October 30, 2022        CONTINUE taking these medications    apixaban 5 mg Tab  Commonly known as: ELIQUIS  Take 1 tablet (5 mg total) by mouth 2 (two) times daily.     atorvastatin 40 MG tablet  Commonly known as: LIPITOR  Take 1 tablet (40 mg total) by mouth every evening.     cloNIDine 0.1 MG tablet  Commonly known as: CATAPRES  Take 0.1 mg by mouth 2 (two) times daily.     furosemide 20 MG tablet  Commonly known as: LASIX  Take 1 tablet (20 mg total) by mouth once daily.     losartan 100 MG tablet  Commonly known as: COZAAR  Take 100 mg by mouth once daily.     metFORMIN 500 MG tablet  Commonly known as: GLUCOPHAGE  Take 500 mg by mouth once daily.     metoprolol succinate 50 MG 24 hr tablet  Commonly known as: TOPROL-XL  Take 1 tablet (50 mg total) by mouth once daily.     mirtazapine 15 MG tablet  Commonly known as: REMERON  Take 1 tablet (15 mg total) by mouth every evening.     zolpidem 10 mg Tab  Commonly known as: AMBIEN  Take 10 mg by mouth once daily.            Indwelling Lines/Drains at time of discharge:    Lines/Drains/Airways     None                 Time spent on the discharge of patient: 30 minutes         Harmony Matthews MD  Department of Hospital Medicine  [facility]

## 2022-10-29 NOTE — PLAN OF CARE
[place] - Telemetry  Initial Discharge Assessment       Primary Care Provider: Tadeo Gleason MD    Admission Diagnosis: TIA (transient ischemic attack) [G45.9]  Slurred speech [R47.81]  Facial droop [R29.810]  Stroke [I63.9]  Left arm weakness [R29.898]    Admission Date: 10/27/2022  Expected Discharge Date: 10/29/2022    Discharge Barriers Identified: Underinsured    Payor: MEDICAID / Plan: [employer] [facility] / Product Type: Managed Medicaid /     Extended Emergency Contact Information  Primary Emergency Contact: Elise Brewer  Address: [address] [place] [address] [facility]  Home Phone: [phone]  Mobile Phone: [phone]  Relation: Spouse  Secondary Emergency Contact: Marylu Brewer  Address: [address] [place] [address] [place] [address] [facility]  Home Phone: [phone]  Mobile Phone: [phone]  Relation: Mother    Discharge Plan A: Home  Discharge Plan B: Home with family      [address] [place] [address]  [facility] [place] [address]  Phone: [phone] Fax: [phone]      Initial Assessment (most recent)       Adult Discharge Assessment - 10/29/22 1538          Discharge Assessment    Assessment Type Discharge Planning Assessment     Confirmed/corrected address, phone number and insurance Yes     Confirmed Demographics Correct on Facesheet     Source of Information patient     Does patient/caregiver understand observation status Yes     Communicated SANTOS with patient/caregiver Yes     Reason For Admission patient was admitted for a TIA     Lives With spouse     Facility Arrived From: patient arrived from home     Do you expect to return to your current living situation? Yes     Do you have help at home or someone to help you manage your care at home? Yes     Who are your caregiver(s) and their phone number(s)? Elise Brewer ,spouse at [phone]     Prior to hospitilization cognitive status:  Alert/Oriented     Current cognitive status: Alert/Oriented     Readmission within 30 days? No     Patient currently being followed by outpatient case management? No     Do you currently have service(s) that help you manage your care at home? No     Do you take prescription medications? Yes     Do you have prescription coverage? Yes     Coverage [place] medicaid     Do you have any problems affording any of your prescribed medications? No     Is the patient taking medications as prescribed? yes     Who is going to help you get home at discharge? Elise Brewer ,spouse at [phone]     Are you on dialysis? No     Do you take coumadin? No     Discharge Plan A Home     Discharge Plan B Home with family     Discharge Plan discussed with: Spouse/sig other     Name(s) and Number(s) Elise Brewer ,spouse at [phone]     Discharge Barriers Identified Underinsured        Relationship/Environment    Name(s) of Who Lives With Patient Elise Brewer ,spouse at [phone]

## 2022-11-03 ENCOUNTER — TELEPHONE (OUTPATIENT)
Dept: NEUROLOGY | Facility: CLINIC | Age: 57
End: 2022-11-03

## 2022-11-03 ENCOUNTER — TELEPHONE (OUTPATIENT)
Dept: CARDIOLOGY | Facility: CLINIC | Age: 57
End: 2022-11-03
Payer: MEDICAID

## 2022-11-03 NOTE — TELEPHONE ENCOUNTER
Attempted to reach in regards to arranging a Hosp f/u appt with Dr Jaramillo     Detailed message left including office # for return call.

## 2022-11-03 NOTE — TELEPHONE ENCOUNTER
Unfortunately, at this time, we do not have any appointments available within our currently available schedules through January. You may be able to obtain an appt with the [facility] in [place] or [place]. There may be appts available with [facility] as well. I have included their contact #s below. Thank you.  [facility] [phone]  [facility] [phone]  [facility] [phone]

## 2022-11-03 NOTE — TELEPHONE ENCOUNTER
----- Message from Harmony Hopkins sent at 11/2/2022 11:59 AM CDT -----  Regarding: HFU  Patient was discharged from [facility] and will be needing a HFU w/Neurology.  I am unable to schedule as patient has Medicaid.  Please schedule as appropriate and message me back so I can relay to DC Nurse to chart.    DX: TIA    Thanks, Kaylie  Access Navigator/Discharge

## 2022-11-21 ENCOUNTER — CLINICAL SUPPORT (OUTPATIENT)
Dept: REHABILITATION | Facility: HOSPITAL | Age: 57
End: 2022-11-21
Payer: MEDICAID

## 2022-11-21 DIAGNOSIS — R41.841 COGNITIVE COMMUNICATION DEFICIT: ICD-10-CM

## 2022-11-21 DIAGNOSIS — R47.1 DYSARTHRIA: Primary | ICD-10-CM

## 2022-11-21 PROCEDURE — 92522 EVALUATE SPEECH PRODUCTION: CPT | Mod: PN

## 2022-11-21 NOTE — PROGRESS NOTES
[facility]  Speech Therapy Evaluation -Neurological Rehabilitation    Date: 11/21/2022     Name: Sarbjit Brewer Sr.   MRN: [id number]    Therapy Diagnosis:   Encounter Diagnoses   Name Primary?    Dysarthria Yes    Cognitive communication deficit     Physician: Steven Krueger MD  Physician Orders: Ambulatory Referral to Speech Therapy   Medical Diagnosis: Cerebral vascular accident [I63.9]    Visit # / Visits Authorized:  1 / 1   Date of Evaluation:  11/21/2022   Insurance Authorization Period: 11/15/2022 to 12/31/2022  Plan of Care Certification:    11/21/2022 to 2/3/2022      Time In:1400   Time Out: 1445   Procedure Min.   Evaluation of Speech Sound Production  45     Precautions: Standard, blood thinners, Fall, and CVA  Subjective   Date of Onset: 10/27/2022 for most recent cerebrovascular accident (CVA)   History of Current Condition:  Sarbjit Brewer Sr. is a 57 y.o. male male who presents to [facility] Outpatient Speech Therapy for evaluation secondary to cerebrovascular accident (CVA). Patient was referred to therapy by Steven Krueger MD , which is the patient's Neurologist . Patient required max encouragement to attempt to provide history - however poor initiation therefore pt's wife provided medical history information.   Spouse reports multiple CVA's. Patient reports feelings of confusion. Of note, patient requires frequent repetitions in conversation. No word-finding difficulty reported. Spouse reported significant difficulties with short-term memory.      Patient presents with decreased field of vision due to suspected left-sided neglect. Patient is not interested in returning to work at this time.    Patient was admitted to rehab on 11/02/2022 due to decline in his prior level of function secondary to acute ischemic stroke, which occurred on 10/27/2022. Discharged from Inpatient Rehab on 11/16/2022  Past Medical History: Sarbjit Brewer Sr.  has a past medical  "history of A-fib, CHF (congestive heart failure), Hypertension, and Insomnia.  Sarbjit Brewer Sr.  has a past surgical history that includes Kidney stone surgery.  Medical Hx and Allergies: Sarbjit has a current medication list which includes the following prescription(s): amlodipine, apixaban, aspirin, atorvastatin, clonidine, furosemide, losartan, metformin, metoprolol succinate, mirtazapine, and zolpidem. Review of patient's allergies indicates:  No Known Allergies  Prior Therapy:  significant previous therapy including Acute, Inpatient rehabilitation, outpatient   Social History:  Patient lives with his wife in [place], Patient is not currently driving, Hobbies include: patient unable to state hobbies  Occupation:  Was working on the [employer] about a year ago, working on getting disability status  Prior Level of Function: known cognitive changes from previous strokes   Current Level of Function: dysarthria and cognitive communication changes  Pain: 0/10  Pain Location / Description: no pain indicated throughout session   Nutrition:  oral diet, IDDSI 0 (Thin), and IDDSI 7 (Regular)  Patient's Therapy Goals:  "Get better"  Objective   Formal Assessment:  MOTOR SPEECH/DYSARTHRIA EVALUATION    Evaluation of Speech Mechanisms  Respiration:  Posture: WNL  Breath Support: WNL  Breath Rate: Slow  Respiratory Features: Abnormal: Thoracic    Oral Mechanism at Rest   Labial Structures  Structure WNL   Symmetry Left   Tone excessive- left   Ability to control secretions reduced/drooling- left       Lingual Structure (at rest in mouth)   Structure WNL   Symmetry WNL   Tone WNL   Irregular Movement Involuntary Movement       Mandible  Symmetry WNL   Posture at Rest WNL=closed   Dentition Scattered     Face  Symmetry Left Facial Droop   Expression Lability of Affect   Irregular Movement WNL     Soft Palate  Symmetry WNL   Structure WNL     Hard Palate  Structure WNL     Oral Mechanism during Sustained Postures   Labial " Retraction   Symmetry Left Reduced   Range Reduced   Tone WNL   Strength Reduced     Labial Protrusion  Range Reduced- left   Tone WNL   Strength Reduced -left      Labial Compression  Strength (Upper R) WNL   Strength (Upper L) Reduced   Strength (Lower R) WNL   Strength (Lower L) Reduced     Lingual Protrusion  Symmetry WNL   Range WNL   Tone WNL   Tremor WNL   Strength WNL     Lingual Elevation to Alveolar Ridge   Range WNL   Symmetry WNL     Mandible Depression  Symmetry WNL   Range WNL   Jaw Clonus WNL   Strength WNL     Mandible Elevation  Symmetry WNL   Range WNL   Strength WNL     Face: Raising Eyebrows  Symmetry WNL   Range WNL   Forehead Wrinkle WNL     Velopharyngeal Function: Cheek Puffing   Symmetry Left   Range Reduced-Cannot maintain air in mouth   Tone WNL   Strength Reduced       Oral Mechanism during Movement   Labial Protrusion and Lateralization   Rate Slow   Rhythm Irregular     Lingual Lateralization (External)   Rate WNL   Rhythm WNL   Range WNL     Lingual Lateralization (Internal)  Rate WNL   Rhythm WNL   Range WNL     Circular Range of Motion (External)   Rate Slow   Rhythm Irregular   Range Reduced     Circular Range of Motion (Internal)  Rate WNL   Rhythm Irregular   Range WNL     Velopharyngeal Function: Sustained /a/  Velum Range WNL   Pharyngeal Wall Range WNL     Velopharyngeal Function: Short Repeating /a/  Velum Range WNL   Pharyngeal Wall Range WNL     Gross Sensation  Sensation  Face: Upper L WNL    Upper R WNL    Lower L Reduced    Lower R WNL   Lips: Upper L WNL    Upper R WNL    Lower L WNL    Lower R WNL       Alternating Motion Rates (AMRs) and Sequential Motion Rates (SMRs)  SMRs /pu/  Rate Slow   Rhythm WNL   Accuracy Inaccurate   Norm 5.0-7.1 Sara/Sec Below norm     SMRs /tu/  Rate WNL   Rhythm WNL   Accuracy WNL   Norm 4.8-7.1 Sara/Sec WNL     SMRs /ku/  Rate WNL   Rhythm WNL   Accuracy Blurred   Norm 4.4-7.5 Sara/Sec WNL     AMRs /putuku/  Rate WNL   Rhythm WNL   Accuracy  Inaccurate   Norm 3.6-7.5 Sara/Sec WNL     Perceptual Ratings  Respiratory Features: Audible inspiration  Respiratory/Phonatory Features: Mono-loudness  Phonatory Features: Monopitch  Phonatory Quality:  none present  Resonatory Features:  none present  Articulatory Features: Imprecise consonants  Prosodic Features: Slow rate, reduced stress, short phrases  Other Features:  none present    Diagnosis   OVERALL IMPRESSION:   Patient presents with Mild-moderate Mixed   dysarthria characterized by audible inspiration, , mono-loudness, monopitch, imprecise consonants, slow rate, reduced stress, and short phrases.     Description:  Language Skills:   Auditory Comprehension: difficulty following greater than one step commands   Verbal Expression: naming is functional    Cognition: Further assessment of warranted.     Motor Speech Skills: as above    Treatment   Total Treatment Time Separate from Evaluation: n/a   no treatment performed secondary to time to complete evaluation.    Education: Plan of Care, role of SLP in care, and scheduling/ cancellation policy were discussed with patient. Patient and family members expressed understanding.     Home Program: not yet established   Assessment     Sarbjit presents to [facility] status post medical diagnosis of cerebrovascular accident (CVA) .Patient presents with Mild-moderate Mixed  dysarthria characterized by audible inspiration, , mono-loudness, monopitch, imprecise consonants, slow rate, reduced stress, and short phrases.  He also presents with a cognitive communication disorder warranting further assessment next session.  Demonstrates impairments including limitations as described in the problem list. Positive prognostic factors include family support. Negative prognostic factors include history of multiple cerebrovascular accidents. Barriers to therapy include transportation to clinic.  Patient will benefit from skilled, outpatient neurological  rehabilitation speech therapy.    Rehab Potential: fair  Pt's spiritual, cultural, and educational needs considered and patient agreeable to plan of care and goals.    Short Term Goals (4 weeks):   Patient will complete testing using the CLQT.  Pt will rate his speech while reading as at least a 3 on a 3 point scale ( 1 = same as before beginning therapy, 2 =better but not best, 3 =best possible outcome) on  7 /10 trials.   Pt will differentiate between his speech and error-free speech by giving specific examples of differences on  7 /10 trials.   Pt will use motor speech strategies and answer questions in conversation with a self-rating of at least 3 on a 3 point scale ( 1 = same as before beginning therapy, 2 =better but not best, 3 =best possible outcome) on  7 /10 trials.     Long Term Goals (10 weeks):   1. He will be appropriately oriented to time, date, person, place, city with cues to improve safety and awareness in functional living environment.   2. He will improve attention skills to effectively attend to and communicate in simple daily living tasks in functional living environment.   3. He will use appropriate memory strategies to schedule and recall weekly activities, express needs and recall names to maintain safety and participate socially in functional living environment.   4. He will develop functional reading skills and utilize compensatory strategies to maintain safety during ADL's and read and understand simple adult material independently.  5. He will demonstrate appropriate visual scanning and awareness of objects and during reading activities to maintain safety and awareness in functional living environment.     Plan     Plan of Care Certification Period: 11/21/2022 to 2/3/2022    Recommended Treatment Plan:  Patient will participate in the [facility] rehabilitation program for speech therapy 2 times per week for 10 weeks to address his Communication, Cognition, and Motor Speech deficits, to  educate patient and their family, and to participate in a home exercise program.    Therapist's Name:   Suze Simmons CCC-SLP   11/21/2022     I CERTIFY THE NEED FOR THESE SERVICES FURNISHED UNDER THIS PLAN OF TREATMENT AND WHILE UNDER MY CARE    Physician Name: _______________________________    Physician Signature: ____________________________

## 2022-11-21 NOTE — PROCEDURES
EEG REPORT      Sarbjit Brewer Sr.  [id number]  1965    DATE OF SERVICE: 2/4/2022    OK     METHODOLOGY      Extended electroencephalographic recording is made while the patient is ambulatory and continuing normal daily activities.  Electrodes are placed according to the International 10-20 placement system and included T1 and T2 electrode placement.  Twenty four (24) channels of digital signal (sampling rate of 512/sec) was simultaneously recorded from the scalp including EKG and eye monitors.  Recording band pass was 0.1 to 100 hz and all data was stored digitally on the recorder.  The patient is instructed to press an event button when clinical symptoms occur and write the symptoms into a diary. Activation procedures which include photic stimulation, hyperventilation and instructing patients to perform simple task are done in selected patients.        The EEG is displayed on a monitor screen and can be reformatted into different montages for evaluation.  The entire recoding is submitted for computer assisted analysis to detect spike and electrographic seizure activity.  The entire recording is visually reviewed and the times identified by computer analysis as being spikes or seizures are reviewed again.  Compresses spectral analysis (CSA) is also performed on the activity recorded from each individual channel.  This is displayed as a power display of frequencies from 0 to 30 Hz over time.   The CSA analysis is done and displayed continuously.  This is reviewed for asymmetries in power between homologous areas of the scalp and for presence of changes in power which canbe seen when seizures occur.  Sections of suspected abnormalities on the CSA is then compared with the original EEG recording.  .     [employer] software was also utilized in the review of this study.  This software suite analyzes the EEG recording in multiple domains.  Coherence and rhythmicity is computed to identify EEG sections which may  contain organized seizures.  Each channel undergoes analysis to detect presence of spike and sharp waves which have special and morphological characteristic of epileptic activity.  The routine EEG recording is converted from spacial into frequency domain.  This is then displayed comparing homologous areas to identify areas of significant asymmetry.  Algorithm to identify non-cortically generated artifact is used to separate eye movement, EMG and other artifact from the EEG     Recording Times    A total of 00:28:25 hours of EEG was recorded.      EEG FINDINGS:  Background activity:   The background rhythm was characterized by alpha and anterior dominant beta activity with a 7-8Hz posterior dominant alpha rhythm at 30-70 microvolts.  There is independent bitemporal slowing  Symmetry and continuity: the background was continuous and symmetric     Sleep:   Normal sleep transients were seen although drowsiness is noted.    Activation procedures:   Photic stimulation was performed with no abnormalities seen    Abnormal activity:   No epileptiform discharges, periodic discharges, lateralized rhythmic delta activity or electrographic seizures were seen.    IMPRESSION:   Abnormal EEG due to regional cortical or subcortical dysfunction in the bilateral temporal lobes.  No indications of seizure tendency.      Jakub Key MD  Neurology-Epilepsy.  [facility].       Topical Sulfur Applications Pregnancy And Lactation Text: This medication is considered safe during pregnancy and breast feeding secondary to limited systemic absorption.

## 2022-11-23 ENCOUNTER — CLINICAL SUPPORT (OUTPATIENT)
Dept: REHABILITATION | Facility: HOSPITAL | Age: 57
End: 2022-11-23
Attending: INTERNAL MEDICINE
Payer: MEDICAID

## 2022-11-23 ENCOUNTER — CLINICAL SUPPORT (OUTPATIENT)
Dept: REHABILITATION | Facility: HOSPITAL | Age: 57
End: 2022-11-23
Payer: MEDICAID

## 2022-11-23 DIAGNOSIS — M62.81 MUSCLE WEAKNESS: ICD-10-CM

## 2022-11-23 DIAGNOSIS — R41.4 HEMI-INATTENTION: ICD-10-CM

## 2022-11-23 DIAGNOSIS — R27.9 LACK OF COORDINATION: ICD-10-CM

## 2022-11-23 DIAGNOSIS — R26.89 IMBALANCE: ICD-10-CM

## 2022-11-23 DIAGNOSIS — Z74.1 NEED FOR ASSISTANCE WITH PERSONAL CARE: ICD-10-CM

## 2022-11-23 DIAGNOSIS — R27.8 IMPAIRED GROSS MOTOR COORDINATION: ICD-10-CM

## 2022-11-23 DIAGNOSIS — R27.8 IMPAIRED GROSS MOTOR COORDINATION: Primary | ICD-10-CM

## 2022-11-23 PROBLEM — R47.1 DYSARTHRIA: Status: ACTIVE | Noted: 2022-11-23

## 2022-11-23 PROCEDURE — 97166 OT EVAL MOD COMPLEX 45 MIN: CPT | Mod: PN

## 2022-11-23 PROCEDURE — 97162 PT EVAL MOD COMPLEX 30 MIN: CPT | Mod: PN

## 2022-11-23 NOTE — PLAN OF CARE
[facility]  Speech Therapy Evaluation -Neurological Rehabilitation    Date: 11/21/2022     Name: Sarbjit Brewer Sr.   MRN: [id number]    Therapy Diagnosis:   Encounter Diagnoses   Name Primary?    Dysarthria Yes    Cognitive communication deficit     Physician: Steven Krueger MD  Physician Orders: Ambulatory Referral to Speech Therapy   Medical Diagnosis: Cerebral vascular accident [I63.9]    Visit # / Visits Authorized:  1 / 1   Date of Evaluation:  11/21/2022   Insurance Authorization Period: 11/15/2022 to 12/31/2022  Plan of Care Certification:    11/21/2022 to 2/3/2022      Time In:1400   Time Out: 1445   Procedure Min.   Evaluation of Speech Sound Production  45     Precautions: Standard, blood thinners, Fall, and CVA  Subjective   Date of Onset: 10/27/2022 for most recent cerebrovascular accident (CVA)   History of Current Condition:  Sarbjit Brewer Sr. is a 57 y.o. male male who presents to [facility] Out[facility] Speech Therapy for evaluation secondary to cerebrovascular accident (CVA). Patient was referred to therapy by Steven Krueger MD , which is the patient's Neurologist . Patient required max encouragement to attempt to provide history - however poor initiation therefore pt's wife provided medical history information.   Spouse reports multiple CVA's. Patient reports feelings of confusion. Of note, patient requires frequent repetitions in conversation. No word-finding difficulty reported. Spouse reported significant difficulties with short-term memory.      Patient presents with decreased field of vision due to suspected left-sided neglect. Patient is not interested in returning to work at this time.    Patient was admitted to rehab on 11/02/2022 due to decline in his prior level of function secondary to acute ischemic stroke, which occurred on 10/27/2022. Discharged from Inpatient Rehab on 11/16/2022  Past Medical History: Sarbjit Brewer Sr.  has a past medical  "history of A-fib, CHF (congestive heart failure), Hypertension, and Insomnia.  Sarbjit Brewer Sr.  has a past surgical history that includes Kidney stone surgery.  Medical Hx and Allergies: Sarbjit has a current medication list which includes the following prescription(s): amlodipine, apixaban, aspirin, atorvastatin, clonidine, furosemide, losartan, metformin, metoprolol succinate, mirtazapine, and zolpidem. Review of patient's allergies indicates:  No Known Allergies  Prior Therapy:  significant previous therapy including Acute, Inpatient rehabilitation, outpatient   Social History:  Patient lives with his wife in [place], Patient is not currently driving, Hobbies include: patient unable to state hobbies  Occupation:  Was working on the [employer] about a year ago, working on getting disability status  Prior Level of Function: known cognitive changes from previous strokes   Current Level of Function: dysarthria and cognitive communication changes  Pain: 0/10  Pain Location / Description: no pain indicated throughout session   Nutrition:  oral diet, IDDSI 0 (Thin), and IDDSI 7 (Regular)  Patient's Therapy Goals:  "Get better"  Objective   Formal Assessment:  MOTOR SPEECH/DYSARTHRIA EVALUATION    Evaluation of Speech Mechanisms  Respiration:  Posture: WNL  Breath Support: WNL  Breath Rate: Slow  Respiratory Features: Abnormal: Thoracic    Oral Mechanism at Rest   Labial Structures  Structure WNL   Symmetry Left   Tone excessive- left   Ability to control secretions reduced/drooling- left       Lingual Structure (at rest in mouth)   Structure WNL   Symmetry WNL   Tone WNL   Irregular Movement Involuntary Movement       Mandible  Symmetry WNL   Posture at Rest WNL=closed   Dentition Scattered     Face  Symmetry Left Facial Droop   Expression Lability of Affect   Irregular Movement WNL     Soft Palate  Symmetry WNL   Structure WNL     Hard Palate  Structure WNL     Oral Mechanism during Sustained Postures   Labial " Retraction   Symmetry Left Reduced   Range Reduced   Tone WNL   Strength Reduced     Labial Protrusion  Range Reduced- left   Tone WNL   Strength Reduced -left      Labial Compression  Strength (Upper R) WNL   Strength (Upper L) Reduced   Strength (Lower R) WNL   Strength (Lower L) Reduced     Lingual Protrusion  Symmetry WNL   Range WNL   Tone WNL   Tremor WNL   Strength WNL     Lingual Elevation to Alveolar Ridge   Range WNL   Symmetry WNL     Mandible Depression  Symmetry WNL   Range WNL   Jaw Clonus WNL   Strength WNL     Mandible Elevation  Symmetry WNL   Range WNL   Strength WNL     Face: Raising Eyebrows  Symmetry WNL   Range WNL   Forehead Wrinkle WNL     Velopharyngeal Function: Cheek Puffing   Symmetry Left   Range Reduced-Cannot maintain air in mouth   Tone WNL   Strength Reduced       Oral Mechanism during Movement   Labial Protrusion and Lateralization   Rate Slow   Rhythm Irregular     Lingual Lateralization (External)   Rate WNL   Rhythm WNL   Range WNL     Lingual Lateralization (Internal)  Rate WNL   Rhythm WNL   Range WNL     Circular Range of Motion (External)   Rate Slow   Rhythm Irregular   Range Reduced     Circular Range of Motion (Internal)  Rate WNL   Rhythm Irregular   Range WNL     Velopharyngeal Function: Sustained /a/  Velum Range WNL   Pharyngeal Wall Range WNL     Velopharyngeal Function: Short Repeating /a/  Velum Range WNL   Pharyngeal Wall Range WNL     Gross Sensation  Sensation  Face: Upper L WNL    Upper R WNL    Lower L Reduced    Lower R WNL   Lips: Upper L WNL    Upper R WNL    Lower L WNL    Lower R WNL       Alternating Motion Rates (AMRs) and Sequential Motion Rates (SMRs)  SMRs /pu/  Rate Slow   Rhythm WNL   Accuracy Inaccurate   Norm 5.0-7.1 Sara/Sec Below norm     SMRs /tu/  Rate WNL   Rhythm WNL   Accuracy WNL   Norm 4.8-7.1 Sara/Sec WNL     SMRs /ku/  Rate WNL   Rhythm WNL   Accuracy Blurred   Norm 4.4-7.5 Sara/Sec WNL     AMRs /putuku/  Rate WNL   Rhythm WNL   Accuracy  Inaccurate   Norm 3.6-7.5 Sara/Sec WNL     Perceptual Ratings  Respiratory Features: Audible inspiration  Respiratory/Phonatory Features: Mono-loudness  Phonatory Features: Monopitch  Phonatory Quality: none present  Resonatory Features: none present  Articulatory Features: Imprecise consonants  Prosodic Features: Slow rate, reduced stress, short phrases  Other Features: none present    Diagnosis   OVERALL IMPRESSION:   Patient presents with Mild-moderate Mixed  dysarthria characterized by audible inspiration, , mono-loudness, monopitch, imprecise consonants, slow rate, reduced stress, and short phrases.     Description:  Language Skills:   Auditory Comprehension: difficulty following greater than one step commands   Verbal Expression: naming is functional    Cognition: Further assessment of warranted.     Motor Speech Skills: as above    Treatment   Total Treatment Time Separate from Evaluation: n/a   no treatment performed secondary to time to complete evaluation.    Education: Plan of Care, role of SLP in care, and scheduling/ cancellation policy were discussed with patient. Patient and family members expressed understanding.     Home Program: not yet established   Assessment     Sarbjit presents to [facility] status post medical diagnosis of cerebrovascular accident (CVA) .Patient presents with Mild-moderate Mixed  dysarthria characterized by audible inspiration, , mono-loudness, monopitch, imprecise consonants, slow rate, reduced stress, and short phrases.  He also presents with a cognitive communication disorder warranting further assessment next session.  Demonstrates impairments including limitations as described in the problem list. Positive prognostic factors include family support. Negative prognostic factors include history of multiple cerebrovascular accidents. Barriers to therapy include transportation to clinic. Patient will benefit from skilled, outpatient neurological rehabilitation  speech therapy.    Rehab Potential: fair  Pt's spiritual, cultural, and educational needs considered and patient agreeable to plan of care and goals.    Short Term Goals (4 weeks):   Patient will complete testing using the CLQT.  Pt will rate his speech while reading as at least a 3 on a 3 point scale ( 1 = same as before beginning therapy, 2 =better but not best, 3 =best possible outcome) on  7 /10 trials.   Pt will differentiate between his speech and error-free speech by giving specific examples of differences on  7 /10 trials.   Pt will use motor speech strategies and answer questions in conversation with a self-rating of at least 3 on a 3 point scale ( 1 = same as before beginning therapy, 2 =better but not best, 3 =best possible outcome) on  7 /10 trials.     Long Term Goals (10 weeks):   1. He will be appropriately oriented to time, date, person, place, city with cues to improve safety and awareness in functional living environment.   2. He will improve attention skills to effectively attend to and communicate in simple daily living tasks in functional living environment.   3. He will use appropriate memory strategies to schedule and recall weekly activities, express needs and recall names to maintain safety and participate socially in functional living environment.   4. He will develop functional reading skills and utilize compensatory strategies to maintain safety during ADL's and read and understand simple adult material independently.  5. He will demonstrate appropriate visual scanning and awareness of objects and during reading activities to maintain safety and awareness in functional living environment.     Plan     Plan of Care Certification Period: 11/21/2022 to 2/3/2022    Recommended Treatment Plan:  Patient will participate in the [facility] rehabilitation program for speech therapy 2 times per week for 10 weeks to address his Communication, Cognition, and Motor Speech deficits, to educate patient and  their family, and to participate in a home exercise program.    Therapist's Name:   Suze Simmons CCC-SLP   11/21/2022     I CERTIFY THE NEED FOR THESE SERVICES FURNISHED UNDER THIS PLAN OF TREATMENT AND WHILE UNDER MY CARE    Physician Name: _______________________________    Physician Signature: ____________________________

## 2022-11-23 NOTE — PROGRESS NOTES
[facility] Occupational Therapy  Initial Neurological Evaluation     Date: 11/23/2022  Patient: Sarbjit Brewer Sr.  Chart Number: [id number]    Therapy Diagnosis: No diagnosis found.  Physician: Steven Krueger MD    Physician Orders: OT eval and treat   Medical Diagnosis: ***  Evaluation Date: 11/23/2022  Plan of Care Expiration Period: ***/***/2022  Insurance Authorization period Expiration: ***  Visit # / Visits Authorized: 1 / 1  FOTO: 11/23/2022    Time In:8:48 am  Time Out: 9:30  Total Billable (one on one) Time: *** minutes    Precautions: {Precautions:00001}    Subjective     History of Current Condition: Edson states he does not remember the details of the current stroke. He states he has had 3 strokes in the past.       Date of Onset: ***  Surgical Procedure: ***  Imaging: {Mri/ctscan/bone scan:97492} *** (reviewed/not reviewed)  Previous Therapy: ***     Right Left   Involved Side   []     [x]   Dominant Side    [x]     []       Pain:  Pain Related Behaviors Observed: no he reports soreness   Functional Pain Scale Rating 0-10:   Location: left shoulder   Description: sore,   Aggravating Factors: {Causes; Pain:63938}  Easing Factors: {Pain (activities that relieve):52331}    Occupation:   Working presently: unemployed    Functional Limitations/Social History:    Prior Level of Function: independent   Current Level of Function: min - max A     Home/Living environment : lives with their spouse  Home Access: SS , 0 steps to enter,   DME:  quad cane      Leisure: watch TV     Driving: not currently driving    Functional Status      Functional Mobility:  Bed mobility: Mod I  Roll to left: Mod I  Roll to right: Mod I  Supine to sit: Mod I  Sit to supine: Mod I  Transfers to bed: Mod I  Transfers to toilet: Mod I  Car transfers: Mod I    ADL's:  Feeding: Mod I  Grooming: Mod I  Hygiene: Mod A  UB Dressing: Max A  LB Dressing: Max A  Toileting: Min A  Bathing: Max A    IADL's:  Homecare: Max  "A  Cooking: Max A  Laundry: Max A  Yard work: D  Use of telephone: Mod A  Money management: Max A  Medication management: Max A  Handwriting:Mod I  Technology Use:Min A    Patient's Goals for Therapy: "I want to get better and be able to keep going on and keep myself strong."     Past Medical History/Physical Systems Review:     Past Medical History:  Sarbjit Brewer Sr.  has a past medical history of A-fib, CHF (congestive heart failure), Hypertension, and Insomnia.    Past Surgical History:  Sarbjit Brewer Sr.  has a past surgical history that includes Kidney stone surgery.    Current Medications:  Sarbjit has a current medication list which includes the following prescription(s): amlodipine, apixaban, aspirin, atorvastatin, clonidine, furosemide, losartan, metformin, metoprolol succinate, mirtazapine, and zolpidem.    Allergies:  Review of patient's allergies indicates:  No Known Allergies     Objective     Cognitive Exam:  Oriented Person   Behaviors normal, cooperative   Follows Commands/attention: Follows one-step commands   Communication {AMB OT NEURO COGNITIVE COMMUNICATION:39419}   Memory {AMB OT NEURO COGNITIVE MEMORY:52148} as determined by 3 word recall after 1 minute and 3 minutes ***   Safety awareness/insight to disability {ONC PSO AWARENESS:57696}   Coping skills/emotional control {AMB OT NEURO COGNITIVE COPING SKILLS:85534}     Visual/Perceptual:  Comments: he reprots no change in vision and wears readers     Physical Exam:  Postural examination/scapula alignment: {AMB OT NEURO POSTURAL EXAM:40611}  Joint integrity: {AMB OT NEURO JOINT INTEGRITY:53855}  Skin integrity: {AMB OT NEURO SKIN INTEGRITY:22869}  Edema: {AMB OT NEURO EDEMA:83789}   Palpation: ***      Joint Evaluation  MMS   11/23/2022 AROM  11/23/2022 PROM   11/23/2022 MMS   11/23/2022    Right Left Left Left   Scapular Elevation 5/5   {AMB OT SHOULDER STRENGTH:94051}   Scapular Retraction {AMB OT SHOULDER STRENGTH:28697}   {AMB OT SHOULDER " STRENGTH:32960}   Shoulder Flex 0-180 {AMB OT SHOULDER STRENGTH:92077} 38  {AMB OT SHOULDER STRENGTH:78100}   Shoulder Extension 0-80 {AMB OT SHOULDER STRENGTH:48488} 50  {AMB OT SHOULDER STRENGTH:28112}   Shoulder Abd 0-180 {AMB OT SHOULDER STRENGTH:92716}   {AMB OT SHOULDER STRENGTH:55809}   Shoulder ER 0-90 {AMB OT SHOULDER STRENGTH:19162} 28  {AMB OT SHOULDER STRENGTH:79746}   Shoulder IR 0-90 {AMB OT SHOULDER STRENGTH:15205} hip  {AMB OT SHOULDER STRENGTH:76473}   Shoulder Horizontal adduction 0-90 {AMB OT SHOULDER STRENGTH:17232}   {AMB OT SHOULDER STRENGTH:90083}   Elbow Flex/Ext 0-150 {AMB OT SHOULDER STRENGTH:29955} 119  {AMB OT SHOULDER STRENGTH:83167}   Wrist Flex 0-80 {AMB OT SHOULDER STRENGTH:73690} 57  {AMB OT SHOULDER STRENGTH:18159}   Wrist Ext 0-70 {AMB OT SHOULDER STRENGTH:84743} 20  {AMB OT SHOULDER STRENGTH:94635}   Supination 0-80 {AMB OT SHOULDER STRENGTH:57622}   {AMB OT SHOULDER STRENGTH:47132}   Pronation 0-80 {AMB OT SHOULDER STRENGTH:40281}   {AMB OT SHOULDER STRENGTH:78055}   Ulnar Deviation {AMB OT SHOULDER STRENGTH:12211}   {AMB OT SHOULDER STRENGTH:75046}   Radial Deviation  {AMB OT SHOULDER STRENGTH:35776}   {AMB OT SHOULDER STRENGTH:14245}   *WNL - Within Normal Limits  *NT = Not Tested    Digit  Flexion  AROM  11/23/2022 Flexion  PROM  11/23/2022 Ext Lag  AROM  11/23/2022 Ext Lag   PROM  11/23/2022    Distance from tip to palm measured in cm Distance from tip to palm measured in cm     IF       MF       RF       LF       *WNL - Within Normal Limits  *NT = Not Tested    Thumb AROM   11/23/2022 PROM  11/23/2022   IP     MP     Kapandji          *WNL - Within Normal Limits  *NT = Not Tested    Fist: loose left hand      Strength: (LEE ANN Dynamometer in lbs.) Average 3 trials, Position II:     11/23/2022 11/23/2022   Rung II Right Left   Trial 1 73# 1#   Trial 2 57# 1#   Trial 3 55# 2#   Average ***# ***#     Normal  Average Values  Female Right Left Male: Right Left   20-29 66 lbs  62 lbs         94 lbs 86 lbs   30-39 68 lbs 64 lbs 90 lbs 82 lbs   40-49 64 lbs 62 lbs 80 lbs 74 lbs   50-59 62 lbs 57 lbs 72 lbs 65 lbs   60-69 53 lbs 51 lbs 63 lbs 56 lbs   70+ 44 lbs 42 lbs 54 lbs 48 lbs   SD = +/- 19lbs       Pinch Strength (Measured in lbs)     11/23/2022 11/23/2022    Right Left   Key Pinch 19 # 4 #   3pt Pinch 14 # unable   2pt Pinch 10 # *** #       Fine/Gross Motor Coordination    Assessment  Right   11/23/2022 Left  11/23/2022   9 hole peg test 9 pegs in/out for time WNL Unable    Luis M grooved peg board Full board for time  *** ***   Box and blocks assessment  60 seconds, as many blocks as possible  *** ***   DAYSI (Rapid Alternating Movements)    {INTACT IMPAIRED:99561}   {INTACT IMPAIRED:66677}   Finger to Nose (5 times)  {INTACT IMPAIRED:80701} {INTACT IMPAIRED:84694}   Finger Flicks (coordination moving from digit flexion to digit extension)  {INTACT IMPAIRED:60540} {INTACT IMPAIRED:13883}   *WNL - Within Normal Limits  *NT = Not Tested    Tone:  Modified Erasmo Scale:   1-  Slight increase in muscle tone, manifested by a catch and release or by minimal resistance at the end of the range of motino when the affected part(s) is moved in flexion or extension    Comments: ***    Sensation:  Sarbjit  reports ***     Sensation Intact  Impaired Comments    [place] Dustin  Deep Touch (6.65) [] [x]     Protective Sensation (4.56) [] [x]     Light Touch (3.61) [] [x]           Other Kinesthesia  [] [x]     Temperature [] []     Stereognosis  [] []    Comments: ***    Balance:   Static Sit - {BALANCE STATIC SITTING 2 OHS:36849688}  Dynamic sit- {BALANCE STATIC SITTING 2 OHS:85600460}  Static Stand - {BALANCE STATIC SITTING 2 OHS:98487881}  Dynamic stand - {BALANCE STATIC SITTING 2 OHS:70221794}    Endurance Deficit: {severity:88031}             CMS Impairment/Limitation/Restriction for FOTO *** Survey    Therapist reviewed FOTO scores for Sarbjit Brewer Sr. on 11/23/2022.   FOTO documents entered  "into EPIC - see Media section.    Limitation Score: ***%  Category: {Blank single:46355::"Other","Self Care","Body Position","Carrying","Mobility"}         Treatment     Treatment Time In: ***  Treatment Time Out: ***  Total Treatment time separate from Evaluation time:***    Sarbjit participated in dynamic functional therapeutic activities to improve functional performance for ***  minutes, including:  ***    Sarbjit participated in neuromuscular re-education activities to improve: {AMB PT PROGRESS NEURO RE-ED:08323} for *** minutes. The following activities were included:  ***    Sarbjit received therapeutic exercises to develop {AMB PT PROGRESS OBJECTIVE:63328} for *** minutes including:  ***    Sarbjit received the following manual therapy techniques: {AMB PT PROGRESS MANUAL THERAPY:44592} were applied to the: *** for *** minutes, including:  ***    Sarbjit received the following supervised modalities after being cleared for contradictions: {AMB PT SUPERVISED MODES:64883}  ***    Sarbjit received the following direct contact modalities after being cleared for contraindications: {AMB PT PROGRESS DIRECT CONTACT MODES:23792}  ***    Home Exercises and Patient Education Provided    Education provided:   -role of OT, goals for OT, scheduling/cancellations, insurance limitations with patient.  -Additional Education provided: ***    Written Home Exercises Provided: {Blank single:50366::"yes","Patient instructed to cont prior HEP"}.  Exercises were reviewed and Sarbjit was able to demonstrate them prior to the end of the session.    Sarbjit demonstrated {Desc; good/fair/poor:48791} understanding of the education provided.     See EMR under {Blank single:83744::"Media","Patient Instructions"} for exercises provided {Blank single:86812::"11/23/2022","prior visit"}.    Assessment     Sarbjit Brewer Sr. is a 57 y.o. male referred to outpatient occupational therapy and presents with a medical diagnosis of ***, resulting in {AMB OT NEURO " IMPAIRMENTS:00123} and demonstrates limitations as described in the chart below. Following medical record review it is determined that patient will benefit from occupational therapy services in order to maximize pain free and/or functional use of {LEFT/RIGHT:49111} ***.    Patient prognosis is {REHAB PROGNOSIS OHS:24230} due to  ***  Patient will benefit from skilled outpatient Occupational Therapy to address the deficits stated above and in the chart below, provide patient/family education, and to maximize patient's level of independence.     Plan of care discussed with patient: {YES:24256}  Patient's spiritual, cultural and educational needs considered and patient is agreeable to the plan of care and goals as stated below:     Anticipated Barriers for therapy: *** (co-morbidities, transportation, etc)    Medical Necessity is demonstrated by the following  Profile and History Assessment of Occupational Performance Level of Clinical Decision Making Complexity Score   Occupational Profile:   Sarbjit Brewer Sr. is a 57 y.o. male who {LIVES WITH:56423} and is currently {Work history:35220} as ***. Sarbjit Brewer Sr. has difficulty with  {ADLs:51699}  {IADLs:95929}  affecting his/her daily functional abilities. His/her main goal for therapy is ***.     Comorbidities:   {Co-morbidities:59554}    Medical and Therapy History Review:   {History Review:45400}               Performance Deficits    Physical:  {Physical:75998}    Cognitive:  {Cognitive:97674}    Psychosocial:    {Psychosocial:57012}     Clinical Decision Making:  {Desc; low/moderate/high:572751}    Assessment Process:  {Assessment Complexity:12614}    Modification/Need for Assistance:  {Modification:63853}    Intervention Selection:  {Treatment Options:16254}       {Desc; low/moderate/high:797797}  Based on PMHX, co morbidities , data from assessments and functional level of assistance required with task and clinical presentation directly impacting function.    "    The following goals were discussed with the patient and patient is in agreement with them as to be addressed in the treatment plan.     Goals:   Short Term Goals: *** weeks  - Pt will be independent with Home Exercise Program (HEP)/Home Activity Program (HAP) to promote long term maintenance of progress made in therapy.     Long Term Goals: *** weeks      Plan   Certification Period/Plan of care expiration: 11/23/2022 to ***.    Outpatient Occupational Therapy {NUMBERS 1-5:04307} times weekly for {0-10:16165::"0"} weeks to include the following interventions: {TX PLAN:00874}.    Corinne Rapier, OT      I certify the need for these services furnished under this plan of treatment and while under my care.  ____________________________________ Physician/Referring Practitioner   Date of Signature      "

## 2022-11-23 NOTE — PROGRESS NOTES
[facility]  Physical Therapy Neurological Rehabilitation Initial Evaluation    Name: Sarbjit Brewer Sr.  Clinic Number: [id number]    Therapy Diagnosis:   Encounter Diagnoses   Name Primary?    Imbalance     Mono-inattention     Impaired gross motor coordination      Physician: Steven Krueger MD    Physician Orders: PT Eval and Treat   Medical Diagnosis from Referral:   Diagnosis   I63.9 (ICD-10-CM) - Cerebral vascular accident     Evaluation Date: 11/23/2022  Authorization Period Expiration: 12/31/2022  Plan of Care Expiration: 1/27/2022  Visit # / Visits authorized: 1/ 1    Time In: 0935  Time Out: 1016  Total Billable Time: 41 minutes    Precautions: Fall    Subjective   Date of onset: 10/27/2022  History of current condition - Sarbjit reports: decline since most recent CEREBROVASCULAR ACCIDENT. Needs assist with dressing, bathing, meal prep and higher level ADLs.       Medical History:   Past Medical History:   Diagnosis Date    A-fib     CHF (congestive heart failure)     Hypertension     Insomnia      Surgical History:   Sarbjit Brewer Sr.  has a past surgical history that includes Kidney stone surgery.    Medications:   Sarbjit has a current medication list which includes the following prescription(s): amlodipine, apixaban, aspirin, atorvastatin, clonidine, furosemide, losartan, metformin, metoprolol succinate, mirtazapine, and zolpidem.    Allergies:   Review of patient's allergies indicates:  No Known Allergies     Imaging, MRI studies: Impression:     This report was flagged in Epic as abnormal.     1. Multiple punctate foci of restricted diffusion involving the right parietooccipital region as well as the right insular region consistent with acute infarcts.  Previous restricted diffusion within the bilateral occipital lobes has resolved.  2. Multiple scattered remote appearing infarcts including within the bilateral basal ganglia, right thalamus, right caudate, adjacent to the  bilateral posterior horns of the lateral ventricles, left eve damion, and cerebellum.  3. Sequela of chronic microvascular ischemic change and senescent change.  4. Stable scattered regions of hemosiderin deposition.  5. Apparent slow flow through the left sigmoid sinus.    Prior Therapy: just SLP on previous strokes   Social History:  lives with their spouse  Falls: one on day of most recent CVA  DME: Small based Quad cane    Home Environment: [facility] with no steps to enter   Exercise Routine / History: none   Family Present at time of Eval: wife   Occupation: disable garage   Prior Level of Function: indep  Current Level of Function: supervision with SBQC, assist needed with basic ADLs and all high level ADLs    Pain:  Shoulder pain, unable to rate pain  Aggravating Factors: Lifting  Easing Factors: rest    Patient's goals: to quit smoking for a year and to take care of myself.     Objective     - Command followin% simple command   - Speech: attention, cognition and speech deficits (see SLP eval)    Mental status: alert, oriented to person, place, and time, normal mood, behavior, speech, dress, motor activity, and thought processes  Behavior:  cooperative  Attention Span and Concentration:  Easily distracted    Dominant hand:  right     Posture Alignment :slouched posture    Sensation:  Light Touch: Intact      Tone: 2 More marked increase in muscle tone through most of the ROM, but affected part(s) easily moved  Limbs/muscles affected: left UE 3/4 and left LE 2/4    Visual/Auditory: denies changes (see OT eval)    Coordination:   - fine motor: impaired left UE see OT eval  - UE coordination: impaired left UE see OT eval      - LE coordination:  mildly impaired to tap and heel to shin test    ROM:   UPPER EXTREMITY--AROM/PROM  See OT eval          RANGE OF MOTION--LOWER EXTREMITIES  (R) LE WFL    (L) LE: WFL    Upper Extremity Strength  See OT eval    Lower Extremity Strength   RLE LLE   Hip Flexion: 5/5 4-/5    Hip Extension:  4/5 3+/5   Hip Abduction: 5/5 4-/5   Hip Adduction: 5/5 3+/5   Knee Extension: 5/5 4+/5   Knee Flexion: 5/5 4-/5   Ankle Dorsiflexion: 5/5 5/5   Ankle Plantarflexion: 5/5 4-/5     Gait Assessment:   - AD used: SBQC  - Assistance: supervision  - Distance: see 2 minute walk test  - Stairs: see DGI    Gait Analysis:  Deviations noted: decreased right LE stance, decreased right hip flexion    Impairments contributing to deviations:  right sided tone and weakness, right inattention, right impaired motor controls     Assessment:    Sitting balance (static,dynamic):WFL   Standing balance (static, dynamic):     Unable to perform DGI without AD     Evaluation   5 times sit-stand  (adults 18-65 y/o) 17 seconds  >12 sec= fall risk for general elderly  >16 sec= fall risk for Parkinson's disease  >10 sec= balance/vestibular dysfunction (<59 y/o)  >14.2 sec= balance/vestibular dysfunction (>59 y/o)  >12 sec= fall risk for CVA      Evaluation   Timed Up and Go 30 sec  < 20 sec safe for independent transfers, < 30 sec safe for dependent transfers/assist required   Self Selected Walking Speed 0.4 m/sec (6m/14s)       Functional Mobility (Bed mobility, transfers)  Bed mobility: I  Supine to sit: I  Sit to supine: I  Rolling: I  Transfers to bed: I  Sit to stand:  ADELAIDA with UE support  Stand pivot:  supervision with SBQC  Stairs: Min A and max cues for sequencing steps    CMS Impairment/Limitation/Restriction for FOTO LE CVA Survey    Therapist reviewed FOTO scores for Sarbjit Brewer SrYuridia on 11/23/2022.   FOTO documents entered into [employer] - see Media section.    Limitation Score: 49%       TREATMENT   Therapeutic exercise to be initiated at follow-up visit:  Stepper  Step fan  Moderate level balance  Items in DGI with gait belt on    Home Exercises and Patient Education Provided    Education provided:   - need to not spend time in bed all day  - HEP     Written Home Exercises Provided: yes.  Exercises were reviewed and  Sarbjit was able to demonstrate them prior to the end of the session.  Sarbjit demonstrated good  understanding of the education provided.     See EMR under Patient Instructions for exercises provided 11/23/2022.    Assessment   Sarbjit is a 57 y.o. male referred to outpatient Physical Therapy with a medical diagnosis of CVA. Patient presents with left sided weakness and inattention.  He has impaired ability to dual task or follow 2 step commands.  He also has increased risk of falls with impaired transfers and inability to complete DGI without AD.  He uses a SBQC inconsistently and fairly safely. He lack motor control and motor planning skills. He would benefit from PT to improve overall mobility and level of functional independence.     Patient prognosis is Good.   Patient will benefit from skilled outpatient Physical Therapy to address the deficits stated above and in the chart below, provide patient/family education, and to maximize patient's level of independence.     Plan of care discussed with patient: Yes  Patient's spiritual, cultural and educational needs considered and patient is agreeable to the plan of care and goals as stated below:     Anticipated Barriers for therapy: CEREBROVASCULAR ACCIDENT x 3, decreased left attention, impaired motor planning and control, fall risk, sedentary lifestyle    Medical Necessity is demonstrated by the following  History  Co-morbidities and personal factors that may impact the plan of care Co-morbidities:   A-fib   CHF (congestive heart failure)   Hypertension   Insomnia     Personal Factors:   age  lifestyle     moderate   Examination  Body Structures and Functions, activity limitations and participation restrictions that may impact the plan of care Body Regions:   lower extremities  trunk    Body Systems:    gross symmetry  ROM  strength  gross coordinated movement  balance  gait  transfers  transitions  motor control  Motor planning    Participation Restrictions:    Dressing, bathing, higher level ADLs, walking safely without AD    Activity limitations:   Learning and applying knowledge  listening  solving problems    General Tasks and Commands  undertaking multiple tasks    Communication  communicating with/receiving spoken language  communicating with/receiving non-verbal language    Mobility  lifting and carrying objects  fine hand use (grasping/picking up)  walking  driving (bike, car, motorcycle)    Self care  washing oneself (bathing, drying, washing hands)  dressing  looking after one's health  Medication management    Domestic Life  shopping  cooking  doing house work (cleaning house, washing dishes, laundry)  assisting others    Interactions/Relationships  family relationships    Life Areas  employment    Community and Social Life  community life  recreation and leisure         moderate   Clinical Presentation evolving clinical presentation with changing clinical characteristics moderate   Decision Making/ Complexity Score: moderate     Goals:  Short Term Goals (4 Weeks):   1.  Independent with initial HEP.  2.  Pt will perform nu-step at level 4 x 8 minutes without rests breaks going at least 50 step/min or greater.  3. Pt will be able to perform TUG in 25 secs with use of AD placingdemonstrating overall improved functional mobility.   4. Pt will performed sit to stand x 5 without UE support in 14 seconds without LOB backward or posterior LE hooking for functional and safe transfers.   5.  Pt will score greater than or equal to 12/24 on the DGI test/assessment without use of AD demonstrating overall improved functional mobility and balance and reduce fall risk.     Long Term Goals (8 Weeks):     1. Pt will be able to perform TUG in 23 secs with use of AD placingdemonstrating overall improved functional mobility.   2.  Pt will score greater than or equal to 16/24 on the DGI test/assessment without use of AD demonstrating overall improved functional mobility and  balance and reduce fall risk.   3. Pt will walk < than or equal to 800 ft on the 6 minute walk test indoor with AD with 0 LOB and minimal gait deviations for improved safety in home ambulation and safety.   4.  Pt will be able to safely perform and tolerate high level ADL's without LOB.   5. Pt will have 0 falls from start of PT sessions.  6. Independent with updated HEP.   7. Pt will ambulate on TM x 3 minutes with use of UE support with 0 LOB at 0.9 mph.  8. Pt will perform floor to stand f/b stand to floor transfer B UE support with stand by assist and no cues for improved dynamic transfer, core stability and fall safety in home and community.  9. Pt will begin some form of community fitness to begin regular and consistent performance of exercise to continue maintenance of gains made in PT.    Plan   Plan of care Certification: 11/23/2022 to 1/27/2022.    Outpatient Physical Therapy 2 times weekly for 8 weeks to include the following interventions: Gait Training, Manual Therapy, Moist Heat/ Ice, Neuromuscular Re-ed, Patient Education, Self Care, Therapeutic Activities, and Therapeutic Exercise.     Hedy Lin, PT

## 2022-11-27 PROBLEM — M62.81 MUSCLE WEAKNESS: Status: ACTIVE | Noted: 2022-11-27

## 2022-11-27 PROBLEM — R27.9 LACK OF COORDINATION: Status: ACTIVE | Noted: 2022-11-27

## 2022-11-27 PROBLEM — Z74.1 NEED FOR ASSISTANCE WITH PERSONAL CARE: Status: ACTIVE | Noted: 2022-11-27

## 2022-11-27 NOTE — PLAN OF CARE
[facility] Occupational Therapy  Initial Neurological Evaluation     Date: 11/23/2022  Patient: Sarbjit Brewer Sr.  Chart Number: [id number]    Therapy Diagnosis:   Encounter Diagnoses   Name Primary?    Impaired gross motor coordination Yes    Muscle weakness     Lack of coordination     Need for assistance with personal care      Physician: Steven Krueger MD    Physician Orders: OT eval and treat   Medical Diagnosis: I63.9 (ICD-10-CM) - Cerebral vascular accident   Evaluation Date: 11/23/2022  Plan of Care Expiration Period: 1/20/2022  Insurance Authorization period Expiration: 12/31/2022  Visit # / Visits Authorized: 1 / 1  FOTO: 11/23/2022    Time In:8:48 am  Time Out: 9:30  Total Billable (one on one) Time: 42 minutes    Precautions: Standard and Fall    Subjective     History of Current Condition: Edson states he does not remember the details of the current stroke. His wife is present for evaluation and assists with medical history. They state he has had 3 strokes, the most recent being in October of this year. Since this stroke, he has exhibited increased communication, cognitive and upper extremity impairments. He requires assistance for all daily tasks due to left upper extremity weakness.     Date of Onset: 10/27/2022  Surgical Procedure: none   Imaging: MRI 10/27/2022:  Impression: This report was flagged in Epic as abnormal.     1. Multiple punctate foci of restricted diffusion involving the right parietooccipital region as well as the right insular region consistent with acute infarcts.  Previous restricted diffusion within the bilateral occipital lobes has resolved.  2. Multiple scattered remote appearing infarcts including within the bilateral basal ganglia, right thalamus, right caudate, adjacent to the bilateral posterior horns of the lateral ventricles, left eve damion, and cerebellum.  3. Sequela of chronic microvascular ischemic change and senescent change.  4. Stable  "scattered regions of hemosiderin deposition.  5. Apparent slow flow through the left sigmoid sinus     CTA 10/28/2022:  Impression: Significant narrowing of the distal internal carotid arteries as detailed above.  Thyroid hypodensities recommend nonemergent follow-up ultrasound examination.    Previous Therapy: He reports he participated in inpatient rehab      Right Left   Involved Side   []     [x]   Dominant Side    [x]     []       Pain:  Pain Related Behaviors Observed: no he reports soreness   Functional Pain Scale Rating 0-10:   Location: left shoulder   Description: sore,   Aggravating Factors: Standing and Flexing  Easing Factors: relaxation    Occupation:   Working presently: unemployed    Functional Limitations/Social History:    Prior Level of Function: independent   Current Level of Function: min - max A     Home/Living environment : lives with their spouse  Home Access: SSH , 0 steps to enter,   DME: quad cane      Leisure: watch TV     Driving: not currently driving    Functional Status      Functional Mobility:  Bed mobility: Mod I  Roll to left: Mod I  Roll to right: Mod I  Supine to sit: Mod I  Sit to supine: Mod I  Transfers to bed: Mod I  Transfers to toilet: Mod I  Car transfers: Mod I    ADL's:  Feeding: Mod I  Grooming: Mod I  Hygiene: Mod A  UB Dressing: Max A  LB Dressing: Max A  Toileting: Min A  Bathing: Max A    IADL's:  Homecare: Max A  Cooking: Max A  Laundry: Max A  Yard work: D  Use of telephone: Mod A  Money management: Max A  Medication management: Max A  Handwriting:Mod I  Technology Use:Min A    Patient's Goals for Therapy: "I want to get better and be able to keep going on and keep myself strong."     Past Medical History/Physical Systems Review:     Past Medical History:  Sarbjit Osman Will.  has a past medical history of A-fib, CHF (congestive heart failure), Hypertension, and Insomnia.    Past Surgical History:  Sarbjit Osman Will.  has a past surgical history that includes Kidney " stone surgery.    Current Medications:  Sarbjit has a current medication list which includes the following prescription(s): amlodipine, apixaban, aspirin, atorvastatin, clonidine, furosemide, losartan, metformin, metoprolol succinate, mirtazapine, and zolpidem.    Allergies:  Review of patient's allergies indicates:  No Known Allergies     Objective     Cognitive Exam:  Oriented Person   Behaviors normal, cooperative   Follows Commands/attention: Follows one-step commands   Communication expressive aphasia   Memory Impaired STM   Safety awareness/insight to disability unaware of diagnosis, treatment, and prognosis   Coping skills/emotional control Appropriate to situation     Visual/Perceptual:  Comments: he reports no change in vision and wears readers     Physical Exam:  Postural examination/scapula alignment: Rounded shoulder and Head forward  Joint integrity: Firm end feeling  Skin integrity: skin is intact on eval   Edema: Mild edema noted in left hand   Palpation: no pain with palpation     Joint Evaluation  [facility]   11/23/2022 AROM  11/23/2022 MMS   11/23/2022    Right Left Left   Scapular Elevation 5/5  3-/5   Scapular Retraction 5/5  3-/5   Shoulder Flex 0-180 5/5 38 2-/5   Shoulder Extension 0-80 5/5 50 2/5   Shoulder Abd 0-180 5/5  2-/5   Shoulder ER 0-90 5/5 28 2-/5   Shoulder IR 0-90 5/5 hip 2-/5   Shoulder Horizontal adduction 0-90 5/5  2-/5   Elbow Flex/Ext 0-150 5/5 119 2/5   Wrist Flex 0-80 5/5 57 2-/5   Wrist Ext 0-70 5/5 20 2-/5   Supination 0-80 5/5  2/5   Pronation 0-80 5/5  2/5   Ulnar Deviation 5/5  2-/5   Radial Deviation  5/5  2-/5   *WNL - Within Normal Limits  *NT = Not Tested    Fist: loose left hand      Strength: (LEE ANN Dynamometer in lbs.) Average 3 trials, Position II:     11/23/2022 11/23/2022   Rung II Right Left   Trial 1 73# 1#   Trial 2 57# 1#   Trial 3 55# 2#   Average 61.6# 1.3#     Normal  Average Values  Female Right Left Male: Right Left   20-29 66 lbs 62 lbs         94 lbs  86 lbs   30-39 68 lbs 64 lbs 90 lbs 82 lbs   40-49 64 lbs 62 lbs 80 lbs 74 lbs   50-59 62 lbs 57 lbs 72 lbs 65 lbs   60-69 53 lbs 51 lbs 63 lbs 56 lbs   70+ 44 lbs 42 lbs 54 lbs 48 lbs   SD = +/- 19lbs       Pinch Strength (Measured in lbs)     11/23/2022 11/23/2022    Right Left   Key Pinch 19 # 4 #   3pt Pinch 14 # unable   2pt Pinch 10 # unable       Fine/Gross Motor Coordination    Assessment  Right   11/23/2022 Left  11/23/2022   9 hole peg test 9 pegs in/out for time WNL Unable    DAYSI (Rapid Alternating Movements)    intact   impaired - severe   Finger to Nose (5 times)  intact impaired - severe   Finger Flicks (coordination moving from digit flexion to digit extension)  intact impaired - severe   *WNL - Within Normal Limits  *NT = Not Tested    Tone:  Modified Erasmo Scale:   1-  Slight increase in muscle tone, manifested by a catch and release or by minimal resistance at the end of the range of motino when the affected part(s) is moved in flexion or extension    Sensation:  Sarbjit  reports numbness on left side.     Sensation Intact  Impaired Comments    [place] Dustin  Deep Touch (6.65) [] [x]     Protective Sensation (4.56) [] [x]     Light Touch (3.61) [] [x]           Other Kinesthesia  [] [x]     Temperature [] []     Stereognosis  [] []      Balance:   Static Sit - GOOD+: Takes MAXIMAL challenges from all directions.    Dynamic sit- GOOD+: Takes MAXIMAL challenges from all directions.    Static Stand - FAIR: Maintains without assist, but unable to take any challenges   Dynamic stand - FAIR: Maintains without assist, but unable to take any challenges     Endurance Deficit: moderate             CMS Impairment/Limitation/Restriction for FOTO Survey    Therapist reviewed FOTO scores for Sarbjit Brewer  on 11/23/2022.   FOTO documents entered into [employer] - see Media section.    Limitation Score: 49%  Category: Carrying         Treatment     Home Exercises and Patient Education Provided    Education  provided:   -role of OT, goals for OT, scheduling/cancellations, insurance limitations with patient.  -Additional Education provided: rehab following a stroke    Written Home Exercises Provided: Patient instructed to cont prior HEP.  Exercises were reviewed and Sarbjit was able to demonstrate them prior to the end of the session.    Sarbjit demonstrated good  understanding of the education provided.       Assessment     Sarbjit Brewer Sr. is a 57 y.o. male referred to outpatient occupational therapy and presents with a medical diagnosis of I63.9 (ICD-10-CM) - Cerebral vascular accident , resulting in decreased flexibility, decreased range of motion, decreased muscle strength, impaired function, and decreased work ability and demonstrates limitations as described in the chart below. Following medical record review it is determined that patient will benefit from occupational therapy services in order to maximize pain free and/or functional use of left upper extremity.    Patient prognosis is Good due to  good family support, current muscle return and high motivation   Patient will benefit from skilled outpatient Occupational Therapy to address the deficits stated above and in the chart below, provide patient/family education, and to maximize patient's level of independence.     Plan of care discussed with patient: Yes  Patient's spiritual, cultural and educational needs considered and patient is agreeable to the plan of care and goals as stated below:     Anticipated Barriers for therapy: co-morbidities,, cognitive impairments     Medical Necessity is demonstrated by the following  Profile and History Assessment of Occupational Performance Level of Clinical Decision Making Complexity Score   Occupational Profile:   Sarbjit Brewer Sr. is a 57 y.o. male who lives with their spouse and is currently unemployed. Sarbjit Brewer Sr. has difficulty with  bathing, grooming, and dressing  driving/transportation management, shopping,  phone/computer use, and housework/household chores  affecting his/her daily functional abilities. His/her main goal for therapy is to improve use of left upper extremity and return to prior independence level .     Comorbidities:   CHF, history of CVA, and HTN    Medical and Therapy History Review:   Expanded               Performance Deficits    Physical:  Joint Mobility  Joint Stability  Muscle Power/Strength  Muscle Endurance  Control of Voluntary Movement   Strength  Pinch Strength  Gross Motor Coordination  Fine Motor Coordination  Proprioception Functions    Cognitive:  Communication  Memory    Psychosocial:    Habits  Routines  Rituals     Clinical Decision Making:  moderate    Assessment Process:  Detailed Assessments    Modification/Need for Assistance:  Significant Modifications/Assistance    Intervention Selection:  Several Treatment Options       moderate  Based on PMHX, co morbidities , data from assessments and functional level of assistance required with task and clinical presentation directly impacting function.       The following goals were discussed with the patient and patient is in agreement with them as to be addressed in the treatment plan.     Goals:   Short Term Goals: 4 weeks  - Pt will be independent with Home Exercise Program (HEP)/Home Activity Program (HAP) and report at least 50% compliance.  - Pt will demonstrate greater than or equal to 90 degrees of active shoulder flexion with LUE to increase independence with dressing and overhead reaching tasks.  - Pt will increase L  strength to 15# or more to improve participation with ADL and IADL tasks.  - Pt will increase left pinch strength, in all 3 conditions (lateral, 3pt, 2pt), by 2 psi to improve performance with cooking tasks, home management.  - Pt will complete Box and Blocks Test with left UE, transferring 5 blocks, to demonstrate improved gross manual coordination needed for ADL and IADL tasks.  - Pt will identify 3 or  more compensatory strategies and/or pieces of adaptive equipment to assist with home care and other IADL tasks.    Long Term Goals: 8 weeks  promote long term maintenance of progress made in therapy.   - Pt will reduce limitation score on FOTO by less than or equal to 30 to demonstrate an increase in self-perceived functional performance.   - Pt will be SBA with upper body dressing with use of adaptive equipment/techniques as needed in order to increase independence ans decrease CG burden.  - Pt will complete Box and Blocks Test with left UE, transferring 10 blocks, to demonstrate improved gross manual coordination needed for ADL and IADL tasks.  - Pt will report return to meaningful activities using left upper extremity in order to improve quality of life.     Plan   Certification Period/Plan of care expiration: 11/23/2022 to 1/20/2023.    Outpatient Occupational Therapy 2 times weekly for 8 weeks to include the following interventions: Electrical Stimulation NMES/TENs, Fluidotherapy, Manual Therapy, Moist Heat/ Ice, Neuromuscular Re-ed, Orthotic Management and Training, Paraffin, Patient Education, Self Care, Therapeutic Activities, and Therapeutic Exercise.    Corinne Rapier, OT      I certify the need for these services furnished under this plan of treatment and while under my care.  ____________________________________ Physician/Referring Practitioner   Date of Signature

## 2022-12-01 ENCOUNTER — CLINICAL SUPPORT (OUTPATIENT)
Dept: REHABILITATION | Facility: HOSPITAL | Age: 57
End: 2022-12-01
Payer: MEDICAID

## 2022-12-01 DIAGNOSIS — R41.4 HEMI-INATTENTION: ICD-10-CM

## 2022-12-01 DIAGNOSIS — R26.89 IMBALANCE: Primary | ICD-10-CM

## 2022-12-01 DIAGNOSIS — R27.9 LACK OF COORDINATION: ICD-10-CM

## 2022-12-01 DIAGNOSIS — M62.81 MUSCLE WEAKNESS: Primary | ICD-10-CM

## 2022-12-01 DIAGNOSIS — R27.8 IMPAIRED GROSS MOTOR COORDINATION: ICD-10-CM

## 2022-12-01 DIAGNOSIS — Z74.1 NEED FOR ASSISTANCE WITH PERSONAL CARE: ICD-10-CM

## 2022-12-01 PROCEDURE — 97110 THERAPEUTIC EXERCISES: CPT | Mod: PN

## 2022-12-01 PROCEDURE — 97530 THERAPEUTIC ACTIVITIES: CPT | Mod: PN

## 2022-12-01 NOTE — PROGRESS NOTES
"Occupational Therapy Daily Treatment Note     Name: Sarbjit Brewer .  Clinic Number: [id number]    Therapy Diagnosis:   Encounter Diagnoses   Name Primary?    Muscle weakness Yes    Lack of coordination     Need for assistance with personal care      Physician: Steven Krueger MD    Visit Date: 12/1/2022    Physician Orders: OT eval and treat   Medical Diagnosis: I63.9 (ICD-10-CM) - Cerebral vascular accident   Evaluation Date: 11/23/2022  Plan of Care Expiration Period: 1/20/2022  Insurance Authorization period Expiration: 12/31/2022  Visit # / Visits Authorized: 1 / 20  FOTO: 11/23/2022     Time In:8:45 am  Time Out: 9:30  Total Billable (one on one) Time: 45 minutes       Precautions:  Standard and Fall    Subjective     Pt reports: "I am ok. My hand just won't do anything. Its paralyzed"  he was compliant with home exercise program given last session.   Response to previous treatment:first since evaluation  Functional change: first since evaluation     Pain: 0/10  Location: left arms     Objective   Physical Exam:  Postural examination/scapula alignment: Rounded shoulder and Head forward  Joint integrity: Firm end feeling  Skin integrity: skin is intact on eval   Edema: Mild edema noted in left hand   Palpation: no pain with palpation      Joint Evaluation  [facility]   11/23/2022 AROM  11/23/2022 MMS   11/23/2022     Right Left Left   Scapular Elevation 5/5   3-/5   Scapular Retraction 5/5   3-/5   Shoulder Flex 0-180 5/5 38 2-/5   Shoulder Extension 0-80 5/5 50 2/5   Shoulder Abd 0-180 5/5   2-/5   Shoulder ER 0-90 5/5 28 2-/5   Shoulder IR 0-90 5/5 hip 2-/5   Shoulder Horizontal adduction 0-90 5/5   2-/5   Elbow Flex/Ext 0-150 5/5 119 2/5   Wrist Flex 0-80 5/5 57 2-/5   Wrist Ext 0-70 5/5 20 2-/5   Supination 0-80 5/5   2/5   Pronation 0-80 5/5   2/5   Ulnar Deviation 5/5   2-/5   Radial Deviation  5/5   2-/5   *WNL - Within Normal Limits  *NT = Not Tested     Fist: loose left hand       Strength: (LEE ANN " Dynamometer in lbs.) Average 3 trials, Position II:       11/23/2022 11/23/2022   Rung II Right Left   Trial 1 73# 1#   Trial 2 57# 1#   Trial 3 55# 2#   Average 61.6# 1.3#      Normal  Average Values  Female Right Left Male: Right Left   20-29 66 lbs 62 lbs         94 lbs 86 lbs   30-39 68 lbs 64 lbs 90 lbs 82 lbs   40-49 64 lbs 62 lbs 80 lbs 74 lbs   50-59 62 lbs 57 lbs 72 lbs 65 lbs   60-69 53 lbs 51 lbs 63 lbs 56 lbs   70+ 44 lbs 42 lbs 54 lbs 48 lbs   SD = +/- 19lbs         Pinch Strength (Measured in lbs)       11/23/2022 11/23/2022     Right Left   Key Pinch 19 # 4 #   3pt Pinch 14 # unable   2pt Pinch 10 # unable         Fine/Gross Motor Coordination     Assessment   Right   11/23/2022 Left  11/23/2022   9 hole peg test 9 pegs in/out for time WNL Unable    DAYSI (Rapid Alternating Movements)      intact    impaired - severe   Finger to Nose (5 times)   intact impaired - severe   Finger Flicks (coordination moving from digit flexion to digit extension)   intact impaired - severe   *WNL - Within Normal Limits  *NT = Not Tested     Tone:  Modified Erasmo Scale:   1-  Slight increase in muscle tone, manifested by a catch and release or by minimal resistance at the end of the range of motino when the affected part(s) is moved in flexion or extension     Sensation:  Sarbjit  reports numbness on left side.       Sensation Intact  Impaired Comments    [place] Dustin  Deep Touch (6.65) []  [x]       Protective Sensation (4.56) []  [x]       Light Touch (3.61) []  [x]                  Other Kinesthesia  []  [x]       Temperature []  []       Stereognosis  []  []         Balance:   Static Sit - GOOD+: Takes MAXIMAL challenges from all directions.    Dynamic sit- GOOD+: Takes MAXIMAL challenges from all directions.    Static Stand - FAIR: Maintains without assist, but unable to take any challenges   Dynamic stand - FAIR: Maintains without assist, but unable to take any challenges      Endurance Deficit:  moderate    Sarbjit received the following supervised modalities after being cleared for contradictions for 10 minutes:   - Fluidotherapy: To left upper extremity for 10 min, continuous air, 110 deg, air speed 100 to decrease pain, edema & scar tissue and increased tissue extensibility.    Sarbjit received therapeutic exercises for 35 minutes including:  - active range of motion left wrist / digits in fluidotherapy  - weightbearing on therapy ball left upper extremity flexion/abduction x10   - SPROM home exercise program left upper extremity each joint x10   - towel slides flexion/abduction/ clockwise/counter clockwise circles x10     Home Exercises and Education Provided     Education provided:   - SPROM home exercise program initiated  - Progress towards goals     Written Home Exercises Provided: yes.  Exercises were reviewed and Sarbjit was able to demonstrate them prior to the end of the session.  Sarbjit demonstrated fair  understanding of the HEP provided.   .   See EMR under Patient Instructions for exercises provided 12/1/2022.        Assessment     Sarbjit tolerated today's session well. Interventions focused on home exercise program initiation, pt education, sensory integration, joint mobility and neuromuscular re-education in order to maximize return in left upper extremity. Fluidotherapy utilized start of session to promote joint mobility, edema management and sensory integration to left hand. Sarbjit stated he was able to feel the sand and was instructed on active range of motion exercises to perform during the 10 minutes. Self passive range of motion home exercise program issued with hand over hand assistance and cues for biomechanics to promote joint mobility. Handout issued to maximize carry over. He reported soreness with exercises and therapist educated on importance of maintaining joint mobility and muscle elongation. Pt verbalized understanding     Sarbjit is progressing well towards his goals and there are no  updates to goals at this time. Pt prognosis is Good.     Pt will continue to benefit from skilled outpatient occupational therapy to address the deficits listed in the problem list on initial evaluation provide pt/family education and to maximize pt's level of independence in the home and community environment.     Anticipated barriers to occupational therapy: cognitive impairments     Pt's spiritual, cultural and educational needs considered and pt agreeable to plan of care and goals.    Goals:   Short Term Goals: 4 weeks  - Pt will be independent with Home Exercise Program (HEP)/Home Activity Program (HAP) and report at least 50% compliance. Not met, progressing  - Pt will demonstrate greater than or equal to 90 degrees of active shoulder flexion with LUE to increase independence with dressing and overhead reaching tasks. Not met, progressing  - Pt will increase L  strength to 15# or more to improve participation with ADL and IADL tasks. Not met, progressing  - Pt will increase left pinch strength, in all 3 conditions (lateral, 3pt, 2pt), by 2 psi to improve performance with cooking tasks, home management. Not met, progressing  - Pt will complete Box and Blocks Test with left UE, transferring 5 blocks, to demonstrate improved gross manual coordination needed for ADL and IADL tasks. Not met, progressing  - Pt will identify 3 or more compensatory strategies and/or pieces of adaptive equipment to assist with home care and other IADL tasks. Not met, progressing     Long Term Goals: 8 weeks  - Pt will reduce limitation score on FOTO by less than or equal to 30 to demonstrate an increase in self-perceived functional performance. Not met, progressing  - Pt will be SBA with upper body dressing with use of adaptive equipment/techniques as needed in order to increase independence ans decrease CG burden. Not met, progressing  - Pt will complete Box and Blocks Test with left UE, transferring 10 blocks, to demonstrate  improved gross manual coordination needed for ADL and IADL tasks. Not met, progressing  - Pt will report return to meaningful activities using left upper extremity in order to improve quality of life. Not met, progressing     Plan   Certification Period/Plan of care expiration: 11/23/2022 to 1/20/2023.     Outpatient Occupational Therapy 2 times weekly for 8 weeks to include the following interventions: Electrical Stimulation NMES/TENs, Fluidotherapy, Manual Therapy, Moist Heat/ Ice, Neuromuscular Re-ed, Orthotic Management and Training, Paraffin, Patient Education, Self Care, Therapeutic Activities, and Therapeutic Exercise.     Updates/Grading for next session: continue with POC Corinne Rapier, OT

## 2022-12-01 NOTE — PROGRESS NOTES
"[facility]   Physical Therapy Treatment Note     Name: Sarbjit Brewer Sr.  Clinic Number: [id number]    Therapy Diagnosis:   Encounter Diagnoses   Name Primary?    Imbalance Yes    Mono-inattention     Impaired gross motor coordination      Physician: Steven Krueger MD    Visit Date: 12/1/2022    Physician Orders: PT Eval and Treat   Medical Diagnosis from Referral:   Diagnosis   I63.9 (ICD-10-CM) - Cerebral vascular accident      Evaluation Date: 11/23/2022  Authorization Period Expiration: 12/31/2022  Plan of Care Expiration: 1/27/2022  Visit # / Visits authorized: 1/20 (+eval)    PTA Visit #: 0/5     Time In: 8:15AM (patient late)  Time Out: 8:45AM   Total Billable Time: 30 minutes (2TE - [facility])    Precautions: Standard, Fall    SUBJECTIVE     Patient reports: No changes since initial evaluation.  He  will be  compliant with home exercise program.  Response to previous treatment: Last session was initial evaluation  Functional change: Last session was initial evaluation    Pain: 0/10  Location: N/A - patient denies pain today    OBJECTIVE     Objective Measures updated at progress report unless specified.     Treatment     Sarbjit received the treatments listed below:      therapeutic exercises to develop strength, endurance, ROM, and flexibility for 10 minutes including:  - Gastroc Fitter stretch 2x45"  - Stepper bike with bilateral upper extremity / bilateral lower extremity use (left hand wrapped) x 6 minutes level 3.5    neuromuscular re-education activities to improve: Balance, Coordination, Kinesthetic, Sense, and Proprioception for 20 minutes. The following activities were included:  - Staggered sit to stands with left lower extremity posterior to right lower extremity 2x10; right upper extremity push off   - Step fan with 2 forward steps (4" and 10"); right upper extremity support; right lower extremity stepping for improved left lower extremity stance control; left lower " extremity stepping for improved left lower extremity coordination x 10 minutes total; cross over stepping included    Patient Education and Home Exercises     Home Exercises Provided and Patient Education Provided     Education provided:   - Purpose of PT intervention    Written Home Exercises Provided: Patient instructed to cont prior HEP. Exercises were reviewed and Sarbjit was able to demonstrate them prior to the end of the session.  Sarbjit demonstrated good  understanding of the education provided. See EMR under Patient Instructions for exercises provided during therapy sessions    ASSESSMENT     Sarbjit tolerated first full PT treatment session without adverse response; volume of activity completed today limited by patient's late arrival and mild distractibility. Session focused on intervention to bias left lower extremity in order to minimize compensatory gait and transfer patterns. Frequent cuing required for attention to task/safety awareness but no loss of balance throughout visit. He would benefit from continued PT services to achieve goals established at evaluation.    Sarbjit Is progressing well towards his goals.   Patient prognosis is Good.     Patient will continue to benefit from skilled outpatient physical therapy to address the deficits listed in the problem list box on initial evaluation, provide pt/family education and to maximize pt's level of independence in the home and community environment.     Patient's spiritual, cultural and educational needs considered and pt agreeable to plan of care and goals.     Anticipated barriers to physical therapy: CEREBROVASCULAR ACCIDENT x 3, decreased left attention, impaired motor planning and control, fall risk, sedentary lifestyle    Goals:     Short Term Goals (4 Weeks):     1.  Independent with initial HEP.  2.  Pt will perform nu-step at level 4 x 8 minutes without rests breaks going at least 50 step/min or greater.  3. Pt will be able to perform TUG in 25  secs with use of AD placingdemonstrating overall improved functional mobility.   4. Pt will performed sit to stand x 5 without UE support in 14 seconds without LOB backward or posterior LE hooking for functional and safe transfers.   5.  Pt will score greater than or equal to 12/24 on the DGI test/assessment without use of AD demonstrating overall improved functional mobility and balance and reduce fall risk.      Long Term Goals (8 Weeks):      1. Pt will be able to perform TUG in 23 secs with use of AD placingdemonstrating overall improved functional mobility.   2.  Pt will score greater than or equal to 16/24 on the DGI test/assessment without use of AD demonstrating overall improved functional mobility and balance and reduce fall risk.   3. Pt will walk < than or equal to 800 ft on the 6 minute walk test indoor with AD with 0 LOB and minimal gait deviations for improved safety in home ambulation and safety.   4.  Pt will be able to safely perform and tolerate high level ADL's without LOB.   5. Pt will have 0 falls from start of PT sessions.  6. Independent with updated HEP.   7. Pt will ambulate on TM x 3 minutes with use of UE support with 0 LOB at 0.9 mph.  8. Pt will perform floor to stand f/b stand to floor transfer B UE support with stand by assist and no cues for improved dynamic transfer, core stability and fall safety in home and community.  9. Pt will begin some form of community fitness to begin regular and consistent performance of exercise to continue maintenance of gains made in PT.    PLAN     Continue to progress as per plan of care; reinforce HEP    NUPUR GUDINO, PT

## 2022-12-02 ENCOUNTER — CLINICAL SUPPORT (OUTPATIENT)
Dept: REHABILITATION | Facility: HOSPITAL | Age: 57
End: 2022-12-02
Attending: INTERNAL MEDICINE
Payer: MEDICAID

## 2022-12-02 DIAGNOSIS — R41.841 COGNITIVE COMMUNICATION DEFICIT: Primary | ICD-10-CM

## 2022-12-02 DIAGNOSIS — R47.1 DYSARTHRIA: ICD-10-CM

## 2022-12-02 PROCEDURE — 92507 TX SP LANG VOICE COMM INDIV: CPT | Mod: PN | Performed by: SPEECH-LANGUAGE PATHOLOGIST

## 2022-12-02 NOTE — PROGRESS NOTES
[facility]  Speech Therapy Treatment Note- Neurological Rehabilitation  Date: 12/2/2022     Name: Sarbjit Brewer Sr.   MRN: [id number]   Therapy Diagnosis:   Encounter Diagnoses   Name Primary?    Cognitive communication deficit Yes    Dysarthria    Physician: Steven Krueger MD  Physician Orders: [id number] - AMB REFERRAL/CONSULT TO SPEECH THERAPY  Medical Diagnosis: I63.9 (ICD-10-CM) - Cerebral vascular accident    Visit #/ Visits Authorized: 1/ 20 (plus evaluation)  Date of Evaluation:  11/21/22  Insurance Authorization Period: 11/24/22 to 12/31/22  Plan of Care Expiration Date:    2/3/22  Extended Plan of Care:  none   Progress Note: due 12/21/22   Visits Cancelled: 0  Visits No Show: 0    Time In:  9:26  am   Time Out: 10:06 am   Total Billable Time: 40 minutes      Precautions: Standard  Subjective:   Patient reports: that his skin is irritated from drooling.  Had Physical therapy yesterday.   He was not compliant to home exercise program - not yet established.  Response to previous treatment: positive    Pain Scale:  0/10 on a Visual Analog Scale currently.   Pain Location: none indicated other than discomfort around left labial commissure    Objective:      UNTIMED  Procedure Min.   Speech- Language- Voice Therapy  40   Total Timed Units: 0  Total Untimed Units: 1  Charges Billed/Number of units: 1    Short Term Goals: (4 weeks) Current Progress:   Patient will complete testing using the CLQT.  Progressing/ Not Met 12/2/2022   See below     2. Pt will rate his speech while reading as at least a 3 on a 3 point scale ( 1 = same as before beginning therapy, 2 =better but not best, 3 =best possible outcome) on  7 /10 trials.       Progressing/ Not Met 12/2/2022   Not formally addressed      3. Pt will differentiate between his speech and error-free speech by giving specific examples of differences on  7 /10 trials.   Progressing/ Not Met 12/2/2022   Not formally addressed      4. Pt will use motor  speech strategies and answer questions in conversation with a self-rating of at least 3 on a 3 point scale ( 1 = same as before beginning therapy, 2 =better but not best, 3 =best possible outcome) on  7 /10 trials.        Progressing/ Not Met 12/2/2022   Not formally addressed      New goal   5. Patient will complete word finding tasks with semantic relationships (I.e. similarities and differences, synonyms/antonyms, semantic category, Semantic Feature Analysis) with 90% accuracy given Min cues to improve word finding skills.        New goal  6. Patient will recall visual and/or auditory information using memory strategies with 90% given Min cues to improve memory skills.    New goal   7. Patient will complete mental manipulation/working memory tasks with 80% accuracy given Min cues to improve immediate memory skills.    New goal   8. Patient will complete selective attention tasks with 90% acc independently to improve attention skills.    New goal  9. Patient will complete simple executive function tasks (I.e. functional scheduling, problem-solving/reasoning, goal/plan/action/review) with 90% accuracy given Min cues to improve executive functioning skills.         Cognitive Linguistic Quick Test (CLQT), Aphasia Administration was administered to quickly assess the patient's overall cognitive-linguistic function and to determine cognitive strengths and weaknesses.     Cognitive Domain  Score  Severity Rating    Attention 10/215 severe   Memory 81/185 severe   Executive Functions 5/40 severe   Language 23/37 moderate   Visuospatial Skills 7/105 severe   Clock Drawing 7/13 severe          Composite Score = 1.2/4 severe     Task Score Ages 18-69 Cut Score Below?   Personal Facts  8  8 average   Symbol Cancellation 0  11 below average   Confrontational naming  10  10 average   Clock drawing  7  12 below average   Story Retelling 2  6 below average   Symbol Trails 1  9 below average   Generative Naming 3  5 below average    Design Memory 1  5 below average   Mazes 0  7 below average   Design Generation  1  6 below average       Cognition: Cognitive communication skills are considered severely impaired. Patient answered orientation questions with 100% accuracy. Patient cancelled pre-determined symbol out of a field of many similar looking symbols with 0% accuracy, indicating impaired selective attention skills. Patient named line item photographs with 100% accuracy. Patient scored 7  (cut off score 12) on clock drawing task, indicating impaired planning, organizing, self-monitoring, and self-correction as the patient's clock model contained 12/12 numbers with fairly appropriate spacing and orientation, 10/2 hands, and was not set to the correct time. Patient recalled 2/18 details from a paragraph presented auditorily. Patient answered 4/6 y/n questions about the paragraph indicating adequate  comprehension but poor auditory recall.  Patient completed a symbol trails task (alternating size and shape) with 10% acc indicating impaired divided attention.  Patient completed divergent naming of concrete categories with 9 named items within one minute; completed divergent naming of abstract categories with 3 named items within one minute.  Patient recalled  1/6 designs in a design recall task indicating impaired visual recall skills. Patient completed a simple maze with  0% acc; completed a complex maze with 0% acc indicating impaired planning and organization for simple and complex information. Patient created 1 designs with 4 lines, 0 designs with more than 4 lines, 12 designs with less than 4 lines, and 12 perseverative designs indicating impaired mental flexibility skills. Patient with good insight into severity of deficits. Patient noted with negative self talk.    Patient Education/Response:   Skilled education provided regarding:  - CLQT purpose and results   - plan of care and goals       Home program established: yes-Identify the MD  he would like to make a follow up appointment with  Exercises were reviewed and Sarbjit was able to demonstrate them prior to the end of the session.  Sarbjit demonstrated good  understanding of the education provided.     See Electronic Medical Record under Patient Instructions for exercises provided throughout therapy.  Assessment:   Sarbjit is progressing well towards his goals. CLQT completed today - patient presents with severe cognitive-linguistic impairment characterized by moderately impaired language skills and severely impaired attention, memory, executive functioning, and visuospatial skills. Goals added above to reflect noted deficits. Current goals remain appropriate. Goals to be updated as necessary.     Patient prognosis is Fair to good. Patient will continue to benefit from skilled outpatient speech and language therapy to address the deficits listed in the problem list on initial evaluation, provide patient/family education and to maximize patient's level of independence in the home and community environment.   Medical necessity is demonstrated by the following IMPAIRMENTS:  Deficits in executive functioning, attention, and memory in addition to decreased word finding and speech intelligibility prevent the pt from relaying medically and safety relevant information in a timely manner in a state of emergency.   Barriers to Therapy: none  Patient's spiritual, cultural and educational needs considered and patient agreeable to plan of care and goals.  Plan:   Continue Plan of Care with focus on improving cognitive-linguistic skills and use of motor speech strategies to enhance speech intelligibility     NEO Crocker, L-SLP, CCC-SLP  Speech Language Pathologist   12/2/2022

## 2022-12-07 ENCOUNTER — CLINICAL SUPPORT (OUTPATIENT)
Dept: REHABILITATION | Facility: HOSPITAL | Age: 57
End: 2022-12-07
Payer: MEDICAID

## 2022-12-07 DIAGNOSIS — R41.841 COGNITIVE COMMUNICATION DEFICIT: Primary | ICD-10-CM

## 2022-12-07 DIAGNOSIS — R47.1 DYSARTHRIA: ICD-10-CM

## 2022-12-07 PROCEDURE — 92507 TX SP LANG VOICE COMM INDIV: CPT | Mod: PN

## 2022-12-07 NOTE — PROGRESS NOTES
[facility]  Speech Therapy Treatment Note- Neurological Rehabilitation  Date: 12/7/2022     Name: Sarbjit Brewer Sr.   MRN: [id number]   Therapy Diagnosis:   Encounter Diagnoses   Name Primary?    Cognitive communication deficit Yes    Dysarthria    Physician: Steven Krueger MD  Physician Orders: [id number] - AMB REFERRAL/CONSULT TO SPEECH THERAPY  Medical Diagnosis: I63.9 (ICD-10-CM) - Cerebral vascular accident    Visit #/ Visits Authorized: 2/ 20 (plus evaluation)  Date of Evaluation:  11/21/22  Insurance Authorization Period: 11/24/22 to 12/31/22  Plan of Care Expiration Date:    2/3/22  Extended Plan of Care:  none   Progress Note: due 12/21/22   Visits Cancelled: 0  Visits No Show: 0    Time In:  1445  am   Time Out: 1530 am   Total Billable Time: 45 minutes      Precautions: Standard  Subjective:   Patient reports: that his skin is irritated from drooling and asked who he should see regarding this. Urged patient to reach out to primary care provider, Patient report change in sleep   He was not compliant to home exercise program - not yet established.  Response to previous treatment: positive    Pain Scale:  0/10 on a Visual Analog Scale currently.   Pain Location: none indicated other than discomfort around left labial commissure    Objective:      UNTIMED  Procedure Min.   Speech- Language- Voice Therapy  40   Total Timed Units: 0  Total Untimed Units: 1  Charges Billed/Number of units: 1    Short Term Goals: (4 weeks) Current Progress:   Patient will complete testing using the CLQT. Goal Met 12/7/2022     2. Pt will rate his speech while reading as at least a 3 on a 3 point scale ( 1 = same as before beginning therapy, 2 =better but not best, 3 =best possible outcome) on  7 /10 trials.       Progressing/ Not Met 12/7/2022   Not formally addressed      3. Pt will differentiate between his speech and error-free speech by giving specific examples of differences on  7 /10 trials.     Progressing/  Not Met 12/7/2022   Patient with decreased awareness to speech deficits and improvement with use of mirror used for biofeedback   4. Pt will use motor speech strategies and answer questions in conversation with a self-rating of at least 3 on a 3 point scale ( 1 = same as before beginning therapy, 2 =better but not best, 3 =best possible outcome) on  7 /10 trials.        Progressing/ Not Met 12/7/2022   Not formally addressed      5. Patient will complete word finding tasks with semantic relationships (I.e. similarities and differences, synonyms/antonyms, semantic category, Semantic Feature Analysis) with 90% accuracy given Min cues to improve word finding skills.     Progressing/Not Met 12/7/2022  Patient completed with 50% accuracy independently, improvement with cues to slow down, cues to organize thoughts based on categories    6. Patient will recall visual and/or auditory information using memory strategies with 90% given Min cues to improve memory skills.    Progressing/Not Met 12/7/2022  Not addressed in today's session.    7. Patient will complete mental manipulation/working memory tasks with 80% accuracy given Min cues to improve immediate memory skills.    Progressing/Not Met 12/7/2022  Not addressed in today's session.    8. Patient will complete selective attention tasks with 90% acc independently to improve attention skills.    Progressing/Not Met 12/7/2022  Not addressed in today's session.    9. Patient will complete simple executive function tasks (I.e. functional scheduling, problem-solving/reasoning, goal/plan/action/review) with 90% accuracy given Min cues to improve executive functioning skills.     Progressing/Not Met 12/7/2022  Not addressed in today's session.      Patient Education/Response:   Skilled education provided regarding:  - clear speech strategies  -memory strategies  -general stroke education and prevention        Home program established: yes-Identify the MD he would like to make a  follow up appointment with  Exercises were reviewed and Sarbjit was able to demonstrate them prior to the end of the session.  Sarbjit demonstrated good  understanding of the education provided.     See Electronic Medical Record under Patient Instructions for exercises provided throughout therapy.  Assessment:   Sarbjit is progressing well towards his goals. Patient with significant need to redirection throughout the session. Decreased functional memory noted throughout the session and maximum cues required for use of clear speech strategies. Improvement was noted with use of mirror for biofeedback. Goals added above to reflect noted deficits. Current goals remain appropriate. Goals to be updated as necessary.     Patient prognosis is Fair to good. Patient will continue to benefit from skilled outpatient speech and language therapy to address the deficits listed in the problem list on initial evaluation, provide patient/family education and to maximize patient's level of independence in the home and community environment.   Medical necessity is demonstrated by the following IMPAIRMENTS:  Deficits in executive functioning, attention, and memory in addition to decreased word finding and speech intelligibility prevent the pt from relaying medically and safety relevant information in a timely manner in a state of emergency.   Barriers to Therapy: none  Patient's spiritual, cultural and educational needs considered and patient agreeable to plan of care and goals.  Plan:   Continue Plan of Care with focus on improving cognitive-linguistic skills and use of motor speech strategies to enhance speech intelligibility     Suze Simmons CCC-SLP  12/7/2022

## 2022-12-08 ENCOUNTER — CLINICAL SUPPORT (OUTPATIENT)
Dept: REHABILITATION | Facility: HOSPITAL | Age: 57
End: 2022-12-08
Payer: MEDICAID

## 2022-12-08 ENCOUNTER — CLINICAL SUPPORT (OUTPATIENT)
Dept: REHABILITATION | Facility: HOSPITAL | Age: 57
End: 2022-12-08
Attending: PSYCHIATRY & NEUROLOGY
Payer: MEDICAID

## 2022-12-08 DIAGNOSIS — R41.4 HEMI-INATTENTION: ICD-10-CM

## 2022-12-08 DIAGNOSIS — R41.841 COGNITIVE COMMUNICATION DEFICIT: Primary | ICD-10-CM

## 2022-12-08 DIAGNOSIS — R47.1 DYSARTHRIA: ICD-10-CM

## 2022-12-08 DIAGNOSIS — R27.8 IMPAIRED GROSS MOTOR COORDINATION: ICD-10-CM

## 2022-12-08 DIAGNOSIS — R26.89 IMBALANCE: Primary | ICD-10-CM

## 2022-12-08 PROCEDURE — 97110 THERAPEUTIC EXERCISES: CPT | Mod: PN

## 2022-12-08 PROCEDURE — 92507 TX SP LANG VOICE COMM INDIV: CPT | Mod: PN

## 2022-12-08 NOTE — PROGRESS NOTES
"[facility]   Physical Therapy Treatment Note     Name: Sarbjit Brewer Sr.  Clinic Number: [id number]    Therapy Diagnosis:   Encounter Diagnoses   Name Primary?    Imbalance Yes    Mono-inattention     Impaired gross motor coordination      Physician: Steven Krueger MD    Visit Date: 12/8/2022    Physician Orders: PT Eval and Treat   Medical Diagnosis from Referral:   Diagnosis   I63.9 (ICD-10-CM) - Cerebral vascular accident      Evaluation Date: 11/23/2022  Authorization Period Expiration: 12/31/2022  Plan of Care Expiration: 1/27/2022  Visit # / Visits authorized: 2/20 (+eval)    PTA Visit #: 0/5     Time In: 2:40 PM  Time Out: 2:30 PM  Total Billable Time: 50 minutes (3TE - [facility])    Precautions: Standard, Fall    SUBJECTIVE     Patient reports: that he does try to walk short distances at home without cane.  He agree to be fully complaint with AD until PT gives him the OK.   He  will be  compliant with home exercise program.  Response to previous treatment: Last session was initial evaluation  Functional change: Last session was initial evaluation    Pain: 0/10  Location: N/A - patient denies pain today    OBJECTIVE     Objective Measures updated at progress report unless specified.     Treatment     Sarbjit received the treatments listed below:      therapeutic exercises to develop strength, endurance, ROM, and flexibility for 10 minutes including:  - Gastroc Fitter stretch 2x45"  - Stepper bike with bilateral upper extremity / bilateral lower extremity use (left hand wrapped) x 6 minutes level 3.5    neuromuscular re-education activities to improve: Balance, Coordination, Kinesthetic, Sense, and Proprioception for 40 minutes. The following activities were included:  -- forward ladder stepping   3 x 10 feet each way without AD contact guard assist   -- side ladder stepping    3 x  10 feet each way without contact guard assist   --SIT TO STANDS    2 x 10 without UE support from " standard chair with cues for forward scoot and weight shift  --SIDE STEPPING    6 x 10 feet in // bars with 1 UE support RED THERABAND  --CONE WEAVING    4 x 25 feet without AD with contact guard assist   --Toe taps    3 x 30'' without UE support and contact guard assist    --Ambulation 250 feet without AD with contact guard assist to improve stability during walking.     Patient Education and Home Exercises     Home Exercises Provided and Patient Education Provided     Education provided:   - Purpose of PT intervention    Written Home Exercises Provided: Patient instructed to cont prior HEP. Exercises were reviewed and Sarbjit was able to demonstrate them prior to the end of the session.  Sarbjit demonstrated good  understanding of the education provided. See EMR under Patient Instructions for exercises provided during therapy sessions    ASSESSMENT     Sarbjit was able to perform much of the session without his AD today but with contact guard assist when performing walking and dynamic balance tasks.  He still needs to use is QC for safety with walking to reduce risk of falls in the home and community.  Frequent cuing required for attention to task/safety awareness but no loss of balance throughout visit. He would benefit from continued PT services to achieve goals established at evaluation.    Sarbjit Is progressing well towards his goals.   Patient prognosis is Good.     Patient will continue to benefit from skilled outpatient physical therapy to address the deficits listed in the problem list box on initial evaluation, provide pt/family education and to maximize pt's level of independence in the home and community environment.     Patient's spiritual, cultural and educational needs considered and pt agreeable to plan of care and goals.     Anticipated barriers to physical therapy: CEREBROVASCULAR ACCIDENT x 3, decreased left attention, impaired motor planning and control, fall risk, sedentary lifestyle    Goals:      Short Term Goals (4 Weeks):     1.  Independent with initial HEP. (PROGRESSING, NOT MET)  2.  Pt will perform nu-step at level 4 x 8 minutes without rests breaks going at least 50 step/min or greater.  (PROGRESSING, NOT MET)  3. Pt will be able to perform TUG in 25 secs with use of AD placingdemonstrating overall improved functional mobility.  (PROGRESSING, NOT MET)  4. Pt will performed sit to stand x 5 without UE support in 14 seconds without LOB backward or posterior LE hooking for functional and safe transfers.  (PROGRESSING, NOT MET)  5.  Pt will score greater than or equal to 12/24 on the DGI test/assessment without use of AD demonstrating overall improved functional mobility and balance and reduce fall risk.  (PROGRESSING, NOT MET)     Long Term Goals (8 Weeks):      1. Pt will be able to perform TUG in 23 secs with use of AD placingdemonstrating overall improved functional mobility.  (PROGRESSING, NOT MET)  2.  Pt will score greater than or equal to 16/24 on the DGI test/assessment without use of AD demonstrating overall improved functional mobility and balance and reduce fall risk.  (PROGRESSING, NOT MET)  3. Pt will walk < than or equal to 800 ft on the 6 minute walk test indoor with AD with 0 LOB and minimal gait deviations for improved safety in home ambulation and safety.  (PROGRESSING, NOT MET)  4.  Pt will be able to safely perform and tolerate high level ADL's without LOB. (PROGRESSING, NOT MET)  5. Pt will have 0 falls from start of PT sessions. (PROGRESSING, NOT MET)  6. Independent with updated HEP.  (PROGRESSING, NOT MET)  7. Pt will ambulate on TM x 3 minutes with use of UE support with 0 LOB at 0.9 mph. (PROGRESSING, NOT MET)  8. Pt will perform floor to stand f/b stand to floor transfer B UE support with stand by assist and no cues for improved dynamic transfer, core stability and fall safety in home and community. (PROGRESSING, NOT MET)  9. Pt will begin some form of community fitness to  begin regular and consistent performance of exercise to continue maintenance of gains made in PT. (PROGRESSING, NOT MET)    PLAN     Continue to progress as per plan of care; reinforce HEP with wife assisting    Hedy Lin, PT

## 2022-12-08 NOTE — PROGRESS NOTES
"Request for medication refill:    Providers if patient needs an appointment and you are willing to give a one month supply please refill for one month and  send a letter/MyChart using \".SMILLIMITEDREFILL\" .smillimited and route chart to \"P SMI \" (Giving one month refill in non controlled medications is strongly recommended before denial)    If refill has been denied, meaning absolutely no refills without visit, please complete the smart phrase \".smirxrefuse\" and route it to the \"P SMI MED REFILLS\"  pool to inform the patient and the pharmacy.    Анна Bazan RN        " [facility]  Speech Therapy Treatment Note- Neurological Rehabilitation  Date: 12/8/2022     Name: Sarbjit Brewer Sr.   MRN: [id number]   Therapy Diagnosis:   Encounter Diagnoses   Name Primary?    Cognitive communication deficit Yes    Dysarthria      Physician: Steven Krueger MD  Physician Orders: [id number] - AMB REFERRAL/CONSULT TO SPEECH THERAPY  Medical Diagnosis: I63.9 (ICD-10-CM) - Cerebral vascular accident    Visit #/ Visits Authorized: 3/ 20 (plus evaluation)  Date of Evaluation:  11/21/22  Insurance Authorization Period: 11/24/22 to 12/31/22  Plan of Care Expiration Date:    2/3/22  Extended Plan of Care:  none   Progress Note: due 12/21/22   Visits Cancelled: 0  Visits No Show: 0    Time In:  3:30 pm   Time Out: 4:15 pm  Total Billable Time: 45 minutes      Precautions: Standard  Subjective:   Patient reports: that his skin is irritated from drooling.  PT suggested pt to see a podiatrist for his toenails.   He was not compliant to home exercise program - not yet established.  Response to previous treatment: positive    Pain Scale:  0/10 on a Visual Analog Scale currently.   Pain Location: none indicated other than discomfort around left labial commissure    Objective:      UNTIMED  Procedure Min.   Speech- Language- Voice Therapy  45   Total Timed Units: 0  Total Untimed Units: 1  Charges Billed/Number of units: 1    Short Term Goals: (4 weeks) Current Progress:   Patient will complete testing using the CLQT. Goal Met 12/7/2022     2. Pt will rate his speech while reading as at least a 3 on a 3 point scale ( 1 = same as before beginning therapy, 2 =better but not best, 3 =best possible outcome) on  7 /10 trials.       Progressing/ Not Met 12/8/2022   Not formally addressed      3. Pt will differentiate between his speech and error-free speech by giving specific examples of differences on  7 /10 trials.     Progressing/ Not Met 12/8/2022   Patient with decreased awareness to speech deficits  and improvement with use of mirror used for biofeedback    Consistent tangents and poor topic maintenance.    4. Pt will use motor speech strategies and answer questions in conversation with a self-rating of at least 3 on a 3 point scale ( 1 = same as before beginning therapy, 2 =better but not best, 3 =best possible outcome) on  7 /10 trials.        Progressing/ Not Met 12/8/2022   Not formally addressed      5. Patient will complete word finding tasks with semantic relationships (I.e. similarities and differences, synonyms/antonyms, semantic category, Semantic Feature Analysis) with 90% accuracy given Min cues to improve word finding skills.     Progressing/Not Met 12/8/2022  Patient completed with 60% accuracy independently, improvement with cues to slow down, cues to organize thoughts based on categories     Name food items: x 4 in one minute. Multiple tangents.     Reviewed word finding strategies.    6. Patient will recall visual and/or auditory information using memory strategies with 90% given Min cues to improve memory skills.    Progressing/Not Met 12/8/2022  Not addressed in today's session.    7. Patient will complete mental manipulation/working memory tasks with 80% accuracy given Min cues to improve immediate memory skills.    Progressing/Not Met 12/8/2022  3 word reverse with mod-max A  3 word unscramble with mod A   8. Patient will complete selective attention tasks with 90% acc independently to improve attention skills.    Progressing/Not Met 12/8/2022  Max A   9. Patient will complete simple executive function tasks (I.e. functional scheduling, problem-solving/reasoning, goal/plan/action/review) with 90% accuracy given Min cues to improve executive functioning skills.     Progressing/Not Met 12/8/2022  Not addressed in today's session.      Future goals: Topic maintenance. Decrease tangents.    Patient Education/Response:   Skilled education provided regarding:  - clear speech strategies  -memory  strategies  -general stroke education and prevention      Home program established: yes. Memory and attention strategies at home.   Exercises were reviewed and Sarbjit was able to demonstrate them prior to the end of the session.  Sarbjit demonstrated good  understanding of the education provided.     See Electronic Medical Record under Patient Instructions for exercises provided throughout therapy.  Assessment:   Sarbjit is progressing well towards his goals. Patient with significant need to redirection throughout the session. Decreased functional memory noted throughout the session and maximum cues required for use of clear speech strategies.  Current goals remain appropriate. Goals to be updated as necessary.     Patient prognosis is Fair to good. Patient will continue to benefit from skilled outpatient speech and language therapy to address the deficits listed in the problem list on initial evaluation, provide patient/family education and to maximize patient's level of independence in the home and community environment.   Medical necessity is demonstrated by the following IMPAIRMENTS:  Deficits in executive functioning, attention, and memory in addition to decreased word finding and speech intelligibility prevent the pt from relaying medically and safety relevant information in a timely manner in a state of emergency.   Barriers to Therapy: none  Patient's spiritual, cultural and educational needs considered and patient agreeable to plan of care and goals.  Plan:   Continue Plan of Care with focus on improving cognitive-linguistic skills and use of motor speech strategies to enhance speech intelligibility     Iesha Cortes CCC-SLP  12/8/2022

## 2022-12-15 ENCOUNTER — CLINICAL SUPPORT (OUTPATIENT)
Dept: REHABILITATION | Facility: HOSPITAL | Age: 57
End: 2022-12-15
Attending: PSYCHIATRY & NEUROLOGY
Payer: MEDICAID

## 2022-12-15 DIAGNOSIS — R26.89 IMBALANCE: Primary | ICD-10-CM

## 2022-12-15 DIAGNOSIS — R27.8 IMPAIRED GROSS MOTOR COORDINATION: ICD-10-CM

## 2022-12-15 DIAGNOSIS — R27.9 LACK OF COORDINATION: ICD-10-CM

## 2022-12-15 DIAGNOSIS — R41.841 COGNITIVE COMMUNICATION DEFICIT: Primary | ICD-10-CM

## 2022-12-15 DIAGNOSIS — R47.1 DYSARTHRIA: ICD-10-CM

## 2022-12-15 DIAGNOSIS — R41.4 HEMI-INATTENTION: ICD-10-CM

## 2022-12-15 DIAGNOSIS — M62.81 MUSCLE WEAKNESS: Primary | ICD-10-CM

## 2022-12-15 DIAGNOSIS — Z74.1 NEED FOR ASSISTANCE WITH PERSONAL CARE: ICD-10-CM

## 2022-12-15 PROCEDURE — 97110 THERAPEUTIC EXERCISES: CPT | Mod: PN,CQ

## 2022-12-15 PROCEDURE — 92507 TX SP LANG VOICE COMM INDIV: CPT | Mod: PN

## 2022-12-15 PROCEDURE — 97530 THERAPEUTIC ACTIVITIES: CPT | Mod: 59,PN

## 2022-12-15 NOTE — PROGRESS NOTES
"[facility]   Physical Therapy Treatment Note     Name: Sarbjit Brewer Sr.  Clinic Number: [id number]    Therapy Diagnosis:   Encounter Diagnoses   Name Primary?    Imbalance Yes    Mono-inattention     Impaired gross motor coordination      Physician: Steven Krueger MD    Visit Date: 12/15/2022    Physician Orders: PT Eval and Treat   Medical Diagnosis from Referral:   Diagnosis   I63.9 (ICD-10-CM) - Cerebral vascular accident      Evaluation Date: 11/23/2022  Authorization Period Expiration: 12/31/2022  Plan of Care Expiration: 1/27/2022  Visit # / Visits authorized: 3/20 (+eval)  FOTO #: 3/5  PTA Visit #: 1/5     Time In: 8:45 AM  Time Out: 9:30 AM  Total Billable Time: 45 minutes (3TE - [facility])    Precautions: Standard, Fall    SUBJECTIVE     Patient reports: that he does try to walk short distances at home without cane.  He agree to be fully complaint with AD until PT gives him the OK.   He  will be  compliant with home exercise program.  Response to previous treatment: Last session was initial evaluation  Functional change: Last session was initial evaluation    Pain: 0/10  Location: N/A - patient denies pain today    OBJECTIVE     Objective Measures updated at progress report unless specified.     Treatment     Sarbjit received the treatments listed below:      therapeutic exercises to develop strength, endurance, ROM, and flexibility for 15 minutes including:  - Gastroc Fitter stretch 2x45"  - Hamstring stretch 2x45" B with upper extremity support  - Stepper bike with bilateral upper extremity / bilateral lower extremity use (left hand wrapped) x 8 minutes level 3.5    neuromuscular re-education activities to improve: Balance, Coordination, Kinesthetic, Sense, and Proprioception for 30 minutes. The following activities were included:  -- forward ladder stepping  3 x 10 feet each way without AD contact guard assist   -- side ladder stepping   3 x  10 feet each way without contact " guard assist   -- Sit to stands    2 x 10 without UE support from standard chair with cues for forward scoot and weight shift  -- Side stepping   6 x 10 feet in // bars with 1 UE support RED THERABAND  -- [employer] weaving   4 x 25 feet without AD with contact guard assist   -- Toe taps    3 x 30'' without UE support and contact guard assist    -- Ambulation 250 feet without AD with contact guard assist to improve stability during walking.     Patient Education and Home Exercises     Home Exercises Provided and Patient Education Provided     Education provided:   - Purpose of PT intervention    Written Home Exercises Provided: Patient instructed to cont prior HEP. Exercises were reviewed and Sarbjit was able to demonstrate them prior to the end of the session.  Sarbjit demonstrated good  understanding of the education provided. See EMR under Patient Instructions for exercises provided during therapy sessions    ASSESSMENT     Sarbjit arrived to session without any complaints of pain and was agreeable to treatment.  Session consisted of BLE strengthening and dynamic balance training.  Any in clinic ambulation was completed without AD and with CGA.  Heavy verbal cuing for task reinforcement this session with patient often stopping a task and needing reminders to continue to the intended quantity of reps or sets.  No loss of balance this session though decreased safety awareness observed throughout session.  He would benefit from continued PT services to achieve goals established at evaluation.    Sarbjit Is progressing well towards his goals.   Patient prognosis is Good.     Patient will continue to benefit from skilled outpatient physical therapy to address the deficits listed in the problem list box on initial evaluation, provide pt/family education and to maximize pt's level of independence in the home and community environment.     Patient's spiritual, cultural and educational needs considered and pt agreeable to plan of care  and goals.     Anticipated barriers to physical therapy: CEREBROVASCULAR ACCIDENT x 3, decreased left attention, impaired motor planning and control, fall risk, sedentary lifestyle    Goals:     Short Term Goals (4 Weeks):     1.  Independent with initial HEP. (PROGRESSING, NOT MET)  2.  Pt will perform nu-step at level 4 x 8 minutes without rests breaks going at least 50 step/min or greater.  (PROGRESSING, NOT MET)  3. Pt will be able to perform TUG in 25 secs with use of AD placingdemonstrating overall improved functional mobility.  (PROGRESSING, NOT MET)  4. Pt will performed sit to stand x 5 without UE support in 14 seconds without LOB backward or posterior LE hooking for functional and safe transfers.  (PROGRESSING, NOT MET)  5.  Pt will score greater than or equal to 12/24 on the DGI test/assessment without use of AD demonstrating overall improved functional mobility and balance and reduce fall risk.  (PROGRESSING, NOT MET)     Long Term Goals (8 Weeks):      1. Pt will be able to perform TUG in 23 secs with use of AD placingdemonstrating overall improved functional mobility.  (PROGRESSING, NOT MET)  2.  Pt will score greater than or equal to 16/24 on the DGI test/assessment without use of AD demonstrating overall improved functional mobility and balance and reduce fall risk.  (PROGRESSING, NOT MET)  3. Pt will walk < than or equal to 800 ft on the 6 minute walk test indoor with AD with 0 LOB and minimal gait deviations for improved safety in home ambulation and safety.  (PROGRESSING, NOT MET)  4.  Pt will be able to safely perform and tolerate high level ADL's without LOB. (PROGRESSING, NOT MET)  5. Pt will have 0 falls from start of PT sessions. (PROGRESSING, NOT MET)  6. Independent with updated HEP.  (PROGRESSING, NOT MET)  7. Pt will ambulate on TM x 3 minutes with use of UE support with 0 LOB at 0.9 mph. (PROGRESSING, NOT MET)  8. Pt will perform floor to stand f/b stand to floor transfer B UE support  with stand by assist and no cues for improved dynamic transfer, core stability and fall safety in home and community. (PROGRESSING, NOT MET)  9. Pt will begin some form of community fitness to begin regular and consistent performance of exercise to continue maintenance of gains made in PT. (PROGRESSING, NOT MET)    PLAN     Continue to progress as per plan of care; reinforce HEP with wife assisting    Venus Story, PTA

## 2022-12-15 NOTE — PROGRESS NOTES
:[facility]  Speech Therapy Treatment Note- Neurological Rehabilitation  Date: 12/16/2022     Name: Sarbjit Brewer Sr.   MRN: [id number]   Therapy Diagnosis:   Encounter Diagnoses   Name Primary?    Cognitive communication deficit Yes    Dysarthria        Physician: Steven Krueger MD  Physician Orders: [id number] - AMB REFERRAL/CONSULT TO SPEECH THERAPY  Medical Diagnosis: I63.9 (ICD-10-CM) - Cerebral vascular accident    Visit #/ Visits Authorized: 5/ 20 (plus evaluation)  Date of Evaluation:  11/21/22  Insurance Authorization Period: 11/24/22 to 12/31/22  Plan of Care Expiration Date:    2/3/22  Extended Plan of Care:  none   Progress Note: due 12/21/22   Visits Cancelled: 0  Visits No Show: 0    Time In:  8:50 am   Time Out:  9:30 am  Total Billable Time: 40   minutes      Precautions: Standard  Subjective:   Patient reports:  that he would like to understand why he had a stroke.   He was compliant to home exercise program .  Response to previous treatment: positive    Pain Scale:  0/10 on a Visual Analog Scale currently.   Pain Location: none indicated    Objective:      UNTIMED  Procedure Min .   Speech- Language- Voice Therapy  40    Total Timed Units: 0  Total Untimed Units: 1  Charges Billed/Number of units: 1    Short Term Goals: (4 weeks) Current Progress:   Patient will complete testing using the CLQT. Goal Met 12/7/2022     2. Pt will rate his speech while reading as at least a 3 on a 3 point scale ( 1 = same as before beginning therapy, 2 =better but not best, 3 =best possible outcome) on  7 /10 trials.       Progressing/ Not Met 12/16/2022   Not formally addressed     3. Pt will differentiate between his speech and error-free speech by giving specific examples of differences on  7 /10 trials.     Progressing/ Not Met 12/16/2022   Not formally addressed     4. Pt will use motor speech strategies and answer questions in conversation with a self-rating of at least 3 on a 3 point scale ( 1 =  "same as before beginning therapy, 2 =better but not best, 3 =best possible outcome) on  7 /10 trials.        Progressing/ Not Met 12/16/2022   Patient indicated that while his speech was intelligible, it was not "smooth"  Patient describing stuttering/dysfluency however this was not  observed by clinician today (previously observed in the past)   5. Patient will complete word finding tasks with semantic relationships (I.e. similarities and differences, synonyms/antonyms, semantic category, Semantic Feature Analysis) with 90% accuracy given Min cues to improve word finding skills.     Progressing/Not Met 12/16/2022  Not formally addressed     6. Patient will recall visual and/or auditory information using memory strategies with 90% given Min cues to improve memory skills.    Progressing/Not Met 12/16/2022  Memory strategies discussed at length   7. Patient will complete mental manipulation/working memory tasks with 80% accuracy given Min cues to improve immediate memory skills.    Progressing/Not Met 12/16/2022  25% accuracy independently; 50% accuracy given moderate cues; 75% accuracy given max cues    Max cues required to attend to full stimuli    8. Patient will complete selective attention tasks with 90% acc independently to improve attention skills.    Progressing/Not Met 12/16/2022  Constant Therapy symbol matching level 3: 62% given max cues     Moderate cues to attend to task   9. Patient will complete simple executive function tasks (I.e. functional scheduling, problem-solving/reasoning, goal/plan/action/review) with 90% accuracy given Min cues to improve executive functioning skills.     Progressing/Not Met 12/16/2022  ordering 4 step functional tasks in correct sequential order: 75% accuracy independently; 100% accuracy given minimum cues      Future goals: Topic maintenance. Decrease tangents.    Patient Education/Response:   Skilled education provided regarding:  - word finding strategies and memory " strategies  - stroke education and prevention   Patient verbalized understanding of all discussed.     Home program established: yes. Memory and attention strategies at home.   Exercises were reviewed and Sarbjit was able to demonstrate them prior to the end of the session.  Sarbjit demonstrated good  understanding of the education provided.     See Electronic Medical Record under Patient Instructions for exercises provided throughout therapy.  Assessment:   Sarbjit is progressing well towards his goals. Given frequent redirection cues, Sarbjit participated well today in today's session which focused on memory, sustained attention, problem solving, mental manipulation, education, oral/verbal expression, word finding strategies, and Dysarthria strategies. Patient consistently required moderate to max cues to complete simple working memory tasks and simple selective attention tasks. Min cues required for simple executive functioning tasks. Cognitive, Physical, and Emotional fatigue was not believed to have been a barrier to the session. To date, Sarbjit has met 1 goals.   Current goals remain appropriate. Goals to be updated as necessary.     Patient prognosis is Fair to good. Patient will continue to benefit from skilled outpatient speech and language therapy to address the deficits listed in the problem list on initial evaluation, provide patient/family education and to maximize patient's level of independence in the home and community environment.   Medical necessity is demonstrated by the following IMPAIRMENTS:  Deficits in executive functioning, attention, and memory in addition to decreased word finding and speech intelligibility prevent the pt from relaying medically and safety relevant information in a timely manner in a state of emergency.   Barriers to Therapy: none  Patient's spiritual, cultural and educational needs considered and patient agreeable to plan of care and goals.  Plan:   Continue Plan of Care with focus  on improving cognitive-linguistic skills and use of motor speech strategies to enhance speech intelligibility.      NEO Crocker, L-SLP, CCC-SLP  Speech Language Pathologist   12/16/2022

## 2022-12-15 NOTE — PROGRESS NOTES
"[facility]  Speech Therapy Treatment Note- Neurological Rehabilitation  Date: 12/15/2022     Name: Sarbjit Brewer Sr.   MRN: [id number]   Therapy Diagnosis:   Encounter Diagnoses   Name Primary?    Cognitive communication deficit Yes    Dysarthria        Physician: Steven Krueger MD  Physician Orders: [id number] - AMB REFERRAL/CONSULT TO SPEECH THERAPY  Medical Diagnosis: I63.9 (ICD-10-CM) - Cerebral vascular accident    Visit #/ Visits Authorized: 4/ 20 (plus evaluation)  Date of Evaluation:  11/21/22  Insurance Authorization Period: 11/24/22 to 12/31/22  Plan of Care Expiration Date:    2/3/22  Extended Plan of Care:  none   Progress Note: due 12/21/22   Visits Cancelled: 0  Visits No Show: 0    Time In:  9:30 pm   Time Out: 10:15 pm  Total Billable Time: 45 minutes      Precautions: Standard  Subjective:   Patient reports: pleasant; no complaints. "I don't stutter too much anymore because I don't guardado. I take my time."   He was compliant to home exercise program .  Response to previous treatment: positive    Pain Scale:  0/10 on a Visual Analog Scale currently.   Pain Location: none indicated    Objective:      UNTIMED  Procedure Min.   Speech- Language- Voice Therapy  45   Total Timed Units: 0  Total Untimed Units: 1  Charges Billed/Number of units: 1    Short Term Goals: (4 weeks) Current Progress:   Patient will complete testing using the CLQT. Goal Met 12/7/2022     2. Pt will rate his speech while reading as at least a 3 on a 3 point scale ( 1 = same as before beginning therapy, 2 =better but not best, 3 =best possible outcome) on  7 /10 trials.       Progressing/ Not Met 12/15/2022   Difficulty reading the words; reported that the words are blurry. Provided reading glasses; some improvement noted.     Pt was turning the cards upside down in attempt to read them.     "I'm not stuttering as much as I do when I get hyper."     Rated his speech while reading a 1/3 on 2/5 trials.   Rated his " "speech while reading a 2/3 on 3/5 trials.    3. Pt will differentiate between his speech and error-free speech by giving specific examples of differences on  7 /10 trials.     Progressing/ Not Met 12/15/2022   Patient with decreased awareness to speech deficits and improvement with use of mirror used for biofeedback    Consistent tangents and poor topic maintenance.    4. Pt will use motor speech strategies and answer questions in conversation with a self-rating of at least 3 on a 3 point scale ( 1 = same as before beginning therapy, 2 =better but not best, 3 =best possible outcome) on  7 /10 trials.        Progressing/ Not Met 12/15/2022   Kept saying "my speech is the same."  "No it's not better."   5. Patient will complete word finding tasks with semantic relationships (I.e. similarities and differences, synonyms/antonyms, semantic category, Semantic Feature Analysis) with 90% accuracy given Min cues to improve word finding skills.     Progressing/Not Met 12/15/2022  Patient completed simple similarities x 5 with 100% acc Independently   Pt identified differences x 5 with 60% acc Independently     Reviewed word finding strategies.    6. Patient will recall visual and/or auditory information using memory strategies with 90% given Min cues to improve memory skills.    Progressing/Not Met 12/15/2022  Not addressed in today's session.    7. Patient will complete mental manipulation/working memory tasks with 80% accuracy given Min cues to improve immediate memory skills.    Progressing/Not Met 12/15/2022  -Not addressed this session.    8. Patient will complete selective attention tasks with 90% acc independently to improve attention skills.    Progressing/Not Met 12/15/2022  Max A   9. Patient will complete simple executive function tasks (I.e. functional scheduling, problem-solving/reasoning, goal/plan/action/review) with 90% accuracy given Min cues to improve executive functioning skills.     Progressing/Not Met " 12/15/2022  Organizing task: sorting objects with max A. Step by step guidance.     Sorting pictures into two categories with 10/11 acc given min A.      Future goals: Topic maintenance. Decrease tangents.    Patient Education/Response:   Skilled education provided regarding:  - clear speech strategies  -memory strategies  -general stroke education and prevention      Home program established: yes. Memory and attention strategies at home.   Exercises were reviewed and Sarbjit was able to demonstrate them prior to the end of the session.  Sarbjit demonstrated good  understanding of the education provided.     See Electronic Medical Record under Patient Instructions for exercises provided throughout therapy.  Assessment:   Sarbjit is progressing well towards his goals. Patient with significant need to redirection throughout the session. Pt reports that he doesn't stutter much anymore because he goes slow; using strategies.  Able to independently identify similiaries; some difficulty noted with differences.  Reading was a problem today; reported blurred vision; turned the cards upside and tried to read them.  Accurately read 3/7 cards with reading glasses on.  Current goals remain appropriate. Goals to be updated as necessary.     Patient prognosis is Fair to good. Patient will continue to benefit from skilled outpatient speech and language therapy to address the deficits listed in the problem list on initial evaluation, provide patient/family education and to maximize patient's level of independence in the home and community environment.   Medical necessity is demonstrated by the following IMPAIRMENTS:  Deficits in executive functioning, attention, and memory in addition to decreased word finding and speech intelligibility prevent the pt from relaying medically and safety relevant information in a timely manner in a state of emergency.   Barriers to Therapy: none  Patient's spiritual, cultural and educational needs  considered and patient agreeable to plan of care and goals.  Plan:   Continue Plan of Care with focus on improving cognitive-linguistic skills and use of motor speech strategies to enhance speech intelligibility.      Iesha Cortes CCC-SLP  12/15/2022

## 2022-12-15 NOTE — PROGRESS NOTES
"Occupational Therapy Daily Treatment Note     Name: Sarbjit Brewer .  Clinic Number: [id number]    Therapy Diagnosis:   Encounter Diagnoses   Name Primary?    Muscle weakness Yes    Lack of coordination     Need for assistance with personal care        Physician: Steven Krueger MD    Visit Date: 12/15/2022    Physician Orders: OT eval and treat   Medical Diagnosis: I63.9 (ICD-10-CM) - Cerebral vascular accident   Evaluation Date: 11/23/2022  Plan of Care Expiration Period: 1/20/2022  Insurance Authorization period Expiration: 12/31/2022  Visit # / Visits Authorized: 2 / 20  FOTO: 11/23/2022     Time In: 10:30 am  Time Out: 11:10 am  Total Billable (one on one) Time: 40 minutes     Precautions:  Standard and Fall    Subjective     Pt reports: " My shoulder is hurting a lot"  he was compliant with home exercise program given last session.   Response to previous treatment: fair   Functional change: increased pain in shoulder     Pain: 5/10  Location: left arms     Objective   Physical Exam:  Postural examination/scapula alignment: Rounded shoulder and Head forward  Joint integrity: Firm end feeling  Skin integrity: skin is intact on eval   Edema: Mild edema noted in left hand   Palpation: no pain with palpation      Joint Evaluation  [facility]   11/23/2022 AROM  11/23/2022 MMS   11/23/2022     Right Left Left   Scapular Elevation 5/5   3-/5   Scapular Retraction 5/5   3-/5   Shoulder Flex 0-180 5/5 38 2-/5   Shoulder Extension 0-80 5/5 50 2/5   Shoulder Abd 0-180 5/5   2-/5   Shoulder ER 0-90 5/5 28 2-/5   Shoulder IR 0-90 5/5 hip 2-/5   Shoulder Horizontal adduction 0-90 5/5   2-/5   Elbow Flex/Ext 0-150 5/5 119 2/5   Wrist Flex 0-80 5/5 57 2-/5   Wrist Ext 0-70 5/5 20 2-/5   Supination 0-80 5/5   2/5   Pronation 0-80 5/5   2/5   Ulnar Deviation 5/5   2-/5   Radial Deviation  5/5   2-/5   *WNL - Within Normal Limits  *NT = Not Tested     Fist: loose left hand       Strength: (LEE ANN Dynamometer in lbs.) Average 3 " trials, Position II:       11/23/2022 11/23/2022   Rung II Right Left   Trial 1 73# 1#   Trial 2 57# 1#   Trial 3 55# 2#   Average 61.6# 1.3#      Normal  Average Values  Female Right Left Male: Right Left   20-29 66 lbs 62 lbs         94 lbs 86 lbs   30-39 68 lbs 64 lbs 90 lbs 82 lbs   40-49 64 lbs 62 lbs 80 lbs 74 lbs   50-59 62 lbs 57 lbs 72 lbs 65 lbs   60-69 53 lbs 51 lbs 63 lbs 56 lbs   70+ 44 lbs 42 lbs 54 lbs 48 lbs   SD = +/- 19lbs         Pinch Strength (Measured in lbs)       11/23/2022 11/23/2022     Right Left   Key Pinch 19 # 4 #   3pt Pinch 14 # unable   2pt Pinch 10 # unable         Fine/Gross Motor Coordination     Assessment   Right   11/23/2022 Left  11/23/2022   9 hole peg test 9 pegs in/out for time WNL Unable    DAYSI (Rapid Alternating Movements)      intact    impaired - severe   Finger to Nose (5 times)   intact impaired - severe   Finger Flicks (coordination moving from digit flexion to digit extension)   intact impaired - severe   *WNL - Within Normal Limits  *NT = Not Tested     Tone:  Modified Erasmo Scale:   1-  Slight increase in muscle tone, manifested by a catch and release or by minimal resistance at the end of the range of motino when the affected part(s) is moved in flexion or extension     Sensation:  Sarbjit  reports numbness on left side.       Sensation Intact  Impaired Comments    Adrian Dustin  Deep Touch (6.65) []  [x]       Protective Sensation (4.56) []  [x]       Light Touch (3.61) []  [x]                  Other Kinesthesia  []  [x]       Temperature []  []       Stereognosis  []  []         Balance:   Static Sit - GOOD+: Takes MAXIMAL challenges from all directions.    Dynamic sit- GOOD+: Takes MAXIMAL challenges from all directions.    Static Stand - FAIR: Maintains without assist, but unable to take any challenges   Dynamic stand - FAIR: Maintains without assist, but unable to take any challenges      Endurance Deficit: waldemar Schaffer participated in neuromuscular  re-education activities to improve: Coordination, Kinesthetic, Sense, Proprioception, and Posture for 40 minutes. The following activities were included:  - Upper Body Ergometer (UBE) L2 for 6 minutes to provide proprioceptive awareness, postural stability, strength, activity tolerance, and reciprocal patterns with bimanual coordination completed to improve use of bilateral upper extremities in order to prepare for therapeutic exercises/activities. Cues provided as needed for postural stability/positioning. Use of wrap to assist with left  due to weakness.  - supine shoulder flexion/extension 1# dowel 2x10   - supine chest press 1# dowel 2x10   - supine external rotation 1# dowel 2x10  - weightbearing with therapy ball x10   - active assisted range of motion shoulder flexion with therapy ball x10   - bimanual coordination activity with pt education on hygiene     Home Exercises and Education Provided     Education provided:   - SPROM home exercise program initiated  - Progress towards goals     Written Home Exercises Provided: yes.  Exercises were reviewed and Sarbjit was able to demonstrate them prior to the end of the session.  Sarbjit demonstrated fair  understanding of the HEP provided.   .   See EMR under Patient Instructions for exercises provided 12/1/2022.        Assessment     Sarbjit tolerated today's session well. Interventions focused on joint mobility, muscle elongation, and bimanual coordination to improve function and decrease pain in left shoulder. Sarbjit required the use of a strap to maintain static grasp on UBE and dowel due to weakness. Therapist present throughout all exercises to provide stability/support in order to decrease risk of injury. Noted improvement with volitional movement from patient and decreased pain as exercises progressed. Therapist educated about importance of hygiene to prevent skin breakdown and risk of infection. He complete bimanual coordination activity end of session with  decreased pain and increased range end of session. Overall good response to treatment, max cues throughout due to cognitive deficits.     Sarbjit is progressing well towards his goals and there are no updates to goals at this time. Pt prognosis is Good.     Pt will continue to benefit from skilled outpatient occupational therapy to address the deficits listed in the problem list on initial evaluation provide pt/family education and to maximize pt's level of independence in the home and community environment.     Anticipated barriers to occupational therapy: cognitive impairments     Pt's spiritual, cultural and educational needs considered and pt agreeable to plan of care and goals.    Goals:   Short Term Goals: 4 weeks  - Pt will be independent with Home Exercise Program (HEP)/Home Activity Program (HAP) and report at least 50% compliance. Not met, progressing  - Pt will demonstrate greater than or equal to 90 degrees of active shoulder flexion with LUE to increase independence with dressing and overhead reaching tasks. Not met, progressing  - Pt will increase L  strength to 15# or more to improve participation with ADL and IADL tasks. Not met, progressing  - Pt will increase left pinch strength, in all 3 conditions (lateral, 3pt, 2pt), by 2 psi to improve performance with cooking tasks, home management. Not met, progressing  - Pt will complete Box and Blocks Test with left UE, transferring 5 blocks, to demonstrate improved gross manual coordination needed for ADL and IADL tasks. Not met, progressing  - Pt will identify 3 or more compensatory strategies and/or pieces of adaptive equipment to assist with home care and other IADL tasks. Not met, progressing     Long Term Goals: 8 weeks  - Pt will reduce limitation score on FOTO by less than or equal to 30 to demonstrate an increase in self-perceived functional performance. Not met, progressing  - Pt will be SBA with upper body dressing with use of adaptive  equipment/techniques as needed in order to increase independence ans decrease CG burden. Not met, progressing  - Pt will complete Box and Blocks Test with left UE, transferring 10 blocks, to demonstrate improved gross manual coordination needed for ADL and IADL tasks. Not met, progressing  - Pt will report return to meaningful activities using left upper extremity in order to improve quality of life. Not met, progressing     Plan   Certification Period/Plan of care expiration: 11/23/2022 to 1/20/2023.     Outpatient Occupational Therapy 2 times weekly for 8 weeks to include the following interventions: Electrical Stimulation NMES/TENs, Fluidotherapy, Manual Therapy, Moist Heat/ Ice, Neuromuscular Re-ed, Orthotic Management and Training, Paraffin, Patient Education, Self Care, Therapeutic Activities, and Therapeutic Exercise.     Updates/Grading for next session: continue with POC Corinne Rapier, OT

## 2022-12-16 ENCOUNTER — CLINICAL SUPPORT (OUTPATIENT)
Dept: REHABILITATION | Facility: HOSPITAL | Age: 57
End: 2022-12-16
Payer: MEDICAID

## 2022-12-16 DIAGNOSIS — R26.89 IMBALANCE: Primary | ICD-10-CM

## 2022-12-16 DIAGNOSIS — Z74.1 NEED FOR ASSISTANCE WITH PERSONAL CARE: ICD-10-CM

## 2022-12-16 DIAGNOSIS — R47.1 DYSARTHRIA: ICD-10-CM

## 2022-12-16 DIAGNOSIS — R27.8 IMPAIRED GROSS MOTOR COORDINATION: ICD-10-CM

## 2022-12-16 DIAGNOSIS — R41.4 HEMI-INATTENTION: ICD-10-CM

## 2022-12-16 DIAGNOSIS — R27.9 LACK OF COORDINATION: ICD-10-CM

## 2022-12-16 DIAGNOSIS — M62.81 MUSCLE WEAKNESS: Primary | ICD-10-CM

## 2022-12-16 DIAGNOSIS — R41.841 COGNITIVE COMMUNICATION DEFICIT: Primary | ICD-10-CM

## 2022-12-16 PROCEDURE — 97110 THERAPEUTIC EXERCISES: CPT | Mod: PN,CQ

## 2022-12-16 PROCEDURE — 97530 THERAPEUTIC ACTIVITIES: CPT | Mod: 59,PN

## 2022-12-16 PROCEDURE — 92507 TX SP LANG VOICE COMM INDIV: CPT | Mod: PN | Performed by: SPEECH-LANGUAGE PATHOLOGIST

## 2022-12-16 NOTE — PROGRESS NOTES
"Occupational Therapy Daily Treatment Note     Name: Sarbjit Brewer .  Clinic Number: [id number]    Therapy Diagnosis:   Encounter Diagnoses   Name Primary?    Muscle weakness Yes    Lack of coordination     Need for assistance with personal care        Physician: Steven Krueger MD    Visit Date: 12/16/2022    Physician Orders: OT eval and treat   Medical Diagnosis: I63.9 (ICD-10-CM) - Cerebral vascular accident   Evaluation Date: 11/23/2022  Plan of Care Expiration Period: 1/20/2022  Insurance Authorization period Expiration: 12/31/2022  Visit # / Visits Authorized: 3 / 20  FOTO: 11/23/2022     Time In: 8:00 am  Time Out: 8:45 am  Total Billable (one on one) Time: 45 minutes     Precautions:  Standard and Fall    Subjective     Pt reports: " My arm feels better after yesterday. Its sore"  he was compliant with home exercise program given last session.   Response to previous treatment: fair   Functional change: decreased pain in shoulder     Pain: 0/10  Location: left arms     Objective   Physical Exam:  Postural examination/scapula alignment: Rounded shoulder and Head forward  Joint integrity: Firm end feeling  Skin integrity: skin is intact on eval   Edema: Mild edema noted in left hand   Palpation: no pain with palpation      Joint Evaluation  MMS   11/23/2022 AROM  11/23/2022 MMS   11/23/2022     Right Left Left   Scapular Elevation 5/5   3-/5   Scapular Retraction 5/5   3-/5   Shoulder Flex 0-180 5/5 38 2-/5   Shoulder Extension 0-80 5/5 50 2/5   Shoulder Abd 0-180 5/5   2-/5   Shoulder ER 0-90 5/5 28 2-/5   Shoulder IR 0-90 5/5 hip 2-/5   Shoulder Horizontal adduction 0-90 5/5   2-/5   Elbow Flex/Ext 0-150 5/5 119 2/5   Wrist Flex 0-80 5/5 57 2-/5   Wrist Ext 0-70 5/5 20 2-/5   Supination 0-80 5/5   2/5   Pronation 0-80 5/5   2/5   Ulnar Deviation 5/5   2-/5   Radial Deviation  5/5   2-/5   *WNL - Within Normal Limits  *NT = Not Tested     Fist: loose left hand       Strength: (LEE ANN Dynamometer in lbs.) " Average 3 trials, Position II:       11/23/2022 11/23/2022   Rung II Right Left   Trial 1 73# 1#   Trial 2 57# 1#   Trial 3 55# 2#   Average 61.6# 1.3#      Normal  Average Values  Female Right Left Male: Right Left   20-29 66 lbs 62 lbs         94 lbs 86 lbs   30-39 68 lbs 64 lbs 90 lbs 82 lbs   40-49 64 lbs 62 lbs 80 lbs 74 lbs   50-59 62 lbs 57 lbs 72 lbs 65 lbs   60-69 53 lbs 51 lbs 63 lbs 56 lbs   70+ 44 lbs 42 lbs 54 lbs 48 lbs   SD = +/- 19lbs         Pinch Strength (Measured in lbs)       11/23/2022 11/23/2022     Right Left   Key Pinch 19 # 4 #   3pt Pinch 14 # unable   2pt Pinch 10 # unable         Fine/Gross Motor Coordination     Assessment   Right   11/23/2022 Left  11/23/2022   9 hole peg test 9 pegs in/out for time WNL Unable    DAYSI (Rapid Alternating Movements)      intact    impaired - severe   Finger to Nose (5 times)   intact impaired - severe   Finger Flicks (coordination moving from digit flexion to digit extension)   intact impaired - severe   *WNL - Within Normal Limits  *NT = Not Tested     Tone:  Modified Erasmo Scale:   1-  Slight increase in muscle tone, manifested by a catch and release or by minimal resistance at the end of the range of motino when the affected part(s) is moved in flexion or extension     Sensation:  Sarbjit  reports numbness on left side.       Sensation Intact  Impaired Comments    White Dustin  Deep Touch (6.65) []  [x]       Protective Sensation (4.56) []  [x]       Light Touch (3.61) []  [x]                  Other Kinesthesia  []  [x]       Temperature []  []       Stereognosis  []  []         Balance:   Static Sit - GOOD+: Takes MAXIMAL challenges from all directions.    Dynamic sit- GOOD+: Takes MAXIMAL challenges from all directions.    Static Stand - FAIR: Maintains without assist, but unable to take any challenges   Dynamic stand - FAIR: Maintains without assist, but unable to take any challenges      Endurance Deficit: moderate    Sarbjit participated in  neuromuscular re-education activities to improve: Coordination, Kinesthetic, Sense, Proprioception, and Posture for 40 minutes. The following activities were included:  - Upper Body Ergometer (UBE) L2 for 6 minutes to provide proprioceptive awareness, postural stability, strength, activity tolerance, and reciprocal patterns with bimanual coordination completed to improve use of bilateral upper extremities in order to prepare for therapeutic exercises/activities. Cues provided as needed for postural stability/positioning. Use of wrap to assist with left  due to weakness.  - supine shoulder flexion/extension 1# dowel 2x10   - supine chest press 1# dowel 2x10   - supine external rotation 1# dowel 2x10  - supine scap protraction/retraction 2x10   - supine triceps extension x10   - weightbearing with therapy ball x10 with scap rotation   - active assisted range of motion shoulder flexion with therapy ball x10   - weightbearing edge of mat x10 with wrist extension   - self passive range of motion with wrist and digit extension     Home Exercises and Education Provided     Education provided:   - SPROM home exercise program initiated  - Progress towards goals     Written Home Exercises Provided: yes.  Exercises were reviewed and Sarbjit was able to demonstrate them prior to the end of the session.  Sarbjit demonstrated fair  understanding of the HEP provided.   .   See EMR under Patient Instructions for exercises provided 12/1/2022.        Assessment     Sarbjit tolerated today's session well. Interventions focused on joint mobility, muscle elongation, and weightbearing in right upper extremity to maximize re-education and promote use with daily tasks. He reported soreness throughout exercises and required tactile/physical cues/support at left  wrist and elbow to maximize safety with movements and promote normal muscle patterns. Noted increased fatigue during triceps extension supine and increased range with target provided.  Therapist educated on importance of compliance with self passive range of motion home exercise program and increased tone/joint stiffness due to pt reporting pain in left wrist and digits with weightbearing edge of mat. max cues throughout due to cognitive deficits.     Sarbjit is progressing well towards his goals and there are no updates to goals at this time. Pt prognosis is Good.     Pt will continue to benefit from skilled outpatient occupational therapy to address the deficits listed in the problem list on initial evaluation provide pt/family education and to maximize pt's level of independence in the home and community environment.     Anticipated barriers to occupational therapy: cognitive impairments     Pt's spiritual, cultural and educational needs considered and pt agreeable to plan of care and goals.    Goals:   Short Term Goals: 4 weeks  - Pt will be independent with Home Exercise Program (HEP)/Home Activity Program (HAP) and report at least 50% compliance. Not met, progressing  - Pt will demonstrate greater than or equal to 90 degrees of active shoulder flexion with LUE to increase independence with dressing and overhead reaching tasks. Not met, progressing  - Pt will increase L  strength to 15# or more to improve participation with ADL and IADL tasks. Not met, progressing  - Pt will increase left pinch strength, in all 3 conditions (lateral, 3pt, 2pt), by 2 psi to improve performance with cooking tasks, home management. Not met, progressing  - Pt will complete Box and Blocks Test with left UE, transferring 5 blocks, to demonstrate improved gross manual coordination needed for ADL and IADL tasks. Not met, progressing  - Pt will identify 3 or more compensatory strategies and/or pieces of adaptive equipment to assist with home care and other IADL tasks. Not met, progressing     Long Term Goals: 8 weeks  - Pt will reduce limitation score on FOTO by less than or equal to 30 to demonstrate an increase in  self-perceived functional performance. Not met, progressing  - Pt will be SBA with upper body dressing with use of adaptive equipment/techniques as needed in order to increase independence ans decrease CG burden. Not met, progressing  - Pt will complete Box and Blocks Test with left UE, transferring 10 blocks, to demonstrate improved gross manual coordination needed for ADL and IADL tasks. Not met, progressing  - Pt will report return to meaningful activities using left upper extremity in order to improve quality of life. Not met, progressing     Plan   Certification Period/Plan of care expiration: 11/23/2022 to 1/20/2023.     Outpatient Occupational Therapy 2 times weekly for 8 weeks to include the following interventions: Electrical Stimulation NMES/TENs, Fluidotherapy, Manual Therapy, Moist Heat/ Ice, Neuromuscular Re-ed, Orthotic Management and Training, Paraffin, Patient Education, Self Care, Therapeutic Activities, and Therapeutic Exercise.     Updates/Grading for next session: continue with POC Corinne Rapier, OT

## 2022-12-16 NOTE — PROGRESS NOTES
"[facility]   Physical Therapy Treatment Note     Name: Sarbjit Brewer Sr.  Clinic Number: [id number]    Therapy Diagnosis:   Encounter Diagnoses   Name Primary?    Imbalance Yes    Mono-inattention     Impaired gross motor coordination        Physician: Steven Krueger MD    Visit Date: 12/16/2022    Physician Orders: PT Eval and Treat   Medical Diagnosis from Referral:   Diagnosis   I63.9 (ICD-10-CM) - Cerebral vascular accident      Evaluation Date: 11/23/2022  Authorization Period Expiration: 12/31/2022  Plan of Care Expiration: 1/27/2022  Visit # / Visits authorized: 4/20 (+eval)  FOTO #: 4/5  PTA Visit #: 2/5     Time In: 9:30 AM  Time Out: 10:15 AM  Total Billable Time: 45 minutes (3TE - [facility])    Precautions: Standard, Fall    SUBJECTIVE     Patient reports: still using his cane at home but doesn't think he needs it.  Has not been compliant with HEP, reprinted this session.  He  will be  compliant with home exercise program.  Response to previous treatment: Last session was initial evaluation  Functional change: Last session was initial evaluation    Pain: 0/10  Location: N/A - patient denies pain today    OBJECTIVE     Objective Measures updated at progress report unless specified.     Treatment     Sarbjit received the treatments listed below:      therapeutic exercises to develop strength, endurance, ROM, and flexibility for 10 minutes including:  - Gastroc Fitter stretch 2x45"  - Hamstring stretch 2x45" B with upper extremity support  - Stepper bike with bilateral upper extremity / bilateral lower extremity use (left hand wrapped) x 8 minutes level 3.5    neuromuscular re-education activities to improve: Balance, Coordination, Kinesthetic, Sense, and Proprioception for 30 minutes. The following activities were included:  -- forward ladder stepping  3 x 10 feet each way without AD contact guard assist   -- side ladder stepping   3 x  10 feet each way without contact guard " assist   -- Sit to stands    2 x 10 without UE support from standard chair with cues for forward scoot and weight shift  -- Side stepping   6 x 10 feet in // bars with 1 UE support [place]  -- Cone weaving   4 x 25 feet without AD with contact guard assist   -- Toe taps    3 x 30'' without UE support and contact guard assist    -- Ambulation 250 feet without AD with contact guard assist to improve stability during walking.     Patient Education and Home Exercises     Home Exercises Provided and Patient Education Provided     Education provided:   - Purpose of PT intervention    Written Home Exercises Provided: Patient instructed to cont prior HEP. Exercises were reviewed and Sarbjit was able to demonstrate them prior to the end of the session.  Sarbjit demonstrated good  understanding of the education provided. See EMR under Patient Instructions for exercises provided during therapy sessions    ASSESSMENT     Sarbjit arrived to session without any complaints of pain and was agreeable to treatment.  Session consisted of BLE strengthening and dynamic balance training.  Any in clinic ambulation was completed without AD and with CGA.  Heavy verbal cuing for task reinforcement this session with patient often stopping a task and needing reminders to continue to the intended quantity of reps or sets.  No loss of balance this session though decreased safety awareness observed throughout session.  HEP was reprinted and reviewed this session, follow up on compliance next session.  He would benefit from continued PT services to achieve goals established at evaluation.    Sarbjit Is progressing well towards his goals.   Patient prognosis is Good.     Patient will continue to benefit from skilled outpatient physical therapy to address the deficits listed in the problem list box on initial evaluation, provide pt/family education and to maximize pt's level of independence in the home and community environment.     Patient's  spiritual, cultural and educational needs considered and pt agreeable to plan of care and goals.     Anticipated barriers to physical therapy: CEREBROVASCULAR ACCIDENT x 3, decreased left attention, impaired motor planning and control, fall risk, sedentary lifestyle    Goals:     Short Term Goals (4 Weeks):     1.  Independent with initial HEP. (PROGRESSING, NOT MET)  2.  Pt will perform nu-step at level 4 x 8 minutes without rests breaks going at least 50 step/min or greater.  (PROGRESSING, NOT MET)  3. Pt will be able to perform TUG in 25 secs with use of AD placingdemonstrating overall improved functional mobility.  (PROGRESSING, NOT MET)  4. Pt will performed sit to stand x 5 without UE support in 14 seconds without LOB backward or posterior LE hooking for functional and safe transfers.  (PROGRESSING, NOT MET)  5.  Pt will score greater than or equal to 12/24 on the DGI test/assessment without use of AD demonstrating overall improved functional mobility and balance and reduce fall risk.  (PROGRESSING, NOT MET)     Long Term Goals (8 Weeks):      1. Pt will be able to perform TUG in 23 secs with use of AD placingdemonstrating overall improved functional mobility.  (PROGRESSING, NOT MET)  2.  Pt will score greater than or equal to 16/24 on the DGI test/assessment without use of AD demonstrating overall improved functional mobility and balance and reduce fall risk.  (PROGRESSING, NOT MET)  3. Pt will walk < than or equal to 800 ft on the 6 minute walk test indoor with AD with 0 LOB and minimal gait deviations for improved safety in home ambulation and safety.  (PROGRESSING, NOT MET)  4.  Pt will be able to safely perform and tolerate high level ADL's without LOB. (PROGRESSING, NOT MET)  5. Pt will have 0 falls from start of PT sessions. (PROGRESSING, NOT MET)  6. Independent with updated HEP.  (PROGRESSING, NOT MET)  7. Pt will ambulate on TM x 3 minutes with use of UE support with 0 LOB at 0.9 mph. (PROGRESSING,  NOT MET)  8. Pt will perform floor to stand f/b stand to floor transfer B UE support with stand by assist and no cues for improved dynamic transfer, core stability and fall safety in home and community. (PROGRESSING, NOT MET)  9. Pt will begin some form of community fitness to begin regular and consistent performance of exercise to continue maintenance of gains made in PT. (PROGRESSING, NOT MET)    PLAN     Continue to progress as per plan of care; reinforce HEP with wife assisting    Venus Story, PTA

## 2022-12-22 ENCOUNTER — CLINICAL SUPPORT (OUTPATIENT)
Dept: REHABILITATION | Facility: HOSPITAL | Age: 57
End: 2022-12-22
Attending: PSYCHIATRY & NEUROLOGY
Payer: MEDICAID

## 2022-12-22 DIAGNOSIS — Z74.1 NEED FOR ASSISTANCE WITH PERSONAL CARE: ICD-10-CM

## 2022-12-22 DIAGNOSIS — R27.9 LACK OF COORDINATION: ICD-10-CM

## 2022-12-22 DIAGNOSIS — R41.4 HEMI-INATTENTION: ICD-10-CM

## 2022-12-22 DIAGNOSIS — R47.1 DYSARTHRIA: ICD-10-CM

## 2022-12-22 DIAGNOSIS — R27.8 IMPAIRED GROSS MOTOR COORDINATION: ICD-10-CM

## 2022-12-22 DIAGNOSIS — R41.841 COGNITIVE COMMUNICATION DEFICIT: Primary | ICD-10-CM

## 2022-12-22 DIAGNOSIS — R26.89 IMBALANCE: Primary | ICD-10-CM

## 2022-12-22 DIAGNOSIS — M62.81 MUSCLE WEAKNESS: Primary | ICD-10-CM

## 2022-12-22 PROCEDURE — 97110 THERAPEUTIC EXERCISES: CPT | Mod: PN,CQ,59

## 2022-12-22 PROCEDURE — 92507 TX SP LANG VOICE COMM INDIV: CPT | Mod: PN

## 2022-12-22 PROCEDURE — 97530 THERAPEUTIC ACTIVITIES: CPT | Mod: PN,59

## 2022-12-22 NOTE — PROGRESS NOTES
":[facility]  Speech Therapy PROGRESS Note- Neurological Rehabilitation  Date: 12/22/2022     Name: Sarbjit Brewer Sr.   MRN: [id number]   Therapy Diagnosis:   Encounter Diagnoses   Name Primary?    Cognitive communication deficit Yes    Dysarthria        Physician: Steven Krueger MD  Physician Orders: [id number] - AMB REFERRAL/CONSULT TO SPEECH THERAPY  Medical Diagnosis: I63.9 (ICD-10-CM) - Cerebral vascular accident    Visit #/ Visits Authorized: 6/ 20 (plus evaluation)  Date of Evaluation:  11/21/22  Insurance Authorization Period: 11/24/22 to 12/31/22  Plan of Care Expiration Date:    2/3/22  Extended Plan of Care:  none   Progress Note: due 12/21/22   Visits Cancelled: 0  Visits No Show: 0    Time In:  11:05 am   Time Out:  11:45 am  Total Billable Time: 40 minutes      Precautions: Standard  Subjective:   Patient reports:  Pt reported. "I need to poo but I can't."  Advised him to speak with his doctor. "I need to see the foot doctor." Advised his wife to call the podiatrist to set up appointment.   He was compliant to home exercise program .  Response to previous treatment: positive    Pain Scale:  0/10 on a Visual Analog Scale currently.   Pain Location: none indicated    Objective:      UNTIMED  Procedure Min .   Speech- Language- Voice Therapy  40   Total Timed Units: 0  Total Untimed Units: 1  Charges Billed/Number of units: 1    Short Term Goals: (4 weeks) Current Progress:   Patient will complete testing using the CLQT. Goal Met 12/7/2022     2. Pt will rate his speech while reading as at least a 3 on a 3 point scale ( 1 = same as before beginning therapy, 2 =better but not best, 3 =best possible outcome) on  7 /10 trials.       Progressing/ Not Met 12/22/2022   2/3 on 7/10 trials   3. Pt will differentiate between his speech and error-free speech by giving specific examples of differences on  7 /10 trials.     Progressing/ Not Met 12/22/2022   Mod A   4. Pt will use motor speech " "strategies and answer questions in conversation with a self-rating of at least 3 on a 3 point scale ( 1 = same as before beginning therapy, 2 =better but not best, 3 =best possible outcome) on  7 /10 trials.        Progressing/ Not Met 12/22/2022   Patient indicated that while his speech was intelligible, it was not "smooth"  Patient describing stuttering/dysfluency. Pt stated this is not his baseline speech.  Going slow is a helpful strategy   5. Patient will complete word finding tasks with semantic relationships (I.e. similarities and differences, synonyms/antonyms, semantic category, Semantic Feature Analysis) with 90% accuracy given Min cues to improve word finding skills.     Progressing/Not Met 12/22/2022  Similarities with 75% acc Independently   Differences with 50% acc Independently   Naming objects with 80% acc Independently      6. Patient will recall visual and/or auditory information using memory strategies with 90% given Min cues to improve memory skills.    Progressing/Not Met 12/22/2022  Memory strategies discussed at length  Max difficulty recalling strategies.     Recall picture with max A     7. Patient will complete mental manipulation/working memory tasks with 80% accuracy given Min cues to improve immediate memory skills.    Progressing/Not Met 12/22/2022  -Not addressed this session.    8. Patient will complete selective attention tasks with 90% acc independently to improve attention skills.    Progressing/Not Met 12/22/2022  -Not addressed this session.    9. Patient will complete simple executive function tasks (I.e. functional scheduling, problem-solving/reasoning, goal/plan/action/review) with 90% accuracy given Min cues to improve executive functioning skills.     Progressing/Not Met 12/22/2022  Organizing office supplies with max A.  Difficulty following directions. Difficulty counting. Difficulty staying on task even when focused on one supply at a time.      Future goals: Topic " maintenance. Decrease tangents.    Patient Education/Response:   Skilled education provided regarding:  - word finding strategies and memory strategies  - stroke education and prevention   Patient verbalized understanding of all discussed.     Home program established: yes. Memory and attention strategies at home.   Exercises were reviewed and Sarbjit was able to demonstrate them prior to the end of the session.  Sarbjit demonstrated good  understanding of the education provided.     See Electronic Medical Record under Patient Instructions for exercises provided throughout therapy.  Assessment:   Sarbjit is progressing well towards his goals. Since initial evaluation, pt has met 1/9 goals.  Given frequent redirection cues, Sarbjit participated well today in today's session which focused on memory, sustained attention, problem solving, education, oral/verbal expression, word finding strategies, and Dysarthria strategies. Patient consistently required moderate to max cues to complete simple working memory tasks and simple selective attention tasks. Min cues required for simple executive functioning tasks. Improvement noted in his speech fluency since initial evaluation.  Cognitive, Physical, and Emotional fatigue was not believed to have been a barrier to the session. To date, Sarbjit has met 1 goals.   Current goals remain appropriate. Goals to be updated as necessary.     Patient prognosis is Fair to good. Patient will continue to benefit from skilled outpatient speech and language therapy to address the deficits listed in the problem list on initial evaluation, provide patient/family education and to maximize patient's level of independence in the home and community environment.   Medical necessity is demonstrated by the following IMPAIRMENTS:  Deficits in executive functioning, attention, and memory in addition to decreased word finding and speech intelligibility prevent the pt from relaying medically and safety relevant  information in a timely manner in a state of emergency.   Barriers to Therapy: none  Patient's spiritual, cultural and educational needs considered and patient agreeable to plan of care and goals.  Plan:   Continue Plan of Care with focus on improving cognitive-linguistic skills and use of motor speech strategies to enhance speech intelligibility.     Pt would like a referral to podiatry.      Iesha Cortes M.S.CCC-SLP  Speech Language Pathologist   12/22/2022

## 2022-12-22 NOTE — PROGRESS NOTES
"[facility]   Physical Therapy Treatment Note     Name: Sarbjit Brewer Sr.  Clinic Number: [id number]    Therapy Diagnosis:   Encounter Diagnoses   Name Primary?    Imbalance Yes    Mono-inattention     Impaired gross motor coordination        Physician: Steven Krueger MD    Visit Date: 12/22/2022    Physician Orders: PT Eval and Treat   Medical Diagnosis from Referral:   Diagnosis   I63.9 (ICD-10-CM) - Cerebral vascular accident      Evaluation Date: 11/23/2022  Authorization Period Expiration: 12/31/2022  Plan of Care Expiration: 1/27/2022  Visit # / Visits authorized: 5/20 (+eval)  FOTO #: 5/5  PTA Visit #: 3/5     Time In: 9:30 AM  Time Out: 10:15 AM  Total Billable Time: 45 minutes (3TE - [facility])    Precautions: Standard, Fall    SUBJECTIVE     Patient reports: still using his cane at home but doesn't think he needs it.  Still not compliant with HEP despite reinforcement previous two sessions.  He was not compliant with home exercise program.  Response to previous treatment: Last session was initial evaluation  Functional change: Last session was initial evaluation    Pain: 0/10  Location: N/A - patient denies pain today    OBJECTIVE     Objective Measures updated at progress report unless specified.     Treatment     Sarbjit received the treatments listed below:      therapeutic exercises to develop strength, endurance, ROM, and flexibility for 10 minutes including:  - Gastroc Fitter stretch 2x45"  - Hamstring stretch 2x45" B with upper extremity support  - Stepper bike with bilateral upper extremity / bilateral lower extremity use (left hand wrapped) x 8 minutes level 3.5    neuromuscular re-education activities to improve: Balance, Coordination, Kinesthetic, Sense, and Proprioception for 30 minutes. The following activities were included:  -- forward ladder stepping  3 x 10 feet each way without AD contact guard assist   -- side ladder stepping   3 x  10 feet each way without " "contact guard assist   -- Sit to stands    2 x 10 without UE support from standard chair with cues for forward scoot and weight shift  -- Side stepping   6 x 10 feet in // bars with 1 UE support RED THERABAND  -- Cone weaving   4 x 25 feet without AD with contact guard assist   -- Toe taps    3 x 30'' without UE support and contact guard assist  -- Cone taps (2) with lateral steps      6 lengths x 8 feet and stand by assist  -- 4" step ups: forward                        2 x 10 B  -- Ambulation 250 feet without AD with contact guard assist to improve stability during walking.     Patient Education and Home Exercises     Home Exercises Provided and Patient Education Provided     Education provided:   - Purpose of PT intervention    Written Home Exercises Provided: Patient instructed to cont prior HEP. Exercises were reviewed and Sarbjit was able to demonstrate them prior to the end of the session.  Sarbjit demonstrated good  understanding of the education provided. See EMR under Patient Instructions for exercises provided during therapy sessions    ASSESSMENT     Sarbjit arrived to session without any complaints of pain and was agreeable to treatment.  Session consisted of BLE strengthening and dynamic balance training.  Patient is still not compliant with HEP despite multiple instances of reinforcement and education provided. Heavy verbal cuing for task reinforcement this session with patient often stopping a task and needing reminders to continue to the intended quantity of reps or sets.  Patient unable to keep track of number of repetitions for each exercise and needs assistance.  No loss of balance this session though decreased safety awareness observed throughout session.  Unable to progress due to lack of any carryover, noncompliance with HEP, and challenge with multi step commands.  He may benefit from continued PT services to achieve goals established at evaluation.    Sarbjit Is progressing well towards his goals. "   Patient prognosis is Good.     Patient will continue to benefit from skilled outpatient physical therapy to address the deficits listed in the problem list box on initial evaluation, provide pt/family education and to maximize pt's level of independence in the home and community environment.     Patient's spiritual, cultural and educational needs considered and pt agreeable to plan of care and goals.     Anticipated barriers to physical therapy: CEREBROVASCULAR ACCIDENT x 3, decreased left attention, impaired motor planning and control, fall risk, sedentary lifestyle    Goals:     Short Term Goals (4 Weeks):     1.  Independent with initial HEP. (PROGRESSING, NOT MET)  2.  Pt will perform nu-step at level 4 x 8 minutes without rests breaks going at least 50 step/min or greater.  (PROGRESSING, NOT MET)  3. Pt will be able to perform TUG in 25 secs with use of AD placingdemonstrating overall improved functional mobility.  (PROGRESSING, NOT MET)  4. Pt will performed sit to stand x 5 without UE support in 14 seconds without LOB backward or posterior LE hooking for functional and safe transfers.  (PROGRESSING, NOT MET)  5.  Pt will score greater than or equal to 12/24 on the DGI test/assessment without use of AD demonstrating overall improved functional mobility and balance and reduce fall risk.  (PROGRESSING, NOT MET)     Long Term Goals (8 Weeks):      1. Pt will be able to perform TUG in 23 secs with use of AD placingdemonstrating overall improved functional mobility.  (PROGRESSING, NOT MET)  2.  Pt will score greater than or equal to 16/24 on the DGI test/assessment without use of AD demonstrating overall improved functional mobility and balance and reduce fall risk.  (PROGRESSING, NOT MET)  3. Pt will walk < than or equal to 800 ft on the 6 minute walk test indoor with AD with 0 LOB and minimal gait deviations for improved safety in home ambulation and safety.  (PROGRESSING, NOT MET)  4.  Pt will be able to  safely perform and tolerate high level ADL's without LOB. (PROGRESSING, NOT MET)  5. Pt will have 0 falls from start of PT sessions. (PROGRESSING, NOT MET)  6. Independent with updated HEP.  (PROGRESSING, NOT MET)  7. Pt will ambulate on TM x 3 minutes with use of UE support with 0 LOB at 0.9 mph. (PROGRESSING, NOT MET)  8. Pt will perform floor to stand f/b stand to floor transfer B UE support with stand by assist and no cues for improved dynamic transfer, core stability and fall safety in home and community. (PROGRESSING, NOT MET)  9. Pt will begin some form of community fitness to begin regular and consistent performance of exercise to continue maintenance of gains made in PT. (PROGRESSING, NOT MET)    PLAN     Continue to progress as per plan of care; reinforce HEP with wife assisting    Venus Story PTA

## 2022-12-22 NOTE — PROGRESS NOTES
"Occupational Therapy Daily Treatment Note     Name: Sarbjit Brewer Sr.  Clinic Number: [id number]    Therapy Diagnosis:   Encounter Diagnoses   Name Primary?    Muscle weakness Yes    Lack of coordination     Need for assistance with personal care      Physician: Steven Krueger MD    Visit Date: 12/22/2022    Physician Orders: OT eval and treat   Medical Diagnosis: I63.9 (ICD-10-CM) - Cerebral vascular accident   Evaluation Date: 11/23/2022  Plan of Care Expiration Period: 1/20/2022  Insurance Authorization period Expiration: 12/31/2022  Visit # / Visits Authorized: 4 / 20  FOTO: 11/23/2022     Time In: 8:58 am  Time Out: 9:30 am  Total Billable (one on one) Time: 32 minutes     Precautions:  Standard and Fall    Subjective     Pt reports: "I haven't been stretching it at home. It hurts when I lie on it"  he was not compliant with home exercise program given last session.   Response to previous treatment: fair   Functional change:  pain and limited range in left shoulder     Pain: 5/10  Location: left arms     Objective   Physical Exam:  Postural examination/scapula alignment: Rounded shoulder and Head forward  Joint integrity: Firm end feeling  Skin integrity: skin is intact on eval   Edema: Mild edema noted in left hand   Palpation: no pain with palpation      Joint Evaluation  MMS   11/23/2022 AROM  11/23/2022 MMS   11/23/2022     Right Left Left   Scapular Elevation 5/5   3-/5   Scapular Retraction 5/5   3-/5   Shoulder Flex 0-180 5/5 38 2-/5   Shoulder Extension 0-80 5/5 50 2/5   Shoulder Abd 0-180 5/5   2-/5   Shoulder ER 0-90 5/5 28 2-/5   Shoulder IR 0-90 5/5 hip 2-/5   Shoulder Horizontal adduction 0-90 5/5   2-/5   Elbow Flex/Ext 0-150 5/5 119 2/5   Wrist Flex 0-80 5/5 57 2-/5   Wrist Ext 0-70 5/5 20 2-/5   Supination 0-80 5/5   2/5   Pronation 0-80 5/5   2/5   Ulnar Deviation 5/5   2-/5   Radial Deviation  5/5   2-/5   *WNL - Within Normal Limits  *NT = Not Tested     Fist: loose left hand       " Strength: (LEE ANN Dynamometer in lbs.) Average 3 trials, Position II:       11/23/2022 11/23/2022   Rung II Right Left   Trial 1 73# 1#   Trial 2 57# 1#   Trial 3 55# 2#   Average 61.6# 1.3#      Normal  Average Values  Female Right Left Male: Right Left   20-29 66 lbs 62 lbs         94 lbs 86 lbs   30-39 68 lbs 64 lbs 90 lbs 82 lbs   40-49 64 lbs 62 lbs 80 lbs 74 lbs   50-59 62 lbs 57 lbs 72 lbs 65 lbs   60-69 53 lbs 51 lbs 63 lbs 56 lbs   70+ 44 lbs 42 lbs 54 lbs 48 lbs   SD = +/- 19lbs         Pinch Strength (Measured in lbs)       11/23/2022 11/23/2022     Right Left   Key Pinch 19 # 4 #   3pt Pinch 14 # unable   2pt Pinch 10 # unable         Fine/Gross Motor Coordination     Assessment   Right   11/23/2022 Left  11/23/2022   9 hole peg test 9 pegs in/out for time WNL Unable    DAYSI (Rapid Alternating Movements)      intact    impaired - severe   Finger to Nose (5 times)   intact impaired - severe   Finger Flicks (coordination moving from digit flexion to digit extension)   intact impaired - severe   *WNL - Within Normal Limits  *NT = Not Tested     Tone:  Modified Erasmo Scale:   1-  Slight increase in muscle tone, manifested by a catch and release or by minimal resistance at the end of the range of motino when the affected part(s) is moved in flexion or extension     Sensation:  Sarbjit  reports numbness on left side.       Sensation Intact  Impaired Comments    Mer[place] Dustin  Deep Touch (6.65) []  [x]       Protective Sensation (4.56) []  [x]       Light Touch (3.61) []  [x]                  Other Kinesthesia  []  [x]       Temperature []  []       Stereognosis  []  []         Balance:   Static Sit - GOOD+: Takes MAXIMAL challenges from all directions.    Dynamic sit- GOOD+: Takes MAXIMAL challenges from all directions.    Static Stand - FAIR: Maintains without assist, but unable to take any challenges   Dynamic stand - FAIR: Maintains without assist, but unable to take any challenges      Endurance Deficit:  moderate    Sarbjit received the following manual therapy techniques: Joint mobilizations, Soft tissue Mobilization were applied to the: left upper extremity for 10 minutes, including:  - passive range of motion, gentle joint distractions,     Sarbjit participated in neuromuscular re-education activities to improve: Coordination, Kinesthetic, Sense, Proprioception, and Posture for 22 minutes. The following activities were included:  - supine shoulder flexion/extension 0# dowel 2x10   - supine chest press 1# dowel 2x10   - supine external rotation 1# dowel 2x10  - weightbearing edge of mat x10 with wrist extension and 5 second hold    Home Exercises and Education Provided     Education provided:   - SPROM home exercise program initiated  - Progress towards goals     Written Home Exercises Provided: yes.  Exercises were reviewed and Sarbjit was able to demonstrate them prior to the end of the session.  Sarbjit demonstrated fair  understanding of the HEP provided.   .   See EMR under Patient Instructions for exercises provided 12/1/2022.        Assessment     Sarbjit tolerated today's session fair. He arrived reporting pain when he lies down and moves his arm and stated he has not been moving it too much. Noted significant tone and decreased range of motion since last session during manual therapy. Therapist performed passive range of motion and gentle manual techniques within his pain tolerance but limited. Active assisted range of motion exercises completed supine to allow for patient autonomy. Noted decreased reports of pain as exercises progressed. Therapist continued to educate throughout session about the importance of compliance with home exercise program issued and tone with stroke recovery. Limited carry over due to cognitive impairments. Significant wrist stiffness and low tolerance to weightbearing in right hand. He may benefit from a dynamic splint to prevent contractures. Increased time for all exercises due to pain  cherri Schaffer is progressing well towards his goals and there are no updates to goals at this time. Pt prognosis is Fair.     Pt will continue to benefit from skilled outpatient occupational therapy to address the deficits listed in the problem list on initial evaluation provide pt/family education and to maximize pt's level of independence in the home and community environment.     Anticipated barriers to occupational therapy: cognitive impairments     Pt's spiritual, cultural and educational needs considered and pt agreeable to plan of care and goals.    Goals:   Short Term Goals: 4 weeks  - Pt will be independent with Home Exercise Program (HEP)/Home Activity Program (HAP) and report at least 50% compliance. Not met, progressing  - Pt will demonstrate greater than or equal to 90 degrees of active shoulder flexion with LUE to increase independence with dressing and overhead reaching tasks. Not met, progressing  - Pt will increase L  strength to 15# or more to improve participation with ADL and IADL tasks. Not met, progressing  - Pt will increase left pinch strength, in all 3 conditions (lateral, 3pt, 2pt), by 2 psi to improve performance with cooking tasks, home management. Not met, progressing  - Pt will complete Box and Blocks Test with left UE, transferring 5 blocks, to demonstrate improved gross manual coordination needed for ADL and IADL tasks. Not met, progressing  - Pt will identify 3 or more compensatory strategies and/or pieces of adaptive equipment to assist with home care and other IADL tasks. Not met, progressing     Long Term Goals: 8 weeks  - Pt will reduce limitation score on FOTO by less than or equal to 30 to demonstrate an increase in self-perceived functional performance. Not met, progressing  - Pt will be SBA with upper body dressing with use of adaptive equipment/techniques as needed in order to increase independence ans decrease CG burden. Not met, progressing  - Pt will complete  Box and Blocks Test with left UE, transferring 10 blocks, to demonstrate improved gross manual coordination needed for ADL and IADL tasks. Not met, progressing  - Pt will report return to meaningful activities using left upper extremity in order to improve quality of life. Not met, progressing     Plan   Certification Period/Plan of care expiration: 11/23/2022 to 1/20/2023.     Outpatient Occupational Therapy 2 times weekly for 8 weeks to include the following interventions: Electrical Stimulation NMES/TENs, Fluidotherapy, Manual Therapy, Moist Heat/ Ice, Neuromuscular Re-ed, Orthotic Management and Training, Paraffin, Patient Education, Self Care, Therapeutic Activities, and Therapeutic Exercise.     Updates/Grading for next session: continue with POC Corinne Rapier, OT

## 2022-12-29 ENCOUNTER — CLINICAL SUPPORT (OUTPATIENT)
Dept: REHABILITATION | Facility: HOSPITAL | Age: 57
End: 2022-12-29
Attending: PSYCHIATRY & NEUROLOGY
Payer: MEDICAID

## 2022-12-29 DIAGNOSIS — R41.4 HEMI-INATTENTION: ICD-10-CM

## 2022-12-29 DIAGNOSIS — R27.9 LACK OF COORDINATION: ICD-10-CM

## 2022-12-29 DIAGNOSIS — Z74.1 NEED FOR ASSISTANCE WITH PERSONAL CARE: ICD-10-CM

## 2022-12-29 DIAGNOSIS — R26.89 IMBALANCE: Primary | ICD-10-CM

## 2022-12-29 DIAGNOSIS — R47.1 DYSARTHRIA: ICD-10-CM

## 2022-12-29 DIAGNOSIS — R41.841 COGNITIVE COMMUNICATION DEFICIT: Primary | ICD-10-CM

## 2022-12-29 DIAGNOSIS — R27.8 IMPAIRED GROSS MOTOR COORDINATION: ICD-10-CM

## 2022-12-29 DIAGNOSIS — M62.81 MUSCLE WEAKNESS: Primary | ICD-10-CM

## 2022-12-29 PROCEDURE — 97110 THERAPEUTIC EXERCISES: CPT | Mod: PN,CQ

## 2022-12-29 PROCEDURE — 92507 TX SP LANG VOICE COMM INDIV: CPT | Mod: PN

## 2022-12-29 PROCEDURE — 97530 THERAPEUTIC ACTIVITIES: CPT | Mod: PN

## 2022-12-29 NOTE — PROGRESS NOTES
"[facility]   Physical Therapy Treatment Note     Name: Sarbjit Brewer Sr.  Clinic Number: [id number]    Therapy Diagnosis:   Encounter Diagnoses   Name Primary?    Imbalance Yes    Mono-inattention     Impaired gross motor coordination        Physician: Steven Krueger MD    Visit Date: 12/29/2022    Physician Orders: PT Eval and Treat   Medical Diagnosis from Referral:   Diagnosis   I63.9 (ICD-10-CM) - Cerebral vascular accident      Evaluation Date: 11/23/2022  Authorization Period Expiration: 12/31/2022  Plan of Care Expiration: 1/27/2022  Visit # / Visits authorized: 6/20 (+eval)  FOTO #: 5/5 (completed 6th visit, 12/29)  PTA Visit #: 4/5     Time In: 10:20 AM  Time Out: 11:15 AM  Total Billable Time: 55 minutes (4 TE - [facility])    Precautions: Standard, Fall    SUBJECTIVE     Patient reports: no complaints of pain, still not compliant with HEP  He was not compliant with home exercise program.  Response to previous treatment: none  Functional change: none    Pain: 0/10  Location: N/A - patient denies pain today    OBJECTIVEh     Objective Measures updated at progress report unless specified.     Treatment     Sarbjit received the treatments listed below:      therapeutic exercises to develop strength, endurance, ROM, and flexibility for 20 minutes including:  - FOTO Survey completion  - Gastroc Fitter stretch 2x45"  - Hamstring stretch 2x45" B with upper extremity support  - Heel/Toe Raises: 2x20  - Stepper bike with bilateral upper extremity / bilateral lower extremity use x 8 minutes level 4    neuromuscular re-education activities to improve: Balance, Coordination, Kinesthetic, Sense, and Proprioception for 35 minutes. The following activities were included:  -- backward walking                           6 lengths x 10 feet  with single upper extremity support  -- forward ladder stepping  6 lengths x 10 feet and with CGA/SBA  -- side ladder stepping   6 lengths x 10 feet and with " "CGA/SBA  -- Sit to stands    2 x 10 without UE support from standard chair with cues for forward scoot and weight shift  -- Lateral steps with red theraband 6 x 10 feet in // bars with single upper extremity support  -- Cone weaving   4 x 25 feet without AD and with CGA/SBA    -- Toe taps    3 x 30'' without UE support and contact guard assist  -- Cone taps (2) with lateral steps      6 lengths x 8 feet and stand by assist  -- 4" step ups: forward                        2 x 10 B  -- Ambulation 250 feet without AD with contact guard assist to improve stability during walking.     Patient Education and Home Exercises     Home Exercises Provided and Patient Education Provided     Education provided:   - Purpose of PT intervention    Written Home Exercises Provided: Patient instructed to cont prior HEP. Exercises were reviewed and Sarbjit was able to demonstrate them prior to the end of the session.  Sarbjit demonstrated good  understanding of the education provided. See EMR under Patient Instructions for exercises provided during therapy sessions    ASSESSMENT     Sarbjit arrived to session without any complaints of pain and was agreeable to treatment.  Session consisted of BLE strengthening and dynamic balance training.  Patient is still not compliant with HEP despite multiple instances of reinforcement and education provided. Heavy verbal cuing for task reinforcement this session with patient often stopping a task and needing reminders to continue to the intended quantity of reps or sets. Often hand over hand guidance necessary this session.  Patient unable to keep track of number of repetitions for each exercise and needs assistance.  No loss of balance this session though decreased safety awareness observed throughout session.  Unable to progress due to lack of any carryover, noncompliance with HEP, and challenge with multi step commands.  He may benefit from continued PT services to achieve goals established at " evaluation.    Sarbjit Is progressing well towards his goals.   Patient prognosis is Good.     Patient will continue to benefit from skilled outpatient physical therapy to address the deficits listed in the problem list box on initial evaluation, provide pt/family education and to maximize pt's level of independence in the home and community environment.     Patient's spiritual, cultural and educational needs considered and pt agreeable to plan of care and goals.     Anticipated barriers to physical therapy: CEREBROVASCULAR ACCIDENT x 3, decreased left attention, impaired motor planning and control, fall risk, sedentary lifestyle    Goals:     Short Term Goals (4 Weeks):     1. Independent with initial HEP. (PROGRESSING, NOT MET)  2. Pt will perform nu-step at level 4 x 8 minutes without rests breaks going at least 50 step/min or greater.  (PROGRESSING, NOT MET)  3. Pt will be able to perform TUG in 25 secs with use of AD placingdemonstrating overall improved functional mobility.  (PROGRESSING, NOT MET)  4. Pt will performed sit to stand x 5 without UE support in 14 seconds without LOB backward or posterior LE hooking for functional and safe transfers.  (PROGRESSING, NOT MET)  5. Pt will score greater than or equal to 12/24 on the DGI test/assessment without use of AD demonstrating overall improved functional mobility and balance and reduce fall risk.  (PROGRESSING, NOT MET)     Long Term Goals (8 Weeks):      1. Pt will be able to perform TUG in 23 secs with use of AD placingdemonstrating overall improved functional mobility.  (PROGRESSING, NOT MET)  2.  Pt will score greater than or equal to 16/24 on the DGI test/assessment without use of AD demonstrating overall improved functional mobility and balance and reduce fall risk.  (PROGRESSING, NOT MET)  3. Pt will walk < than or equal to 800 ft on the 6 minute walk test indoor with AD with 0 LOB and minimal gait deviations for improved safety in home ambulation and  safety.  (PROGRESSING, NOT MET)  4.  Pt will be able to safely perform and tolerate high level ADL's without LOB. (PROGRESSING, NOT MET)  5. Pt will have 0 falls from start of PT sessions. (PROGRESSING, NOT MET)  6. Independent with updated HEP.  (PROGRESSING, NOT MET)  7. Pt will ambulate on TM x 3 minutes with use of UE support with 0 LOB at 0.9 mph. (PROGRESSING, NOT MET)  8. Pt will perform floor to stand f/b stand to floor transfer B UE support with stand by assist and no cues for improved dynamic transfer, core stability and fall safety in home and community. (PROGRESSING, NOT MET)  9. Pt will begin some form of community fitness to begin regular and consistent performance of exercise to continue maintenance of gains made in PT. (PROGRESSING, NOT MET)    PLAN     Continue to progress as per plan of care; reinforce HEP with wife assisting    Venus Story PTA

## 2022-12-29 NOTE — PROGRESS NOTES
":[facility]  Speech Therapy Treatment Note- Neurological Rehabilitation  Date: 12/29/2022     Name: Sarbjit Brewer Sr.   MRN: [id number]   Therapy Diagnosis:   Encounter Diagnoses   Name Primary?    Cognitive communication deficit Yes    Dysarthria          Physician: Steven Krueger MD  Physician Orders: [id number] - AMB REFERRAL/CONSULT TO SPEECH THERAPY  Medical Diagnosis: I63.9 (ICD-10-CM) - Cerebral vascular accident    Visit #/ Visits Authorized: 7/ 20 (plus evaluation)  Date of Evaluation:  11/21/22  Insurance Authorization Period: 11/24/22 to 12/31/22  Plan of Care Expiration Date:    2/3/22  Extended Plan of Care:  none   Progress Note: due 1/21/22   Visits Cancelled: 0  Visits No Show: 0    Time In:  9:30 am   Time Out:  10:15 am  Total Billable Time: 45 minutes      Precautions: Standard  Subjective:   Patient reports:  Pt said "I need to go to the foot doctor."  Gave pt the number to the podiatrist's next door.   He was compliant to home exercise program .  Response to previous treatment: positive    Pain Scale:  0/10 on a Visual Analog Scale currently.   Pain Location: none indicated    Objective:      UNTIMED  Procedure Min .   Speech- Language- Voice Therapy  45   Total Timed Units: 0  Total Untimed Units: 1  Charges Billed/Number of units: 1    Short Term Goals: (4 weeks) Current Progress:   Patient will complete testing using the CLQT. Goal Met 12/7/2022     2. Pt will rate his speech while reading as at least a 3 on a 3 point scale ( 1 = same as before beginning therapy, 2 =better but not best, 3 =best possible outcome) on  7 /10 trials.       Progressing/ Not Met 12/29/2022   Goal met 12/29/22   3. Pt will differentiate between his speech and error-free speech by giving specific examples of differences on  7 /10 trials.     Progressing/ Not Met 12/29/2022   Mod A   4. Pt will use motor speech strategies and answer questions in conversation with a self-rating of at least 3 on a 3 " "point scale ( 1 = same as before beginning therapy, 2 =better but not best, 3 =best possible outcome) on  7 /10 trials.        Progressing/ Not Met 12/29/2022   Patient indicated that while his speech was intelligible, it was not "smooth"  Patient describing stuttering/dysfluency. Pt stated this is not his baseline speech.  Going slow is a helpful strategy.   5. Patient will complete word finding tasks with semantic relationships (I.e. similarities and differences, synonyms/antonyms, semantic category, Semantic Feature Analysis) with 90% accuracy given Min cues to improve word finding skills.     Progressing/Not Met 12/29/2022  Similarities with 80% acc Independently; 90% acc min A  Differences with 50% acc Independently; 60% acc min A  Naming objects with 80% acc Independently; 90% acc min A    Consistent redirection needed to participate in activities.   6. Patient will recall visual and/or auditory information using memory strategies with 90% given Min cues to improve memory skills.    Progressing/Not Met 12/29/2022  Memory strategies discussed at length  Max difficulty recalling strategies.     Recall picture with max A     7. Patient will complete mental manipulation/working memory tasks with 80% accuracy given Min cues to improve immediate memory skills.    Progressing/Not Met 12/29/2022  -unscramble 3-4 word sentences with 0% acc given max A.   8. Patient will complete selective attention tasks with 90% acc independently to improve attention skills.    Progressing/Not Met 12/29/2022  -Max A to stay on task. Consistent redirection to task.   9. Patient will complete simple executive function tasks (I.e. functional scheduling, problem-solving/reasoning, goal/plan/action/review) with 90% accuracy given Min cues to improve executive functioning skills.     Progressing/Not Met 12/29/2022  -Not addressed this session.      Future goals: Topic maintenance. Decrease tangents.VERNELL.    Patient Education/Response: "   Skilled education provided regarding:  - word finding strategies and memory strategies  - stroke education and prevention   Patient verbalized understanding of all discussed.     Home program established: yes. Memory and attention strategies at home.   Exercises were reviewed and Sarbjit was able to demonstrate them prior to the end of the session.  Sarbjit demonstrated good  understanding of the education provided.     See Electronic Medical Record under Patient Instructions for exercises provided throughout therapy.  Assessment:   Sarbjit is progressing well towards his goals. Met goal for speech rating today.  Since initial evaluation, pt has met 2/9 goals.  Given frequent redirection cues, Sarbjit participated well today in today's session which focused on memory, sustained attention, problem solving, education, oral/verbal expression, word finding strategies, and Dysarthria strategies. Patient consistently required max cues to complete simple working memory tasks and simple selective attention tasks.   Cognitive, Physical, and Emotional fatigue was not believed to have been a barrier to the session. To date, Sarbjit has met 2 goals.   Current goals remain appropriate. Goals to be updated as necessary.     Patient prognosis is Fair to good. Patient will continue to benefit from skilled outpatient speech and language therapy to address the deficits listed in the problem list on initial evaluation, provide patient/family education and to maximize patient's level of independence in the home and community environment.   Medical necessity is demonstrated by the following IMPAIRMENTS:  Deficits in executive functioning, attention, and memory in addition to decreased word finding and speech intelligibility prevent the pt from relaying medically and safety relevant information in a timely manner in a state of emergency.   Barriers to Therapy: none  Patient's spiritual, cultural and educational needs considered and patient  agreeable to plan of care and goals.  Plan:   Continue Plan of Care with focus on improving cognitive-linguistic skills and use of motor speech strategies to enhance speech intelligibility.       Iesha Cortes M.S.CCC-SLP  Speech Language Pathologist   12/29/2022

## 2022-12-29 NOTE — PROGRESS NOTES
"Occupational Therapy Daily Treatment Note     Name: Sarbjit Brewer .  Clinic Number: [id number]    Therapy Diagnosis:   Encounter Diagnoses   Name Primary?    Muscle weakness Yes    Lack of coordination     Need for assistance with personal care        Physician: Steven Krueger MD    Visit Date: 12/29/2022    Physician Orders: OT eval and treat   Medical Diagnosis: I63.9 (ICD-10-CM) - Cerebral vascular accident   Evaluation Date: 11/23/2022  Plan of Care Expiration Period: 1/20/2022  Insurance Authorization period Expiration: 12/31/2022  Visit # / Visits Authorized: 5 / 20  FOTO: 11/23/2022     Time In: 8:50 am  Time Out: 9:30 am  Total Billable (one on one) Time: 40 minutes     Precautions:  Standard and Fall    Subjective     Pt reports: "It feels ok. A little sore but no pain "  he was not compliant with home exercise program given last session.   Response to previous treatment: fair   Functional change:  no pain in left shoulder     Pain: 0/10  Location: left arms     Objective   Physical Exam:  Postural examination/scapula alignment: Rounded shoulder and Head forward  Joint integrity: Firm end feeling  Skin integrity: skin is intact on eval   Edema: Mild edema noted in left hand   Palpation: no pain with palpation      Joint Evaluation  [facility]   11/23/2022 AROM  11/23/2022 MMS   11/23/2022     Right Left Left   Scapular Elevation 5/5   3-/5   Scapular Retraction 5/5   3-/5   Shoulder Flex 0-180 5/5 38 2-/5   Shoulder Extension 0-80 5/5 50 2/5   Shoulder Abd 0-180 5/5   2-/5   Shoulder ER 0-90 5/5 28 2-/5   Shoulder IR 0-90 5/5 hip 2-/5   Shoulder Horizontal adduction 0-90 5/5   2-/5   Elbow Flex/Ext 0-150 5/5 119 2/5   Wrist Flex 0-80 5/5 57 2-/5   Wrist Ext 0-70 5/5 20 2-/5   Supination 0-80 5/5   2/5   Pronation 0-80 5/5   2/5   Ulnar Deviation 5/5   2-/5   Radial Deviation  5/5   2-/5   *WNL - Within Normal Limits  *NT = Not Tested     Fist: loose left hand       Strength: (LEE ANN Dynamometer in lbs.) " Average 3 trials, Position II:       11/23/2022 11/23/2022   Rung II Right Left   Trial 1 73# 1#   Trial 2 57# 1#   Trial 3 55# 2#   Average 61.6# 1.3#      Normal  Average Values  Female Right Left Male: Right Left   20-29 66 lbs 62 lbs         94 lbs 86 lbs   30-39 68 lbs 64 lbs 90 lbs 82 lbs   40-49 64 lbs 62 lbs 80 lbs 74 lbs   50-59 62 lbs 57 lbs 72 lbs 65 lbs   60-69 53 lbs 51 lbs 63 lbs 56 lbs   70+ 44 lbs 42 lbs 54 lbs 48 lbs   SD = +/- 19lbs         Pinch Strength (Measured in lbs)       11/23/2022 11/23/2022     Right Left   Key Pinch 19 # 4 #   3pt Pinch 14 # unable   2pt Pinch 10 # unable         Fine/Gross Motor Coordination     Assessment   Right   11/23/2022 Left  11/23/2022   9 hole peg test 9 pegs in/out for time WNL Unable    DAYSI (Rapid Alternating Movements)      intact    impaired - severe   Finger to Nose (5 times)   intact impaired - severe   Finger Flicks (coordination moving from digit flexion to digit extension)   intact impaired - severe   *WNL - Within Normal Limits  *NT = Not Tested     Tone:  Modified Erasmo Scale:   1-  Slight increase in muscle tone, manifested by a catch and release or by minimal resistance at the end of the range of motino when the affected part(s) is moved in flexion or extension     Sensation:  Sarbjit  reports numbness on left side.       Sensation Intact  Impaired Comments    [place] Dustin  Deep Touch (6.65) []  [x]       Protective Sensation (4.56) []  [x]       Light Touch (3.61) []  [x]                  Other Kinesthesia  []  [x]       Temperature []  []       Stereognosis  []  []         Balance:   Static Sit - GOOD+: Takes MAXIMAL challenges from all directions.    Dynamic sit- GOOD+: Takes MAXIMAL challenges from all directions.    Static Stand - FAIR: Maintains without assist, but unable to take any challenges   Dynamic stand - FAIR: Maintains without assist, but unable to take any challenges      Endurance Deficit: moderate    Sarbjit participated in  neuromuscular re-education activities to improve: Coordination, Kinesthetic, Sense, Proprioception, and Posture for 40 minutes. The following activities were included:  - Upper Body Ergometer (UBE) L1 for 8 minutes to provide proprioceptive awareness, postural stability, strength, activity tolerance, and reciprocal patterns with bimanual coordination completed to improve use of bilateral upper extremities in order to prepare for therapeutic exercises/activities. Cues provided as needed for postural stability/positioning  - supine shoulder flexion/extension 1# dowel 2x10   - supine chest press 1# dowel 2x10   - supine external rotation 1# dowel 2x10  - active assisted range of motion therapy ball flexion/extension   - weightbearing in therapy ball 2x10   - pulleys x3'  - towel slides on wall x10     Home Exercises and Education Provided     Education provided:   - SPROM home exercise program initiated  - Progress towards goals     Written Home Exercises Provided: yes.  Exercises were reviewed and Sarbjit was able to demonstrate them prior to the end of the session.  Sarbjit demonstrated fair  understanding of the HEP provided.   .   See EMR under Patient Instructions for exercises provided 12/1/2022.        Assessment     Sarbjit tolerated today's session fair. He arrived moving his shoulder and stated no pain upon arrival. He was able to complete exercises supine and seated with min assist from therapist for biomechanics and use of strap to assist with left . Max cues for encouragement to continue exercises and complete reps. Noted mild improvement in left  on dowel, pulleys and UBE. Will reassess next session.     Sarbjit is progressing well towards his goals and there are no updates to goals at this time. Pt prognosis is Fair.     Pt will continue to benefit from skilled outpatient occupational therapy to address the deficits listed in the problem list on initial evaluation provide pt/family education and to  maximize pt's level of independence in the home and community environment.     Anticipated barriers to occupational therapy: cognitive impairments     Pt's spiritual, cultural and educational needs considered and pt agreeable to plan of care and goals.    Goals:   Short Term Goals: 4 weeks  - Pt will be independent with Home Exercise Program (HEP)/Home Activity Program (HAP) and report at least 50% compliance. Not met, progressing  - Pt will demonstrate greater than or equal to 90 degrees of active shoulder flexion with LUE to increase independence with dressing and overhead reaching tasks. Not met, progressing  - Pt will increase L  strength to 15# or more to improve participation with ADL and IADL tasks. Not met, progressing  - Pt will increase left pinch strength, in all 3 conditions (lateral, 3pt, 2pt), by 2 psi to improve performance with cooking tasks, home management. Not met, progressing  - Pt will complete Box and Blocks Test with left UE, transferring 5 blocks, to demonstrate improved gross manual coordination needed for ADL and IADL tasks. Not met, progressing  - Pt will identify 3 or more compensatory strategies and/or pieces of adaptive equipment to assist with home care and other IADL tasks. Not met, progressing     Long Term Goals: 8 weeks  - Pt will reduce limitation score on FOTO by less than or equal to 30 to demonstrate an increase in self-perceived functional performance. Not met, progressing  - Pt will be SBA with upper body dressing with use of adaptive equipment/techniques as needed in order to increase independence ans decrease CG burden. Not met, progressing  - Pt will complete Box and Blocks Test with left UE, transferring 10 blocks, to demonstrate improved gross manual coordination needed for ADL and IADL tasks. Not met, progressing  - Pt will report return to meaningful activities using left upper extremity in order to improve quality of life. Not met, progressing     Plan    Certification Period/Plan of care expiration: 11/23/2022 to 1/20/2023.     Outpatient Occupational Therapy 2 times weekly for 8 weeks to include the following interventions: Electrical Stimulation NMES/TENs, Fluidotherapy, Manual Therapy, Moist Heat/ Ice, Neuromuscular Re-ed, Orthotic Management and Training, Paraffin, Patient Education, Self Care, Therapeutic Activities, and Therapeutic Exercise.     Updates/Grading for next session: continue with POC Corinne Rapier, OT

## 2022-12-30 ENCOUNTER — CLINICAL SUPPORT (OUTPATIENT)
Dept: REHABILITATION | Facility: HOSPITAL | Age: 57
End: 2022-12-30
Attending: PSYCHIATRY & NEUROLOGY
Payer: MEDICAID

## 2022-12-30 DIAGNOSIS — R41.841 COGNITIVE COMMUNICATION DEFICIT: Primary | ICD-10-CM

## 2022-12-30 DIAGNOSIS — R41.4 HEMI-INATTENTION: ICD-10-CM

## 2022-12-30 DIAGNOSIS — R47.1 DYSARTHRIA: ICD-10-CM

## 2022-12-30 DIAGNOSIS — R27.9 LACK OF COORDINATION: ICD-10-CM

## 2022-12-30 DIAGNOSIS — M62.81 MUSCLE WEAKNESS: Primary | ICD-10-CM

## 2022-12-30 DIAGNOSIS — R26.89 IMBALANCE: Primary | ICD-10-CM

## 2022-12-30 DIAGNOSIS — Z74.1 NEED FOR ASSISTANCE WITH PERSONAL CARE: ICD-10-CM

## 2022-12-30 DIAGNOSIS — R27.8 IMPAIRED GROSS MOTOR COORDINATION: ICD-10-CM

## 2022-12-30 PROCEDURE — 97110 THERAPEUTIC EXERCISES: CPT | Mod: PN,CQ,59

## 2022-12-30 PROCEDURE — 97530 THERAPEUTIC ACTIVITIES: CPT | Mod: PN

## 2022-12-30 PROCEDURE — 92507 TX SP LANG VOICE COMM INDIV: CPT | Mod: PN | Performed by: SPEECH-LANGUAGE PATHOLOGIST

## 2022-12-30 NOTE — PROGRESS NOTES
"Occupational Therapy Daily Treatment Note     Name: Sarbjit Brewer .  Clinic Number: [id number]    Therapy Diagnosis:   Encounter Diagnoses   Name Primary?    Muscle weakness Yes    Lack of coordination     Need for assistance with personal care        Physician: Steven Krueger MD    Visit Date: 12/30/2022    Physician Orders: OT eval and treat   Medical Diagnosis: I63.9 (ICD-10-CM) - Cerebral vascular accident   Evaluation Date: 11/23/2022  Plan of Care Expiration Period: 1/20/2022  Insurance Authorization period Expiration: 12/31/2022  Visit # / Visits Authorized: 6 / 20  FOTO: 11/23/2022     Time In: 8:05 am  Time Out: 8:45 am  Total Billable (one on one) Time: 40 minutes     Precautions:  Standard and Fall    Subjective     Pt reports: "I am ok. My arm doesn't hurt  "  he was not compliant with home exercise program given last session.   Response to previous treatment: fair   Functional change:  no pain in left shoulder     Pain: 0/10  Location: left arms     Objective   Physical Exam:  Postural examination/scapula alignment: Rounded shoulder and Head forward  Joint integrity: Firm end feeling  Skin integrity: skin is intact on eval   Edema: Mild edema noted in left hand   Palpation: no pain with palpation      Joint Evaluation  [facility]   11/23/2022 AROM  11/23/2022 MMS   11/23/2022 AROM  12/30/2022     Right Left Left Left    Scapular Elevation 5/5   3-/5    Scapular Retraction 5/5   3-/5    Shoulder Flex 0-180 5/5 38 2-/5 55   Shoulder Extension 0-80 5/5 50 2/5 60   Shoulder Abd 0-180 5/5   2-/5 42   Shoulder ER 0-90 5/5 28 2-/5 18   Shoulder IR 0-90 5/5 hip 2-/5 PSIS   Shoulder Horizontal adduction 0-90 5/5   2-/5    Elbow Flex/Ext 0-150 5/5 119 2/5 0-129   Wrist Flex 0-80 5/5 57 2-/5 50   Wrist Ext 0-70 5/5 20 2-/5 27   Supination 0-80 5/5   2/5    Pronation 0-80 5/5   2/5    Ulnar Deviation 5/5   2-/5    Radial Deviation  5/5   2-/5    *WNL - Within Normal Limits  *NT = Not Tested     Fist: loose left hand "       Strength: (LEE ANN Dynamometer in lbs.) Average 3 trials, Position II:       11/23/2022 11/23/2022 12/30/2022   Rung II Right Left Right    Trial 1 73# 1# 0#   Trial 2 57# 1# 0#   Trial 3 55# 2# 0#   Average 61.6# 1.3# 0#      Normal  Average Values  Female Right Left Male: Right Left   20-29 66 lbs 62 lbs         94 lbs 86 lbs   30-39 68 lbs 64 lbs 90 lbs 82 lbs   40-49 64 lbs 62 lbs 80 lbs 74 lbs   50-59 62 lbs 57 lbs 72 lbs 65 lbs   60-69 53 lbs 51 lbs 63 lbs 56 lbs   70+ 44 lbs 42 lbs 54 lbs 48 lbs   SD = +/- 19lbs         Pinch Strength (Measured in lbs)       11/23/2022 11/23/2022 12/30/2022     Right Left Left    Key Pinch 19 # 4 # 4   3pt Pinch 14 # unable unable   2pt Pinch 10 # unable 2#         Fine/Gross Motor Coordination     Assessment   Right   11/23/2022 Left  11/23/2022 Left   12/30/2022   9 hole peg test 9 pegs in/out for time WNL Unable  7:22  With therapist holding pegs + pt reaching for them (not resting on table)   *WNL - Within Normal Limits  *NT = Not Tested     Tone:  Modified Erasmo Scale:   1-  Slight increase in muscle tone, manifested by a catch and release or by minimal resistance at the end of the range of motino when the affected part(s) is moved in flexion or extension     Sensation:  Sarbjit  reports numbness on left side.       Sensation Intact  Impaired Comments    [place] Dustin  Deep Touch (6.65) []  [x]       Protective Sensation (4.56) []  [x]       Light Touch (3.61) []  [x]                  Other Kinesthesia  []  [x]       Temperature []  []       Stereognosis  []  []         Balance:   Static Sit - GOOD+: Takes MAXIMAL challenges from all directions.    Dynamic sit- GOOD+: Takes MAXIMAL challenges from all directions.    Static Stand - FAIR: Maintains without assist, but unable to take any challenges   Dynamic stand - FAIR: Maintains without assist, but unable to take any challenges      Endurance Deficit: waldemar    Sarbjit participated in neuromuscular  re-education activities to improve: Coordination, Kinesthetic, Sense, Proprioception, and Posture for 8 minutes. The following activities were included:  - Upper Body Ergometer (UBE) L1 for 8 minutes to provide proprioceptive awareness, postural stability, strength, activity tolerance, and reciprocal patterns with bimanual coordination completed to improve use of bilateral upper extremities in order to prepare for therapeutic exercises/activities. Cues provided as needed for postural stability/positioning    Sarbjit participated in dynamic functional therapeutic activities to improve functional performance for 32  minutes, including:  - reassessment   - functional reach with cones left upper extremity   - functional reach with squigs     Home Exercises and Education Provided     Education provided:   - SPROM home exercise program initiated  - Progress towards goals     Written Home Exercises Provided: yes.  Exercises were reviewed and Sarbjit was able to demonstrate them prior to the end of the session.  Sarbjit demonstrated fair  understanding of the HEP provided.   .   See EMR under Patient Instructions for exercises provided 12/1/2022.        Assessment     Sarbjit tolerated today's session fair. Reassessment performed and he exhibits mild improvement in left upper extremity with active range of motion. He was able to complete the 9 hole peg test with left hand using modified set up/assist due to inability to grasp peg from table. Therapist educated on the importance of utilizing left upper extremity with daily tasks, hand hygiene and preventing skin breakdown due to dry skin present on left hand. He continues to require max cues for attention and completion of activities/exercises. Decreased proprioceptive awareness noted in left hand throughout activities.     Sarbjit is progressing well towards his goals and there are no updates to goals at this time. Pt prognosis is Fair.     Pt will continue to benefit from skilled  outpatient occupational therapy to address the deficits listed in the problem list on initial evaluation provide pt/family education and to maximize pt's level of independence in the home and community environment.     Anticipated barriers to occupational therapy: cognitive impairments     Pt's spiritual, cultural and educational needs considered and pt agreeable to plan of care and goals.    Goals:   Short Term Goals: 4 weeks  - Pt will be independent with Home Exercise Program (HEP)/Home Activity Program (HAP) and report at least 50% compliance. Not met, progressing  - Pt will demonstrate greater than or equal to 90 degrees of active shoulder flexion with LUE to increase independence with dressing and overhead reaching tasks. Not met, progressing  - Pt will increase L  strength to 15# or more to improve participation with ADL and IADL tasks. Not met, progressing  - Pt will increase left pinch strength, in all 3 conditions (lateral, 3pt, 2pt), by 2 psi to improve performance with cooking tasks, home management. Not met, progressing  - Pt will complete Box and Blocks Test with left UE, transferring 5 blocks, to demonstrate improved gross manual coordination needed for ADL and IADL tasks. Not met, progressing  - Pt will identify 3 or more compensatory strategies and/or pieces of adaptive equipment to assist with home care and other IADL tasks. Not met, progressing     Long Term Goals: 8 weeks  - Pt will reduce limitation score on FOTO by less than or equal to 30 to demonstrate an increase in self-perceived functional performance. Not met, progressing  - Pt will be SBA with upper body dressing with use of adaptive equipment/techniques as needed in order to increase independence ans decrease CG burden. Not met, progressing  - Pt will complete Box and Blocks Test with left UE, transferring 10 blocks, to demonstrate improved gross manual coordination needed for ADL and IADL tasks. Not met, progressing  - Pt will  report return to meaningful activities using left upper extremity in order to improve quality of life. Not met, progressing     Plan   Certification Period/Plan of care expiration: 11/23/2022 to 1/20/2023.     Outpatient Occupational Therapy 2 times weekly for 8 weeks to include the following interventions: Electrical Stimulation NMES/TENs, Fluidotherapy, Manual Therapy, Moist Heat/ Ice, Neuromuscular Re-ed, Orthotic Management and Training, Paraffin, Patient Education, Self Care, Therapeutic Activities, and Therapeutic Exercise.     Updates/Grading for next session: continue with POC Corinne Rapier, OT

## 2022-12-30 NOTE — PROGRESS NOTES
"[facility]   Physical Therapy Treatment Note     Name: Sarbjit Brewer Sr.  Clinic Number: [id number]    Therapy Diagnosis:   Encounter Diagnoses   Name Primary?    Imbalance Yes    Mono-inattention     Impaired gross motor coordination        Physician: Steven Krueger MD    Visit Date: 12/30/2022    Physician Orders: PT Eval and Treat   Medical Diagnosis from Referral:   Diagnosis   I63.9 (ICD-10-CM) - Cerebral vascular accident      Evaluation Date: 11/23/2022  Authorization Period Expiration: 12/31/2022  Plan of Care Expiration: 1/27/2022  Visit # / Visits authorized: 7/20 (+eval)  FOTO #: 1/5 (completed 6th visit, 12/29)  PTA Visit #: 5/5     Time In: 8:45 AM  Time Out: 9:30 AM  Total Billable Time: 45 minutes (3 TE - Medici[facility])    Precautions: Standard, Fall    SUBJECTIVE     Patient reports: no complaints of pain, still not compliant with HEP  He was not compliant with home exercise program.  Response to previous treatment: none  Functional change: none    Pain: 0/10  Location: N/A - patient denies pain today    OBJECTIVEh     Objective Measures updated at progress report unless specified.     Treatment     Sarbjit received the treatments listed below:      therapeutic exercises to develop strength, endurance, ROM, and flexibility for 15 minutes including:  - Gastroc Fitter stretch 2x45"  - Hamstring stretch 2x45" B with upper extremity support  - Heel/Toe Raises: 2x20  - Stepper bike with bilateral upper extremity / bilateral lower extremity use x 8 minutes level 4    neuromuscular re-education activities to improve: Balance, Coordination, Kinesthetic, Sense, and Proprioception for 30 minutes. The following activities were included:  -- backward walking                           6 lengths x 10 feet  with single upper extremity support  -- forward ladder stepping  6 lengths x 10 feet and with CGA/SBA  -- side ladder stepping   6 lengths x 10 feet and with CGA/SBA  -- Sit to stands " "   2 x 10 without UE support from standard chair with cues for forward scoot and weight shift  -- Lateral steps with red theraband 6 x 10 feet in // bars with single upper extremity support  -- Cone weaving   4 x 25 feet without AD and with CGA/SBA    -- Toe taps    3 x 30'' without UE support and contact guard assist  -- Cone taps (2) with lateral steps      6 lengths x 8 feet and stand by assist  -- 4" step ups: forward                        2 x 10 B  -- Ambulation 250 feet without AD with contact guard assist to improve stability during walking.     Patient Education and Home Exercises     Home Exercises Provided and Patient Education Provided     Education provided:   - Purpose of PT intervention    Written Home Exercises Provided: Patient instructed to cont prior HEP. Exercises were reviewed and Sarbjit was able to demonstrate them prior to the end of the session.  Sarbjit demonstrated good  understanding of the education provided. See EMR under Patient Instructions for exercises provided during therapy sessions    ASSESSMENT     Sarbjit arrived to session without any complaints of pain and was agreeable to treatment.  Session consisted of BLE strengthening and dynamic balance training.  Patient is still not compliant with HEP despite multiple instances of reinforcement and education provided. Heavy verbal cuing for task reinforcement this session with patient often stopping a task and needing reminders to continue to the intended quantity of reps or sets. Often hand over hand guidance necessary this session.  Patient unable to keep track of number of repetitions for each exercise and needs assistance.  No loss of balance this session though decreased safety awareness observed throughout session.  Unable to progress due to lack of any carryover, noncompliance with HEP, and challenge with multi step commands.  He may benefit from continued PT services to achieve goals established at evaluation.    Sarbjit Is " progressing well towards his goals.   Patient prognosis is Good.     Patient will continue to benefit from skilled outpatient physical therapy to address the deficits listed in the problem list box on initial evaluation, provide pt/family education and to maximize pt's level of independence in the home and community environment.     Patient's spiritual, cultural and educational needs considered and pt agreeable to plan of care and goals.     Anticipated barriers to physical therapy: CEREBROVASCULAR ACCIDENT x 3, decreased left attention, impaired motor planning and control, fall risk, sedentary lifestyle    Goals:     Short Term Goals (4 Weeks):     1. Independent with initial HEP. (PROGRESSING, NOT MET)  2. Pt will perform nu-step at level 4 x 8 minutes without rests breaks going at least 50 step/min or greater.  (PROGRESSING, NOT MET)  3. Pt will be able to perform TUG in 25 secs with use of AD placingdemonstrating overall improved functional mobility.  (PROGRESSING, NOT MET)  4. Pt will performed sit to stand x 5 without UE support in 14 seconds without LOB backward or posterior LE hooking for functional and safe transfers.  (PROGRESSING, NOT MET)  5. Pt will score greater than or equal to 12/24 on the DGI test/assessment without use of AD demonstrating overall improved functional mobility and balance and reduce fall risk.  (PROGRESSING, NOT MET)     Long Term Goals (8 Weeks):      1. Pt will be able to perform TUG in 23 secs with use of AD placingdemonstrating overall improved functional mobility.  (PROGRESSING, NOT MET)  2.  Pt will score greater than or equal to 16/24 on the DGI test/assessment without use of AD demonstrating overall improved functional mobility and balance and reduce fall risk.  (PROGRESSING, NOT MET)  3. Pt will walk < than or equal to 800 ft on the 6 minute walk test indoor with AD with 0 LOB and minimal gait deviations for improved safety in home ambulation and safety.  (PROGRESSING, NOT  MET)  4.  Pt will be able to safely perform and tolerate high level ADL's without LOB. (PROGRESSING, NOT MET)  5. Pt will have 0 falls from start of PT sessions. (PROGRESSING, NOT MET)  6. Independent with updated HEP.  (PROGRESSING, NOT MET)  7. Pt will ambulate on TM x 3 minutes with use of UE support with 0 LOB at 0.9 mph. (PROGRESSING, NOT MET)  8. Pt will perform floor to stand f/b stand to floor transfer B UE support with stand by assist and no cues for improved dynamic transfer, core stability and fall safety in home and community. (PROGRESSING, NOT MET)  9. Pt will begin some form of community fitness to begin regular and consistent performance of exercise to continue maintenance of gains made in PT. (PROGRESSING, NOT MET)    PLAN     Continue to progress as per plan of care; reinforce HEP with wife assisting    Venus Story PTA

## 2023-01-04 ENCOUNTER — CLINICAL SUPPORT (OUTPATIENT)
Dept: REHABILITATION | Facility: HOSPITAL | Age: 58
End: 2023-01-04
Payer: MEDICAID

## 2023-01-04 DIAGNOSIS — M62.81 MUSCLE WEAKNESS: Primary | ICD-10-CM

## 2023-01-04 DIAGNOSIS — R27.9 LACK OF COORDINATION: ICD-10-CM

## 2023-01-04 DIAGNOSIS — Z74.1 NEED FOR ASSISTANCE WITH PERSONAL CARE: ICD-10-CM

## 2023-01-04 DIAGNOSIS — R26.89 IMBALANCE: Primary | ICD-10-CM

## 2023-01-04 DIAGNOSIS — R41.4 HEMI-INATTENTION: ICD-10-CM

## 2023-01-04 DIAGNOSIS — R27.8 IMPAIRED GROSS MOTOR COORDINATION: ICD-10-CM

## 2023-01-04 PROCEDURE — 97110 THERAPEUTIC EXERCISES: CPT | Mod: PN

## 2023-01-04 PROCEDURE — 97530 THERAPEUTIC ACTIVITIES: CPT | Mod: PN

## 2023-01-04 NOTE — PROGRESS NOTES
"Occupational Therapy Daily Treatment Note     Name: Sarbjit Brewer .  Clinic Number: [id number]    Therapy Diagnosis:   Encounter Diagnoses   Name Primary?    Muscle weakness Yes    Lack of coordination     Need for assistance with personal care      Physician: Steven Krueger MD    Visit Date: 1/4/2023    Physician Orders: OT eval and treat   Medical Diagnosis: I63.9 (ICD-10-CM) - Cerebral vascular accident   Evaluation Date: 11/23/2022  Plan of Care Expiration Period: 1/20/2022  Insurance Authorization period Expiration: 12/31/2022  Visit # / Visits Authorized: (8) 1/20  FOTO: 11/23/2022     Time In: 1:45 pm  Time Out: 2:30 pm  Total Billable (one on one) Time: 45 minutes     Precautions:  Standard and Fall    Subjective     Pt reports: "I am good. I liked what we did last time. I told my brother about it"  he was not compliant with home exercise program given last session.   Response to previous treatment: fair   Functional change:  no pain, some recall of activities completed last session     Pain: 0/10  Location: left arms     Objective   Physical Exam:  Postural examination/scapula alignment: Rounded shoulder and Head forward  Joint integrity: Firm end feeling  Skin integrity: skin is intact on eval   Edema: Mild edema noted in left hand   Palpation: no pain with palpation      Joint Evaluation  [facility]   11/23/2022 [facility]  11/23/2022 [facility]   11/23/2022 [facility]  12/30/2022     Right Left Left Left    Scapular Elevation 5/5   3-/5    Scapular Retraction 5/5   3-/5    Shoulder Flex 0-180 5/5 38 2-/5 55   Shoulder Extension 0-80 5/5 50 2/5 60   Shoulder Abd 0-180 5/5   2-/5 42   Shoulder ER 0-90 5/5 28 2-/5 18   Shoulder IR 0-90 5/5 hip 2-/5 PSIS   Shoulder Horizontal adduction 0-90 5/5   2-/5    Elbow Flex/Ext 0-150 5/5 119 2/5 0-129   Wrist Flex 0-80 5/5 57 2-/5 50   Wrist Ext 0-70 5/5 20 2-/5 27   Supination 0-80 5/5   2/5    Pronation 0-80 5/5   2/5    Ulnar Deviation 5/5   2-/5    Radial Deviation  5/5   2-/5    *WNL " - Within Normal Limits  *NT = Not Tested     Fist: loose left hand       Strength: (LEE ANN Dynamometer in lbs.) Average 3 trials, Position II:       11/23/2022 11/23/2022 12/30/2022   Rung II Right Left Right    Trial 1 73# 1# 0#   Trial 2 57# 1# 0#   Trial 3 55# 2# 0#   Average 61.6# 1.3# 0#      Normal  Average Values  Female Right Left Male: Right Left   20-29 66 lbs 62 lbs         94 lbs 86 lbs   30-39 68 lbs 64 lbs 90 lbs 82 lbs   40-49 64 lbs 62 lbs 80 lbs 74 lbs   50-59 62 lbs 57 lbs 72 lbs 65 lbs   60-69 53 lbs 51 lbs 63 lbs 56 lbs   70+ 44 lbs 42 lbs 54 lbs 48 lbs   SD = +/- 19lbs         Pinch Strength (Measured in lbs)       11/23/2022 11/23/2022 12/30/2022     Right Left Left    Key Pinch 19 # 4 # 4   3pt Pinch 14 # unable unable   2pt Pinch 10 # unable 2#         Fine/Gross Motor Coordination     Assessment   Right   11/23/2022 Left  11/23/2022 Left   12/30/2022   9 hole peg test 9 pegs in/out for time WNL Unable  7:22  With therapist holding pegs + pt reaching for them (not resting on table)   *WNL - Within Normal Limits  *NT = Not Tested     Tone:  Modified Erasmo Scale:   1-  Slight increase in muscle tone, manifested by a catch and release or by minimal resistance at the end of the range of motino when the affected part(s) is moved in flexion or extension     Sensation:  Sarbjit  reports numbness on left side.       Sensation Intact  Impaired Comments    Tibbie Dustin  Deep Touch (6.65) []  [x]       Protective Sensation (4.56) []  [x]       Light Touch (3.61) []  [x]                  Other Kinesthesia  []  [x]       Temperature []  []       Stereognosis  []  []         Balance:   Static Sit - GOOD+: Takes MAXIMAL challenges from all directions.    Dynamic sit- GOOD+: Takes MAXIMAL challenges from all directions.    Static Stand - FAIR: Maintains without assist, but unable to take any challenges   Dynamic stand - FAIR: Maintains without assist, but unable to take any challenges      Endurance  Deficit: moderate    Fluidotherapy: To right  upper extremity for 10 min, continuous air, 110 deg, air speed 100 to decrease pain, edema & scar tissue and increased tissue extensibility.    Sarbjit participated in dynamic functional therapeutic activities to improve functional performance for 35 minutes, including:  - hand hygiene + lotion   - peg activity with functional reach left upper extremity   - clothespin on tower     Home Exercises and Education Provided     Education provided:   - SPROM home exercise program initiated  - Progress towards goals     Written Home Exercises Provided: yes.  Exercises were reviewed and Sarbjit was able to demonstrate them prior to the end of the session.  Sarbjit demonstrated fair  understanding of the HEP provided.   .   See EMR under Patient Instructions for exercises provided 12/1/2022.        Assessment     Sarbjit tolerated today's session well. Interventions focused on joint mobility, functional reach, fine motor coordination and gross motor coordination in left upper extremity to improve use with daily tasks. Sarbjit required increased time for all activities, he was able to identify weakness in left upper extremity and self correct to prevent turning bucket over and accurately place pegs in bucket. Intermittent cues provided tactile/physical and max verbal cues to continue and complete activity. He exhibited improved 2pt pinch as activity progressed.     Sarbjit is progressing well towards his goals and there are no updates to goals at this time. Pt prognosis is Fair.     Pt will continue to benefit from skilled outpatient occupational therapy to address the deficits listed in the problem list on initial evaluation provide pt/family education and to maximize pt's level of independence in the home and community environment.     Anticipated barriers to occupational therapy: cognitive impairments     Pt's spiritual, cultural and educational needs considered and pt agreeable to plan of  care and goals.    Goals:   Short Term Goals: 4 weeks  - Pt will be independent with Home Exercise Program (HEP)/Home Activity Program (HAP) and report at least 50% compliance. Not met, progressing  - Pt will demonstrate greater than or equal to 90 degrees of active shoulder flexion with LUE to increase independence with dressing and overhead reaching tasks. Not met, progressing  - Pt will increase L  strength to 15# or more to improve participation with ADL and IADL tasks. Not met, progressing  - Pt will increase left pinch strength, in all 3 conditions (lateral, 3pt, 2pt), by 2 psi to improve performance with cooking tasks, home management. Not met, progressing  - Pt will complete Box and Blocks Test with left UE, transferring 5 blocks, to demonstrate improved gross manual coordination needed for ADL and IADL tasks. Not met, progressing  - Pt will identify 3 or more compensatory strategies and/or pieces of adaptive equipment to assist with home care and other IADL tasks. Not met, progressing     Long Term Goals: 8 weeks  - Pt will reduce limitation score on FOTO by less than or equal to 30 to demonstrate an increase in self-perceived functional performance. Not met, progressing  - Pt will be SBA with upper body dressing with use of adaptive equipment/techniques as needed in order to increase independence ans decrease CG burden. Not met, progressing  - Pt will complete Box and Blocks Test with left UE, transferring 10 blocks, to demonstrate improved gross manual coordination needed for ADL and IADL tasks. Not met, progressing  - Pt will report return to meaningful activities using left upper extremity in order to improve quality of life. Not met, progressing     Plan   Certification Period/Plan of care expiration: 11/23/2022 to 1/20/2023.     Outpatient Occupational Therapy 2 times weekly for 8 weeks to include the following interventions: Electrical Stimulation NMES/TENs, Fluidotherapy, Manual Therapy, Moist  Heat/ Ice, Neuromuscular Re-ed, Orthotic Management and Training, Paraffin, Patient Education, Self Care, Therapeutic Activities, and Therapeutic Exercise.     Updates/Grading for next session: continue with POC Corinne Rapier, OT

## 2023-01-04 NOTE — PROGRESS NOTES
"[facility]   Physical Therapy Treatment Note     Name: Sarbjit Brewer Sr.  Clinic Number: [id number]    Therapy Diagnosis:   Encounter Diagnoses   Name Primary?    Imbalance Yes    Mono-inattention     Impaired gross motor coordination      Physician: Steven Krueger MD    Visit Date: 1/4/2023    Physician Orders: PT Eval and Treat   Medical Diagnosis from Referral:   Diagnosis   I63.9 (ICD-10-CM) - Cerebral vascular accident      Evaluation Date: 11/23/2022  Authorization Period Expiration: 12/31/2022  Plan of Care Expiration: 1/27/2022  Visit # / Visits authorized: 2/20 (+8 visits in 2022)  FOTO #: next at 13th visit  PTA Visit #: 0/5     Time In: 1:05PM  Time Out: 1:45PM  Total Billable Time: 40 minutes (3 TE - [facility])    Precautions: Standard, Fall    SUBJECTIVE     Patient reports: Mild pain/tightness in left upper extremity; otherwise no new complaints. Not completing formal HEP but "walking some."  He was not compliant with home exercise program.  Response to previous treatment: none  Functional change: none    Pain: 2/10  Location: left shoulder    OBJECTIVEh     Objective Measures updated at progress report unless specified.     Five Time Sit to Stand: 14.3 seconds with right upper extremity push off    Timed Up and Go: 17.5 seconds with quad cane    Treatment     Sarbjit received the treatments listed below:      therapeutic exercises to develop strength, endurance, ROM, and flexibility for 15 minutes including:  - Gastroc Fitter stretch 2x45"  - Heel/Toe Raises: 2x20  - Stepper bike with bilateral upper extremity / bilateral lower extremity use x 8 minutes level 4 sprints  - TUG and 5xSTS reassessments (see above)    NOT TODAY  - Hamstring stretch 2x45" B with upper extremity support    neuromuscular re-education activities to improve: Balance, Coordination, Kinesthetic, Sense, and Proprioception for 25 minutes. The following activities were included:  -- backward walking 6 " "lengths x 10 feet  with single upper extremity support  -- sit to stand from chair + 2 airex with minimal upper extremity support x10  -- Cone weaving 4 x 25 feet without AD and with CGA/SBA   -- 6" step ups: forward x12    NOT TODAY  -- forward ladder stepping  6 lengths x 10 feet and with CGA/SBA  -- side ladder stepping   6 lengths x 10 feet and with CGA/SBA  -- Lateral steps with red theraband 6 x 10 feet in // bars with single upper extremity support  -- Toe taps    3 x 30'' without UE support and contact guard assist  -- Cone taps (2) with lateral steps      6 lengths x 8 feet and stand by assist  -- Ambulation 250 feet without AD with contact guard assist to improve stability during walking.     Patient Education and Home Exercises     Home Exercises Provided and Patient Education Provided     Education provided:   - Purpose of PT intervention    Written Home Exercises Provided: Patient instructed to cont prior HEP. Exercises were reviewed and Sarbjit was able to demonstrate them prior to the end of the session.  Sarbjit demonstrated good  understanding of the education provided. See EMR under Patient Instructions for exercises provided during therapy sessions    ASSESSMENT     Sarbjit arrived to session with complaints of mild shoulder pain and was agreeable to treatment. Patient easily redirected to task today and with functional mobility improvements noted on TUG and Five Time Sit to Stand assessments. Still with HEP noncompliance per self report. Continues to demonstrate dynamic stability with overground activity without AD but compensatory decrease in gait speed compared to when ambulating with quad cane. Low carryover from prior sessions. He may benefit from continued PT services to achieve goals established at evaluation.    Sarbjit Is progressing well towards his goals.   Patient prognosis is Good.     Patient will continue to benefit from skilled outpatient physical therapy to address the deficits listed in " the problem list box on initial evaluation, provide pt/family education and to maximize pt's level of independence in the home and community environment.     Patient's spiritual, cultural and educational needs considered and pt agreeable to plan of care and goals.     Anticipated barriers to physical therapy: CEREBROVASCULAR ACCIDENT x 3, decreased left attention, impaired motor planning and control, fall risk, sedentary lifestyle    Goals:     Short Term Goals (4 Weeks):     1. Independent with initial HEP. (PROGRESSING, NOT MET)  2. Pt will perform nu-step at level 4 x 8 minutes without rests breaks going at least 50 step/min or greater.  MET  3. Pt will be able to perform TUG in 25 secs with use of AD placingdemonstrating overall improved functional mobility.  MET  4. Pt will performed sit to stand x 5 without UE support in 14 seconds without LOB backward or posterior LE hooking for functional and safe transfers.  (PROGRESSING, NOT MET)  5. Pt will score greater than or equal to 12/24 on the DGI test/assessment without use of AD demonstrating overall improved functional mobility and balance and reduce fall risk.  (PROGRESSING, NOT MET)     Long Term Goals (8 Weeks):      1. Pt will be able to perform TUG in 23 secs with use of AD placingdemonstrating overall improved functional mobility.  MET  2.  Pt will score greater than or equal to 16/24 on the DGI test/assessment without use of AD demonstrating overall improved functional mobility and balance and reduce fall risk.  (PROGRESSING, NOT MET)  3. Pt will walk < than or equal to 800 ft on the 6 minute walk test indoor with AD with 0 LOB and minimal gait deviations for improved safety in home ambulation and safety.  (PROGRESSING, NOT MET)  4.  Pt will be able to safely perform and tolerate high level ADL's without LOB. (PROGRESSING, NOT MET)  5. Pt will have 0 falls from start of PT sessions. (PROGRESSING, NOT MET)  6. Independent with updated HEP.  (PROGRESSING,  NOT MET)  7. Pt will ambulate on TM x 3 minutes with use of UE support with 0 LOB at 0.9 mph. (PROGRESSING, NOT MET)  8. Pt will perform floor to stand f/b stand to floor transfer B UE support with stand by assist and no cues for improved dynamic transfer, core stability and fall safety in home and community. (PROGRESSING, NOT MET)  9. Pt will begin some form of community fitness to begin regular and consistent performance of exercise to continue maintenance of gains made in PT. (PROGRESSING, NOT MET)    PLAN     Continue to progress as per plan of care; reinforce HEP with wife assisting    NUPUR GUDINO PT

## 2023-01-06 ENCOUNTER — CLINICAL SUPPORT (OUTPATIENT)
Dept: REHABILITATION | Facility: HOSPITAL | Age: 58
End: 2023-01-06
Payer: MEDICAID

## 2023-01-06 DIAGNOSIS — R41.4 HEMI-INATTENTION: ICD-10-CM

## 2023-01-06 DIAGNOSIS — R27.9 LACK OF COORDINATION: ICD-10-CM

## 2023-01-06 DIAGNOSIS — Z74.1 NEED FOR ASSISTANCE WITH PERSONAL CARE: ICD-10-CM

## 2023-01-06 DIAGNOSIS — R26.89 IMBALANCE: Primary | ICD-10-CM

## 2023-01-06 DIAGNOSIS — M62.81 MUSCLE WEAKNESS: Primary | ICD-10-CM

## 2023-01-06 DIAGNOSIS — R27.8 IMPAIRED GROSS MOTOR COORDINATION: ICD-10-CM

## 2023-01-06 PROCEDURE — 97530 THERAPEUTIC ACTIVITIES: CPT | Mod: PN

## 2023-01-06 PROCEDURE — 97110 THERAPEUTIC EXERCISES: CPT | Mod: PN,CQ

## 2023-01-06 NOTE — PROGRESS NOTES
"Occupational Therapy Daily Treatment Note     Name: Sarbjit Brewer .  Clinic Number: [id number]    Therapy Diagnosis:   Encounter Diagnoses   Name Primary?    Muscle weakness Yes    Lack of coordination     Need for assistance with personal care      Physician: Steven Krueger MD    Visit Date: 1/6/2023    Physician Orders: OT eval and treat   Medical Diagnosis: I63.9 (ICD-10-CM) - Cerebral vascular accident   Evaluation Date: 11/23/2022  Plan of Care Expiration Period: 1/20/2022  Insurance Authorization period Expiration: 12/31/2022  Visit # / Visits Authorized: (8) 1/20  FOTO: 11/23/2022     Time In: 1:45 pm  Time Out: 2:30 pm  Total Billable (one on one) Time: 45 minutes     Precautions:  Standard and Fall    Subjective     Pt reports: "I am ok. My arm doesn't work. I use my right arm for everything"  he was not compliant with home exercise program given last session.   Response to previous treatment: fair   Functional change:  reports non-use of affected upper extremity at home     Pain: 0/10  Location: left arm    Objective   Physical Exam:  Postural examination/scapula alignment: Rounded shoulder and Head forward  Joint integrity: Firm end feeling  Skin integrity: skin is intact on eval   Edema: Mild edema noted in left hand   Palpation: no pain with palpation      Joint Evaluation  [facility]   11/23/2022 [facility]  11/23/2022 [facility]   11/23/2022 AROM  12/30/2022     Right Left Left Left    Scapular Elevation 5/5   3-/5    Scapular Retraction 5/5   3-/5    Shoulder Flex 0-180 5/5 38 2-/5 55   Shoulder Extension 0-80 5/5 50 2/5 60   Shoulder Abd 0-180 5/5   2-/5 42   Shoulder ER 0-90 5/5 28 2-/5 18   Shoulder IR 0-90 5/5 hip 2-/5 PSIS   Shoulder Horizontal adduction 0-90 5/5   2-/5    Elbow Flex/Ext 0-150 5/5 119 2/5 0-129   Wrist Flex 0-80 5/5 57 2-/5 50   Wrist Ext 0-70 5/5 20 2-/5 27   Supination 0-80 5/5   2/5    Pronation 0-80 5/5   2/5    Ulnar Deviation 5/5   2-/5    Radial Deviation  5/5   2-/5    *WNL - Within " Normal Limits  *NT = Not Tested     Fist: loose left hand       Strength: (LEE ANN Dynamometer in lbs.) Average 3 trials, Position II:       11/23/2022 11/23/2022 12/30/2022   Rung II Right Left Right    Trial 1 73# 1# 0#   Trial 2 57# 1# 0#   Trial 3 55# 2# 0#   Average 61.6# 1.3# 0#      Normal  Average Values  Female Right Left Male: Right Left   20-29 66 lbs 62 lbs         94 lbs 86 lbs   30-39 68 lbs 64 lbs 90 lbs 82 lbs   40-49 64 lbs 62 lbs 80 lbs 74 lbs   50-59 62 lbs 57 lbs 72 lbs 65 lbs   60-69 53 lbs 51 lbs 63 lbs 56 lbs   70+ 44 lbs 42 lbs 54 lbs 48 lbs   SD = +/- 19lbs         Pinch Strength (Measured in lbs)       11/23/2022 11/23/2022 12/30/2022     Right Left Left    Key Pinch 19 # 4 # 4   3pt Pinch 14 # unable unable   2pt Pinch 10 # unable 2#         Fine/Gross Motor Coordination     Assessment   Right   11/23/2022 Left  11/23/2022 Left   12/30/2022   9 hole peg test 9 pegs in/out for time WNL Unable  7:22  With therapist holding pegs + pt reaching for them (not resting on table)   *WNL - Within Normal Limits  *NT = Not Tested     Tone:  Modified Erasmo Scale:   1-  Slight increase in muscle tone, manifested by a catch and release or by minimal resistance at the end of the range of motino when the affected part(s) is moved in flexion or extension     Sensation:  Sarbjit  reports numbness on left side.       Sensation Intact  Impaired Comments    [place] Dustin  Deep Touch (6.65) []  [x]       Protective Sensation (4.56) []  [x]       Light Touch (3.61) []  [x]                  Other Kinesthesia  []  [x]       Temperature []  []       Stereognosis  []  []         Balance:   Static Sit - GOOD+: Takes MAXIMAL challenges from all directions.    Dynamic sit- GOOD+: Takes MAXIMAL challenges from all directions.    Static Stand - FAIR: Maintains without assist, but unable to take any challenges   Dynamic stand - FAIR: Maintains without assist, but unable to take any challenges      Endurance Deficit:  moderate    Sarbjit participated in neuromuscular re-education activities to improve: Coordination, Kinesthetic, Sense, and Proprioception for 10 minutes. The following activities were included:  - Upper Body Ergometer (UBE) L2 for 6 minutes to provide proprioceptive awareness, postural stability, strength, activity tolerance, and reciprocal patterns with bimanual coordination completed to improve use of bilateral upper extremities in order to prepare for therapeutic exercises/activities. Cues provided as needed for postural stability/positioning  - ball squeeze with medium stress balls/resistance 10x10     Sarbjit participated in dynamic functional therapeutic activities to improve functional performance for 35 minutes, including:  - connect four using left upper extremity for functional reach and pinch     Home Exercises and Education Provided     Education provided:   - SPROM home exercise program initiated  - Progress towards goals     Written Home Exercises Provided: yes.  Exercises were reviewed and Sarbjit was able to demonstrate them prior to the end of the session.  Sarbjit demonstrated fair  understanding of the HEP provided.   .   See EMR under Patient Instructions for exercises provided 12/1/2022.        Assessment     Sarbjit tolerated today's session fair. He required max verbal cues for encouragement to continue with activities. In addition, increased time required for all task completion. He was able to pinch and reach during connect for task, dropping pieces occasionally. Noted improvement as session progressed and as patient saw improvement in left hand. Therapist again, reiterated the importance of incorporating left upper extremity at home to prevent continued weakness.     Sarbjit is progressing well towards his goals and there are no updates to goals at this time. Pt prognosis is Fair.     Pt will continue to benefit from skilled outpatient occupational therapy to address the deficits listed in the problem  list on initial evaluation provide pt/family education and to maximize pt's level of independence in the home and community environment.     Anticipated barriers to occupational therapy: cognitive impairments     Pt's spiritual, cultural and educational needs considered and pt agreeable to plan of care and goals.    Goals:   Short Term Goals: 4 weeks  - Pt will be independent with Home Exercise Program (HEP)/Home Activity Program (HAP) and report at least 50% compliance. Not met, progressing  - Pt will demonstrate greater than or equal to 90 degrees of active shoulder flexion with LUE to increase independence with dressing and overhead reaching tasks. Not met, progressing  - Pt will increase L  strength to 15# or more to improve participation with ADL and IADL tasks. Not met, progressing  - Pt will increase left pinch strength, in all 3 conditions (lateral, 3pt, 2pt), by 2 psi to improve performance with cooking tasks, home management. Not met, progressing  - Pt will complete Box and Blocks Test with left UE, transferring 5 blocks, to demonstrate improved gross manual coordination needed for ADL and IADL tasks. Not met, progressing  - Pt will identify 3 or more compensatory strategies and/or pieces of adaptive equipment to assist with home care and other IADL tasks. Not met, progressing     Long Term Goals: 8 weeks  - Pt will reduce limitation score on FOTO by less than or equal to 30 to demonstrate an increase in self-perceived functional performance. Not met, progressing  - Pt will be SBA with upper body dressing with use of adaptive equipment/techniques as needed in order to increase independence ans decrease CG burden. Not met, progressing  - Pt will complete Box and Blocks Test with left UE, transferring 10 blocks, to demonstrate improved gross manual coordination needed for ADL and IADL tasks. Not met, progressing  - Pt will report return to meaningful activities using left upper extremity in order to  improve quality of life. Not met, progressing     Plan   Certification Period/Plan of care expiration: 11/23/2022 to 1/20/2023.     Outpatient Occupational Therapy 2 times weekly for 8 weeks to include the following interventions: Electrical Stimulation NMES/TENs, Fluidotherapy, Manual Therapy, Moist Heat/ Ice, Neuromuscular Re-ed, Orthotic Management and Training, Paraffin, Patient Education, Self Care, Therapeutic Activities, and Therapeutic Exercise.     Updates/Grading for next session: continue with POC Corinne Rapier, OT

## 2023-01-06 NOTE — PROGRESS NOTES
"[facility]   Physical Therapy Treatment Note     Name: Sarbjit Brewer Sr.  Clinic Number: [id number]    Therapy Diagnosis:   Encounter Diagnoses   Name Primary?    Imbalance Yes    Mono-inattention     Impaired gross motor coordination      Physician: Steven Krueger MD    Visit Date: 1/6/2023    Physician Orders: PT Eval and Treat   Medical Diagnosis from Referral:   Diagnosis   I63.9 (ICD-10-CM) - Cerebral vascular accident      Evaluation Date: 11/23/2022  Authorization Period Expiration: 12/31/2022  Plan of Care Expiration: 1/27/2022  Visit # / Visits authorized: 2/20 (+8 visits in 2022)  FOTO #: next at 13th visit  PTA Visit #: 1/5     Time In: 10:30 AM  Time Out: 11:15 AM  Total Billable Time: 40 minutes (3 TE - [facility])    Precautions: Standard, Fall    SUBJECTIVE     Patient reports: Mild pain/tightness in left upper extremity; otherwise no new complaints. Not completing formal HEP but "walking some."  He was not compliant with home exercise program.  Response to previous treatment: none  Functional change: none    Pain: 2/10  Location: left shoulder    OBJECTIVEh     Objective Measures updated at progress report unless specified.     Five Time Sit to Stand: 14.3 seconds with right upper extremity push off    Timed Up and Go: 17.5 seconds with quad cane    Treatment     Sarbjit received the treatments listed below:      therapeutic exercises to develop strength, endurance, ROM, and flexibility for 15 minutes including:  - Gastroc Fitter stretch 2x45"  - Heel/Toe Raises: 2x20  - Stepper bike with bilateral upper extremity / bilateral lower extremity use x 8 minutes level 4 sprints  - Hamstring stretch 2x45" B with upper extremity support    neuromuscular re-education activities to improve: Balance, Coordination, Kinesthetic, Sense, and Proprioception for 25 minutes. The following activities were included:  -- backward walking 6 lengths x 10 feet  with single upper extremity " "support  -- sit to stand from chair + 2 airex with minimal upper extremity support x10  -- [place]one weaving 4 x 25 feet without AD and with CGA/SBA   -- 6" step ups: forward 2x12 B  -- Toe taps    3 x 30'' without UE support and contact guard assist  -- Cone taps (2) with lateral steps      6 lengths x 8 feet and stand by assist  -- Ambulation 250 feet without AD with contact guard assist to improve stability during walking.     NOT TODAY  -- forward ladder stepping  6 lengths x 10 feet and with CGA/SBA  -- side ladder stepping   6 lengths x 10 feet and with CGA/SBA  -- Lateral steps with red theraband 6 x 10 feet in // bars with single upper extremity support    Patient Education and Home Exercises     Home Exercises Provided and Patient Education Provided     Education provided:   - Purpose of PT intervention    Written Home Exercises Provided: Patient instructed to cont prior HEP. Exercises were reviewed and Sarbjit was able to demonstrate them prior to the end of the session.  Sarbjit demonstrated good  understanding of the education provided. See EMR under Patient Instructions for exercises provided during therapy sessions    ASSESSMENT     Sarbjit arrived to session without any complaints of pain and was agreeable to treatment.  Session consisted of BLE strengthening and dynamic balance training.  Heavy verbal cuing for task reinforcement this session with patient often stopping a task and needing reminders to continue to the intended quantity of reps or sets. Often hand over hand guidance necessary this session despite improved attention and focus levels previous session.  No loss of balance this session though decreased safety awareness observed throughout session, especially during in clinic ambulation without AD.  He may benefit from continued PT services to achieve goals established at evaluation    Sarbjit Is progressing well towards his goals.   Patient prognosis is Good.     Patient will continue to benefit from " skilled outpatient physical therapy to address the deficits listed in the problem list box on initial evaluation, provide pt/family education and to maximize pt's level of independence in the home and community environment.     Patient's spiritual, cultural and educational needs considered and pt agreeable to plan of care and goals.     Anticipated barriers to physical therapy: CEREBROVASCULAR ACCIDENT x 3, decreased left attention, impaired motor planning and control, fall risk, sedentary lifestyle    Goals:     Short Term Goals (4 Weeks):     1. Independent with initial HEP. (PROGRESSING, NOT MET)  2. Pt will perform nu-step at level 4 x 8 minutes without rests breaks going at least 50 step/min or greater.  MET  3. Pt will be able to perform TUG in 25 secs with use of AD placingdemonstrating overall improved functional mobility.  MET  4. Pt will performed sit to stand x 5 without UE support in 14 seconds without LOB backward or posterior LE hooking for functional and safe transfers.  (PROGRESSING, NOT MET)  5. Pt will score greater than or equal to 12/24 on the DGI test/assessment without use of AD demonstrating overall improved functional mobility and balance and reduce fall risk.  (PROGRESSING, NOT MET)     Long Term Goals (8 Weeks):      1. Pt will be able to perform TUG in 23 secs with use of AD placingdemonstrating overall improved functional mobility.  MET  2.  Pt will score greater than or equal to 16/24 on the DGI test/assessment without use of AD demonstrating overall improved functional mobility and balance and reduce fall risk.  (PROGRESSING, NOT MET)  3. Pt will walk < than or equal to 800 ft on the 6 minute walk test indoor with AD with 0 LOB and minimal gait deviations for improved safety in home ambulation and safety.  (PROGRESSING, NOT MET)  4.  Pt will be able to safely perform and tolerate high level ADL's without LOB. (PROGRESSING, NOT MET)  5. Pt will have 0 falls from start of PT sessions.  (PROGRESSING, NOT MET)  6. Independent with updated HEP.  (PROGRESSING, NOT MET)  7. Pt will ambulate on TM x 3 minutes with use of UE support with 0 LOB at 0.9 mph. (PROGRESSING, NOT MET)  8. Pt will perform floor to stand f/b stand to floor transfer B UE support with stand by assist and no cues for improved dynamic transfer, core stability and fall safety in home and community. (PROGRESSING, NOT MET)  9. Pt will begin some form of community fitness to begin regular and consistent performance of exercise to continue maintenance of gains made in PT. (PROGRESSING, NOT MET)    PLAN     Continue to progress as per plan of care; reinforce HEP with wife assisting    Venus Story PTA

## 2023-01-09 ENCOUNTER — CLINICAL SUPPORT (OUTPATIENT)
Dept: REHABILITATION | Facility: HOSPITAL | Age: 58
End: 2023-01-09
Payer: MEDICAID

## 2023-01-09 DIAGNOSIS — R27.9 LACK OF COORDINATION: ICD-10-CM

## 2023-01-09 DIAGNOSIS — R41.841 COGNITIVE COMMUNICATION DEFICIT: Primary | ICD-10-CM

## 2023-01-09 DIAGNOSIS — R47.1 DYSARTHRIA: ICD-10-CM

## 2023-01-09 DIAGNOSIS — Z74.1 NEED FOR ASSISTANCE WITH PERSONAL CARE: ICD-10-CM

## 2023-01-09 DIAGNOSIS — M62.81 MUSCLE WEAKNESS: Primary | ICD-10-CM

## 2023-01-09 PROCEDURE — 92507 TX SP LANG VOICE COMM INDIV: CPT | Mod: PN | Performed by: SPEECH-LANGUAGE PATHOLOGIST

## 2023-01-09 PROCEDURE — 97530 THERAPEUTIC ACTIVITIES: CPT | Mod: PN

## 2023-01-09 NOTE — PROGRESS NOTES
[facility]  Speech Therapy Treatment Note- Neurological Rehabilitation  Date: 1/9/2023     Name: Sarbjit Brewer Sr.   MRN: [id number]   Therapy Diagnosis:   Encounter Diagnoses   Name Primary?    Cognitive communication deficit Yes    Dysarthria        Physician: Steven Krueger MD  Physician Orders: [id number] - AMB REFERRAL/CONSULT TO SPEECH THERAPY  Medical Diagnosis: I63.9 (ICD-10-CM) - Cerebral vascular accident    Visit #/ Visits Authorized: 9/20 (plus evaluation)  Date of Evaluation:  11/21/22  Insurance Authorization Period: 11/24/22 to 12/31/22  Plan of Care Expiration Date:    2/3/22  Extended Plan of Care:  none   Progress Note: due 1/21/22   Visits Cancelled: 0  Visits No Show: 0    Time In: 3:09 pm   Time Out: 3:55 pm   Total Billable Time: 46 minutes      Precautions: Standard  Subjective:   Patient reports: that there was a [place] parade this weekend but he didn't go.   Disability difficulty - not sure if he turned in the correct forms.   He was compliant to home exercise program .  Response to previous treatment: positive    Pain Scale:  0/10 on a Visual Analog Scale currently.   Pain Location: none indicated    Objective:      UNTIMED  Procedure Min .   Speech- Language- Voice Therapy 46    Total Timed Units: 0  Total Untimed Units: 1  Charges Billed/Number of units: 1    Short Term Goals: (4 weeks) Current Progress:   Patient will complete testing using the CLQT. Goal Met 12/7/2022     2. Pt will rate his speech while reading as at least a 3 on a 3 point scale ( 1 = same as before beginning therapy, 2 =better but not best, 3 =best possible outcome) on  7 /10 trials.       Goal met 12/29/22   3. Pt will differentiate between his speech and error-free speech by giving specific examples of differences on  7 /10 trials.     Progressing/ Not Met 1/9/2023   Patient frequently pointed out instances of dysfluency    4. Pt will use motor speech strategies and answer questions in  conversation with a self-rating of at least 3 on a 3 point scale ( 1 = same as before beginning therapy, 2 =better but not best, 3 =best possible outcome) on  7 /10 trials.        Progressing/ Not Met 1/9/2023   Probe: fluency  - gentle onset technique - max cues required however patient able to implement fluency techniques at the single word level    5. Patient will complete word finding tasks with semantic relationships (I.e. similarities and differences, synonyms/antonyms, semantic category, Semantic Feature Analysis) with 90% accuracy given Min cues to improve word finding skills.     Progressing/Not Met 1/9/2023  Not formally addressed       6. Patient will recall visual and/or auditory information using memory strategies with 90% given Min cues to improve memory skills.    Progressing/Not Met 1/9/2023  Not formally addressed       7. Patient will complete mental manipulation/working memory tasks with 80% accuracy given Min cues to improve immediate memory skills.    Progressing/Not Met 1/9/2023  Not formally addressed     8. Patient will complete selective attention tasks with 90% acc independently to improve attention skills.    Progressing/Not Met 1/9/2023  - Max A to complete low level alternating attention task; 61% accuracy given max cues.   Consistent redirection to task required   9. Patient will complete simple executive function tasks (I.e. functional scheduling, problem-solving/reasoning, goal/plan/action/review) with 90% accuracy given Min cues to improve executive functioning skills.     Progressing/Not Met 1/9/2023  50% accuracy with simple problem solving; 70% accuracy given moderate cues; 100% accuracy given max cues      Future goals: Topic maintenance. Decrease tangents.VERNELL.    Patient Education/Response:   Skilled education provided regarding:  - word finding strategies and memory strategies  - stroke education and prevention   - fluency strategies   Patient verbalized understanding of all  discussed.     Home program established: yes. Memory and attention strategies at home.   Exercises were reviewed and Sarbjit was able to demonstrate them prior to the end of the session.  Sarbjit demonstrated good  understanding of the education provided.     See Electronic Medical Record under Patient Instructions for exercises provided throughout therapy.  Assessment:   Sarbjit is progressing well towards his goals. Max cues required to implement fluency strategies however patient successful with single words. Max cues to achieve 61% accuracy with low level visual attention task. Max cues required for simple problem solving.  Current goals remain appropriate. Goals to be updated as necessary.     Patient prognosis is Fair to good. Patient will continue to benefit from skilled outpatient speech and language therapy to address the deficits listed in the problem list on initial evaluation, provide patient/family education and to maximize patient's level of independence in the home and community environment.   Medical necessity is demonstrated by the following IMPAIRMENTS:  Deficits in executive functioning, attention, and memory in addition to decreased word finding and speech intelligibility prevent the pt from relaying medically and safety relevant information in a timely manner in a state of emergency.   Barriers to Therapy: none  Patient's spiritual, cultural and educational needs considered and patient agreeable to plan of care and goals.  Plan:   Continue Plan of Care with focus on improving cognitive-linguistic skills and use of motor speech strategies to enhance speech intelligibility.     NEO Crocker, L-SLP, CCC-SLP  Speech Language Pathologist   1/9/2023

## 2023-01-09 NOTE — PROGRESS NOTES
"Occupational Therapy Daily Treatment Note     Name: Sarbjit Brewer .  Clinic Number: [id number]    Therapy Diagnosis:   Encounter Diagnoses   Name Primary?    Muscle weakness Yes    Lack of coordination     Need for assistance with personal care        Physician: Steven Krueger MD    Visit Date: 1/9/2023    Physician Orders: OT eval and treat   Medical Diagnosis: I63.9 (ICD-10-CM) - Cerebral vascular accident   Evaluation Date: 11/23/2022  Plan of Care Expiration Period: 1/20/2022  Insurance Authorization period Expiration: 12/31/2022  Visit # / Visits Authorized: (10) 3/20  FOTO: 11/23/2022     Time In: 4:45 pm  Time Out: 5:30 pm  Total Billable (one on one) Time: 45 minutes     Precautions:  Standard and Fall    Subjective     Pt reports: "I am ok. My arm feels better. I am going to get that game we did last time so I can practice "  he was not compliant with home exercise program given last session.   Response to previous treatment: fair   Functional change:  reports non-use of affected upper extremity at home     Pain: 0/10  Location: left arm    Objective   Physical Exam:  Postural examination/scapula alignment: Rounded shoulder and Head forward  Joint integrity: Firm end feeling  Skin integrity: skin is intact on eval   Edema: Mild edema noted in left hand   Palpation: no pain with palpation      Joint Evaluation  [facility]   11/23/2022 AROM  11/23/2022 MMS   11/23/2022 AROM  12/30/2022     Right Left Left Left    Scapular Elevation 5/5   3-/5    Scapular Retraction 5/5   3-/5    Shoulder Flex 0-180 5/5 38 2-/5 55   Shoulder Extension 0-80 5/5 50 2/5 60   Shoulder Abd 0-180 5/5   2-/5 42   Shoulder ER 0-90 5/5 28 2-/5 18   Shoulder IR 0-90 5/5 hip 2-/5 PSIS   Shoulder Horizontal adduction 0-90 5/5   2-/5    Elbow Flex/Ext 0-150 5/5 119 2/5 0-129   Wrist Flex 0-80 5/5 57 2-/5 50   Wrist Ext 0-70 5/5 20 2-/5 27   Supination 0-80 5/5   2/5    Pronation 0-80 5/5   2/5    Ulnar Deviation 5/5   2-/5    Radial Deviation "  5/5   2-/5    *WNL - Within Normal Limits  *NT = Not Tested     Fist: loose left hand       Strength: (LEE ANN Dynamometer in lbs.) Average 3 trials, Position II:       11/23/2022 11/23/2022 12/30/2022   Rung II Right Left Right    Trial 1 73# 1# 0#   Trial 2 57# 1# 0#   Trial 3 55# 2# 0#   Average 61.6# 1.3# 0#      Normal  Average Values  Female Right Left Male: Right Left   20-29 66 lbs 62 lbs         94 lbs 86 lbs   30-39 68 lbs 64 lbs 90 lbs 82 lbs   40-49 64 lbs 62 lbs 80 lbs 74 lbs   50-59 62 lbs 57 lbs 72 lbs 65 lbs   60-69 53 lbs 51 lbs 63 lbs 56 lbs   70+ 44 lbs 42 lbs 54 lbs 48 lbs   SD = +/- 19lbs         Pinch Strength (Measured in lbs)       11/23/2022 11/23/2022 12/30/2022     Right Left Left    Key Pinch 19 # 4 # 4   3pt Pinch 14 # unable unable   2pt Pinch 10 # unable 2#         Fine/Gross Motor Coordination     Assessment   Right   11/23/2022 Left  11/23/2022 Left   12/30/2022   9 hole peg test 9 pegs in/out for time WNL Unable  7:22  With therapist holding pegs + pt reaching for them (not resting on table)   *WNL - Within Normal Limits  *NT = Not Tested     Tone:  Modified Erasmo Scale:   1-  Slight increase in muscle tone, manifested by a catch and release or by minimal resistance at the end of the range of motino when the affected part(s) is moved in flexion or extension     Sensation:  Sarbjit  reports numbness on left side.       Sensation Intact  Impaired Comments    Seligman Dustin  Deep Touch (6.65) []  [x]       Protective Sensation (4.56) []  [x]       Light Touch (3.61) []  [x]                  Other Kinesthesia  []  [x]       Temperature []  []       Stereognosis  []  []         Balance:   Static Sit - GOOD+: Takes MAXIMAL challenges from all directions.    Dynamic sit- GOOD+: Takes MAXIMAL challenges from all directions.    Static Stand - FAIR: Maintains without assist, but unable to take any challenges   Dynamic stand - FAIR: Maintains without assist, but unable to take any  challenges      Endurance Deficit: moderate    Fluidotherapy: To R upper extremity for 12 min, continuous air, 110 deg, air speed 100 to decrease pain, edema & scar tissue and increased tissue extensibility.    Sarbjit received therapeutic exercises to develop strength, endurance, ROM, and posture for 5 minutes including:  - scap elevation, retraction, posterior rotations x10 with use of mirror   - seated active assisted range of motion using non-weighted dowel x10 with mirror     Sarbjit participated in dynamic functional therapeutic activities to improve functional performance for 28 minutes, including:  - functional reach and pinch with velcro cards    Home Exercises and Education Provided     Education provided:   - SPROM home exercise program initiated  - Progress towards goals     Written Home Exercises Provided: yes.  Exercises were reviewed and Sarbjit was able to demonstrate them prior to the end of the session.  Sarbjit demonstrated fair  understanding of the HEP provided.   .   See EMR under Patient Instructions for exercises provided 12/1/2022.        Assessment     Sarbjit tolerated today's session fair. He struggled with exercises using non-weighted dowel, stating he was unable to do it. Therapist adjusted treatment session to functional activity for reaching, pinch, visual scanning, STM recall and coordination. Noted significant improvement in patient participation and range of motion of left upper extremity with activity. Therapists provided assistance as needed to increase success and shoulder flexion due to weakness. Sarbjit did present with left side neglect at the start of activity. He was able to self correct and scan after skilled instruction provided. He continues to report non use of left upper extremity at home. Carry over is limited.     Sarbjit is progressing well towards his goals and there are no updates to goals at this time. Pt prognosis is Fair.     Pt will continue to benefit from skilled  outpatient occupational therapy to address the deficits listed in the problem list on initial evaluation provide pt/family education and to maximize pt's level of independence in the home and community environment.     Anticipated barriers to occupational therapy: cognitive impairments     Pt's spiritual, cultural and educational needs considered and pt agreeable to plan of care and goals.    Goals:   Short Term Goals: 4 weeks  - Pt will be independent with Home Exercise Program (HEP)/Home Activity Program (HAP) and report at least 50% compliance. Not met, progressing  - Pt will demonstrate greater than or equal to 90 degrees of active shoulder flexion with LUE to increase independence with dressing and overhead reaching tasks. Not met, progressing  - Pt will increase L  strength to 15# or more to improve participation with ADL and IADL tasks. Not met, progressing  - Pt will increase left pinch strength, in all 3 conditions (lateral, 3pt, 2pt), by 2 psi to improve performance with cooking tasks, home management. Not met, progressing  - Pt will complete Box and Blocks Test with left UE, transferring 5 blocks, to demonstrate improved gross manual coordination needed for ADL and IADL tasks. Not met, progressing  - Pt will identify 3 or more compensatory strategies and/or pieces of adaptive equipment to assist with home care and other IADL tasks. Not met, progressing     Long Term Goals: 8 weeks  - Pt will reduce limitation score on FOTO by less than or equal to 30 to demonstrate an increase in self-perceived functional performance. Not met, progressing  - Pt will be SBA with upper body dressing with use of adaptive equipment/techniques as needed in order to increase independence ans decrease CG burden. Not met, progressing  - Pt will complete Box and Blocks Test with left UE, transferring 10 blocks, to demonstrate improved gross manual coordination needed for ADL and IADL tasks. Not met, progressing  - Pt will  report return to meaningful activities using left upper extremity in order to improve quality of life. Not met, progressing     Plan   Certification Period/Plan of care expiration: 11/23/2022 to 1/20/2023.     Outpatient Occupational Therapy 2 times weekly for 8 weeks to include the following interventions: Electrical Stimulation NMES/TENs, Fluidotherapy, Manual Therapy, Moist Heat/ Ice, Neuromuscular Re-ed, Orthotic Management and Training, Paraffin, Patient Education, Self Care, Therapeutic Activities, and Therapeutic Exercise.     Updates/Grading for next session: continue with POC Corinne Rapier, OT

## 2023-01-11 ENCOUNTER — CLINICAL SUPPORT (OUTPATIENT)
Dept: REHABILITATION | Facility: HOSPITAL | Age: 58
End: 2023-01-11
Payer: MEDICAID

## 2023-01-11 DIAGNOSIS — Z74.1 NEED FOR ASSISTANCE WITH PERSONAL CARE: ICD-10-CM

## 2023-01-11 DIAGNOSIS — R27.9 LACK OF COORDINATION: ICD-10-CM

## 2023-01-11 DIAGNOSIS — M62.81 MUSCLE WEAKNESS: Primary | ICD-10-CM

## 2023-01-11 PROCEDURE — 97530 THERAPEUTIC ACTIVITIES: CPT | Mod: PN

## 2023-01-11 NOTE — PROGRESS NOTES
"Occupational Therapy Daily Treatment Note     Name: Sarbjit Brewer .  Clinic Number: [id number]    Therapy Diagnosis:   Encounter Diagnoses   Name Primary?    Muscle weakness Yes    Lack of coordination     Need for assistance with personal care      Physician: Steven Krueger MD    Visit Date: 1/11/2023    Physician Orders: OT eval and treat   Medical Diagnosis: I63.9 (ICD-10-CM) - Cerebral vascular accident   Evaluation Date: 11/23/2022  Plan of Care Expiration Period: 1/20/2022  Insurance Authorization period Expiration: 12/31/2022  Visit # / Visits Authorized: (11) 4/20  FOTO: 11/23/2022     Time In: 3:15 pm  Time Out: 4:00 pm  Total Billable (one on one) Time: 45 minutes     Precautions:  Standard and Fall    Subjective     Pt reports: "I am my arm hurts. I think I have cancer. I got that game we talked about"  he was not compliant with home exercise program given last session.   Response to previous treatment: fair   Functional change:  pain with movement in left upper extremity     Pain: 5/10 with movement   Location: left arm    Objective   Physical Exam:  Postural examination/scapula alignment: Rounded shoulder and Head forward  Joint integrity: Firm end feeling  Skin integrity: skin is intact on eval   Edema: Mild edema noted in left hand   Palpation: no pain with palpation      Joint Evaluation  [facility]   11/23/2022 AROM  11/23/2022 MMS   11/23/2022 AROM  12/30/2022     Right Left Left Left    Scapular Elevation 5/5   3-/5    Scapular Retraction 5/5   3-/5    Shoulder Flex 0-180 5/5 38 2-/5 55   Shoulder Extension 0-80 5/5 50 2/5 60   Shoulder Abd 0-180 5/5   2-/5 42   Shoulder ER 0-90 5/5 28 2-/5 18   Shoulder IR 0-90 5/5 hip 2-/5 PSIS   Shoulder Horizontal adduction 0-90 5/5   2-/5    Elbow Flex/Ext 0-150 5/5 119 2/5 0-129   Wrist Flex 0-80 5/5 57 2-/5 50   Wrist Ext 0-70 5/5 20 2-/5 27   Supination 0-80 5/5   2/5    Pronation 0-80 5/5   2/5    Ulnar Deviation 5/5   2-/5    Radial Deviation  5/5   2-/5  "   *WNL - Within Normal Limits  *NT = Not Tested     Fist: loose left hand       Strength: (LEE ANN Dynamometer in lbs.) Average 3 trials, Position II:       11/23/2022 11/23/2022 12/30/2022   Rung II Right Left Right    Trial 1 73# 1# 0#   Trial 2 57# 1# 0#   Trial 3 55# 2# 0#   Average 61.6# 1.3# 0#      Normal  Average Values  Female Right Left Male: Right Left   20-29 66 lbs 62 lbs         94 lbs 86 lbs   30-39 68 lbs 64 lbs 90 lbs 82 lbs   40-49 64 lbs 62 lbs 80 lbs 74 lbs   50-59 62 lbs 57 lbs 72 lbs 65 lbs   60-69 53 lbs 51 lbs 63 lbs 56 lbs   70+ 44 lbs 42 lbs 54 lbs 48 lbs   SD = +/- 19lbs         Pinch Strength (Measured in lbs)       11/23/2022 11/23/2022 12/30/2022     Right Left Left    Key Pinch 19 # 4 # 4   3pt Pinch 14 # unable unable   2pt Pinch 10 # unable 2#         Fine/Gross Motor Coordination     Assessment   Right   11/23/2022 Left  11/23/2022 Left   12/30/2022   9 hole peg test 9 pegs in/out for time WNL Unable  7:22  With therapist holding pegs + pt reaching for them (not resting on table)   *WNL - Within Normal Limits  *NT = Not Tested     Tone:  Modified Erasmo Scale:   1-  Slight increase in muscle tone, manifested by a catch and release or by minimal resistance at the end of the range of motino when the affected part(s) is moved in flexion or extension     Sensation:  Sarbjit  reports numbness on left side.       Sensation Intact  Impaired Comments    Clark Dustin  Deep Touch (6.65) []  [x]       Protective Sensation (4.56) []  [x]       Light Touch (3.61) []  [x]                  Other Kinesthesia  []  [x]       Temperature []  []       Stereognosis  []  []         Balance:   Static Sit - GOOD+: Takes MAXIMAL challenges from all directions.    Dynamic sit- GOOD+: Takes MAXIMAL challenges from all directions.    Static Stand - FAIR: Maintains without assist, but unable to take any challenges   Dynamic stand - FAIR: Maintains without assist, but unable to take any challenges       Endurance Deficit: moderate    Sarbjit participated in neuromuscular re-education activities to improve: Coordination, Kinesthetic, Sense, Proprioception, and Posture for 15 minutes. The following activities were included:  Upper Body Ergometer (UBE) L2.5 for 8 minutes to provide proprioceptive awareness, postural stability, strength, activity tolerance, and reciprocal patterns with bimanual coordination completed to improve use of bilateral upper extremities in order to prepare for therapeutic exercises/activities. Cues provided as needed for postural stability/positioning  - weightbearing on therapy ball 2x10   - active assisted range of motion shoulder flexion and horizontal abduction 2x10  - towel slides clockwise/counter clockwise circles and horizontal abduction/adduction x1 minute each     Sarbjit participated in dynamic functional therapeutic activities to improve functional performance for 30 minutes, including:  - cones with crossing midline and shoulder flexion bilateral sides   - velcro ball with target and single step directions on/off     Home Exercises and Education Provided     Education provided:   - SPROM home exercise program initiated  - Progress towards goals     Written Home Exercises Provided: yes.  Exercises were reviewed and Sarbjit was able to demonstrate them prior to the end of the session.  Sarbjit demonstrated fair  understanding of the HEP provided.   .   See EMR under Patient Instructions for exercises provided 12/1/2022.        Assessment     Sarbjit tolerated today's session fair. Continued non-use at home, stating he has pain when he lies on his left upper extremity. Noted mild improvement with isolated active assisted range of motion of left upper extremity and decreased assistance from right upper extremity to manage left. He was able to reach across midline and to sternum height to manage cones and velcro balls during therapeutic activities. Trunk compensatory movements noted more  "prevalent at start of activity. Max cues for encouragement required for activities. He states he is "stretching my arm like this" using right upper extremity to bring into flexion.     Sarbjit is progressing well towards his goals and there are no updates to goals at this time. Pt prognosis is Fair.     Pt will continue to benefit from skilled outpatient occupational therapy to address the deficits listed in the problem list on initial evaluation provide pt/family education and to maximize pt's level of independence in the home and community environment.     Anticipated barriers to occupational therapy: cognitive impairments     Pt's spiritual, cultural and educational needs considered and pt agreeable to plan of care and goals.    Goals:   Short Term Goals: 4 weeks  - Pt will be independent with Home Exercise Program (HEP)/Home Activity Program (HAP) and report at least 50% compliance. Not met, progressing  - Pt will demonstrate greater than or equal to 90 degrees of active shoulder flexion with LUE to increase independence with dressing and overhead reaching tasks. Not met, progressing  - Pt will increase L  strength to 15# or more to improve participation with ADL and IADL tasks. Not met, progressing  - Pt will increase left pinch strength, in all 3 conditions (lateral, 3pt, 2pt), by 2 psi to improve performance with cooking tasks, home management. Not met, progressing  - Pt will complete Box and Blocks Test with left UE, transferring 5 blocks, to demonstrate improved gross manual coordination needed for ADL and IADL tasks. Not met, progressing  - Pt will identify 3 or more compensatory strategies and/or pieces of adaptive equipment to assist with home care and other IADL tasks. Not met, progressing     Long Term Goals: 8 weeks  - Pt will reduce limitation score on FOTO by less than or equal to 30 to demonstrate an increase in self-perceived functional performance. Not met, progressing  - Pt will be SBA with " upper body dressing with use of adaptive equipment/techniques as needed in order to increase independence ans decrease CG burden. Not met, progressing  - Pt will complete Box and Blocks Test with left UE, transferring 10 blocks, to demonstrate improved gross manual coordination needed for ADL and IADL tasks. Not met, progressing  - Pt will report return to meaningful activities using left upper extremity in order to improve quality of life. Not met, progressing     Plan   Certification Period/Plan of care expiration: 11/23/2022 to 1/20/2023.     Outpatient Occupational Therapy 2 times weekly for 8 weeks to include the following interventions: Electrical Stimulation NMES/TENs, Fluidotherapy, Manual Therapy, Moist Heat/ Ice, Neuromuscular Re-ed, Orthotic Management and Training, Paraffin, Patient Education, Self Care, Therapeutic Activities, and Therapeutic Exercise.     Updates/Grading for next session: continue with POC Corinne Rapier, OT

## 2023-01-12 ENCOUNTER — CLINICAL SUPPORT (OUTPATIENT)
Dept: REHABILITATION | Facility: HOSPITAL | Age: 58
End: 2023-01-12
Attending: PSYCHIATRY & NEUROLOGY
Payer: MEDICAID

## 2023-01-12 DIAGNOSIS — R26.89 IMBALANCE: Primary | ICD-10-CM

## 2023-01-12 DIAGNOSIS — R27.8 IMPAIRED GROSS MOTOR COORDINATION: ICD-10-CM

## 2023-01-12 DIAGNOSIS — R41.4 HEMI-INATTENTION: ICD-10-CM

## 2023-01-12 DIAGNOSIS — R47.1 DYSARTHRIA: ICD-10-CM

## 2023-01-12 DIAGNOSIS — R41.841 COGNITIVE COMMUNICATION DEFICIT: Primary | ICD-10-CM

## 2023-01-12 PROCEDURE — 92507 TX SP LANG VOICE COMM INDIV: CPT | Mod: PN

## 2023-01-12 PROCEDURE — 97110 THERAPEUTIC EXERCISES: CPT | Mod: 59,PN

## 2023-01-12 NOTE — PROGRESS NOTES
"[facility]   Physical Therapy Treatment Note     Name: Sarbjit Brewer Sr.  Clinic Number: [id number]    Therapy Diagnosis:   Encounter Diagnoses   Name Primary?    Imbalance Yes    Mono-inattention     Impaired gross motor coordination      Physician: Steven Krueger MD    Visit Date: 1/12/2023    Physician Orders: PT Eval and Treat   Medical Diagnosis from Referral:   Diagnosis   I63.9 (ICD-10-CM) - Cerebral vascular accident      Evaluation Date: 11/23/2022  Authorization Period Expiration: 12/31/2022  Plan of Care Expiration: 1/27/2022  Visit # / Visits authorized: 4/20 (+8 visits in 2022)  FOTO #: next please  PTA Visit #: 0/5     Time In: 10:20 AM  Time Out: 11:05 AM  Total Billable Time: 44 minutes (3 TE - [facility])    Precautions: Standard, Fall    SUBJECTIVE     Patient reports: significant pain in left UE at the shoulder.  He was not compliant with home exercise program.  Response to previous treatment: none  Functional change: none    Pain: 7/10  Location: left shoulder    OBJECTIVEh     Objective Measures updated at progress report unless specified.     Treatment     Sarbjit received the treatments listed below:      therapeutic exercises to develop strength, endurance, ROM, and flexibility for 15 minutes including:  - Gastroc Fitter stretch 2x45"  - Heel/Toe Raises: 2x20  - Stepper bike with bilateral upper extremity / bilateral lower extremity use x 8 minutes level 4 sprints  - Hamstring stretch 2x45" B with upper extremity support    neuromuscular re-education activities to improve: Balance, Coordination, Kinesthetic, Sense, and Proprioception for 25 minutes. The following activities were included:  Pt performed gait training on treadmill x 10 total consisting of forward gait 4 x 1.5 minutes' at 1.0-1.2 ms speed setting with B UE support and 0 LOB with cues as needed for increased stride length and heel strike. Patient required frequent standing rests and could not walk more " than 2 minutes at a time on TREADMILL due to LE fatigue.    -- sit to stand from chair + 2 airex with minimal upper extremity support x10  -- walking with horizontal head turns    4 x 40'' with contact guard assist and no AD  -- Ambulation 250 feet without AD with contact guard assist to improve stability during walking.     NOT TODAY  -- forward ladder stepping  6 lengths x 10 feet and with CGA/SBA  -- side ladder stepping   6 lengths x 10 feet and with CGA/SBA  -- Lateral steps with red theraband 6 x 10 feet in // bars with single upper extremity support    Patient Education and Home Exercises     Home Exercises Provided and Patient Education Provided     Education provided:   - Purpose of PT intervention    Written Home Exercises Provided: Patient instructed to cont prior HEP. Exercises were reviewed and Sarbjit was able to demonstrate them prior to the end of the session.  Sarbjit demonstrated good  understanding of the education provided. See EMR under Patient Instructions for exercises provided during therapy sessions    ASSESSMENT     Sarbjit performed well on treadmill today with close supervision and rests every 1-2 minutes and cues for improved heel strike. He required cues and contact guard assist for walking with head turns but there was No loss of balance this session. Sarbjit is easily distracted during walking and talking and does not perform tasks safely  without needing to focus on one task.  He may benefit from continued PT services to achieve goals established at evaluation    Sarbjit Is progressing well towards his goals.   Patient prognosis is Good.     Patient will continue to benefit from skilled outpatient physical therapy to address the deficits listed in the problem list box on initial evaluation, provide pt/family education and to maximize pt's level of independence in the home and community environment.     Patient's spiritual, cultural and educational needs considered and pt agreeable to plan of  care and goals.     Anticipated barriers to physical therapy: CEREBROVASCULAR ACCIDENT x 3, decreased left attention, impaired motor planning and control, fall risk, sedentary lifestyle    Goals:     Short Term Goals (4 Weeks):     1. Independent with initial HEP. (PROGRESSING, NOT MET)  2. Pt will perform nu-step at level 4 x 8 minutes without rests breaks going at least 50 step/min or greater.  MET  3. Pt will be able to perform TUG in 25 secs with use of AD placingdemonstrating overall improved functional mobility.  MET  4. Pt will performed sit to stand x 5 without UE support in 14 seconds without LOB backward or posterior LE hooking for functional and safe transfers.  (PROGRESSING, NOT MET)  5. Pt will score greater than or equal to 12/24 on the DGI test/assessment without use of AD demonstrating overall improved functional mobility and balance and reduce fall risk.  (PROGRESSING, NOT MET)     Long Term Goals (8 Weeks):      1. Pt will be able to perform TUG in 23 secs with use of AD placingdemonstrating overall improved functional mobility.  MET  2.  Pt will score greater than or equal to 16/24 on the DGI test/assessment without use of AD demonstrating overall improved functional mobility and balance and reduce fall risk.  (PROGRESSING, NOT MET)  3. Pt will walk < than or equal to 800 ft on the 6 minute walk test indoor with AD with 0 LOB and minimal gait deviations for improved safety in home ambulation and safety.  (PROGRESSING, NOT MET)  4.  Pt will be able to safely perform and tolerate high level ADL's without LOB. (PROGRESSING, NOT MET)  5. Pt will have 0 falls from start of PT sessions. (PROGRESSING, NOT MET)  6. Independent with updated HEP.  (PROGRESSING, NOT MET)  7. Pt will ambulate on TM x 3 minutes with use of UE support with 0 LOB at 0.9 mph. (PROGRESSING, NOT MET)  8. Pt will perform floor to stand f/b stand to floor transfer B UE support with stand by assist and no cues for improved dynamic  "transfer, core stability and fall safety in home and community. (PROGRESSING, NOT MET)  9. Pt will begin some form of community fitness to begin regular and consistent performance of exercise to continue maintenance of gains made in PT. (PROGRESSING, NOT MET)    PLAN     Continue to progress as per plan of care; reinforce HEP with wife assisting  Collaborate with OT about increased left shoulder pain post last OT session.   -- [facility] weaving 4 x 25 feet without AD and with CGA/SBA   -- 6" step ups: forward 2x12 B  -- Toe taps    3 x 30'' without UE support and contact guard assist  -- [facility]one taps (2) with lateral steps      6 lengths x 8 feet and stand by assist  -- backward walking 6 lengths x 10 feet  with single upper extremity support  Hedy Lin, PT     "

## 2023-01-12 NOTE — PROGRESS NOTES
[facility]  Speech Therapy Treatment Note- Neurological Rehabilitation  Date: 1/12/2023     Name: Sarbjit Brewer Sr.   MRN: [id number]   Therapy Diagnosis:   Encounter Diagnoses   Name Primary?    Cognitive communication deficit Yes    Dysarthria        Physician: Steven Krueger MD  Physician Orders: [id number] - AMB REFERRAL/CONSULT TO SPEECH THERAPY  Medical Diagnosis: I63.9 (ICD-10-CM) - Cerebral vascular accident    Visit #/ Visits Authorized: 10/20 (plus evaluation)  Date of Evaluation:  11/21/22  Insurance Authorization Period: 11/24/22 to 12/31/22  Plan of Care Expiration Date:    2/3/22  Extended Plan of Care:  none   Progress Note: due 1/21/22   Visits Cancelled: 0  Visits No Show: 0    Time In: 9:35 pm   Time Out: 10:15 pm   Total Billable Time: 40 minutes      Precautions: Standard  Subjective:   Patient reports: Returned homework; but reported that he did not do it because he did not know how.   Disability difficulty - not sure if he turned in the correct forms.   He was compliant to home exercise program .  Response to previous treatment: positive    Pain Scale:  0/10 on a Visual Analog Scale currently.   Pain Location: none indicated    Objective:      UNTIMED  Procedure Min .   Speech- Language- Voice Therapy 40   Total Timed Units: 0  Total Untimed Units: 1  Charges Billed/Number of units: 1    Short Term Goals: (4 weeks) Current Progress:   Patient will complete testing using the CLQT. Goal Met 12/7/2022     2. Pt will rate his speech while reading as at least a 3 on a 3 point scale ( 1 = same as before beginning therapy, 2 =better but not best, 3 =best possible outcome) on  7 /10 trials.       Goal met 12/29/22   3. Pt will differentiate between his speech and error-free speech by giving specific examples of differences on  7 /10 trials.     Progressing/ Not Met 1/12/2023   Patient frequently pointed out instances of dysfluency    4. Pt will use motor speech strategies and answer  "questions in conversation with a self-rating of at least 3 on a 3 point scale ( 1 = same as before beginning therapy, 2 =better but not best, 3 =best possible outcome) on  7 /10 trials.        Progressing/ Not Met 1/12/2023   Probe: fluency  - gentle onset technique - max cues required however patient able to implement fluency techniques at the single word level     Pt reportedly did not complete his homework because he did not understand it.  Pt has difficulty reading.  Reviewed homework with him. Pt was able to complete with tasks with consistent model.  Attempted in short sentences while repeating.       Pt stated "well I'm not stuttering right now" so I don't need to use it.  Discussed using this strategy when needed.    5. Patient will complete word finding tasks with semantic relationships (I.e. similarities and differences, synonyms/antonyms, semantic category, Semantic Feature Analysis) with 90% accuracy given Min cues to improve word finding skills.     Progressing/Not Met 1/12/2023    Semantic Feature Analysis data:  Item Percentage independently Percentage with cues Amount of cueing  Notes   phone 60% 80 min N/a   cup 60 80 min N/a   watch       ring       clock       glasses                Consistent redirection to task required.      6. Patient will recall visual and/or auditory information using memory strategies with 90% given Min cues to improve memory skills.    Progressing/Not Met 1/12/2023  Reviewed memory strategies.   Pt recalled next appointment with michael HARRIS.   7. Patient will complete mental manipulation/working memory tasks with 80% accuracy given Min cues to improve immediate memory skills.    Progressing/Not Met 1/12/2023  Not formally addressed     8. Patient will complete selective attention tasks with 90% acc independently to improve attention skills.    Progressing/Not Met 1/12/2023  - Sustained attention on Constant therapy level 1:  41/50 for 82% acc Independently    -Alternating " attention level 2: 29/50 Independently ; double checking work 34/50 min A.   9. Patient will complete simple executive function tasks (I.e. functional scheduling, problem-solving/reasoning, goal/plan/action/review) with 90% accuracy given Min cues to improve executive functioning skills.     Progressing/Not Met 1/12/2023  -Not addressed this session.      Future goals: Topic maintenance. Decrease tangents.VERNELL.    Patient Education/Response:   Skilled education provided regarding:  - word finding strategies and memory strategies  - stroke education and prevention   - fluency strategies   Patient verbalized understanding of all discussed.     Home program established: yes. Memory and attention strategies at home.   Exercises were reviewed and Sarbjit was able to demonstrate them prior to the end of the session.  Sarbjit demonstrated good  understanding of the education provided.     See Electronic Medical Record under Patient Instructions for exercises provided throughout therapy.  Assessment:   Sarbjit is progressing well towards his goals. Max cues required to implement fluency strategies however patient successful with single words. Pt has difficulty reading. Able to utilize gentle onset while repeating sentences.  Unable to Independently utilize strategy in conversation.  82% acc with sustained attention; max A with alternating attention. Difficulty noted with visual scanning and double checking work. Difficulty staying on tasks. Consistent tangents throughout. Current goals remain appropriate. Goals to be updated as necessary.     Patient prognosis is Fair to good. Patient will continue to benefit from skilled outpatient speech and language therapy to address the deficits listed in the problem list on initial evaluation, provide patient/family education and to maximize patient's level of independence in the home and community environment.   Medical necessity is demonstrated by the following IMPAIRMENTS:  Deficits in  executive functioning, attention, and memory in addition to decreased word finding and speech intelligibility prevent the pt from relaying medically and safety relevant information in a timely manner in a state of emergency.   Barriers to Therapy: none  Patient's spiritual, cultural and educational needs considered and patient agreeable to plan of care and goals.  Plan:   Continue Plan of Care with focus on improving cognitive-linguistic skills and use of motor speech strategies to enhance speech intelligibility.     Iesha Cortes M.S.CCC-SLP  Speech Language Pathologist   1/12/2023

## 2023-01-13 ENCOUNTER — DOCUMENTATION ONLY (OUTPATIENT)
Dept: REHABILITATION | Facility: HOSPITAL | Age: 58
End: 2023-01-13
Payer: MEDICAID

## 2023-01-13 ENCOUNTER — CLINICAL SUPPORT (OUTPATIENT)
Dept: REHABILITATION | Facility: HOSPITAL | Age: 58
End: 2023-01-13
Payer: MEDICAID

## 2023-01-13 DIAGNOSIS — R26.89 IMBALANCE: Primary | ICD-10-CM

## 2023-01-13 DIAGNOSIS — R27.8 IMPAIRED GROSS MOTOR COORDINATION: ICD-10-CM

## 2023-01-13 DIAGNOSIS — R41.4 HEMI-INATTENTION: ICD-10-CM

## 2023-01-13 PROCEDURE — 97110 THERAPEUTIC EXERCISES: CPT | Mod: PN

## 2023-01-13 NOTE — PROGRESS NOTES
Patient arrived to scheduled speech therapy session however patient has already participated in skilled speech therapy services 2x this week. Patient's plan of care frequency is set to 2x/week therefore patient was unchecked in for his speech therapy appointment. Speech therapist and patient discussed fluency strategies and home exercise program. Speech-language pathologist provided additional home exercise program pages for fluency.  Patient escorted to waiting room to wait for Physical therapy appointment.  No charges posted today.       NEO Crocker, L-SLP, CCC-SLP  Speech Language Pathologist   1/13/2023

## 2023-01-18 ENCOUNTER — CLINICAL SUPPORT (OUTPATIENT)
Dept: REHABILITATION | Facility: HOSPITAL | Age: 58
End: 2023-01-18
Payer: MEDICAID

## 2023-01-18 DIAGNOSIS — R27.9 LACK OF COORDINATION: ICD-10-CM

## 2023-01-18 DIAGNOSIS — M62.81 MUSCLE WEAKNESS: Primary | ICD-10-CM

## 2023-01-18 DIAGNOSIS — Z74.1 NEED FOR ASSISTANCE WITH PERSONAL CARE: ICD-10-CM

## 2023-01-18 PROCEDURE — 97530 THERAPEUTIC ACTIVITIES: CPT | Mod: PN

## 2023-01-18 NOTE — PROGRESS NOTES
"Occupational Therapy Daily Treatment Note     Name: Sarbjit Brewer .  Clinic Number: [id number]    Therapy Diagnosis:   Encounter Diagnoses   Name Primary?    Muscle weakness Yes    Lack of coordination     Need for assistance with personal care        Physician: Steven Krueger MD    Visit Date: 1/18/2023    Physician Orders: OT eval and treat   Medical Diagnosis: I63.9 (ICD-10-CM) - Cerebral vascular accident   Evaluation Date: 11/23/2022  Plan of Care Expiration Period: 1/20/2022  Insurance Authorization period Expiration: 12/31/2022  Visit # / Visits Authorized: (12) 5/20  FOTO: 11/23/2022     Time In: 4:00 pm  Time Out: 4:45 pm  Total Billable (one on one) Time: 45 minutes (TA - 3 Medicaid)      Precautions:  Standard and Fall    Subjective     Pt reports: "My arm just hurts. I roll on it from one side to the other but I don't stretch it"  he was not compliant with home exercise program given last session.   Response to previous treatment: fair   Functional change:  pain with movement in left upper extremity     Pain: 5/10 with movement   Location: left arm    Objective   Physical Exam:  Postural examination/scapula alignment: Rounded shoulder and Head forward  Joint integrity: Firm end feeling  Skin integrity: skin is intact on eval   Edema: Mild edema noted in left hand   Palpation: no pain with palpation      Joint Evaluation  [facility] [place]   11/23/2022 AROM  11/23/2022 MMS   11/23/2022 AROM  12/30/2022     Right Left Left Left    Scapular Elevation 5/5   3-/5    Scapular Retraction 5/5   3-/5    Shoulder Flex 0-180 5/5 38 2-/5 55   Shoulder Extension 0-80 5/5 50 2/5 60   Shoulder Abd 0-180 5/5   2-/5 42   Shoulder ER 0-90 5/5 28 2-/5 18   Shoulder IR 0-90 5/5 hip 2-/5 PSIS   Shoulder Horizontal adduction 0-90 5/5   2-/5    Elbow Flex/Ext 0-150 5/5 119 2/5 0-129   Wrist Flex 0-80 5/5 57 2-/5 50   Wrist Ext 0-70 5/5 20 2-/5 27   Supination 0-80 5/5   2/5    Pronation 0-80 5/5   2/5    Ulnar Deviation 5/5   2-/5  "   Radial Deviation  5/5   2-/5    *WNL - Within Normal Limits  *NT = Not Tested     Fist: loose left hand       Strength: (LEE ANN Dynamometer in lbs.) Average 3 trials, Position II:       11/23/2022 11/23/2022 12/30/2022   Rung II Right Left Right    Trial 1 73# 1# 0#   Trial 2 57# 1# 0#   Trial 3 55# 2# 0#   Average 61.6# 1.3# 0#      Normal  Average Values  Female Right Left Male: Right Left   20-29 66 lbs 62 lbs         94 lbs 86 lbs   30-39 68 lbs 64 lbs 90 lbs 82 lbs   40-49 64 lbs 62 lbs 80 lbs 74 lbs   50-59 62 lbs 57 lbs 72 lbs 65 lbs   60-69 53 lbs 51 lbs 63 lbs 56 lbs   70+ 44 lbs 42 lbs 54 lbs 48 lbs   SD = +/- 19lbs         Pinch Strength (Measured in lbs)       11/23/2022 11/23/2022 12/30/2022     Right Left Left    Key Pinch 19 # 4 # 4   3pt Pinch 14 # unable unable   2pt Pinch 10 # unable 2#         Fine/Gross Motor Coordination     Assessment   Right   11/23/2022 Left  11/23/2022 Left   12/30/2022   9 hole peg test 9 pegs in/out for time WNL Unable  7:22  With therapist holding pegs + pt reaching for them (not resting on table)   *WNL - Within Normal Limits  *NT = Not Tested     Tone:  Modified Erasmo Scale:   1-  Slight increase in muscle tone, manifested by a catch and release or by minimal resistance at the end of the range of motino when the affected part(s) is moved in flexion or extension     Sensation:  Sarbjit  reports numbness on left side.       Sensation Intact  Impaired Comments    Calipatria Dustin  Deep Touch (6.65) []  [x]       Protective Sensation (4.56) []  [x]       Light Touch (3.61) []  [x]                  Other Kinesthesia  []  [x]       Temperature []  []       Stereognosis  []  []         Balance:   Static Sit - GOOD+: Takes MAXIMAL challenges from all directions.    Dynamic sit- GOOD+: Takes MAXIMAL challenges from all directions.    Static Stand - FAIR: Maintains without assist, but unable to take any challenges   Dynamic stand - FAIR: Maintains without assist, but unable  to take any challenges      Endurance Deficit: moderate    Sarbjit participated in neuromuscular re-education activities to improve: Coordination, Kinesthetic, Sense, Proprioception, and Posture for 45 minutes. The following activities were included:  Upper Body Ergometer (UBE) L2.5 for 8 minutes to provide proprioceptive awareness, postural stability, strength, activity tolerance, and reciprocal patterns with bimanual coordination completed to improve use of bilateral upper extremities in order to prepare for therapeutic exercises/activities. Cues provided as needed for postural stability/positioning  - weightbearing on therapy ball 2x10   - active assisted range of motion using therapy ball shoulder flexion, horizontal abduction 2x10   - supine shoulder flexion 2x10 0#  - supine chest press 2x10 0#  - supine external rotation 2x10 0#  - towel slides clockwise/counter clockwise circles and horizontal abduction/adduction x2 minutes each    - home exercise program issued with movements performed this session     Home Exercises and Education Provided     Education provided:   - active assisted range of motion supine and seated   - SPROM home exercise program initiated  - Progress towards goals     Written Home Exercises Provided: yes.  Exercises were reviewed and Sarbjit was able to demonstrate them prior to the end of the session.  Sarbjit demonstrated fair  understanding of the HEP provided.   .   See EMR under Patient Instructions for exercises provided 12/1/2022. ; 1/18/2023     Assessment     Sarbjit tolerated today's session fair. He continues to report pain in his left shoulder and does not report compliance with stretching/home exercise program. Therapist provided education on the importance of compliance with home exercise program, presentation and risks after a stroke, specifically with tone and adhesive capsulitis. As Sarbjit progressed with exercises, noted decreased pain and improved independence with movement as  therapist decreased assistance indicating positive response to treatment/home exercise program. Therapist printed home exercise program and reviewed with Sarbjit to maximize carry over at home. Increased time required for education and exercises     Sarbjit is progressing well towards his goals and there are no updates to goals at this time. Pt prognosis is Fair.     Pt will continue to benefit from skilled outpatient occupational therapy to address the deficits listed in the problem list on initial evaluation provide pt/family education and to maximize pt's level of independence in the home and community environment.     Anticipated barriers to occupational therapy: cognitive impairments     Pt's spiritual, cultural and educational needs considered and pt agreeable to plan of care and goals.    Goals:   Short Term Goals: 4 weeks  - Pt will be independent with Home Exercise Program (HEP)/Home Activity Program (HAP) and report at least 50% compliance. Not met, progressing  - Pt will demonstrate greater than or equal to 90 degrees of active shoulder flexion with LUE to increase independence with dressing and overhead reaching tasks. Not met, progressing  - Pt will increase L  strength to 15# or more to improve participation with ADL and IADL tasks. Not met, progressing  - Pt will increase left pinch strength, in all 3 conditions (lateral, 3pt, 2pt), by 2 psi to improve performance with cooking tasks, home management. Not met, progressing  - Pt will complete Box and Blocks Test with left UE, transferring 5 blocks, to demonstrate improved gross manual coordination needed for ADL and IADL tasks. Not met, progressing  - Pt will identify 3 or more compensatory strategies and/or pieces of adaptive equipment to assist with home care and other IADL tasks. Not met, progressing     Long Term Goals: 8 weeks  - Pt will reduce limitation score on FOTO by less than or equal to 30 to demonstrate an increase in self-perceived  functional performance. Not met, progressing  - Pt will be SBA with upper body dressing with use of adaptive equipment/techniques as needed in order to increase independence ans decrease CG burden. Not met, progressing  - Pt will complete Box and Blocks Test with left UE, transferring 10 blocks, to demonstrate improved gross manual coordination needed for ADL and IADL tasks. Not met, progressing  - Pt will report return to meaningful activities using left upper extremity in order to improve quality of life. Not met, progressing     Plan   Certification Period/Plan of care expiration: 11/23/2022 to 1/20/2023.     Outpatient Occupational Therapy 2 times weekly for 8 weeks to include the following interventions: Electrical Stimulation NMES/TENs, Fluidotherapy, Manual Therapy, Moist Heat/ Ice, Neuromuscular Re-ed, Orthotic Management and Training, Paraffin, Patient Education, Self Care, Therapeutic Activities, and Therapeutic Exercise.     Updates/Grading for next session: continue with POC Corinne Rapier, OT

## 2023-01-20 ENCOUNTER — CLINICAL SUPPORT (OUTPATIENT)
Dept: REHABILITATION | Facility: HOSPITAL | Age: 58
End: 2023-01-20
Payer: MEDICAID

## 2023-01-20 DIAGNOSIS — R27.8 IMPAIRED GROSS MOTOR COORDINATION: ICD-10-CM

## 2023-01-20 DIAGNOSIS — R26.89 IMBALANCE: ICD-10-CM

## 2023-01-20 DIAGNOSIS — R27.9 LACK OF COORDINATION: ICD-10-CM

## 2023-01-20 DIAGNOSIS — Z74.1 NEED FOR ASSISTANCE WITH PERSONAL CARE: ICD-10-CM

## 2023-01-20 DIAGNOSIS — M62.81 MUSCLE WEAKNESS: Primary | ICD-10-CM

## 2023-01-20 DIAGNOSIS — R41.4 HEMI-INATTENTION: ICD-10-CM

## 2023-01-20 PROCEDURE — 97110 THERAPEUTIC EXERCISES: CPT | Mod: PN,CQ

## 2023-01-20 NOTE — PROGRESS NOTES
"My Note           [facility]   Physical Therapy Treatment Note      Name: Sarbjit Brewer Sr.  Clinic Number: [id number]     Therapy Diagnosis:        Encounter Diagnoses   Name Primary?    Muscle weakness Yes    Lack of coordination      Need for assistance with personal care              Physician: Steven Krueger MD     Visit Date: 1/20/2023     Physician Orders: PT Eval and Treat   Medical Diagnosis from Referral:   Diagnosis   I63.9 (ICD-10-CM) - Cerebral vascular accident      Evaluation Date: 11/23/2022  Authorization Period Expiration: 12/31/2022  Plan of Care Expiration: 1/27/2022  Visit # / Visits authorized: 4/20 (+8 visits in 2022)  FOTO #: next at 13th visit  PTA Visit #: 1/5      Time In: 10:33 AM  Time Out: 11:19 AM  Total Billable Time: 46 minutes (3 TE - [facility])     Precautions: Standard, Fall     SUBJECTIVE      Patient reports: Continued noncompliance with HEP but says he tries to walk with grandson  He was not compliant with home exercise program.  Response to previous treatment: none  Functional change: none     Pain: 2/10  Location: left shoulder     OBJECTIVEh      Objective Measures updated at progress report unless specified.      6 minute walk test: 490 feet with NBQC     Treatment      Sarbjit received the treatments listed below:       therapeutic exercises to develop strength, endurance, ROM, and flexibility for 20 minutes including:  - Gastroc Fitter stretch 2x45"  - Heel/Toe Raises: 2x20  - Stepper bike with bilateral upper extremity / bilateral lower extremity use x 8 minutes level 4 sprints  - Hamstring stretch 2x45" B with upper extremity support     neuromuscular re-education activities to improve: Balance, Coordination, Kinesthetic, Sense, and Proprioception for 25 minutes. The following activities were included:  -- backward walking 6 lengths x 10 feet  with single upper extremity support  -- 6" step ups: forward 2x12 B  -- Toe taps                     "                       3 x 30'' without UE support and contact guard assist  -- Cone taps (2) with lateral steps      6 lengths x 8 feet and stand by assist  -- Ambulation 250 feet without AD with contact guard assist to improve stability during walking.      NOT TODAY  -- forward ladder stepping                   6 lengths x 10 feet and with CGA/SBA  -- side ladder stepping                        6 lengths x 10 feet and with CGA/SBA  -- Lateral steps with red theraband     6 x 10 feet in // bars with single upper extremity support  -- sit to stand from chair + 2 airex with minimal upper extremity support x10  -- Cone weaving 4 x 25 feet without AD and with CGA/SBA      Patient Education and Home Exercises      Home Exercises Provided and Patient Education Provided      Education provided:   - Purpose of PT intervention     Written Home Exercises Provided: Patient instructed to cont prior HEP. Exercises were reviewed and Sarbjit was able to demonstrate them prior to the end of the session.  Sarbjit demonstrated good  understanding of the education provided. See EMR under Patient Instructions for exercises provided during therapy sessions     ASSESSMENT      Sarbjit arrived to session without any complaints of pain and was agreeable to treatment.  Session consisted of BLE strengthening and dynamic balance training.  No loss of balance noted during any activity.  Pt does need cuing to remember to use his cane when walking away from an areas.  Pt is easily distracted and needs consistent cuing to remain on task.    He may benefit from continued PT services to achieve goals established at evaluation     Sarbjit Is progressing well towards his goals.   Patient prognosis is Good.      Patient will continue to benefit from skilled outpatient physical therapy to address the deficits listed in the problem list box on initial evaluation, provide pt/family education and to maximize pt's level of independence in the home and community  environment.      Patient's spiritual, cultural and educational needs considered and pt agreeable to plan of care and goals.     Anticipated barriers to physical therapy: CEREBROVASCULAR ACCIDENT x 3, decreased left attention, impaired motor planning and control, fall risk, sedentary lifestyle     Goals:      Short Term Goals (4 Weeks):      1. Independent with initial HEP. (PROGRESSING, NOT MET)  2. Pt will perform nu-step at level 4 x 8 minutes without rests breaks going at least 50 step/min or greater.  MET  3. Pt will be able to perform TUG in 25 secs with use of AD placingdemonstrating overall improved functional mobility.  MET  4. Pt will performed sit to stand x 5 without UE support in 14 seconds without LOB backward or posterior LE hooking for functional and safe transfers.  (PROGRESSING, NOT MET)  5. Pt will score greater than or equal to 12/24 on the DGI test/assessment without use of AD demonstrating overall improved functional mobility and balance and reduce fall risk.  (PROGRESSING, NOT MET)     Long Term Goals (8 Weeks):      1. Pt will be able to perform TUG in 23 secs with use of AD placingdemonstrating overall improved functional mobility.  MET  2.  Pt will score greater than or equal to 16/24 on the DGI test/assessment without use of AD demonstrating overall improved functional mobility and balance and reduce fall risk.  (PROGRESSING, NOT MET)  3. Pt will walk < than or equal to 800 ft on the 6 minute walk test indoor with AD with 0 LOB and minimal gait deviations for improved safety in home ambulation and safety.  (PROGRESSING, NOT MET)  4.  Pt will be able to safely perform and tolerate high level ADL's without LOB. (PROGRESSING, NOT MET)  5. Pt will have 0 falls from start of PT sessions. (PROGRESSING, NOT MET)  6. Independent with updated HEP.  (PROGRESSING, NOT MET)  7. Pt will ambulate on TM x 3 minutes with use of UE support with 0 LOB at 0.9 mph. (PROGRESSING, NOT MET)  8. Pt will perform  floor to stand f/b stand to floor transfer B UE support with stand by assist and no cues for improved dynamic transfer, core stability and fall safety in home and community. (PROGRESSING, NOT MET)  9. Pt will begin some form of community fitness to begin regular and consistent performance of exercise to continue maintenance of gains made in PT. (PROGRESSING, NOT MET)     PLAN      Continue to progress as per plan of care; reinforce HEP with wife assisting     Venus Story, PTA

## 2023-01-20 NOTE — PROGRESS NOTES
"[facility]   Physical Therapy Treatment Note     Name: Sarbjit Brewer Sr.  Clinic Number: [id number]    Therapy Diagnosis:   Encounter Diagnoses   Name Primary?    Muscle weakness Yes    Lack of coordination     Need for assistance with personal care          Physician: Steven Krueger MD    Visit Date: 1/20/2023    Physician Orders: PT Eval and Treat   Medical Diagnosis from Referral:   Diagnosis   I63.9 (ICD-10-CM) - Cerebral vascular accident      Evaluation Date: 11/23/2022  Authorization Period Expiration: 12/31/2022  Plan of Care Expiration: 1/27/2022  Visit # / Visits authorized: 4/20 (+8 visits in 2022)  FOTO #: next at 13th visit  PTA Visit #: 1/5     Time In: 10:33 AM  Time Out: 11:19 AM  Total Billable Time: 46 minutes (3 TE - [facility])    Precautions: Standard, Fall    SUBJECTIVE     Patient reports: Continued noncompliance with HEP but says he tries to walk with grandson  He was not compliant with home exercise program.  Response to previous treatment: none  Functional change: none    Pain: 2/10  Location: left shoulder    OBJECTIVEh     Objective Measures updated at progress report unless specified.     6 minute walk test: 490 feet with NBQC    Treatment     Sarbjit received the treatments listed below:      therapeutic exercises to develop strength, endurance, ROM, and flexibility for 20 minutes including:  - Gastroc Fitter stretch 2x45"  - Heel/Toe Raises: 2x20  - Stepper bike with bilateral upper extremity / bilateral lower extremity use x 8 minutes level 4 sprints  - Hamstring stretch 2x45" B with upper extremity support    neuromuscular re-education activities to improve: Balance, Coordination, Kinesthetic, Sense, and Proprioception for 25 minutes. The following activities were included:  -- backward walking 6 lengths x 10 feet  with single upper extremity support  -- 6" step ups: forward 2x12 B  -- Toe taps    3 x 30'' without UE support and contact guard assist  -- " Cone taps (2) with lateral steps      6 lengths x 8 feet and stand by assist  -- Ambulation 250 feet without AD with contact guard assist to improve stability during walking.     NOT TODAY  -- forward ladder stepping  6 lengths x 10 feet and with CGA/SBA  -- side ladder stepping   6 lengths x 10 feet and with CGA/SBA  -- Lateral steps with red theraband 6 x 10 feet in // bars with single upper extremity support  -- sit to stand from chair + 2 airex with minimal upper extremity support x10  -- Cone weaving 4 x 25 feet without AD and with CGA/SBA     Patient Education and Home Exercises     Home Exercises Provided and Patient Education Provided     Education provided:   - Purpose of PT intervention    Written Home Exercises Provided: Patient instructed to cont prior HEP. Exercises were reviewed and Sarbjit was able to demonstrate them prior to the end of the session.  Sarbjit demonstrated good  understanding of the education provided. See EMR under Patient Instructions for exercises provided during therapy sessions    ASSESSMENT     Sarbjit arrived to session without any complaints of pain and was agreeable to treatment.  Session consisted of BLE strengthening and dynamic balance training.  No loss of balance noted during any activity.  Pt does need cuing to remember to use his cane when walking away from an areas.  Pt is easily distracted and needs consistent cuing to remain on task.    He may benefit from continued PT services to achieve goals established at evaluation    Sarbjit Is progressing well towards his goals.   Patient prognosis is Good.     Patient will continue to benefit from skilled outpatient physical therapy to address the deficits listed in the problem list box on initial evaluation, provide pt/family education and to maximize pt's level of independence in the home and community environment.     Patient's spiritual, cultural and educational needs considered and pt agreeable to plan of care and goals.      Anticipated barriers to physical therapy: CEREBROVASCULAR ACCIDENT x 3, decreased left attention, impaired motor planning and control, fall risk, sedentary lifestyle    Goals:     Short Term Goals (4 Weeks):     1. Independent with initial HEP. (PROGRESSING, NOT MET)  2. Pt will perform nu-step at level 4 x 8 minutes without rests breaks going at least 50 step/min or greater.  MET  3. Pt will be able to perform TUG in 25 secs with use of AD placingdemonstrating overall improved functional mobility.  MET  4. Pt will performed sit to stand x 5 without UE support in 14 seconds without LOB backward or posterior LE hooking for functional and safe transfers.  (PROGRESSING, NOT MET)  5. Pt will score greater than or equal to 12/24 on the DGI test/assessment without use of AD demonstrating overall improved functional mobility and balance and reduce fall risk.  (PROGRESSING, NOT MET)     Long Term Goals (8 Weeks):      1. Pt will be able to perform TUG in 23 secs with use of AD placingdemonstrating overall improved functional mobility.  MET  2.  Pt will score greater than or equal to 16/24 on the DGI test/assessment without use of AD demonstrating overall improved functional mobility and balance and reduce fall risk.  (PROGRESSING, NOT MET)  3. Pt will walk < than or equal to 800 ft on the 6 minute walk test indoor with AD with 0 LOB and minimal gait deviations for improved safety in home ambulation and safety.  (PROGRESSING, NOT MET)  4.  Pt will be able to safely perform and tolerate high level ADL's without LOB. (PROGRESSING, NOT MET)  5. Pt will have 0 falls from start of PT sessions. (PROGRESSING, NOT MET)  6. Independent with updated HEP.  (PROGRESSING, NOT MET)  7. Pt will ambulate on TM x 3 minutes with use of UE support with 0 LOB at 0.9 mph. (PROGRESSING, NOT MET)  8. Pt will perform floor to stand f/b stand to floor transfer B UE support with stand by assist and no cues for improved dynamic transfer, core  stability and fall safety in home and community. (PROGRESSING, NOT MET)  9. Pt will begin some form of community fitness to begin regular and consistent performance of exercise to continue maintenance of gains made in PT. (PROGRESSING, NOT MET)    PLAN     Continue to progress as per plan of care; reinforce HEP with wife assisting    Venus Story, ABI

## 2023-01-25 ENCOUNTER — CLINICAL SUPPORT (OUTPATIENT)
Dept: REHABILITATION | Facility: HOSPITAL | Age: 58
End: 2023-01-25
Payer: MEDICAID

## 2023-01-25 DIAGNOSIS — R27.9 LACK OF COORDINATION: ICD-10-CM

## 2023-01-25 DIAGNOSIS — Z74.1 NEED FOR ASSISTANCE WITH PERSONAL CARE: ICD-10-CM

## 2023-01-25 DIAGNOSIS — M62.81 MUSCLE WEAKNESS: Primary | ICD-10-CM

## 2023-01-25 PROCEDURE — 97530 THERAPEUTIC ACTIVITIES: CPT | Mod: PN

## 2023-01-25 NOTE — PROGRESS NOTES
"Occupational Therapy Daily Treatment Note     Name: Sarbjit Brewer Sr.  Clinic Number: [id number]    Therapy Diagnosis:   Encounter Diagnoses   Name Primary?    Muscle weakness Yes    Lack of coordination     Need for assistance with personal care        Physician: Steven Krueger MD    Visit Date: 1/25/2023    Physician Orders: OT eval and treat   Medical Diagnosis: I63.9 (ICD-10-CM) - Cerebral vascular accident   Evaluation Date: 11/23/2022  Plan of Care Expiration Period: 3/10/2023  Insurance Authorization period Expiration: 12/31/2022  Visit # / Visits Authorized: (12) 5/20  FOTO: 11/23/2022     Time In: 1:07 pm  Time Out: 1:45 pm  Total Billable (one on one) Time: 38 minutes ( - 3 Medicaid)      Precautions:  Standard and Fall    Subjective     Pt reports: "I have been stretching my arm when I am in bed." "It is the normal pain"   he was not compliant with home exercise program given last session.   Response to previous treatment: fair   Functional change:  He reports compliance with stretching program     Pain: 5/10 with movement   Location: left arm    Objective   Physical Exam:  Postural examination/scapula alignment: Rounded shoulder and Head forward  Joint integrity: Firm end feeling  Skin integrity: skin is intact on eval   Edema: Mild edema noted in left hand   Palpation: no pain with palpation      Joint Evaluation  [facility]   11/23/2022 AROM  11/23/2022 MMS   11/23/2022 AROM  12/30/2022 AROM  1/25/2022     Right Left Left Left  Left    Scapular Elevation 5/5   3-/5     Scapular Retraction 5/5   3-/5     Shoulder Flex 0-180 5/5 38 2-/5 55 56   Shoulder Extension 0-80 5/5 50 2/5 60 60   Shoulder Abd 0-180 5/5   2-/5 42 45   Shoulder ER 0-90 5/5 28 2-/5 18 28   Shoulder IR 0-90 5/5 hip 2-/5 PSIS PSIS   Shoulder Horizontal adduction 0-90 5/5   2-/5     Elbow Flex/Ext 0-150 5/5 119 2/5 0-129 132   Wrist Flex 0-80 5/5 57 2-/5 50    Wrist Ext 0-70 5/5 20 2-/5 27    Supination 0-80 5/5   2/5     Pronation 0-80 " 5/5   2/5     Ulnar Deviation 5/5   2-/5     Radial Deviation  5/5   2-/5     *WNL - Within Normal Limits  *NT = Not Tested     Fist: loose left hand       Strength: (LEE ANN Dynamometer in lbs.) Average 3 trials, Position II:       2022   Rung II Right Left Left  Left    Trial 1 73# 1# 0# 10#   Trial 2 57# 1# 0# 11#   Trial 3 55# 2# 0# 6#   Average 61.6# 1.3# 0# 9#      Normal  Average Values  Female Right Left Male: Right Left   20-29 66 lbs 62 lbs         94 lbs 86 lbs   30-39 68 lbs 64 lbs 90 lbs 82 lbs   40-49 64 lbs 62 lbs 80 lbs 74 lbs   50-59 62 lbs 57 lbs 72 lbs 65 lbs   60-69 53 lbs 51 lbs 63 lbs 56 lbs   70+ 44 lbs 42 lbs 54 lbs 48 lbs   SD = +/- 19lbs         Pinch Strength (Measured in lbs)       2022     Right Left Left  Left    Key Pinch 19 # 4 # 4 7#   3pt Pinch 14 # unable unable 4#   2pt Pinch 10 # unable 2# 2#         Fine/Gross Motor Coordination     Assessment   Right   2022 Left  2022 Left   2022 Left   2023   9 hole peg test 9 pegs in/out for time WNL Unable  7:22  With therapist holding pegs + pt reaching for them (not resting on table) 1 pe seconds    12:47    *No physical help from therapist  Verbal cues for problem solving    *WNL - Within Normal Limits  *NT = Not Tested     Tone:  Modified Erasmo Scale:   1-  Slight increase in muscle tone, manifested by a catch and release or by minimal resistance at the end of the range of motino when the affected part(s) is moved in flexion or extension     Sensation:  Sarbjit  reports numbness on left side.       Sensation Intact  Impaired Comments    [place] Dustin  Deep Touch (6.65) []  [x]       Protective Sensation (4.56) []  [x]       Light Touch (3.61) []  [x]                  Other Kinesthesia  []  [x]       Temperature []  []       Stereognosis  []  []         Treatment      Sarbjit participated in neuromuscular re-education activities to  improve: Coordination, Kinesthetic, Sense, Proprioception, and Posture for 15 minutes. The following activities were included:  Upper Body Ergometer (UBE) L2.5 for 8 minutes to provide proprioceptive awareness, postural stability, strength, activity tolerance, and reciprocal patterns with bimanual coordination completed to improve use of bilateral upper extremities in order to prepare for therapeutic exercises/activities. Cues provided as needed for postural stability/positioning   - supine shoulder flexion 2x10 1# dowel   - supine chest press 2x10 1# dowel   - supine external rotation 2x10 1# dowel     Sarbjit participated in dynamic functional therapeutic activities to improve functional performance for 23 minutes, including:  - reassessment, discussed progress, home exercise program and plan of care    Home Exercises and Education Provided     Education provided:   - active assisted range of motion supine and seated   - SPROM home exercise program initiated  - Progress towards goals     Written Home Exercises Provided: yes.  Exercises were reviewed and Sarbjit was able to demonstrate them prior to the end of the session.  Sarbjit demonstrated fair  understanding of the HEP provided.   .   See EMR under Patient Instructions for exercises provided 12/1/2022. ; 1/18/2023     Assessment     Sarbjit is progressing well towards his goals and there are no updates to goals at this time. Pt prognosis is Fair.     Pt will continue to benefit from skilled outpatient occupational therapy to address the deficits listed in the problem list on initial evaluation provide pt/family education and to maximize pt's level of independence in the home and community environment.     Anticipated barriers to occupational therapy: cognitive impairments     Pt's spiritual, cultural and educational needs considered and pt agreeable to plan of care and goals.    Goals:   See UPOC     Plan     Updates/Grading for next session: continue with POC      Corinne Rapier, OT

## 2023-01-26 ENCOUNTER — CLINICAL SUPPORT (OUTPATIENT)
Dept: REHABILITATION | Facility: HOSPITAL | Age: 58
End: 2023-01-26
Payer: MEDICAID

## 2023-01-26 DIAGNOSIS — R26.89 IMBALANCE: Primary | ICD-10-CM

## 2023-01-26 DIAGNOSIS — R47.1 DYSARTHRIA: ICD-10-CM

## 2023-01-26 DIAGNOSIS — R41.4 HEMI-INATTENTION: ICD-10-CM

## 2023-01-26 DIAGNOSIS — R27.8 IMPAIRED GROSS MOTOR COORDINATION: ICD-10-CM

## 2023-01-26 DIAGNOSIS — R41.841 COGNITIVE COMMUNICATION DEFICIT: Primary | ICD-10-CM

## 2023-01-26 PROCEDURE — 92507 TX SP LANG VOICE COMM INDIV: CPT | Mod: PN

## 2023-01-26 PROCEDURE — 97110 THERAPEUTIC EXERCISES: CPT | Mod: PN

## 2023-01-26 NOTE — PROGRESS NOTES
[facility]   Physical Therapy Treatment Note / Discharge Summary     Name: Sarbjit Brewer Sr.  Clinic Number: [id number]     Therapy Diagnosis:        Encounter Diagnoses   Name Primary?    Muscle weakness Yes    Lack of coordination      Need for assistance with personal care              Physician: Steven Krueger MD     Visit Date: 1/20/2023     Physician Orders: PT Eval and Treat   Medical Diagnosis from Referral:   Diagnosis   I63.9 (ICD-10-CM) - Cerebral vascular accident      Evaluation Date: 11/23/2022  Authorization Period Expiration: 12/31/2022  Plan of Care Expiration: 1/27/2022  Visit # / Visits authorized: 6/20 (+8 visits in 2022)  FOTO #: completed  - 35% limitation  PTA Visit #: 0/5      Time In: 2:30  PM  Time Out: 3:15 PM  Total Billable Time: 45 minutes (3 TE - [facility])     Precautions: Standard, Fall     SUBJECTIVE      Patient reports: Pt says he will walk to the corner, which he says is a block, but otherwise, he watches tv at home.    He was not compliant with home exercise program.  Response to previous treatment: none  Functional change: walking to corner      Pain: 2/10  Location: left shoulder     OBJECTIVE      Objective Measures updated at progress report unless specified.      6 minute walk test: 490 feet with NBQC      Evaluation   5 times sit-stand  (adults 18-63 y/o) 12 seconds  >12 sec= fall risk for general elderly  >16 sec= fall risk for Parkinson's disease  >10 sec= balance/vestibular dysfunction (<61 y/o)  >14.2 sec= balance/vestibular dysfunction (>61 y/o)  >12 sec= fall risk for CVA        Evaluation   Timed Up and Go 11.9 sec  < 20 sec safe for independent transfers, < 30 sec safe for dependent transfers/assist required   Self Selected Walking Speed 0.6 m/sec (6m/9.6s)      Dynamic Gait Index    *note: Measure a distance of 20 feet (~6 meters) for this assessment    1. Gait on level surface: 2. Mild impairment: Walks 20', uses assistive devices,  slower speed, mild gait deviations.   2. Change in Gait speed: 2. Mild impairment: Is able to change speed, but demonstrates mild gait deviations, or no gait deviations but unable to achieve a signifcant change in velocity, or uses an assistive device.  3. Gait with Horizontal Head Turns: 3. Normal: Performs head turns smoothly with no change in gait.  4. Gait with Vertical Head Turns: 3. Normal: Performs head turns with no change in gait  5. Gait and Pivot turn: 1. Moderate impairment: Turns slowly, requires verbal cuing, requries several small steps to catch balance following turn and stop.  6. Step Over Obstacle: 1. Moderate impairment: Is able to step over the box but must stop, then, step over. May requrie verbal cueing  7. Step around obstacles: 2. Mild impairment: Is able to step around both cones, but must slow down and adjust steps to clear cones.  8. Steps: 2. Mild impairment: Alternating feet, must use rail.    Score: 16/24    Cutoffs:   < 19/24 = predictive of falls in the elderly  > 22/24 = safe ambulators    MDC:  Geriatric = 2.9 points  Chronic Stroke = 1.9 points  Parkinson's = 2.9 points  Vestibular = 4     Floor <> stand transfer: independent with single upper extremity assist     Treatment      Sarbjit received the treatments listed below:       therapeutic exercises to develop strength, endurance, ROM, and flexibility for 20 minutes including:  - Stepper bike with bilateral upper extremity / bilateral lower extremity use x 8 minutes level 4 sprints  - reassessment as listed above   - review of previously issued HEP     neuromuscular re-education activities to improve: Balance, Coordination, Kinesthetic, Sense, and Proprioception for 0 minutes. The following activities were included:  -        Patient Education and Home Exercises      Home Exercises Provided and Patient Education Provided      Education provided:   - Purpose of PT intervention     Written Home Exercises Provided: Patient instructed  to cont prior HEP. Exercises were reviewed and Sarbjit was able to demonstrate them prior to the end of the session.  Sarbjit demonstrated good  understanding of the education provided. See EMR under Patient Instructions for exercises provided during therapy sessions     ASSESSMENT      Sarbjit arrived to session without any complaints of pain and was agreeable to treatment.  Reassessment of objective measures performed at evaluation performed this session.  Pt is ambulating at improved walking speed and performing Timed Up and Go t improved speed, indicating lower fall risk as compared to start of care.  Pt was also able to successfully complete Dynamic Gait Index assessment but score, although improved, does indicate fall risk.  Pt has not had a fall since starting PT but is independent with floor to stand transfer.  Pt continues to be non-compliant with issued home exercise program but does report taking short daily walks.   Pt has made significant progress since starting PT and is in agreement with discharge of PT.  OT and ST services will continue.     Sarbjit Is progressing well towards his goals.   Patient prognosis is Good.         Patient's spiritual, cultural and educational needs considered and pt agreeable to plan of care and goals.     Anticipated barriers to physical therapy: CEREBROVASCULAR ACCIDENT x 3, decreased left attention, impaired motor planning and control, fall risk, sedentary lifestyle     Goals:      Short Term Goals (4 Weeks):      1. Independent with initial HEP.  NOT MET - pt non-compliant  2. Pt will perform nu-step at level 4 x 8 minutes without rests breaks going at least 50 step/min or greater.  MET  3. Pt will be able to perform TUG in 25 secs with use of AD placingdemonstrating overall improved functional mobility.  MET  4. Pt will performed sit to stand x 5 without UE support in 14 seconds without LOB backward or posterior LE hooking for functional and safe transfers.   MET  5. Pt will  score greater than or equal to 12/24 on the DGI test/assessment without use of AD demonstrating overall improved functional mobility and balance and reduce fall risk.  MET     Long Term Goals (8 Weeks):      1. Pt will be able to perform TUG in 23 secs with use of AD placingdemonstrating overall improved functional mobility.  MET  2.  Pt will score greater than or equal to 16/24 on the DGI test/assessment without use of AD demonstrating overall improved functional mobility and balance and reduce fall risk.  MET  3. Pt will walk < than or equal to 800 ft on the 6 minute walk test indoor with AD with 0 LOB and minimal gait deviations for improved safety in home ambulation and safety.   NOT MET  4.  Pt will be able to safely perform and tolerate high level ADL's without LOB. MET  5. Pt will have 0 falls from start of PT sessions. MET  6. Independent with updated HEP.  MET  7. Pt will ambulate on TM x 3 minutes with use of UE support with 0 LOB at 0.9 mph. NOT MET  8. Pt will perform floor to stand f/b stand to floor transfer B UE support with stand by assist and no cues for improved dynamic transfer, core stability and fall safety in home and community. MET  9. Pt will begin some form of community fitness to begin regular and consistent performance of exercise to continue maintenance of gains made in PT. Partially MET     PLAN      Discharge PT.     Apolonia Key, PT

## 2023-01-26 NOTE — PLAN OF CARE
"[facility]  Occupational Therapy Plan of Care Note    Name: Sarbjit Brewer Sr.  Clinic Number: [id number]    Therapy Diagnosis:   Encounter Diagnoses   Name Primary?    Muscle weakness Yes    Lack of coordination     Need for assistance with personal care      Physician: Steven Krueger MD    Visit Date: 1/25/2023    Physician Orders: OT eval and treat   Medical Diagnosis: I63.9 (ICD-10-CM) - Cerebral vascular accident   Evaluation Date: 11/23/2022  Plan of Care Expiration Period: 03/10/2023  Insurance Authorization period Expiration: 12/31/2022  Visit # / Visits Authorized: (12) 5/20     Time In: 1:17 pm  Time Out: 1:45 pm  Total Billable (one on one) Time: 38 minutes ( - 3 Medicaid)      Precautions:  Standard and Fall  Functional Level Prior to Evaluation:  min to max A     SUBJECTIVE     Update: "My shoulders get tired. I would like to keep doing therapy to improve my shoulders"    OBJECTIVE     Physical Exam:  Postural examination/scapula alignment: Rounded shoulder and Head forward  Joint integrity: Firm end feeling  Skin integrity: skin is intact on eval   Edema: Mild edema noted in left hand   Palpation: no pain with palpation      Joint Evaluation  [facility]   11/23/2022 AROM  11/23/2022 MMS   11/23/2022 AROM  12/30/2022 AROM  1/25/2022     Right Left Left Left  Left    Scapular Elevation 5/5   3-/5     Scapular Retraction 5/5   3-/5     Shoulder Flex 0-180 5/5 38 2-/5 55 56   Shoulder Extension 0-80 5/5 50 2/5 60 60   Shoulder Abd 0-180 5/5   2-/5 42 45   Shoulder ER 0-90 5/5 28 2-/5 18 28   Shoulder IR 0-90 5/5 hip 2-/5 PSIS PSIS   Shoulder Horizontal adduction 0-90 5/5   2-/5     Elbow Flex/Ext 0-150 5/5 119 2/5 0-129 132   Wrist Flex 0-80 5/5 57 2-/5 50    Wrist Ext 0-70 5/5 20 2-/5 27    Supination 0-80 5/5   2/5     Pronation 0-80 5/5   2/5     Ulnar Deviation 5/5   2-/5     Radial Deviation  5/5   2-/5     *WNL - Within Normal Limits  *NT = Not Tested     Fist: loose left " hand       Strength: (LEE ANN Dynamometer in lbs.) Average 3 trials, Position II:       2022   Rung II Right Left Left  Left    Trial 1 73# 1# 0# 10#   Trial 2 57# 1# 0# 11#   Trial 3 55# 2# 0# 6#   Average 61.6# 1.3# 0# 9#      Normal  Average Values  Female Right Left Male: Right Left   20-29 66 lbs 62 lbs         94 lbs 86 lbs   30-39 68 lbs 64 lbs 90 lbs 82 lbs   40-49 64 lbs 62 lbs 80 lbs 74 lbs   50-59 62 lbs 57 lbs 72 lbs 65 lbs   60-69 53 lbs 51 lbs 63 lbs 56 lbs   70+ 44 lbs 42 lbs 54 lbs 48 lbs   SD = +/- 19lbs         Pinch Strength (Measured in lbs)       2022     Right Left Left  Left    Key Pinch 19 # 4 # 4 7#   3pt Pinch 14 # unable unable 4#   2pt Pinch 10 # unable 2# 2#         Fine/Gross Motor Coordination     Assessment   Right   2022 Left  2022 Left   2022 Left   2023   9 hole peg test 9 pegs in/out for time WNL Unable  7:22  With therapist holding pegs + pt reaching for them (not resting on table) 1 pe seconds    12:47    *No physical help from therapist  Verbal cues for problem solving    *WNL - Within Normal Limits  *NT = Not Tested     Tone:  Modified Erasmo Scale:   1-  Slight increase in muscle tone, manifested by a catch and release or by minimal resistance at the end of the range of motino when the affected part(s) is moved in flexion or extension     Sensation:  Sarbjit  reports numbness on left side.       Sensation Intact  Impaired Comments    Lavallette Dustin  Deep Touch (6.65) []  [x]       Protective Sensation (4.56) []  [x]       Light Touch (3.61) []  [x]                  Other Kinesthesia  []  [x]       Temperature []  []       Stereognosis  []  []           ASSESSMENT     Sarbjit has made fair progress with therapy. On evaluation, he presented with limited movement in left shoulder and inability to use left hand due to muscle weakness, pain and joint stiffness. During today's  assessment, he exhibited improved shoulder flexion, abduction, and extension. In addition, his  and pinch strength has increased, allowing for improved grasp on utensils, clothes and leisure activities. Sarbjit was unable to complete 9 hole peg assessment on eval, however, today he was able to grasp and release pegs with cues for therapist for problem solving, completing assessment in 12 minutes. While Sarbjit has exhibited improvement, he continues to report pain in his left shoulder and requires assistance for self care due to weakness and low muscle endurance. Sarbjit would benefit from continued skilled OT services to continue progressing left upper extremity following CVA to improve independence and quality of life.     Previous Short Term Goals Status:   continue with plan of care, upgrades made based on Sarbjit's progress  New Short Term Goals Status:   modified   Long Term Goal Status: modified:  upgraded based on Sarbjit's progress  Reasons for Recertification of Therapy:   Expiration of current plan of care    GOALS  Short Term Goals: 3 weeks  - Pt will be independent with Home Exercise Program (HEP)/Home Activity Program (HAP) and report at least 50% compliance. Not met, progressing  - Pt will demonstrate greater than or equal to 90 degrees of active shoulder flexion with LUE to increase independence with dressing and overhead reaching tasks. Not met, progressing  - Pt will increase L  strength to 15# or more to improve participation with ADL and IADL tasks. Not met, progressing  - Pt will increase left pinch strength, in all 3 conditions (lateral, 3pt, 2pt), by 2 psi to improve performance with cooking tasks, home management. Met 1/25/2023  - Pt will complete Box and Blocks Test with left UE, transferring 5 blocks, to demonstrate improved gross manual coordination needed for ADL and IADL tasks. Not met, progressing; improvement with 9 hole peg. Upgraded, see below   - Pt will identify 3 or more  compensatory strategies and/or pieces of adaptive equipment to assist with home care and other IADL tasks. Not met, progressing     Long Term Goals: 6 weeks  - Pt will reduce limitation score on FOTO by less than or equal to 30 to demonstrate an increase in self-perceived functional performance. Not met, progressing  - Pt will be SBA with upper body dressing with use of adaptive equipment/techniques as needed in order to increase independence ans decrease CG burden. Not met, progressing  - Pt will complete Box and Blocks Test with left UE, transferring 10 blocks, to demonstrate improved gross manual coordination needed for ADL and IADL tasks. Upgraded, see below  - Pt will report return to meaningful activities using left upper extremity in order to improve quality of life. Not met, progressing    Modified STG:   - Pt will complete 9 hole peg assessment with left upper extremity in 9 minutes or less in order to improve coordination with buttons, laces, etc    Modified LTG:   - Pt will complete 9 hole peg assessment in 7 minutes or less in order to improve dexterity in left upper extremity to increase participation with meaningful activities.      PLAN     Updated Certification Period: 1/25/2023 to 03/10/2023   Recommended Treatment Plan: 2 times per week for 6 weeks:  Fluidotherapy, Manual Therapy, Moist Heat/ Ice, Neuromuscular Re-ed, Orthotic Management and Training, Paraffin, Patient Education, Self Care, Therapeutic Activities, and Therapeutic Exercise    Corinne Rapier, OT    I CERTIFY THE NEED FOR THESE SERVICES FURNISHED UNDER THIS PLAN OF TREATMENT AND WHILE UNDER MY CARE  Physician's comments:      Physician's Signature: ___________________________________________________

## 2023-01-26 NOTE — PROGRESS NOTES
[facility]  Speech Therapy PROGRESS Note- Neurological Rehabilitation  Date: 1/26/2023     Name: Sarbjit Brewer Sr.   MRN: [id number]   Therapy Diagnosis:   Encounter Diagnoses   Name Primary?    Cognitive communication deficit Yes    Dysarthria      Physician: Steven Krueger MD  Physician Orders: [id number] - AMB REFERRAL/CONSULT TO SPEECH THERAPY  Medical Diagnosis: I63.9 (ICD-10-CM) - Cerebral vascular accident    Visit #/ Visits Authorized: 10/20 (plus evaluation)  Date of Evaluation:  11/21/22  Insurance Authorization Period: 11/24/22 to 12/31/22, 1/9/23 to 4/29/23  Plan of Care Expiration Date:    2/3/22  Extended Plan of Care:  none   Progress Note: due 1/21/22, 2/23/23  Visits Cancelled: 0  Visits No Show: 0    Time In: 1:05 pm   Time Out: 1:30 pm   Total Billable Time: 25 minutes      Precautions: Standard  Subjective:   Patient reports: Arrived 20 minutes late to today's session.  He was compliant to home exercise program .  Response to previous treatment: positive    Pain Scale:  0/10 on a Visual Analog Scale currently.   Pain Location: none indicated    Objective:      UNTIMED  Procedure Min .   Speech- Language- Voice Therapy 25   Total Timed Units: 0  Total Untimed Units: 1  Charges Billed/Number of units: 1    Short Term Goals: (4 weeks) Current Progress:   Patient will complete testing using the CLQT. Goal Met 12/7/2022     2. Pt will rate his speech while reading as at least a 3 on a 3 point scale ( 1 = same as before beginning therapy, 2 =better but not best, 3 =best possible outcome) on  7 /10 trials.       Goal met 12/29/22   3. Pt will differentiate between his speech and error-free speech by giving specific examples of differences on  7 /10 trials.     Progressing/ Not Met 1/26/2023   Patient frequently pointed out instances of dysfluency    4. Pt will use motor speech strategies and answer questions in conversation with a self-rating of at least 3 on a 3 point scale ( 1 = same  "as before beginning therapy, 2 =better but not best, 3 =best possible outcome) on  7 /10 trials.        Progressing/ Not Met 1/26/2023   Probe: fluency  - gentle onset technique - mod cues required however patient able to implement fluency techniques at the short sentence level.  Pt reported "I can do that."      5. Patient will complete word finding tasks with semantic relationships (I.e. similarities and differences, synonyms/antonyms, semantic category, Semantic Feature Analysis) with 90% accuracy given Min cues to improve word finding skills.     Progressing/Not Met 1/26/2023    Semantic Feature Analysis data:  Item Percentage independently Percentage with cues Amount of cueing  Notes   phone 80 100 min N/a   bottle 100 100 N/a N/a   watch 60 80 min N/a   ring 80 100 min N/a   clock 100 100 N/a N/a                   Consistent redirection to task required.      6. Patient will recall visual and/or auditory information using memory strategies with 90% given Min cues to improve memory skills.    Progressing/Not Met 1/26/2023  -Not addressed this session.    7. Patient will complete mental manipulation/working memory tasks with 80% accuracy given Min cues to improve immediate memory skills.    Progressing/Not Met 1/26/2023  Not formally addressed     8. Patient will complete selective attention tasks with 90% acc independently to improve attention skills.    Progressing/Not Met 1/26/2023  -Visual attention on Constant therapy - 2 stimuli:  27/34 for 79% acc min-mod cues to review and max cues to stay on task       9. Patient will complete simple executive function tasks (I.e. functional scheduling, problem-solving/reasoning, goal/plan/action/review) with 90% accuracy given Min cues to improve executive functioning skills.     Progressing/Not Met 1/26/2023  -Not addressed this session.      Future goals: Topic maintenance. Decrease tangents.VERNELL.    Patient Education/Response:   Skilled education provided " regarding:  - fluency strategies   -home exercise program  Patient verbalized understanding of all discussed.     Home program established: yes. Memory and attention strategies at home.   Exercises were reviewed and Sarbjit was able to demonstrate them prior to the end of the session.  Sarbjit demonstrated good  understanding of the education provided.     See Electronic Medical Record under Patient Instructions for exercises provided throughout therapy.  Assessment:   Sarbjit is progressing well towards his goals. Mod cues required to implement fluency strategies. Pt has difficulty reading. Able to utilize gentle onset while repeating sentences.  Unable to Independently utilize strategy in conversation.   Difficulty noted with visual scanning let to right and double checking work. Difficulty staying on task. Consistent tangents throughout. Current goals remain appropriate. Goals to be updated as necessary. Pt has met 2 goals since initial plan of care.  Pt is progressing towards other goals.     Patient prognosis is Fair to good. Patient will continue to benefit from skilled outpatient speech and language therapy to address the deficits listed in the problem list on initial evaluation, provide patient/family education and to maximize patient's level of independence in the home and community environment.   Medical necessity is demonstrated by the following IMPAIRMENTS:  Deficits in executive functioning, attention, and memory in addition to decreased word finding and speech intelligibility prevent the pt from relaying medically and safety relevant information in a timely manner in a state of emergency.   Barriers to Therapy: none  Patient's spiritual, cultural and educational needs considered and patient agreeable to plan of care and goals.  Plan:   Continue Plan of Care with focus on improving cognitive-linguistic skills and use of motor speech strategies to enhance speech intelligibility.     Iesha Cortes,  M.S.[facility]-SLP  Speech Language Pathologist   1/26/2023

## 2023-01-30 PROBLEM — G45.9 TIA (TRANSIENT ISCHEMIC ATTACK): Status: RESOLVED | Noted: 2022-10-28 | Resolved: 2023-01-30

## 2023-01-31 ENCOUNTER — CLINICAL SUPPORT (OUTPATIENT)
Dept: REHABILITATION | Facility: HOSPITAL | Age: 58
End: 2023-01-31
Payer: MEDICAID

## 2023-01-31 DIAGNOSIS — R47.1 DYSARTHRIA: ICD-10-CM

## 2023-01-31 DIAGNOSIS — R41.841 COGNITIVE COMMUNICATION DEFICIT: Primary | ICD-10-CM

## 2023-01-31 PROCEDURE — 92507 TX SP LANG VOICE COMM INDIV: CPT | Mod: PN

## 2023-01-31 NOTE — PROGRESS NOTES
"[facility]  Speech Therapy PROGRESS Note- Neurological Rehabilitation  Date: 1/31/2023     Name: Sarbjit Brewer Sr.   MRN: [id number]   Therapy Diagnosis:   Encounter Diagnoses   Name Primary?    Cognitive communication deficit Yes    Dysarthria      Physician: Steven Krueger MD  Physician Orders: [id number] - AMB REFERRAL/CONSULT TO SPEECH THERAPY  Medical Diagnosis: I63.9 (ICD-10-CM) - Cerebral vascular accident    Visit #/ Visits Authorized: 11/20 (plus evaluation)  Date of Evaluation:  11/21/22  Insurance Authorization Period: 11/24/22 to 12/31/22, 1/9/23 to 4/29/23  Plan of Care Expiration Date:    2/3/22  Extended Plan of Care:  none   Progress Note: due 1/21/22, 2/23/23  Visits Cancelled: 0  Visits No Show: 0    Time In: 3:30 pm   Time Out: 4:00 pm   Total Billable Time: 30 minutes      Precautions: Standard  Subjective:   Patient reports: Upset about being discharged from PT last session. "Why they getting rid of me? I can't work. I need help." Pt tearful.  At the end of the session, PT explained to patient and wife again that he completed physical therapy because he met all of his goals. Pt was under the impression that he was beign discharged from all disciplines; however, that is not the case. Pt will continue ST and OT. Pt reported feeling much better once he fully understood the situation.   He was compliant to home exercise program .  Response to previous treatment: positive    Pain Scale:  0/10 on a Visual Analog Scale currently.   Pain Location: none indicated    Objective:      UNTIMED  Procedure Min .   Speech- Language- Voice Therapy 30   Total Timed Units: 0  Total Untimed Units: 1  Charges Billed/Number of units: 1    Short Term Goals: (4 weeks) Current Progress:   Patient will complete testing using the CLQT. Goal Met 12/7/2022     2. Pt will rate his speech while reading as at least a 3 on a 3 point scale ( 1 = same as before beginning therapy, 2 =better but not best, 3 =best " "possible outcome) on  7 /10 trials.       Goal met 12/29/22   3. Pt will differentiate between his speech and error-free speech by giving specific examples of differences on  7 /10 trials.     Progressing/ Not Met 1/31/2023   Patient frequently pointed out instances of dysfluency     Pt upset and "stuttering" upon arrival. Pt paused and took a breath and started over. Pt said "I calmed down" and I'm doing better.    4. Pt will use motor speech strategies and answer questions in conversation with a self-rating of at least 3 on a 3 point scale ( 1 = same as before beginning therapy, 2 =better but not best, 3 =best possible outcome) on  7 /10 trials.        Progressing/ Not Met 1/31/2023   Probe: fluency  - gentle onset technique - mod cues required however patient able to implement fluency techniques at the short sentence level.       5. Patient will complete word finding tasks with semantic relationships (I.e. similarities and differences, synonyms/antonyms, semantic category, Semantic Feature Analysis) with 90% accuracy given Min cues to improve word finding skills.     Progressing/Not Met 1/31/2023  Similarities: 80% acc Independently  Differences: 80% acc Independently   GOAL MET      Semantic Feature Analysis data: GOAL MET  Item Percentage independently Percentage with cues Amount of cueing  Notes   phone 80 100 min N/a   bottle 100 100 N/a N/a   watch 100 100 N/a N/a   ring 80 100 min N/a   clock 100 100 N/a N/a                   Consistent redirection to task required.      6. Patient will recall visual and/or auditory information using memory strategies with 90% given Min cues to improve memory skills.    Progressing/Not Met 1/31/2023  -Not addressed this session.    7. Patient will complete mental manipulation/working memory tasks with 80% accuracy given Min cues to improve immediate memory skills.    Progressing/Not Met 1/31/2023  Not formally addressed     8. Patient will complete selective attention tasks " with 90% acc independently to improve attention skills.    Progressing/Not Met 1/31/2023  -Not addressed this session.      9. Patient will complete simple executive function tasks (I.e. functional scheduling, problem-solving/reasoning, goal/plan/action/review) with 90% accuracy given Min cues to improve executive functioning skills.     Progressing/Not Met 1/31/2023  -Not addressed this session.      Future goals: Topic maintenance. Decrease tangents.VERNELL.    Patient Education/Response:   Skilled education provided regarding:  - fluency strategies   -home exercise program  Patient verbalized understanding of all discussed.     Home program established: yes. Memory and attention strategies at home.   Exercises were reviewed and Sarbjit was able to demonstrate them prior to the end of the session.  Sarbjit demonstrated good  understanding of the education provided.     See Electronic Medical Record under Patient Instructions for exercises provided throughout therapy.  Assessment:   Sarbjit is progressing well towards his goals. Mod cues required to implement fluency strategies. Pt has difficulty reading. Able to utilize gentle onset while repeating sentences.  Unable to Independently utilize strategy in conversation. Difficulty staying on task. Consistent tangents throughout. Met 1 goal today for word finding with semantic relationships.  Current goals remain appropriate. Goals to be updated as necessary. Pt has met 3 goals since initial plan of care.  Pt is progressing towards other goals.     Patient prognosis is Fair to good. Patient will continue to benefit from skilled outpatient speech and language therapy to address the deficits listed in the problem list on initial evaluation, provide patient/family education and to maximize patient's level of independence in the home and community environment.   Medical necessity is demonstrated by the following IMPAIRMENTS:  Deficits in executive functioning, attention, and  memory in addition to decreased word finding and speech intelligibility prevent the pt from relaying medically and safety relevant information in a timely manner in a state of emergency.   Barriers to Therapy: none  Patient's spiritual, cultural and educational needs considered and patient agreeable to plan of care and goals.  Plan:   Continue Plan of Care with focus on improving cognitive-linguistic skills and use of motor speech strategies to enhance speech intelligibility.     Iesha Cortes M.S.CCC-SLP  Speech Language Pathologist   1/31/2023

## 2023-02-01 ENCOUNTER — CLINICAL SUPPORT (OUTPATIENT)
Dept: REHABILITATION | Facility: HOSPITAL | Age: 58
End: 2023-02-01
Payer: MEDICAID

## 2023-02-01 DIAGNOSIS — Z74.1 NEED FOR ASSISTANCE WITH PERSONAL CARE: ICD-10-CM

## 2023-02-01 DIAGNOSIS — M62.81 MUSCLE WEAKNESS: Primary | ICD-10-CM

## 2023-02-01 DIAGNOSIS — R27.9 LACK OF COORDINATION: ICD-10-CM

## 2023-02-01 PROCEDURE — 97530 THERAPEUTIC ACTIVITIES: CPT | Mod: PN

## 2023-02-01 NOTE — PROGRESS NOTES
"Occupational Therapy Daily Treatment Note     Name: Sarbjit Brewer Sr.  Clinic Number: [id number]    Therapy Diagnosis:   Encounter Diagnoses   Name Primary?    Muscle weakness Yes    Lack of coordination     Need for assistance with personal care      Physician: Steven Krueger MD    Visit Date: 2/1/2023    Physician Orders: OT eval and treat   Medical Diagnosis: I63.9 (ICD-10-CM) - Cerebral vascular accident   Evaluation Date: 11/23/2022  Plan of Care Expiration Period: 3/10/2023  Insurance Authorization period Expiration: 12/31/2022  Visit # / Visits Authorized: (13) 7/20  FOTO: 11/23/2022     Time In: 4:00 pm  Time Out: 4:55 pm  Total Billable (one on one) Time: 55 minutes ([facility] 4 Medicaid)      Precautions:  Standard and Fall    Subjective     Pt reports: "I have been trying to stretch it at home but they told me it was paralyzed"   he was not compliant with home exercise program given last session.   Response to previous treatment: fair   Functional change:  He reports compliance with stretching program     Pain: 0/10 with movement   Location: left arm    Objective   Physical Exam:  Postural examination/scapula alignment: Rounded shoulder and Head forward  Joint integrity: Firm end feeling  Skin integrity: skin is intact on eval   Edema: Mild edema noted in left hand   Palpation: no pain with palpation      Joint Evaluation  [facility]   11/23/2022 AROM  11/23/2022 MMS   11/23/2022 AROM  12/30/2022 AROM  1/25/2022     Right Left Left Left  Left    Scapular Elevation 5/5   3-/5     Scapular Retraction 5/5   3-/5     Shoulder Flex 0-180 5/5 38 2-/5 55 56   Shoulder Extension 0-80 5/5 50 2/5 60 60   Shoulder Abd 0-180 5/5   2-/5 42 45   Shoulder ER 0-90 5/5 28 2-/5 18 28   Shoulder IR 0-90 5/5 hip 2-/5 PSIS PSIS   Shoulder Horizontal adduction 0-90 5/5   2-/5     Elbow Flex/Ext 0-150 5/5 119 2/5 0-129 132   Wrist Flex 0-80 5/5 57 2-/5 50    Wrist Ext 0-70 5/5 20 2-/5 27    Supination 0-80 5/5   2/5     Pronation 0-80 " 5/5   2/5     Ulnar Deviation 5/5   2-/5     Radial Deviation  5/5   2-/5     *WNL - Within Normal Limits  *NT = Not Tested     Fist: loose left hand       Strength: (LEE ANN Dynamometer in lbs.) Average 3 trials, Position II:       2022   Rung II Right Left Left  Left    Trial 1 73# 1# 0# 10#   Trial 2 57# 1# 0# 11#   Trial 3 55# 2# 0# 6#   Average 61.6# 1.3# 0# 9#      Normal  Average Values  Female Right Left Male: Right Left   20-29 66 lbs 62 lbs         94 lbs 86 lbs   30-39 68 lbs 64 lbs 90 lbs 82 lbs   40-49 64 lbs 62 lbs 80 lbs 74 lbs   50-59 62 lbs 57 lbs 72 lbs 65 lbs   60-69 53 lbs 51 lbs 63 lbs 56 lbs   70+ 44 lbs 42 lbs 54 lbs 48 lbs   SD = +/- 19lbs         Pinch Strength (Measured in lbs)       2022     Right Left Left  Left    Key Pinch 19 # 4 # 4 7#   3pt Pinch 14 # unable unable 4#   2pt Pinch 10 # unable 2# 2#         Fine/Gross Motor Coordination     Assessment   Right   2022 Left  2022 Left   2022 Left   2023   9 hole peg test 9 pegs in/out for time WNL Unable  7:22  With therapist holding pegs + pt reaching for them (not resting on table) 1 pe seconds    12:47    *No physical help from therapist  Verbal cues for problem solving    *WNL - Within Normal Limits  *NT = Not Tested     Tone:  Modified Erasmo Scale:   1-  Slight increase in muscle tone, manifested by a catch and release or by minimal resistance at the end of the range of motino when the affected part(s) is moved in flexion or extension     Sensation:  Sarbjit  reports numbness on left side.       Sensation Intact  Impaired Comments    [place] Dustin  Deep Touch (6.65) []  [x]       Protective Sensation (4.56) []  [x]       Light Touch (3.61) []  [x]                  Other Kinesthesia  []  [x]       Temperature []  []       Stereognosis  []  []         Treatment      Sarbjit participated in neuromuscular re-education activities to  "improve: Coordination, Kinesthetic, Sense, Proprioception, and Posture for 45 minutes. The following activities were included:  Upper Body Ergometer (UBE) L2.5 for 8 minutes to provide proprioceptive awareness, postural stability, strength, activity tolerance, and reciprocal patterns with bimanual coordination completed to improve use of bilateral upper extremities in order to prepare for therapeutic exercises/activities. Cues provided as needed for postural stability/positioning   - supine shoulder flexion 2x15 1# dowel   - supine chest press 2x15 1# dowel   - supine external rotation 2x15 1# dowel   - sidelying external rotation 0# 2x10   - sidelying abduction/adduction 0# with assist 2x10   - pulleys x3 minutes  - external rotation stretch on table with 2 airex 3x30"     Sarbjit participated in dynamic functional therapeutic activities to improve functional performance for 10 minutes, including:  - functional reach to floor level managing shoe and grasp/release with cones     Home Exercises and Education Provided     Education provided:   - active assisted range of motion supine and seated   - SPROM home exercise program initiated  - Progress towards goals     Written Home Exercises Provided: yes.  Exercises were reviewed and Sarbjit was able to demonstrate them prior to the end of the session.  Sarbjit demonstrated fair  understanding of the HEP provided.   .   See EMR under Patient Instructions for exercises provided 12/1/2022. ; 1/18/2023     Assessment     Sarbjit tolerated today's session well. He was able to complete more exercises due to improved tolerance, active range of motion and strength in left upper extremity. He also exhibited improved self motivation and attention to task. Cues provided throughout session for positioning and biomechanics with left upper extremity. Overall good participation this session.     Sarbjit is progressing well towards his goals and there are no updates to goals at this time. Pt " prognosis is Fair.     Pt will continue to benefit from skilled outpatient occupational therapy to address the deficits listed in the problem list on initial evaluation provide pt/family education and to maximize pt's level of independence in the home and community environment.     Anticipated barriers to occupational therapy: cognitive impairments     Pt's spiritual, cultural and educational needs considered and pt agreeable to plan of care and goals.    Goals:   Short Term Goals: 3 weeks  - Pt will be independent with Home Exercise Program (HEP)/Home Activity Program (HAP) and report at least 50% compliance. Not met, progressing  - Pt will demonstrate greater than or equal to 90 degrees of active shoulder flexion with LUE to increase independence with dressing and overhead reaching tasks. Not met, progressing  - Pt will increase L  strength to 15# or more to improve participation with ADL and IADL tasks. Not met, progressing  - Pt will identify 3 or more compensatory strategies and/or pieces of adaptive equipment to assist with home care and other IADL tasks. Not met, progressing    Modified STG:   - Pt will complete 9 hole peg assessment with left upper extremity in 9 minutes or less in order to improve coordination with buttons, laces, etc     Long Term Goals: 6 weeks  - Pt will reduce limitation score on FOTO by less than or equal to 30 to demonstrate an increase in self-perceived functional performance. Not met, progressing  - Pt will be SBA with upper body dressing with use of adaptive equipment/techniques as needed in order to increase independence ans decrease CG burden. Not met, progressing  - Pt will report return to meaningful activities using left upper extremity in order to improve quality of life. Not met, progressing     Modified LTG:   - Pt will complete 9 hole peg assessment in 7 minutes or less in order to improve dexterity in left upper extremity to increase participation with meaningful  activities.     Plan     Updates/Grading for next session: continue with POC     Corinne Rapier, OT

## 2023-02-06 ENCOUNTER — CLINICAL SUPPORT (OUTPATIENT)
Dept: REHABILITATION | Facility: HOSPITAL | Age: 58
End: 2023-02-06
Payer: MEDICAID

## 2023-02-06 DIAGNOSIS — R41.841 COGNITIVE COMMUNICATION DEFICIT: Primary | ICD-10-CM

## 2023-02-06 DIAGNOSIS — R27.9 LACK OF COORDINATION: ICD-10-CM

## 2023-02-06 DIAGNOSIS — Z74.1 NEED FOR ASSISTANCE WITH PERSONAL CARE: ICD-10-CM

## 2023-02-06 DIAGNOSIS — M62.81 MUSCLE WEAKNESS: Primary | ICD-10-CM

## 2023-02-06 DIAGNOSIS — R47.1 DYSARTHRIA: ICD-10-CM

## 2023-02-06 PROCEDURE — 97530 THERAPEUTIC ACTIVITIES: CPT | Mod: PN

## 2023-02-06 PROCEDURE — 92507 TX SP LANG VOICE COMM INDIV: CPT | Mod: PN | Performed by: SPEECH-LANGUAGE PATHOLOGIST

## 2023-02-06 NOTE — PROGRESS NOTES
"Occupational Therapy Daily Treatment Note     Name: Sarbjit Brewer .  Clinic Number: [id number]    Therapy Diagnosis:   Encounter Diagnoses   Name Primary?    Muscle weakness Yes    Lack of coordination     Need for assistance with personal care      Physician: Steven Krueger MD    Visit Date: 2/6/2023    Physician Orders: OT eval and treat   Medical Diagnosis: I63.9 (ICD-10-CM) - Cerebral vascular accident   Evaluation Date: 11/23/2022  Plan of Care Expiration Period: 3/10/2023  Insurance Authorization period Expiration: 12/31/2022  Visit # / Visits Authorized: (15) 8/20  FOTO: 11/23/2022     Time In: 3:12 pm  Time Out: 4:05 pm  Total Billable (one on one) Time: 53 minutes ([facility] 4 Medicaid)      Precautions:  Standard and Fall    Subjective     Pt reports: "I was folding clothes at home."   he was not compliant with home exercise program given last session.   Response to previous treatment: fair   Functional change:  He reports compliance with stretching program     Pain: 0/10 with movement   Location: left arm    Objective   Physical Exam:  Postural examination/scapula alignment: Rounded shoulder and Head forward  Joint integrity: Firm end feeling  Skin integrity: skin is intact on eval   Edema: Mild edema noted in left hand   Palpation: no pain with palpation      Joint Evaluation  [facility]   11/23/2022 AROM  11/23/2022 MMS   11/23/2022 AROM  12/30/2022 AROM  1/25/2022     Right Left Left Left  Left    Scapular Elevation 5/5   3-/5     Scapular Retraction 5/5   3-/5     Shoulder Flex 0-180 5/5 38 2-/5 55 56   Shoulder Extension 0-80 5/5 50 2/5 60 60   Shoulder Abd 0-180 5/5   2-/5 42 45   Shoulder ER 0-90 5/5 28 2-/5 18 28   Shoulder IR 0-90 5/5 hip 2-/5 PSIS PSIS   Shoulder Horizontal adduction 0-90 5/5   2-/5     Elbow Flex/Ext 0-150 5/5 119 2/5 0-129 132   Wrist Flex 0-80 5/5 57 2-/5 50    Wrist Ext 0-70 5/5 20 2-/5 27    Supination 0-80 5/5   2/5     Pronation 0-80 5/5   2/5     Ulnar Deviation 5/5   2-/5   "   Radial Deviation  5/5   2-/5     *WNL - Within Normal Limits  *NT = Not Tested     Fist: loose left hand       Strength: (LEE ANN Dynamometer in lbs.) Average 3 trials, Position II:       2022   Rung II Right Left Left  Left    Trial 1 73# 1# 0# 10#   Trial 2 57# 1# 0# 11#   Trial 3 55# 2# 0# 6#   Average 61.6# 1.3# 0# 9#      Normal  Average Values  Female Right Left Male: Right Left   20-29 66 lbs 62 lbs         94 lbs 86 lbs   30-39 68 lbs 64 lbs 90 lbs 82 lbs   40-49 64 lbs 62 lbs 80 lbs 74 lbs   50-59 62 lbs 57 lbs 72 lbs 65 lbs   60-69 53 lbs 51 lbs 63 lbs 56 lbs   70+ 44 lbs 42 lbs 54 lbs 48 lbs   SD = +/- 19lbs         Pinch Strength (Measured in lbs)       2022     Right Left Left  Left    Key Pinch 19 # 4 # 4 7#   3pt Pinch 14 # unable unable 4#   2pt Pinch 10 # unable 2# 2#         Fine/Gross Motor Coordination     Assessment   Right   2022 Left  2022 Left   2022 Left   2023   9 hole peg test 9 pegs in/out for time WNL Unable  7:22  With therapist holding pegs + pt reaching for them (not resting on table) 1 pe seconds    12:47    *No physical help from therapist  Verbal cues for problem solving    *WNL - Within Normal Limits  *NT = Not Tested     Tone:  Modified Erasmo Scale:   1-  Slight increase in muscle tone, manifested by a catch and release or by minimal resistance at the end of the range of motino when the affected part(s) is moved in flexion or extension     Sensation:  Sarbjit  reports numbness on left side.       Sensation Intact  Impaired Comments    [place] Dustin  Deep Touch (6.65) []  [x]       Protective Sensation (4.56) []  [x]       Light Touch (3.61) []  [x]                  Other Kinesthesia  []  [x]       Temperature []  []       Stereognosis  []  []         Treatment      Sarbjit participated in neuromuscular re-education activities to improve: Coordination, Kinesthetic, Sense,  Proprioception, and Posture for 10 minutes. The following activities were included:  - pulleys x3 minutes  - Upper Body Ergometer (UBE) L2.5 for 6 minutes to provide proprioceptive awareness, postural stability, strength, activity tolerance, and reciprocal patterns with bimanual coordination completed to improve use of bilateral upper extremities in order to promote neuro muscular re-education. Cues provided as needed for postural stability/positioning    Sarbjit participated in dynamic functional therapeutic activities to improve functional performance for 43 minutes, including:  - standing IADL activities    - folding clothes   - reaching overhead for various cans    - reaching with squigs, visual scanning and cognitive processing     Home Exercises and Education Provided     Education provided:   - active assisted range of motion supine and seated   - SPROM home exercise program initiated  - Progress towards goals     Written Home Exercises Provided: yes.  Exercises were reviewed and Sarbjit was able to demonstrate them prior to the end of the session.  Sarbjit demonstrated fair  understanding of the HEP provided.   .   See EMR under Patient Instructions for exercises provided 12/1/2022. ; 1/18/2023     Assessment     Sarbjit tolerated today's session well. Session focused on functional activities using bimanual coordination to promote use at home. He was able to manage laundry activity with min cues for left upper extremity involvement and problem solve. Therapist continued to educate on use of left upper extremity with daily tasks. Sarbjit does exhibit limited shoulder movement and discussed risk of adhesive capsulitis if we do not move the shoulder. Noted decreased pain with functional activity but reported increased pain with active assisted range of motion exercises. Increased time for all tasks     Sarbjit is progressing well towards his goals and there are no updates to goals at this time. Pt prognosis is Fair.     Pt  will continue to benefit from skilled outpatient occupational therapy to address the deficits listed in the problem list on initial evaluation provide pt/family education and to maximize pt's level of independence in the home and community environment.     Anticipated barriers to occupational therapy: cognitive impairments     Pt's spiritual, cultural and educational needs considered and pt agreeable to plan of care and goals.    Goals:   Short Term Goals: 3 weeks  - Pt will be independent with Home Exercise Program (HEP)/Home Activity Program (HAP) and report at least 50% compliance. Not met, progressing  - Pt will demonstrate greater than or equal to 90 degrees of active shoulder flexion with LUE to increase independence with dressing and overhead reaching tasks. Not met, progressing  - Pt will increase L  strength to 15# or more to improve participation with ADL and IADL tasks. Not met, progressing  - Pt will identify 3 or more compensatory strategies and/or pieces of adaptive equipment to assist with home care and other IADL tasks. Not met, progressing    Modified STG:   - Pt will complete 9 hole peg assessment with left upper extremity in 9 minutes or less in order to improve coordination with buttons, laces, etc     Long Term Goals: 6 weeks  - Pt will reduce limitation score on FOTO by less than or equal to 30 to demonstrate an increase in self-perceived functional performance. Not met, progressing  - Pt will be SBA with upper body dressing with use of adaptive equipment/techniques as needed in order to increase independence ans decrease CG burden. Not met, progressing  - Pt will report return to meaningful activities using left upper extremity in order to improve quality of life. Not met, progressing     Modified LTG:   - Pt will complete 9 hole peg assessment in 7 minutes or less in order to improve dexterity in left upper extremity to increase participation with meaningful activities.     Plan      Updates/Grading for next session: continue with POC     Corinne Rapier, OT

## 2023-02-06 NOTE — PLAN OF CARE
[facility]  Speech Therapy Updated Plan of Care-Neurological Rehabilitation         Date: 2/6/2023   Name: Sarbjit Brewer Sr.  Clinic Number: [id number]    Therapy Diagnosis:   Encounter Diagnoses   Name Primary?    Cognitive communication deficit Yes    Dysarthria      Physician: Steven Krueger MD    Physician Orders: [id number] - AMB REFERRAL/CONSULT TO SPEECH THERAPY  Medical Diagnosis: I63.9 (ICD-10-CM) - Cerebral vascular accident    Visit #/ Visits Authorized:  12 /20 (plus evaluation)    Evaluation Date: 11/21/22  Insurance Authorization Period: 11/24/22 to 12/31/22, 1/9/23 to 4/29/23  Plan of Care Expiration:   2/3/23  New POC Certification Period:  4/3/23    Total Visits Received: 12    Precautions:Standard, Fall, and Cognition  Subjective     Update: Patient has participated in skilled speech therapy services with some noted improvement. Patient with severe cognitive-linguistic impairments in addition to mild fluency impairment. Patient has met Short Term Goals for motor speech impairment which then revealed underlying fluency impairment - patient with good response to fluency modification strategies given max cues. Patient with excellent participation given motivational encouragement by speech-language pathologist. Patient would benefit from continued skilled speech therapy services.    Objective     Update: see follow up note dated 2/6/2023    Assessment     Update: Sarbjit Brewer Sr. presents to [facility] status post medical diagnosis of cerebrovascular accident. Demonstrates impairments including limitations as described in the problem list. Positive prognostic factors include patient motivation. Negative prognostic factors include complex medical history. He presents with severe cognitive-linguistic impairment characterized by moderately impaired language skills and severely impaired attention, memory, executive functioning, and visuospatial skills. Patient also  presents with mild fluency impairment. No barriers to therapy identified.. Patient will benefit from skilled, outpatient rehabilitation speech therapy.    Rehab Potential: fair   Pt's spiritual, cultural, and educational needs considered and patient agreeable to plan of care and goals.    Education: Plan of Care, role of SLP in care, motor speech strategies, memory strategies, attention shifting strategies, course of medical disease affect on therapy diagnosis , and scheduling/ cancellation policy. Patient verbalized understanding of all discussed.     Previous Short Term Goals Status: 4 weeks  Short Term Goals: (4 weeks)    Patient will complete testing using the CLQT. Goal Met 12/7/2022     2. Pt will rate his speech while reading as at least a 3 on a 3 point scale ( 1 = same as before beginning therapy, 2 =better but not best, 3 =best possible outcome) on  7 /10 trials.        Goal met 12/29/22   3. Pt will differentiate between his speech and error-free speech by giving specific examples of differences on  7 /10 trials.      Goal Not Met / Discontinue     4. Pt will use motor speech strategies and answer questions in conversation with a self-rating of at least 3 on a 3 point scale ( 1 = same as before beginning therapy, 2 =better but not best, 3 =best possible outcome) on  7 /10 trials.         Goal Not Met / Discontinue     5. Patient will complete word finding tasks with semantic relationships (I.e. similarities and differences, synonyms/antonyms, semantic category, Semantic Feature Analysis) with 90% accuracy given Min cues to improve word finding skills. Goal Met / Discontinue        6. Patient will recall visual and/or auditory information using memory strategies with 90% given Min cues to improve memory skills.    Goal Not Met / Continue    7. Patient will complete mental manipulation/working memory tasks with 80% accuracy given Min cues to improve immediate memory skills.   Goal Not Met / Continue    8.  Patient will complete selective attention tasks with 90% acc independently to improve attention skills.  Goal Not Met / Continue    9. Patient will complete simple executive function tasks (I.e. functional scheduling, problem-solving/reasoning, goal/plan/action/review) with 90% accuracy given Min cues to improve executive functioning skills.      Progressing/Not Met 2/6/2023  Goal Not Met / Continue          New Short Term Goals: 4 weeks      1 Patient will recall visual and/or auditory information using memory strategies with 90% given Min cues to improve memory skills.    Goal Not Met / Continue    2. Patient will complete mental manipulation/working memory tasks with 80% accuracy given Min cues to improve immediate memory skills.   Goal Not Met / Continue    3. Patient will complete selective attention tasks with 90% acc independently to improve attention skills.  Goal Not Met / Continue    4.  Patient will complete simple executive function tasks (I.e. functional scheduling, problem-solving/reasoning, goal/plan/action/review) with 90% accuracy given Min cues to improve executive functioning skills.      Progressing/Not Met 2/6/2023  Goal Not Met / Continue    5. Patient will participate in Oral Reading for Language in Aphasia (level 2) with min A to improve oral expression New goal   6. Patient will answer questions with adequate thought organization/topic maintenance given no more than 2 cues to improve expressive language for conversation and to decrease tangential responses  New goal   7. Patient will will exhibit <3% of stuttering-like disfluencies per 100 words or <2% stuttering-like disfluencies per 100 syllables across a variety of communication contexts (e.g. picture  description, sentence production, paragraph reading, conversation, storytelling) across three therapy sessions using preferred fluency techniques independently. New goal   8. . Patient will demonstrate the Stretched Syllable Technique with  90% accuracy in picture description given minimum cues across three sessions. New goal     1  2. Client will demonstrate the [Stretched Syllable Technique / Diaphragmatic Breathing Technique / Passive Airflow Technique / Gentle Onset Technique / Continuous Phonation  Technique / Light Articulatory Contacts Technique / Attention Shift Technique] with [90%] accuracy [in picture description / in sentences / in paragraph reading / in conversation]  independently across three sessions.     Long Term Goal Status:  6 weeks  1. He will be appropriately oriented to time, date, person, place, city with cues to improve safety and awareness in functional living environment. Goal Met / Discontinue    2. He will improve attention skills to effectively attend to and communicate in simple daily living tasks in functional living environment.  Goal Not Met / Continue   3. He will use appropriate memory strategies to schedule and recall weekly activities, express needs and recall names to maintain safety and participate socially in functional living environment. Goal Not Met / Continue   4. He will develop functional reading skills and utilize compensatory strategies to maintain safety during ADL's and read and understand simple adult material independently.Goal Not Met / Continue   5. He will demonstrate appropriate visual scanning and awareness of objects and during reading activities to maintain safety and awareness in functional living environment. Goal Not Met / Continue     Goals Previously Met:  - patient will complete testing using the CLQT.  - Pt will rate his speech while reading as at least a 3 on a 3 point scale ( 1 = same as before beginning therapy, 2 =better but not best, 3 =best possible outcome) on  7 /10 trials.       Reasons for Recertification of Therapy:   - Deficits in executive functioning, attention, and memory prevent the pt from relaying medically and safety relevant information in a timely manner in a state  of emergency.   - Decreased speech fluency results in decreased efficiency communicating medical and social wants and needs in the case of emergency.       Plan     Updated Certification Period: 2/6/2023 to 4/3/23    Recommended Treatment Plan: Patient will participate in the [facility] program for speech therapy 2 times per week to address his Communication, Cognition, and Motor Speech deficits, to educate patient and their family, and to participate in a home exercise program.     Other recommendations: None at this time     Therapist's Name:  NEO Crocker, L-SLP, CCC-SLP  Speech Language Pathologist   2/6/2023      I CERTIFY THE NEED FOR THESE SERVICES FURNISHED UNDER THIS PLAN OF TREATMENT AND WHILE UNDER MY CARE      Physician Name: _______________________________    Physician Signature: ____________________________

## 2023-02-06 NOTE — PROGRESS NOTES
[facility]  Speech Therapy PROGRESS Note- Neurological Rehabilitation  Date: 2/6/2023     Name: Sarbjit Brewer Sr.   MRN: [id number]   Therapy Diagnosis:   Encounter Diagnoses   Name Primary?    Cognitive communication deficit Yes    Dysarthria      Physician: Steven Krueger MD  Physician Orders: [id number] - AMB REFERRAL/CONSULT TO SPEECH THERAPY  Medical Diagnosis: I63.9 (ICD-10-CM) - Cerebral vascular accident    Visit #/ Visits Authorized: 12/20 (plus evaluation)  Date of Evaluation:  11/21/22  Insurance Authorization Period: 11/24/22 to 12/31/22, 1/9/23 to 4/29/23  Plan of Care Expiration Date:    2/3/22  Extended Plan of Care:  none   Progress Note: due 1/21/22, 2/23/23  Visits Cancelled: 0  Visits No Show: 0    Time In: 2:32 pm  Time Out: 3:12 pm   Total Billable Time: 40 minutes      Precautions: Standard  Subjective:   Patient reports: that he has a cardiology appointment soon but he is unsure why. He cannot get his medication - but his wife is working on bringing it to a new pharmacist.   He was compliant to home exercise program .  Response to previous treatment: positive    Pain Scale:  0/10 on a Visual Analog Scale currently.   Pain Location: none indicated    Objective:      UNTIMED  Procedure Min .   Speech- Language- Voice Therapy 40   Total Timed Units: 0  Total Untimed Units: 1  Charges Billed/Number of units: 1    Short Term Goals: (4 weeks) Current Progress:   Patient will complete testing using the CLQT. Goal Met 12/7/2022     2. Pt will rate his speech while reading as at least a 3 on a 3 point scale ( 1 = same as before beginning therapy, 2 =better but not best, 3 =best possible outcome) on  7 /10 trials.       Goal met 12/29/22   3. Pt will differentiate between his speech and error-free speech by giving specific examples of differences on  7 /10 trials.     Progressing/ Not Met 2/6/2023   Patient frequently pointed out instances of dysfluency    4. Pt will use motor speech  strategies and answer questions in conversation with a self-rating of at least 3 on a 3 point scale ( 1 = same as before beginning therapy, 2 =better but not best, 3 =best possible outcome) on  7 /10 trials.        Progressing/ Not Met 2/6/2023   Stretched Syllable technique:  1 syllable: max cues required  2 syllable: max cues required     5. Patient will complete word finding tasks with semantic relationships (I.e. similarities and differences, synonyms/antonyms, semantic category, Semantic Feature Analysis) with 90% accuracy given Min cues to improve word finding skills. Goal Met / Discontinue       6. Patient will recall visual and/or auditory information using memory strategies with 90% given Min cues to improve memory skills.    Progressing/Not Met 2/6/2023  Memory strategies discussed at length    Spaced retrieval: Bring glasses next session  15 seconds +  30 seconds +  1 minute +  2 minutes -    15 seconds +  30 seconds +  1 minute +  2 minutes +  4 minutes -    15 seconds +  30 seconds +  1 minute +  2 minutes +  4 minutes +  8 minutes+     7. Patient will complete mental manipulation/working memory tasks with 80% accuracy given Min cues to improve immediate memory skills.    Progressing/Not Met 2/6/2023  Not formally addressed     8. Patient will complete selective attention tasks with 90% acc independently to improve attention skills.    Progressing/Not Met 2/6/2023  -Not addressed this session.      9. Patient will complete simple executive function tasks (I.e. functional scheduling, problem-solving/reasoning, goal/plan/action/review) with 90% accuracy given Min cues to improve executive functioning skills.     Progressing/Not Met 2/6/2023  -Not addressed this session.      Future goals: Topic maintenance. Decrease tangents.VERNELL.    Patient Education/Response:   Skilled education provided regarding:  - fluency strategies   -home exercise program  Patient verbalized understanding of all discussed.      Home program established: yes. Memory and attention strategies at home.   Exercises were reviewed and Sarbjit was able to demonstrate them prior to the end of the session.  Sarbjit demonstrated good  understanding of the education provided.     See Electronic Medical Record under Patient Instructions for exercises provided throughout therapy.  Assessment:   Sarbjit is progressing well towards his goals. Mod cues required to implement fluency strategies. Pt has difficulty reading. Able to utilize gentle onset while repeating sentences.  Unable to Independently utilize strategy in conversation. Difficulty staying on task. Consistent tangents throughout. Met 1 goal today for word finding with semantic relationships.  Current goals remain appropriate. Goals to be updated as necessary. Pt has met 3 goals since initial plan of care.  Pt is progressing towards other goals.     Patient prognosis is Fair to good. Patient will continue to benefit from skilled outpatient speech and language therapy to address the deficits listed in the problem list on initial evaluation, provide patient/family education and to maximize patient's level of independence in the home and community environment.   Medical necessity is demonstrated by the following IMPAIRMENTS:  Deficits in executive functioning, attention, and memory in addition to decreased word finding and speech intelligibility prevent the pt from relaying medically and safety relevant information in a timely manner in a state of emergency.   Barriers to Therapy: none  Patient's spiritual, cultural and educational needs considered and patient agreeable to plan of care and goals.  Plan:   Continue Plan of Care with focus on improving cognitive-linguistic skills and use of motor speech strategies to enhance speech intelligibility.     NEO Crocker, L-SLP, CCC-SLP  Speech Language Pathologist   2/6/2023

## 2023-02-08 ENCOUNTER — CLINICAL SUPPORT (OUTPATIENT)
Dept: REHABILITATION | Facility: HOSPITAL | Age: 58
End: 2023-02-08
Payer: MEDICAID

## 2023-02-08 DIAGNOSIS — R27.9 LACK OF COORDINATION: ICD-10-CM

## 2023-02-08 DIAGNOSIS — Z74.1 NEED FOR ASSISTANCE WITH PERSONAL CARE: ICD-10-CM

## 2023-02-08 DIAGNOSIS — M62.81 MUSCLE WEAKNESS: Primary | ICD-10-CM

## 2023-02-08 PROCEDURE — 97530 THERAPEUTIC ACTIVITIES: CPT | Mod: PN

## 2023-02-08 NOTE — PROGRESS NOTES
"Occupational Therapy Daily Treatment Note     Name: Sarbjit Brewer .  Clinic Number: [id number]    Therapy Diagnosis:   Encounter Diagnoses   Name Primary?    Muscle weakness Yes    Lack of coordination     Need for assistance with personal care      Physician: Steven Krueger MD    Visit Date: 2/8/2023    Physician Orders: OT eval and treat   Medical Diagnosis: I63.9 (ICD-10-CM) - Cerebral vascular accident   Evaluation Date: 11/23/2022  Plan of Care Expiration Period: 3/10/2023  Insurance Authorization period Expiration: 12/31/2022   Visit # / Visits Authorized: (16) 9/20  FOTO: 11/23/2022     Time In: 2:30 pm  Time Out: 3:15 pm  Total Billable (one on one) Time: 45 minutes ([facility] 3 Medicaid)      Precautions:  Standard and Fall    Subjective     Pt reports: "My shoulder doesn't hurt unless I move it."   he was not compliant with home exercise program given last session.   Response to previous treatment: fair   Functional change: states increased participation with instrumental activities of daily living     Pain: 0/10 with movement   Location: left arm    Objective     Physical Exam:  Postural examination/scapula alignment: Rounded shoulder and Head forward  Joint integrity: Firm end feeling  Skin integrity: skin is intact on eval   Edema: Mild edema noted in left hand   Palpation: no pain with palpation      Joint Evaluation  [facility]   11/23/2022 AROM  11/23/2022 MMS   11/23/2022 AROM  12/30/2022 AROM  1/25/2022     Right Left Left Left  Left    Scapular Elevation 5/5   3-/5     Scapular Retraction 5/5   3-/5     Shoulder Flex 0-180 5/5 38 2-/5 55 56   Shoulder Extension 0-80 5/5 50 2/5 60 60   Shoulder Abd 0-180 5/5   2-/5 42 45   Shoulder ER 0-90 5/5 28 2-/5 18 28   Shoulder IR 0-90 5/5 hip 2-/5 PSIS PSIS   Shoulder Horizontal adduction 0-90 5/5   2-/5     Elbow Flex/Ext 0-150 5/5 119 2/5 0-129 132   Wrist Flex 0-80 5/5 57 2-/5 50    Wrist Ext 0-70 5/5 20 2-/5 27    Supination 0-80 5/5   2/5     Pronation 0-80 " 5/5   2/5     Ulnar Deviation 5/5   2-/5     Radial Deviation  5/5   2-/5     *WNL - Within Normal Limits  *NT = Not Tested     Fist: loose left hand       Strength: (LEE ANN Dynamometer in lbs.) Average 3 trials, Position II:       2022   Rung II Right Left Left  Left    Trial 1 73# 1# 0# 10#   Trial 2 57# 1# 0# 11#   Trial 3 55# 2# 0# 6#   Average 61.6# 1.3# 0# 9#      Normal  Average Values  Female Right Left Male: Right Left   20-29 66 lbs 62 lbs         94 lbs 86 lbs   30-39 68 lbs 64 lbs 90 lbs 82 lbs   40-49 64 lbs 62 lbs 80 lbs 74 lbs   50-59 62 lbs 57 lbs 72 lbs 65 lbs   60-69 53 lbs 51 lbs 63 lbs 56 lbs   70+ 44 lbs 42 lbs 54 lbs 48 lbs   SD = +/- 19lbs         Pinch Strength (Measured in lbs)       2022     Right Left Left  Left    Key Pinch 19 # 4 # 4 7#   3pt Pinch 14 # unable unable 4#   2pt Pinch 10 # unable 2# 2#         Fine/Gross Motor Coordination     Assessment   Right   2022 Left  2022 Left   2022 Left   2023   9 hole peg test 9 pegs in/out for time WNL Unable  7:22  With therapist holding pegs + pt reaching for them (not resting on table) 1 pe seconds    12:47    *No physical help from therapist  Verbal cues for problem solving    *WNL - Within Normal Limits  *NT = Not Tested     Tone:  Modified Erasmo Scale:   1-  Slight increase in muscle tone, manifested by a catch and release or by minimal resistance at the end of the range of motino when the affected part(s) is moved in flexion or extension     Sensation:  Sarbjit  reports numbness on left side.       Sensation Intact  Impaired Comments    [place]  Deep Touch (6.65) []  [x]       Protective Sensation (4.56) []  [x]       Light Touch (3.61) []  [x]                  Other Kinesthesia  []  [x]       Temperature []  []       Stereognosis  []  []         Treatment      Sarbjit received the following manual therapy techniques: Joint  mobilizations and Soft tissue Mobilization were applied to the: left upper extremity for 10 minutes, including:  - passive range of motion, joint mobilizations with gentle joint distractions and oscillations, pectoralis bending, posterior capsule stretch    Sarbjit participated in neuromuscular re-education activities to improve: Coordination, Kinesthetic, Sense, Proprioception, and Posture for 10 minutes. The following activities were included:  - Upper Body Ergometer (UBE) L2.5 for 6 minutes to provide proprioceptive awareness, postural stability, strength, activity tolerance, and reciprocal patterns with bimanual coordination completed to improve use of bilateral upper extremities in order to promote neuro muscular re-education. Cues provided as needed for postural stability/positioning  - supine shoulder chest press/flexion 2x10  - supine scap protraction/retraction 2x10   - modified pushups x10    Home Exercises and Education Provided     Education provided:   - active assisted range of motion supine and seated   - SPROM home exercise program initiated  - Progress towards goals     Written Home Exercises Provided: yes.  Exercises were reviewed and Sarbjit was able to demonstrate them prior to the end of the session.  Sarbjit demonstrated fair  understanding of the HEP provided.   .   See EMR under Patient Instructions for exercises provided 12/1/2022. ; 1/18/2023     Assessment     Sarbjit tolerated today's session fair. He required increased cues for redirection to exercises. Manual therapy performed and noted soft tissue restrictions at ~90* of shoulder flexion and abduction. He was able to complete 2# dowel exercises but increased time required for 2 sets due to impaired attention. Sarbjit exhibits increased anxiety with new exercises and therapist provided reassurance , demonstration and physical assistance to complete modified pushups. Sarbjit exhibits good rehab potential physically but is limited due to cognitive  impairments.     Sarbjit is progressing well towards his goals and there are no updates to goals at this time. Pt prognosis is Fair.     Pt will continue to benefit from skilled outpatient occupational therapy to address the deficits listed in the problem list on initial evaluation provide pt/family education and to maximize pt's level of independence in the home and community environment.     Anticipated barriers to occupational therapy: cognitive impairments     Pt's spiritual, cultural and educational needs considered and pt agreeable to plan of care and goals.    Goals:   Short Term Goals: 3 weeks  - Pt will be independent with Home Exercise Program (HEP)/Home Activity Program (HAP) and report at least 50% compliance. Not met, progressing  - Pt will demonstrate greater than or equal to 90 degrees of active shoulder flexion with LUE to increase independence with dressing and overhead reaching tasks. Not met, progressing  - Pt will increase L  strength to 15# or more to improve participation with ADL and IADL tasks. Not met, progressing  - Pt will identify 3 or more compensatory strategies and/or pieces of adaptive equipment to assist with home care and other IADL tasks. Not met, progressing    Modified STG:   - Pt will complete 9 hole peg assessment with left upper extremity in 9 minutes or less in order to improve coordination with buttons, laces, etc     Long Term Goals: 6 weeks  - Pt will reduce limitation score on FOTO by less than or equal to 30 to demonstrate an increase in self-perceived functional performance. Not met, progressing  - Pt will be SBA with upper body dressing with use of adaptive equipment/techniques as needed in order to increase independence ans decrease CG burden. Not met, progressing  - Pt will report return to meaningful activities using left upper extremity in order to improve quality of life. Not met, progressing     Modified LTG:   - Pt will complete 9 hole peg assessment in 7  minutes or less in order to improve dexterity in left upper extremity to increase participation with meaningful activities.     Plan     Updates/Grading for next session: continue with POC     Corinne Rapier, OT

## 2023-02-10 ENCOUNTER — CLINICAL SUPPORT (OUTPATIENT)
Dept: REHABILITATION | Facility: HOSPITAL | Age: 58
End: 2023-02-10
Payer: MEDICAID

## 2023-02-10 ENCOUNTER — DOCUMENTATION ONLY (OUTPATIENT)
Dept: REHABILITATION | Facility: HOSPITAL | Age: 58
End: 2023-02-10
Payer: MEDICAID

## 2023-02-10 DIAGNOSIS — R47.1 DYSARTHRIA: ICD-10-CM

## 2023-02-10 DIAGNOSIS — R41.841 COGNITIVE COMMUNICATION DEFICIT: Primary | ICD-10-CM

## 2023-02-10 PROCEDURE — 92507 TX SP LANG VOICE COMM INDIV: CPT | Mod: PN

## 2023-02-10 NOTE — PROGRESS NOTES
No Show Note/Documentation    Patient: Sarbjit Brewer Sr.  Date of Session: 2/10/2023  Diagnosis:   Encounter Diagnoses   Name Primary?    Cognitive communication deficit Yes    Dysarthria       MRN: [id number]    Sarbjit Brewer Sr. did not attend his  scheduled therapy appointment today. He did not call to cancel nor reschedule. This is the 3rd appointment that he has not attended. Next appointment is scheduled for 2/13/23 and will follow up with patient at that time. No charges have been posted today.     NEO Ceja, CCC-SLP   2/10/2023

## 2023-02-10 NOTE — PROGRESS NOTES
[facility]  Speech Therapy PROGRESS Note- Neurological Rehabilitation  Date: 2/10/2023     Name: Sarbjit Brewer Sr.   MRN: [id number]   Therapy Diagnosis:   Encounter Diagnoses   Name Primary?    Cognitive communication deficit Yes    Dysarthria      Physician: Steven Krueger MD  Physician Orders: [id number] - AMB REFERRAL/CONSULT TO SPEECH THERAPY  Medical Diagnosis: I63.9 (ICD-10-CM) - Cerebral vascular accident    Visit #/ Visits Authorized: 13/20 (plus evaluation)  Date of Evaluation:  11/21/22  Insurance Authorization Period: 11/24/22 to 12/31/22, 1/9/23 to 4/29/23  Plan of Care Expiration Date:    2/3/22  Extended Plan of Care:  none   Progress Note: due 1/21/22, 2/23/23  Visits Cancelled: 0  Visits No Show: 2    Time In: 1417   Time Out: 1435   Total Billable Time: 18  minutes      Precautions: Standard  Subjective:   Patient reports: that 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   Arrived an hour and 17 minutes late to appt but still wanted to be seen for a shorter visit.   He was compliant to home exercise program .  Response to previous treatment: positive    Pain Scale:  0/10 on a Visual Analog Scale currently.   Pain Location: none indicated    Objective:      UNTIMED  Procedure   Speech- Language- Voice Therapy   Total Untimed Units: 1  Charges Billed/Number of units: 1    Short Term Goals: (4 weeks) Current Progress:   Patient will complete testing using the CLQT. Goal Met 12/7/2022     2. Pt will rate his speech while reading as at least a 3 on a 3 point scale ( 1 = same as before beginning therapy, 2 =better but not best, 3 =best possible outcome) on  7 /10 trials.  Goal met 12/29/22   3. Pt will differentiate between his speech and error-free speech by giving specific examples of differences on  7 /10 trials.     Progressing/ Not Met 2/10/2023   Sarbjit self corrected unclear speech in conversation but needed cueing in speech specific tasks.    4. Pt will use  motor speech strategies and answer questions in conversation with a self-rating of at least 3 on a 3 point scale ( 1 = same as before beginning therapy, 2 =better but not best, 3 =best possible outcome) on  7 /10 trials.        Progressing/ Not Met 2/10/2023   Stretched syllable technique:  - multisyllable words 3/5  - single syllable words 10/10  - short phrases 3/10 clearly, 9/10 given verbal cue to use strategy     5. Patient will complete word finding tasks with semantic relationships (I.e. similarities and differences, synonyms/antonyms, semantic category, Semantic Feature Analysis) with 90% accuracy given Min cues to improve word finding skills. Goal Met / Discontinue       6. Patient will recall visual and/or auditory information using memory strategies with 90% given Min cues to improve memory skills.    Progressing/Not Met 2/10/2023  Not formally addressed       7. Patient will complete mental manipulation/working memory tasks with 80% accuracy given Min cues to improve immediate memory skills.    Progressing/Not Met 2/10/2023  Not formally addressed     8. Patient will complete selective attention tasks with 90% acc independently to improve attention skills.    Progressing/Not Met 2/10/2023  -Not addressed this session.      9. Patient will complete simple executive function tasks (I.e. functional scheduling, problem-solving/reasoning, goal/plan/action/review) with 90% accuracy given Min cues to improve executive functioning skills.     Progressing/Not Met 2/10/2023  -Not addressed this session.      Future goals: Topic maintenance. Decrease tangents.VERNELL.    Patient Education/Response:   Skilled education provided regarding:  - fluency strategies   - monitoring speech for errors in conversation   -home exercise program  Patient verbalized understanding of all discussed.     Home program established: yes. Memory and attention strategies at home.   Exercises were reviewed and Sarbjit was able to demonstrate  them prior to the end of the session.  Sarbjit demonstrated good  understanding of the education provided.     See Electronic Medical Record under Patient Instructions for exercises provided throughout therapy.  Assessment:   Sarbjit is progressing well towards his goals. Maximum verbal cues required to use strategies to achieve clear, intelligible speech. Current goals remain appropriate. Goals to be updated as necessary. Pt has met 3 goals since initial plan of care.  Pt is progressing towards other goals.     Patient prognosis is Fair to good. Patient will continue to benefit from skilled outpatient speech and language therapy to address the deficits listed in the problem list on initial evaluation, provide patient/family education and to maximize patient's level of independence in the home and community environment.   Medical necessity is demonstrated by the following IMPAIRMENTS:  Deficits in executive functioning, attention, and memory in addition to decreased word finding and speech intelligibility prevent the pt from relaying medically and safety relevant information in a timely manner in a state of emergency.   Barriers to Therapy: none  Patient's spiritual, cultural and educational needs considered and patient agreeable to plan of care and goals.  Plan:   Continue Plan of Care with focus on improving cognitive-linguistic skills and use of motor speech strategies to enhance speech intelligibility.     NEO Reyes, CCC-SLP  Speech Language Pathologist   2/10/2023

## 2023-02-13 ENCOUNTER — CLINICAL SUPPORT (OUTPATIENT)
Dept: REHABILITATION | Facility: HOSPITAL | Age: 58
End: 2023-02-13
Payer: MEDICAID

## 2023-02-13 DIAGNOSIS — M62.81 MUSCLE WEAKNESS: Primary | ICD-10-CM

## 2023-02-13 DIAGNOSIS — R27.9 LACK OF COORDINATION: ICD-10-CM

## 2023-02-13 DIAGNOSIS — Z74.1 NEED FOR ASSISTANCE WITH PERSONAL CARE: ICD-10-CM

## 2023-02-13 DIAGNOSIS — R47.1 DYSARTHRIA: ICD-10-CM

## 2023-02-13 DIAGNOSIS — R41.841 COGNITIVE COMMUNICATION DEFICIT: Primary | ICD-10-CM

## 2023-02-13 PROCEDURE — 92507 TX SP LANG VOICE COMM INDIV: CPT | Mod: PN | Performed by: SPEECH-LANGUAGE PATHOLOGIST

## 2023-02-13 PROCEDURE — 97530 THERAPEUTIC ACTIVITIES: CPT | Mod: PN

## 2023-02-13 NOTE — PROGRESS NOTES
"Occupational Therapy Daily Treatment Note     Name: Sarbjit Brewer Sr.  Clinic Number: [id number]    Therapy Diagnosis:   Encounter Diagnoses   Name Primary?    Muscle weakness Yes    Lack of coordination     Need for assistance with personal care        Physician: Steven Krueger MD    Visit Date: 2/13/2023    Physician Orders: OT eval and treat   Medical Diagnosis: I63.9 (ICD-10-CM) - Cerebral vascular accident   Evaluation Date: 11/23/2022  Plan of Care Expiration Period: 3/10/2023  Insurance Authorization period Expiration: 12/31/2022   Visit # / Visits Authorized: (17) 10/12  FOTO: 11/23/2022     Time In: 3:20 pm  Time Out: 3:15 pm  Total Billable (one on one) Time: 40 minutes ( - 3 Medicaid)      Precautions:  Standard and Fall    Subjective     Pt reports: "I have been doing what I am supposed to do at home but it still hurts."   he was not compliant with home exercise program given last session.   Response to previous treatment: fair   Functional change: continued stiffness in left shoulder    Pain: 0/10 with movement   Location: left arm    Objective     Physical Exam:  Postural examination/scapula alignment: Rounded shoulder and Head forward  Joint integrity: Firm end feeling  Skin integrity: skin is intact on eval   Edema: Mild edema noted in left hand   Palpation: no pain with palpation      Joint Evaluation  [facility]   11/23/2022 AROM  11/23/2022 MMS   11/23/2022 AROM  12/30/2022 AROM  1/25/2022     Right Left Left Left  Left    Scapular Elevation 5/5   3-/5     Scapular Retraction 5/5   3-/5     Shoulder Flex 0-180 5/5 38 2-/5 55 56   Shoulder Extension 0-80 5/5 50 2/5 60 60   Shoulder Abd 0-180 5/5   2-/5 42 45   Shoulder ER 0-90 5/5 28 2-/5 18 28   Shoulder IR 0-90 5/5 hip 2-/5 PSIS PSIS   Shoulder Horizontal adduction 0-90 5/5   2-/5     Elbow Flex/Ext 0-150 5/5 119 2/5 0-129 132   Wrist Flex 0-80 5/5 57 2-/5 50    Wrist Ext 0-70 5/5 20 2-/5 27    Supination 0-80 5/5   2/5     Pronation 0-80 5/5   2/5  "    Ulnar Deviation 5/5   2-/5     Radial Deviation  5/5   2-/5     *WNL - Within Normal Limits  *NT = Not Tested     Fist: loose left hand       Strength: (LEE ANN Dynamometer in lbs.) Average 3 trials, Position II:       2022   Rung II Right Left Left  Left    Trial 1 73# 1# 0# 10#   Trial 2 57# 1# 0# 11#   Trial 3 55# 2# 0# 6#   Average 61.6# 1.3# 0# 9#      Normal  Average Values  Female Right Left Male: Right Left   20-29 66 lbs 62 lbs         94 lbs 86 lbs   30-39 68 lbs 64 lbs 90 lbs 82 lbs   40-49 64 lbs 62 lbs 80 lbs 74 lbs   50-59 62 lbs 57 lbs 72 lbs 65 lbs   60-69 53 lbs 51 lbs 63 lbs 56 lbs   70+ 44 lbs 42 lbs 54 lbs 48 lbs   SD = +/- 19lbs         Pinch Strength (Measured in lbs)       2022     Right Left Left  Left    Key Pinch 19 # 4 # 4 7#   3pt Pinch 14 # unable unable 4#   2pt Pinch 10 # unable 2# 2#         Fine/Gross Motor Coordination     Assessment   Right   2022 Left  2022 Left   2022 Left   2023   9 hole peg test 9 pegs in/out for time WNL Unable  7:22  With therapist holding pegs + pt reaching for them (not resting on table) 1 pe seconds    12:47    *No physical help from therapist  Verbal cues for problem solving    *WNL - Within Normal Limits  *NT = Not Tested     Tone:  Modified Erasmo Scale:   1-  Slight increase in muscle tone, manifested by a catch and release or by minimal resistance at the end of the range of motino when the affected part(s) is moved in flexion or extension     Sensation:  Sarbjit  reports numbness on left side.       Sensation Intact  Impaired Comments    Vickery Dustin  Deep Touch (6.65) []  [x]       Protective Sensation (4.56) []  [x]       Light Touch (3.61) []  [x]                  Other Kinesthesia  []  [x]       Temperature []  []       Stereognosis  []  []         Treatment      Sarbjit received the following manual therapy techniques: Joint  mobilizations and Soft tissue Mobilization were applied to the: left upper extremity for 10 minutes, including:  - passive range of motion, joint mobilizations with gentle joint distractions and oscillations, pectoralis bending, posterior capsule stretch    Sarbjit participated in neuromuscular re-education activities to improve: Coordination, Kinesthetic, Sense, Proprioception, and Posture for 25 minutes. The following activities were included:  - Upper Body Ergometer (UBE) L2.5 for 6 minutes to provide proprioceptive awareness, postural stability, strength, activity tolerance, and reciprocal patterns with bimanual coordination completed to improve use of bilateral upper extremities in order to promote neuro muscular re-education. Cues provided as needed for postural stability/positioning  - supine shoulder chest press/flexion 2# dowel 2x10  - supine scaption 2# dowel 2x10   - shoulder ladder x2    Sarbjit participated in dynamic functional therapeutic activities to improve functional performance for 10  minutes, including:  - functional reach activity with velcro cards     Home Exercises and Education Provided     Education provided:   - active assisted range of motion supine and seated   - SPROM home exercise program initiated  - Progress towards goals     Written Home Exercises Provided: yes.  Exercises were reviewed and Sarbjit was able to demonstrate them prior to the end of the session.  Sarbjit demonstrated fair  understanding of the HEP provided.   .   See EMR under Patient Instructions for exercises provided 12/1/2022. ; 1/18/2023     Assessment     Sarbjit tolerated today's session fair. He continues to exhibit and report pain in left shoulder. Noted increased guarding and decreased joint mobility this session during manual therapy. Continued to educate Sarbjit on adhesive capsulitis/frozen shoulder. Throughout the session, Sarbjit would say he was compliant with stretching and then also say he just lays in bed. Unsure  at this time accuracy of reported compliance. He seems more motivated with therapeutic activities, as evident of decreased pain reported with shoulder movement compared to supine exercises. Discussed plan of care, rehab potential and home exercise program throughout session. Cues for redirection and attention to task.    Sarbjit is progressing well towards his goals and there are no updates to goals at this time. Pt prognosis is Fair.     Pt will continue to benefit from skilled outpatient occupational therapy to address the deficits listed in the problem list on initial evaluation provide pt/family education and to maximize pt's level of independence in the home and community environment.     Anticipated barriers to occupational therapy: cognitive impairments     Pt's spiritual, cultural and educational needs considered and pt agreeable to plan of care and goals.    Goals:   Short Term Goals: 3 weeks  - Pt will be independent with Home Exercise Program (HEP)/Home Activity Program (HAP) and report at least 50% compliance. Not met, progressing  - Pt will demonstrate greater than or equal to 90 degrees of active shoulder flexion with LUE to increase independence with dressing and overhead reaching tasks. Not met, progressing  - Pt will increase L  strength to 15# or more to improve participation with ADL and IADL tasks. Not met, progressing  - Pt will identify 3 or more compensatory strategies and/or pieces of adaptive equipment to assist with home care and other IADL tasks. Not met, progressing    Modified STG:   - Pt will complete 9 hole peg assessment with left upper extremity in 9 minutes or less in order to improve coordination with buttons, laces, etc     Long Term Goals: 6 weeks  - Pt will reduce limitation score on FOTO by less than or equal to 30 to demonstrate an increase in self-perceived functional performance. Not met, progressing  - Pt will be SBA with upper body dressing with use of adaptive  equipment/techniques as needed in order to increase independence ans decrease CG burden. Not met, progressing  - Pt will report return to meaningful activities using left upper extremity in order to improve quality of life. Not met, progressing     Modified LTG:   - Pt will complete 9 hole peg assessment in 7 minutes or less in order to improve dexterity in left upper extremity to increase participation with meaningful activities.     Plan     Updates/Grading for next session: continue with POC Corinne Rapier, OT

## 2023-02-13 NOTE — PROGRESS NOTES
[facility]  Speech Therapy PROGRESS Note- Neurological Rehabilitation  Date: 2/13/2023     Name: Sarbjit Brewer Sr.   MRN: [id number]   Therapy Diagnosis:   Encounter Diagnoses   Name Primary?    Cognitive communication deficit Yes    Dysarthria        Physician: Steven Krueger MD  Physician Orders: [id number] - AMB REFERRAL/CONSULT TO SPEECH THERAPY  Medical Diagnosis: I63.9 (ICD-10-CM) - Cerebral vascular accident    Visit #/ Visits Authorized: 13/20 (plus evaluation)  Date of Evaluation:  11/21/22  Insurance Authorization Period: 11/24/22 to 12/31/22, 1/9/23 to 4/29/23  Plan of Care Expiration Date:    2/3/23  Extended Plan of Care:  4/3/23  Progress Note: due 1/21/22, 2/23/23  Visits Cancelled: 0  Visits No Show: 3    Time In: 2:30 pm    Time Out: 3:15 pm     Total Billable Time: 45 minutes     Precautions: Standard  Subjective:   Patient reports: Patient reported recent high blood pressure and difficulty getting medicine. He reported needing to see neurology, cardiology, and podiatry. Patient reported use of slow and easy onset speech strategies in conversation throughout various contexts and familiar and unfamiliar listeners.   Response to previous treatment: positive    Pain Scale:  0/10 on a Visual Analog Scale currently.    Pain Location: none indicated     Objective:      UNTIMED  Procedure   Speech- Language- Voice Therapy   Total Untimed Units: 1  Charges Billed/Number of units: 1       Short Term Goals: 4 weeks Current Progress:   1 Patient will recall visual and/or auditory information using memory strategies with 90% given Min cues to improve memory skills.  Progressing/ Not Met 2/13/2023   Not formally addressed     2. Patient will complete mental manipulation/working memory tasks with 80% accuracy given Min cues to improve immediate memory skills.  Progressing/ Not Met 2/13/2023    Patient able to recall accessing phone abbi on his smart phone given a model and mod A.    3. Patient  will complete selective attention tasks with 90% acc independently to improve attention skills.   Progressing/ Not Met 2/13/2023   Door open during session. Patient reported little to no interference with performance on tasks.    4.  Patient will complete simple executive function tasks (I.e. functional scheduling, problem-solving/reasoning, goal/plan/action/review) with 90% accuracy given Min cues to improve executive functioning skills.   Progressing/ Not Met 2/13/2023   Completed schedule organization task with max A.     Clinician provided assistance and resources to schedule and organize appointments with providers.    5. Patient will participate in Oral Reading for Language in Aphasia (level 2) with min A to improve oral expression  Progressing/ Not Met 2/13/2023   Not formally addressed     6. Patient will answer questions with adequate thought organization/topic maintenance given no more than 2 cues to improve expressive language for conversation and to decrease tangential responses   Progressing/ Not Met 2/13/2023   Max cues required for topic maintenance throughout session - patient with severely decreased insight into tangential speech   7. Patient will will exhibit <3% of stuttering-like disfluencies per 100 words or <2% stuttering-like disfluencies per 100 syllables across a variety of communication contexts (e.g. picture  description, sentence production, paragraph reading, conversation, storytelling) across three therapy sessions using preferred fluency techniques independently.  Progressing/ Not Met 2/13/2023   Speech improves with slowness and easy onset. Self-awareness is good. Rated speech in conversation at a 2 and 3 today.    8. . Patient will demonstrate the Stretched Syllable Technique with 90% accuracy in picture description given minimum cues across three sessions.  Progressing/ Not Met 2/13/2023   Not formally addressed           Patient Education/Response:   Skilled education provided  regarding:  - SLP role in executive functioning tasks such as scheduling   - fluency strategies   - monitoring speech for errors in conversation   -home exercise program  Patient verbalized understanding of all discussed.     Home program established: yes. Memory and attention strategies at home.   Exercises were reviewed and Sarbjit was able to demonstrate them prior to the end of the session.  Sarbjit demonstrated good  understanding of the education provided.     See Electronic Medical Record under Patient Instructions for exercises provided throughout therapy.  Assessment:   Sarbjit is progressing well towards his goals. Self-awareness is increasing to use strategies to achieve clear, intelligible speech. Patient difficulty with topic maintenance demonstrated, however effort on therapy tasks was good. Clinician provided assistance with scheduling and smart phone communication today. Current goals remain appropriate. Goals to be updated as necessary. Pt has met 3 goals since initial plan of care.  Pt is progressing towards other goals.      Patient prognosis is Fair to good. Patient will continue to benefit from skilled outpatient speech and language therapy to address the deficits listed in the problem list on initial evaluation, provide patient/family education and to maximize patient's level of independence in the home and community environment.   Medical necessity is demonstrated by the following IMPAIRMENTS:  Deficits in executive functioning, attention, and memory in addition to decreased word finding and speech intelligibility prevent the pt from relaying medically and safety relevant information in a timely manner in a state of emergency.   Barriers to Therapy: none  Patient's spiritual, cultural and educational needs considered and patient agreeable to plan of care and goals.  Plan:   Continue Plan of Care with focus on improving cognitive-linguistic skills and use of motor speech strategies to enhance speech  intelligibility.     Crystal Chapin, [facility]  Student   2/13/2023       NEO Crocker, L-SLP, CCC-SLP  Speech Language Pathologist   2/13/2023

## 2023-02-14 ENCOUNTER — CLINICAL SUPPORT (OUTPATIENT)
Dept: REHABILITATION | Facility: HOSPITAL | Age: 58
End: 2023-02-14
Payer: MEDICAID

## 2023-02-14 DIAGNOSIS — R47.1 DYSARTHRIA: ICD-10-CM

## 2023-02-14 DIAGNOSIS — R41.841 COGNITIVE COMMUNICATION DEFICIT: Primary | ICD-10-CM

## 2023-02-14 DIAGNOSIS — M62.81 MUSCLE WEAKNESS: Primary | ICD-10-CM

## 2023-02-14 DIAGNOSIS — R27.9 LACK OF COORDINATION: ICD-10-CM

## 2023-02-14 DIAGNOSIS — Z74.1 NEED FOR ASSISTANCE WITH PERSONAL CARE: ICD-10-CM

## 2023-02-14 PROCEDURE — 92507 TX SP LANG VOICE COMM INDIV: CPT | Mod: PN

## 2023-02-14 PROCEDURE — 97530 THERAPEUTIC ACTIVITIES: CPT | Mod: 59,PN

## 2023-02-14 NOTE — PROGRESS NOTES
"Occupational Therapy Daily Treatment Note     Name: Sarbjit Brewer .  Clinic Number: [id number]    Therapy Diagnosis:   Encounter Diagnoses   Name Primary?    Muscle weakness Yes    Lack of coordination     Need for assistance with personal care      Physician: Steven Krueger MD    Visit Date: 2/14/2023    Physician Orders: OT eval and treat   Medical Diagnosis: I63.9 (ICD-10-CM) - Cerebral vascular accident   Evaluation Date: 11/23/2022  Plan of Care Expiration Period: 3/10/2023  Insurance Authorization period Expiration: 12/31/2022   Visit # / Visits Authorized: (18) 11/12  FOTO: 11/23/2022     Time In: 9:40 am  Time Out: 10: 20 am  Total Billable (one on one) Time: 40 minutes ( - 3 Medicaid)      Precautions:  Standard and Fall    Subjective     Pt reports: "My shoulder isn't sore. It hurts about a 5"   he was not compliant with home exercise program given last session.   Response to previous treatment: fair   Functional change: continued stiffness in left shoulder    Pain: 5/10 with movement   Location: left arm    Objective     Physical Exam:  Postural examination/scapula alignment: Rounded shoulder and Head forward  Joint integrity: Firm end feeling  Skin integrity: skin is intact on eval   Edema: Mild edema noted in left hand   Palpation: no pain with palpation      Joint Evaluation  [facility]   11/23/2022 AROM  11/23/2022 MMS   11/23/2022 AROM  12/30/2022 AROM  1/25/2022 AROM  2/14/2022     Right Left Left Left  Left  Left    Scapular Elevation 5/5   3-/5      Scapular Retraction 5/5   3-/5      Shoulder Flex 0-180 5/5 38 2-/5 55 56 50   Shoulder Extension 0-80 5/5 50 2/5 60 60 58   Shoulder Abd 0-180 5/5   2-/5 42 45 40   Shoulder ER 0-90 5/5 28 2-/5 18 28 28   Shoulder IR 0-90 5/5 hip 2-/5 PSIS PSIS hip   Shoulder Horizontal adduction 0-90 5/5   2-/5      Elbow Flex/Ext 0-150 5/5 119 2/5 0-129 132 120   Wrist Flex 0-80 5/5 57 2-/5 50     Wrist Ext 0-70 5/5 20 2-/5 27     Supination 0-80 5/5   2/5    "   Pronation 0-80 5/5   2/5      Ulnar Deviation 5/5   2-/5      Radial Deviation  5/5   2-/5      *WNL - Within Normal Limits  *NT = Not Tested     Fist: loose left hand       Strength: (LEE ANN Dynamometer in lbs.) Average 3 trials, Position II:       2022   Rung II Right Left Left  Left  Left    Trial 1 73# 1# 0# 10# 10#   Trial 2 57# 1# 0# 11# 10#   Trial 3 55# 2# 0# 6# 10#   Average 61.6# 1.3# 0# 9# 10#      Normal  Average Values  Female Right Left Male: Right Left   20-29 66 lbs 62 lbs         94 lbs 86 lbs   30-39 68 lbs 64 lbs 90 lbs 82 lbs   40-49 64 lbs 62 lbs 80 lbs 74 lbs   50-59 62 lbs 57 lbs 72 lbs 65 lbs   60-69 53 lbs 51 lbs 63 lbs 56 lbs   70+ 44 lbs 42 lbs 54 lbs 48 lbs   SD = +/- 19lbs         Pinch Strength (Measured in lbs)       2022     Right Left Left  Left  Left    Key Pinch 19 # 4 # 4 7# 5#   3pt Pinch 14 # unable unable 4# 5#   2pt Pinch 10 # unable 2# 2# 3#         Fine/Gross Motor Coordination     Assessment   Right   2022 Left  2022 Left   2022 Left   2023 Left   2023   9 hole peg test 9 pegs in/out for time WNL Unable  7:22  With therapist holding pegs + pt reaching for them (not resting on table) 1 pe seconds    12:47    *No physical help from therapist  Verbal cues for problem solving  8:25       *Normal setup   *WNL - Within Normal Limits  *NT = Not Tested     Tone:  Modified Erasmo Scale:   1-  Slight increase in muscle tone, manifested by a catch and release or by minimal resistance at the end of the range of motino when the affected part(s) is moved in flexion or extension     Sensation:  Sarbjit  reports numbness on left side.       Sensation Intact  Impaired Comments    Seymour Dustin  Deep Touch (6.65) []  [x]       Protective Sensation (4.56) []  [x]       Light Touch (3.61) []  [x]                  Other Kinesthesia  []  [x]       Temperature  []  []       Stereognosis  []  []         Treatment      Moist heat applied to left shoulder for 5 minutes start of session to increase blood flow and joint pliability to prepare for OT    Sarbjit participated in neuromuscular re-education activities to improve: Coordination, Kinesthetic, Sense, Proprioception, and Posture for 6 minutes. The following activities were included:  - Upper Body Ergometer (UBE) L2.5 for 6 minutes to provide proprioceptive awareness, postural stability, strength, activity tolerance, and reciprocal patterns with bimanual coordination completed to improve use of bilateral upper extremities in order to promote neuro muscular re-education. Cues provided as needed for postural stability/positioning    Sarbjit participated in dynamic functional therapeutic activities to improve functional performance for 34 minutes, including:  - Reassessment   - FOTO  - reviewed goals, progress, concerns with shoulder     Home Exercises and Education Provided     Education provided:   - active assisted range of motion supine and seated   - SPROM home exercise program initiated  - Progress towards goals     Written Home Exercises Provided: yes.  Exercises were reviewed and Sarbjit was able to demonstrate them prior to the end of the session.  Sarbjit demonstrated fair  understanding of the HEP provided.   .   See EMR under Patient Instructions for exercises provided 12/1/2022. ; 1/18/2023     Assessment     Sarbjit tolerated today's session fair. Reassessment performed and he exhibits a decline in active range of motion in his left shoulder for all planes. He does not exhibit a change in  or pinch but did improve fine motor coordination with 9 hole peg assessment. Therapist discussed concerns about left shoulder presenting as adhesive capsulitis or tone, increasing risk of contracture. Also continued education about importance of home exercise program. Will discuss concerns with wife after pt's SLP appt and determine  follow up with neurology. Increased time for reassessment.    Sarbjit is progressing well towards his goals and there are no updates to goals at this time. Pt prognosis is Fair.     Pt will continue to benefit from skilled outpatient occupational therapy to address the deficits listed in the problem list on initial evaluation provide pt/family education and to maximize pt's level of independence in the home and community environment.     Anticipated barriers to occupational therapy: cognitive impairments     Pt's spiritual, cultural and educational needs considered and pt agreeable to plan of care and goals.    Goals:   Short Term Goals: 3 weeks  - Pt will be independent with Home Exercise Program (HEP)/Home Activity Program (HAP) and report at least 50% compliance. Not met, progressing  - Pt will demonstrate greater than or equal to 90 degrees of active shoulder flexion with LUE to increase independence with dressing and overhead reaching tasks. Not met, progressing  - Pt will increase L  strength to 15# or more to improve participation with ADL and IADL tasks. Not met, progressing  - Pt will identify 3 or more compensatory strategies and/or pieces of adaptive equipment to assist with home care and other IADL tasks. Not met, progressing    Modified STG:   - Pt will complete 9 hole peg assessment with left upper extremity in 9 minutes or less in order to improve coordination with buttons, laces, etc Met 2/14/2023    Long Term Goals: 6 weeks  - Pt will reduce limitation score on FOTO by less than or equal to 30 to demonstrate an increase in self-perceived functional performance. Not met, progressing  - Pt will be SBA with upper body dressing with use of adaptive equipment/techniques as needed in order to increase independence ans decrease CG burden. Met 2/14/2023  - Pt will report return to meaningful activities using left upper extremity in order to improve quality of life. Not met, progressing     Modified LTG:    - Pt will complete 9 hole peg assessment in 7 minutes or less in order to improve dexterity in left upper extremity to increase participation with meaningful activities.     Plan     Updates/Grading for next session: continue with POC     Corinne Rapier, OT

## 2023-02-14 NOTE — PROGRESS NOTES
[facility]  Speech Therapy PROGRESS Note- Neurological Rehabilitation  Date: 2/14/2023     Name: Sarbjit Brewer Sr.   MRN: [id number]   Therapy Diagnosis:   Encounter Diagnoses   Name Primary?    Cognitive communication deficit Yes    Dysarthria          Physician: Steven Krueger MD  Physician Orders: [id number] - AMB REFERRAL/CONSULT TO SPEECH THERAPY  Medical Diagnosis: I63.9 (ICD-10-CM) - Cerebral vascular accident    Visit #/ Visits Authorized: 14/20 (plus evaluation)  Date of Evaluation:  11/21/22  Insurance Authorization Period: 11/24/22 to 12/31/22, 1/9/23 to 4/29/23  Plan of Care Expiration Date:    2/3/23, 4/3/23  Extended Plan of Care:  4/3/23  Progress Note: due 1/21/22, 2/23/23  Visits Cancelled: 0  Visits No Show: 3    Time In: 11:20   Time Out: 12:00     Total Billable Time: 40 minutes     Precautions: Standard  Subjective:   Patient reports: Patient requesting number for podiatry again. Provided number again.  Pt pleasant.   Response to previous treatment: positive    Pain Scale:  0/10 on a Visual Analog Scale currently.    Pain Location: none indicated     Objective:      UNTIMED  Procedure   Speech- Language- Voice Therapy   Total Untimed Units: 1  Charges Billed/Number of units: 1       Short Term Goals: 4 weeks Current Progress:   1 Patient will recall visual and/or auditory information using memory strategies with 90% given Min cues to improve memory skills.  Progressing/ Not Met 2/14/2023   Recalled details from a picture with max A Independently     Completed task again with attention to detail. Reviewed details with ST.  Then answered proceeded to questions about story with 100% acc.   2. Patient will complete mental manipulation/working memory tasks with 80% accuracy given Min cues to improve immediate memory skills.  Progressing/ Not Met 2/14/2023    Patient able to recall accessing phone abbi on his smart phone given a model and mod A.    3. Patient will complete selective  attention tasks with 90% acc independently to improve attention skills.   Progressing/ Not Met 2/14/2023   Door closed during session. Multiple tangents during each task.  Improved focus towards end of the session although tangents remain.   4.  Patient will complete simple executive function tasks (I.e. functional scheduling, problem-solving/reasoning, goal/plan/action/review) with 90% accuracy given Min cues to improve executive functioning skills.   Progressing/ Not Met 2/14/2023   -answer questions regarding a calendar x 10 with 60% acc Independently; 100% acc mod A    5. Patient will participate in Oral Reading for Language in Aphasia (level 2) with min A to improve oral expression  Progressing/ Not Met 2/14/2023   Not formally addressed     6. Patient will answer questions with adequate thought organization/topic maintenance given no more than 2 cues to improve expressive language for conversation and to decrease tangential responses   Progressing/ Not Met 2/14/2023   Max cues required for topic maintenance throughout session - patient with severely decreased insight into tangential speech   7. Patient will will exhibit <3% of stuttering-like disfluencies per 100 words or <2% stuttering-like disfluencies per 100 syllables across a variety of communication contexts (e.g. picture  description, sentence production, paragraph reading, conversation, storytelling) across three therapy sessions using preferred fluency techniques independently.  Progressing/ Not Met 2/14/2023   Speech improves with slowness and easy onset. Self-awareness is good. Rated speech in conversation at a 2 and 3 today.    Increased dysfluency in conversation noted today.    Difficulty finding words while describing a picture.    8. . Patient will demonstrate the Stretched Syllable Technique with 90% accuracy in picture description given minimum cues across three sessions.  Progressing/ Not Met 2/14/2023   Stretched syllables during 2 word  utterances with model x 10 with 80% acc on 1st attempt; 100% acc second attempt         Patient Education/Response:   Skilled education provided regarding:  - SLP role in executive functioning tasks such as scheduling   - fluency strategies   - monitoring speech for errors in conversation   -home exercise program  Patient verbalized understanding of all discussed.     Home program established: yes. Memory and attention strategies at home.   Exercises were reviewed and Sarbjit was able to demonstrate them prior to the end of the session.  Sarbjit demonstrated good  understanding of the education provided.     See Electronic Medical Record under Patient Instructions for exercises provided throughout therapy.  Assessment:   Sarbjit is progressing well towards his goals. Self-awareness is increasing to use strategies to achieve clear, intelligible speech. Patient difficulty with topic maintenance demonstrated, however effort on therapy tasks was good. Improvement noted in participation. Improved focus towards end of the session although significant tangents remain. Current goals remain appropriate. Increased dysfluency in conversation noted today.  Goals to be updated as necessary. Pt has met 3 goals since initial plan of care.  Pt is progressing towards other goals.      Patient prognosis is Fair to good. Patient will continue to benefit from skilled outpatient speech and language therapy to address the deficits listed in the problem list on initial evaluation, provide patient/family education and to maximize patient's level of independence in the home and community environment.   Medical necessity is demonstrated by the following IMPAIRMENTS:  Deficits in executive functioning, attention, and memory in addition to decreased word finding and speech intelligibility prevent the pt from relaying medically and safety relevant information in a timely manner in a state of emergency.   Barriers to Therapy: none  Patient's  spiritual, cultural and educational needs considered and patient agreeable to plan of care and goals.  Plan:   Continue Plan of Care with focus on improving cognitive-linguistic skills and use of motor speech strategies to enhance speech intelligibility.     Iesha Cortes M.S.CCC-SLP  Speech Language Pathologist   2/14/2023

## 2023-02-16 ENCOUNTER — OFFICE VISIT (OUTPATIENT)
Dept: PODIATRY | Facility: CLINIC | Age: 58
End: 2023-02-16
Payer: MEDICAID

## 2023-02-16 VITALS
BODY MASS INDEX: 30.4 KG/M2 | WEIGHT: 217.13 LBS | RESPIRATION RATE: 18 BRPM | HEART RATE: 68 BPM | HEIGHT: 71 IN | SYSTOLIC BLOOD PRESSURE: 150 MMHG | DIASTOLIC BLOOD PRESSURE: 84 MMHG

## 2023-02-16 DIAGNOSIS — M79.672 FOOT PAIN, BILATERAL: ICD-10-CM

## 2023-02-16 DIAGNOSIS — I48.0 PAROXYSMAL ATRIAL FIBRILLATION: ICD-10-CM

## 2023-02-16 DIAGNOSIS — B07.0 PLANTAR WART: ICD-10-CM

## 2023-02-16 DIAGNOSIS — B35.1 ONYCHOMYCOSIS DUE TO DERMATOPHYTE: Primary | ICD-10-CM

## 2023-02-16 DIAGNOSIS — L60.3 NAIL DYSTROPHY: ICD-10-CM

## 2023-02-16 DIAGNOSIS — L98.9 SKIN LESION OF FOOT: ICD-10-CM

## 2023-02-16 DIAGNOSIS — Z86.73 HISTORY OF CVA (CEREBROVASCULAR ACCIDENT): ICD-10-CM

## 2023-02-16 DIAGNOSIS — Z72.0 TOBACCO ABUSE: ICD-10-CM

## 2023-02-16 DIAGNOSIS — I50.42 CHRONIC COMBINED SYSTOLIC (CONGESTIVE) AND DIASTOLIC (CONGESTIVE) HEART FAILURE: ICD-10-CM

## 2023-02-16 DIAGNOSIS — M79.671 FOOT PAIN, BILATERAL: ICD-10-CM

## 2023-02-16 PROCEDURE — 3079F DIAST BP 80-89 MM HG: CPT | Mod: CPTII,,, | Performed by: PODIATRIST

## 2023-02-16 PROCEDURE — 3008F PR BODY MASS INDEX (BMI) DOCUMENTED: ICD-10-PCS | Mod: CPTII,,, | Performed by: PODIATRIST

## 2023-02-16 PROCEDURE — 3077F PR MOST RECENT SYSTOLIC BLOOD PRESSURE >= 140 MM HG: ICD-10-PCS | Mod: CPTII,,, | Performed by: PODIATRIST

## 2023-02-16 PROCEDURE — 3008F BODY MASS INDEX DOCD: CPT | Mod: CPTII,,, | Performed by: PODIATRIST

## 2023-02-16 PROCEDURE — 99215 OFFICE O/P EST HI 40 MIN: CPT | Mod: PBBFAC,PO | Performed by: PODIATRIST

## 2023-02-16 PROCEDURE — 17110 DESTRUCTION B9 LES UP TO 14: CPT | Mod: PBBFAC,PO | Performed by: PODIATRIST

## 2023-02-16 PROCEDURE — 1160F RVW MEDS BY RX/DR IN RCRD: CPT | Mod: CPTII,,, | Performed by: PODIATRIST

## 2023-02-16 PROCEDURE — 3077F SYST BP >= 140 MM HG: CPT | Mod: CPTII,,, | Performed by: PODIATRIST

## 2023-02-16 PROCEDURE — 99999 PR PBB SHADOW E&M-EST. PATIENT-LVL V: CPT | Mod: PBBFAC,,, | Performed by: PODIATRIST

## 2023-02-16 PROCEDURE — 1159F MED LIST DOCD IN RCRD: CPT | Mod: CPTII,,, | Performed by: PODIATRIST

## 2023-02-16 PROCEDURE — 1159F PR MEDICATION LIST DOCUMENTED IN MEDICAL RECORD: ICD-10-PCS | Mod: CPTII,,, | Performed by: PODIATRIST

## 2023-02-16 PROCEDURE — 3079F PR MOST RECENT DIASTOLIC BLOOD PRESSURE 80-89 MM HG: ICD-10-PCS | Mod: CPTII,,, | Performed by: PODIATRIST

## 2023-02-16 PROCEDURE — 17110 PR DESTRUCTION BENIGN LESIONS UP TO 14: ICD-10-PCS | Mod: S$PBB,,, | Performed by: PODIATRIST

## 2023-02-16 PROCEDURE — 99203 OFFICE O/P NEW LOW 30 MIN: CPT | Mod: 25,S$PBB,, | Performed by: PODIATRIST

## 2023-02-16 PROCEDURE — 1160F PR REVIEW ALL MEDS BY PRESCRIBER/CLIN PHARMACIST DOCUMENTED: ICD-10-PCS | Mod: CPTII,,, | Performed by: PODIATRIST

## 2023-02-16 PROCEDURE — 17110 DESTRUCTION B9 LES UP TO 14: CPT | Mod: S$PBB,,, | Performed by: PODIATRIST

## 2023-02-16 PROCEDURE — 99203 PR OFFICE/OUTPT VISIT, NEW, LEVL III, 30-44 MIN: ICD-10-PCS | Mod: 25,S$PBB,, | Performed by: PODIATRIST

## 2023-02-16 PROCEDURE — 99999 PR PBB SHADOW E&M-EST. PATIENT-LVL V: ICD-10-PCS | Mod: PBBFAC,,, | Performed by: PODIATRIST

## 2023-02-16 RX ORDER — CICLOPIROX 80 MG/ML
SOLUTION TOPICAL NIGHTLY
Qty: 6.6 ML | Refills: 6 | Status: SHIPPED | OUTPATIENT
Start: 2023-02-16

## 2023-02-16 NOTE — PROGRESS NOTES
[facility] - PODIATRY  [id number] [place] [address], [address]  [facility] [address]  Dept: [phone]  Dept Fax: [phone]    Kevin Zapata Jr., DPM     Assessment:   MDM    Coding  1. Onychomycosis due to dermatophyte  ciclopirox (PENLAC) 8 % Soln    Hepatic function panel      2. Nail dystrophy        3. Chronic combined systolic (congestive) and diastolic (congestive) heart failure        4. Tobacco abuse  Ambulatory referral/consult to Smoking Cessation Program      5. History of CVA (cerebrovascular accident)        6. Paroxysmal atrial fibrillation        7. Skin lesion of foot        8. Foot pain, bilateral        9. Plantar wart            Plan:     Procedures  1. Onychomycosis due to dermatophyte  -     ciclopirox (PENLAC) 8 % Soln; Apply topically nightly.  Dispense: 6.6 mL; Refill: 6  -     Hepatic function panel; Future; Expected date: 02/16/2023    2. Nail dystrophy    3. Chronic combined systolic (congestive) and diastolic (congestive) heart failure    4. Tobacco abuse  -     Ambulatory referral/consult to Smoking Cessation Program; Future; Expected date: 02/23/2023    5. History of CVA (cerebrovascular accident)    6. Paroxysmal atrial fibrillation    7. Skin lesion of foot    8. Foot pain, bilateral    9. Plantar wart      Sarbjit was seen today for foot pain, callouses, nail problem and foot problem.    Diagnoses and all orders for this visit:    Onychomycosis due to dermatophyte  -     ciclopirox (PENLAC) 8 % Soln; Apply topically nightly.  -     Hepatic function panel; Future    Nail dystrophy    Chronic combined systolic (congestive) and diastolic (congestive) heart failure    Tobacco abuse  -     Ambulatory referral/consult to Smoking Cessation Program; Future    History of CVA (cerebrovascular accident)    Paroxysmal atrial fibrillation    Skin lesion of foot    Foot pain, bilateral    Plantar wart        -pt seen, evaluated, and managed  -dx discussed in detail. All  questions/concerns addressed  -all tx options discussed. All alternatives, risks, benefits of all txs discussed  -after obtaining consent and marking and then performing timeout, I cleansed w/ alcohol prep pad the above mentioned hyperkeratosis which was then trimmed utilizing No 15 scapel, to a smooth base with pinpoint bleeding with out incident. Silver nitrate was used as needed. The skin lesion was then destroyed with application of cantherone and covered with occlusive dressing. Patient tolerated this well and reported comfort to the area.  -Warnings regarding pain and blister formation given  -OTC medications for pain  -pt instructed to keep lesion covered with bandaid+triple abx ointment - change QD as needed x 14-21 days  -Patient education regarding warts was given / Handout on warts was given  -OTC Salicylic acid to be applied daily by patient at home as needed    -for fungal nails, will try topical rx first  -discussed potential use of lamisil PO if topical treatment fails  -discussed PO lamisil can cause liver damage  Warned of s/s of liver damage including but not limited to nausea/vomiting that doesn't stop, loss of appetite, severe stomach/abdominal pain, yellowing eyes/skin, dark urine.   liver injury from terbinafine use only occurs in an estimated 1 in 50,000 to 120,000 prescriptions  If liver injury does occur, it usually happens within the first 6 weeks of taking terbinafine  - labs on way out to eval liver function--> will review at nxt visit  -rxs dispensed: penlac  -referrals: none  -WB: wbat  - educated on antifungal footcare at home  - rec'd otc antifungal foot powder and spray. Use prn  - discussed nail bx in future in nonresponsive to rxd medication  -Discussed importance of keeping feet dry and changing socks and shoes on a regular basis to prevent spread of fungus      Follow up if symptoms worsen or fail to improve.    Subjective:      Patient ID: Sarbjit Brewer  is a 57 y.o.  male.    Chief Complaint:   Chief Complaint   Patient presents with    Foot Pain     Callouses on right foot    Callouses    Nail Problem     Nail pain    Foot Problem     Dry peeling skin on both feet       CC - fungal nails: Patient presents to the clinic complaining of thick and discolored toenails on both feet. Patient is inquiring about treatment options.    HPI    Last Podiatry Enc: Visit date not found  Last Enc w/ Me: Visit date not found    Outside reports reviewed: historical medical records.  Family hx: as below  Past Medical History:   Diagnosis Date    A-fib     CHF (congestive heart failure)     Hypertension     Insomnia      Past Surgical History:   Procedure Laterality Date    KIDNEY STONE SURGERY       No family history on file.  Current Outpatient Medications   Medication Sig Dispense Refill    apixaban (ELIQUIS) 5 mg Tab Take 1 tablet (5 mg total) by mouth 2 (two) times daily. 180 tablet 3    cloNIDine (CATAPRES) 0.1 MG tablet Take 0.1 mg by mouth 2 (two) times daily.      losartan (COZAAR) 100 MG tablet Take 100 mg by mouth once daily.      metFORMIN (GLUCOPHAGE) 500 MG tablet Take 500 mg by mouth once daily.      zolpidem (AMBIEN) 10 mg Tab Take 10 mg by mouth once daily.      amLODIPine (NORVASC) 5 MG tablet Take 1 tablet (5 mg total) by mouth once daily. 30 tablet 0    aspirin (ECOTRIN) 81 MG EC tablet Take 1 tablet (81 mg total) by mouth once daily. 30 tablet 0    atorvastatin (LIPITOR) 40 MG tablet Take 1 tablet (40 mg total) by mouth every evening. 90 tablet 3    ciclopirox (PENLAC) 8 % Soln Apply topically nightly. 6.6 mL 6    furosemide (LASIX) 20 MG tablet Take 1 tablet (20 mg total) by mouth once daily. 90 tablet 4    metoprolol succinate (TOPROL-XL) 50 MG 24 hr tablet Take 1 tablet (50 mg total) by mouth once daily. 90 tablet 3    mirtazapine (REMERON) 15 MG tablet Take 1 tablet (15 mg total) by mouth every evening. 30 tablet 11     No current facility-administered medications for this  "visit.     Review of patient's allergies indicates:  No Known Allergies  Social History     Socioeconomic History    Marital status:    Tobacco Use    Smoking status: Every Day     Packs/day: 1.00     Years: 41.00     Pack years: 41.00     Types: Cigarettes     Start date: 1981    Smokeless tobacco: Never    Tobacco comments:     Pt enrolled in the [employer] on 1/29/20 ([employer] Member ID # [id number]). He declines Ambulatory referral to Smoking Cessation Program.   Substance and Sexual Activity    Alcohol use: Yes     Comment: 12 years ago    Drug use: No       ROS    REVIEW OF SYSTEMS: Negative as documented below as well as positive findings in bold.       Constitutional  Respiratory  Gastrointestinal  Skin   - Fever - Cough - Heartburn - Rash   - Chills - Spit blood - Nausea - Itching   - Weight Loss - Shortness of breath - Vomiting - Nail pain   - Malaise/Fatigue - Wheezing - Abdominal Pain  Wound/Ulcer   - Weight Gain   - Blood in Stool  Poor wound healing       - Diarrhea          Cardiovascular  Genitourinary  Neurological  HEENT   - Chest Pain - Dysuria - Burning Sensation of feet - Headache   - Palpitations - Hematuria - Tingling / Paresthesia - Congestion   - Pain at night in legs - Flank Pain - Dizziness - Sore Throat   - Cramping   - Tremor - Blurred Vision   - Leg Swelling   - Sensory Change - Double Vision   - Dizzy when standing   - Speech Change - Eye Redness       - Focal Weakness - Dry Eyes       - Loss of Consciousness          Endocrine  Musculoskeletal  Psychiatric   - Cold intolerance - Muscle Pain - Depression   - Heat intolerance - Neck Pain - Insomnia   - Anemia - Joint Pain - Memory Loss   -  Easy bruising, bleeding - Heel pain - Anxiety      Toe Pain        Leg/Ankle/Foot Pain         Objective:     BP (!) 150/84 (BP Location: Left arm, Patient Position: Sitting, BP Method: Large (Automatic))   Pulse 68   Resp 18   Ht 5' 11" (1.803 m)   Wt 98.5 kg (217 lb 2.5 oz)   BMI 30.29 " "kg/m²   Vitals:    02/16/23 0948   BP: (!) 150/84   Pulse: 68   Resp: 18   Weight: 98.5 kg (217 lb 2.5 oz)   Height: 5' 11" (1.803 m)   PainSc:   3   PainLoc: Foot       Physical Exam    General Appearance:   Patient appears well developed, well nourished  Patient appears stated age    Psychiatric:   Patient is oriented to time, place, and person.  Patient has appropriate mood and affect    Neck:  Trachea Midline  No visible masses    Respiratory/Ears:  No distress or labored breathing.  Able to differentiate between normal talking voice and whisper.  Able to follow commands    Eyes:  Visual Acuity intact  Lids and conjunctivae normal. No discoloration noted.    Foot Exam  Physical Exam  Ortho Exam  Ortho/SPM Exam  Physical Exam  Neurologic Exam    B/l LE exam con't:  V:  DP 2/4, PT 2/4   CRT< 3s to all digits tested   Tibial and popliteal lymph nodes are w/o abnormality    edema present, varicosities absent    N:  Patient displays normal ankle reflexes   SILT in SP/DP/T/Robel/Saph distributions    Ortho: +Motor EHL/FHL/TA/GA   +TTP skin lesion b/l plantar foot  Compartments soft/compressible. No pain on passive stretch of big toe. No calf  Pain.    Derm:  skin intact, skin warm and dry, skin without ulcers or lesions, skin without induration, nails abnormal, nails discolored and nails dystrophic, mycotic x 10, +hyperkeratotic v verrucous lesion that breaks normal skin lines and has pinpoint bleeding upon debridement b/l foot    Imaging / Labs:      No results found.      Note: This was dictated using a computer transcription program. Although proofread, it may contain computer transcription errors and phonetic errors. Other human proofreading errors may also exist. Corrections may be performed at a later time. Please contact us for any clarification if needed.    Kevin Zapata DPM  [facility]      "

## 2023-02-16 NOTE — PATIENT INSTRUCTIONS
Thick Toenails  Toenails will often become thick as an individual grows older. Thickening may also occur as a result of trauma to the toenail, such as when it repeatedly hits the end of a shoe that is too short. Sometimes when something is dropped on the toenail, the nail will fall off. When a new toenail grows back, it will often be thicker than it was previously.    Thick toenails can also be seen in individuals with nail fungus (onychomycosis), psoriasis and hypothyroidism. Those who have problems with the thickness of their toenails should consult a foot and ankle surgeon for proper diagnosis and treatment.    Nail Fungus    A fungus is an organism that lives in warm moist areas. Fungus of the toenails is a common problem that can affect people of all ages, although it most commonly affects individuals who are older.     Toenail fungus often begins as an infection in the skin called tinea pedis (also known as athletes foot). The fungus often starts under the nail fold at the end of the nail. Over time, it grows underneath the nail and causes changes to its appearance, such as a yellow or brownish discoloration. It can also cause thickening and deformity of the toenail.     Many people have difficulty with their toenails and need assistance in caring for them. A foot and ankle surgeon can diagnose the cause of toenail problems and can recommend treatments.    Yellow Toenails   The most common cause of yellow discoloration in the toenails is a fungal infection. The fungus often develops underneath the nail, resulting in it becoming thick, raised and yellow in color.    Other potential causes for yellow discoloration of the nail include diabetes mellitus and lymphedema (chronic leg swelling). Yellow staining of the nails can also occur in individuals who use nail polish. A stained nail may take several months to grow out.    White Toenails  White toenails can develop for several reasons.    Trauma, such as when an  "object is dropped on a toenail, often causes bleeding under the nail because of broken blood vessels. This would cause a black toenail. If the trauma does not cause broken blood vessels, a white spot may appear under the nail. The spot will slowly grow out with the normal growth of the toenail.    Sometimes white lines appear within the toenail. These may be caused by recurring trauma, such as when a runner wears shoes that are too small and the toe hits the end of the shoe.    White lines may also occur due to a medical illness or trauma that has occurred elsewhere in the body, causing protein to be deposited within the nail bed.    A fungal infection that affects the outermost layer of the toenail may cause a bright white discoloration of the toenail.    A white area close to the nail fold (the lunula) varies in size from one person to another. This is a normal aspect of the nail.    It is recommended that you see a foot and ankle surgeon for the diagnosis and possible treatment of white toenails.    Black Toenails  A black, purple or brownish discoloration under or involving a toenail is frequently due to trauma to the toenail, such as when something is dropped on the toe. The color results from a blood clot or bleeding under the nail and may involve the entire nail or just a small portion of it. This can be very painful when the entire nail is involved and may need medical attention to relieve the pressure caused by bleeding under the toenail.    When the second or third toenails are involved, it is commonly referred to as "runner's toe." This can be the result of the nail being slightly too long and the shoe being either too big or too tight. If the shoe is too big, when running down hill, the foot slips and the nail can get caught where the toe cap meets the toebox. If the shoes are too tight, the nail can get pinched and jammed, resulting in bleeding between the nail plate and nail bed.    Although it is very " rare, a more serious cause of black toenails is malignant melanoma. Since early diagnosis and treatment of melanoma improves the chances for a good outcome, it is important that all black toenails be evaluated by a qualified foot and ankle surgeon to rule out this cause.    Other rare causes of black toenails include fungal infections, chronic ingrown nails or health problems affecting the rest of the body.

## 2023-02-20 ENCOUNTER — CLINICAL SUPPORT (OUTPATIENT)
Dept: REHABILITATION | Facility: HOSPITAL | Age: 58
End: 2023-02-20
Payer: MEDICAID

## 2023-02-20 DIAGNOSIS — R27.9 LACK OF COORDINATION: ICD-10-CM

## 2023-02-20 DIAGNOSIS — M62.81 MUSCLE WEAKNESS: Primary | ICD-10-CM

## 2023-02-20 DIAGNOSIS — Z74.1 NEED FOR ASSISTANCE WITH PERSONAL CARE: ICD-10-CM

## 2023-02-20 PROCEDURE — 97530 THERAPEUTIC ACTIVITIES: CPT | Mod: PN

## 2023-02-20 NOTE — PROGRESS NOTES
"Occupational Therapy Daily Treatment Note and Discharge Summary     Name: Sarbjit Brewer Sr.  Clinic Number: [id number]    Therapy Diagnosis:   Encounter Diagnoses   Name Primary?    Muscle weakness Yes    Lack of coordination     Need for assistance with personal care        Physician: Steven Krueger MD    Visit Date: 2/20/2023    Physician Orders: OT eval and treat   Medical Diagnosis: I63.9 (ICD-10-CM) - Cerebral vascular accident   Evaluation Date: 11/23/2022  Plan of Care Expiration Period: 3/10/2023  Insurance Authorization period Expiration: 12/31/2022   Visit # / Visits Authorized: (19) 12/12  FOTO: 11/23/2022     Time In: 1:10 pm  Time Out: 1:50 pm  Total Billable (one on one) Time: 40 minutes (TA - 3 Medicaid)      Precautions:  Standard and Fall    Subjective     Pt reports: "I am trying to move my arm. I think I am old. That's why there is pain "   he was not compliant with home exercise program given last session.   Response to previous treatment: fair   Functional change: continued stiffness in left shoulder    Pain: 5/10 with movement   Location: left arm    Objective     Physical Exam:  Postural examination/scapula alignment: Rounded shoulder and Head forward  Joint integrity: Firm end feeling  Skin integrity: skin is intact on eval   Edema: Mild edema noted in left hand   Palpation: no pain with palpation      Joint Evaluation  [facility]   11/23/2022 AROM  11/23/2022 MMS   11/23/2022 AROM  12/30/2022 AROM  1/25/2022 AROM  2/14/2022     Right Left Left Left  Left  Left    Scapular Elevation 5/5   3-/5      Scapular Retraction 5/5   3-/5      Shoulder Flex 0-180 5/5 38 2-/5 55 56 50   Shoulder Extension 0-80 5/5 50 2/5 60 60 58   Shoulder Abd 0-180 5/5   2-/5 42 45 40   Shoulder ER 0-90 5/5 28 2-/5 18 28 28   Shoulder IR 0-90 5/5 hip 2-/5 PSIS PSIS hip   Shoulder Horizontal adduction 0-90 5/5   2-/5      Elbow Flex/Ext 0-150 5/5 119 2/5 0-129 132 120   Wrist Flex 0-80 5/5 57 2-/5 50     Wrist Ext 0-70 5/5 " 20 2-/5 27     Supination 0-80 5/5   2/5      Pronation 0-80 5/5   2/5      Ulnar Deviation 5/5   2-/5      Radial Deviation  5/5   2-/5      *WNL - Within Normal Limits  *NT = Not Tested     Fist: loose left hand       Strength: (LEE ANN Dynamometer in lbs.) Average 3 trials, Position II:       2022   Rung II Right Left Left  Left  Left    Trial 1 73# 1# 0# 10# 10#   Trial 2 57# 1# 0# 11# 10#   Trial 3 55# 2# 0# 6# 10#   Average 61.6# 1.3# 0# 9# 10#      Normal  Average Values  Female Right Left Male: Right Left   20-29 66 lbs 62 lbs         94 lbs 86 lbs   30-39 68 lbs 64 lbs 90 lbs 82 lbs   40-49 64 lbs 62 lbs 80 lbs 74 lbs   50-59 62 lbs 57 lbs 72 lbs 65 lbs   60-69 53 lbs 51 lbs 63 lbs 56 lbs   70+ 44 lbs 42 lbs 54 lbs 48 lbs   SD = +/- 19lbs         Pinch Strength (Measured in lbs)       2022     Right Left Left  Left  Left    Key Pinch 19 # 4 # 4 7# 5#   3pt Pinch 14 # unable unable 4# 5#   2pt Pinch 10 # unable 2# 2# 3#         Fine/Gross Motor Coordination     Assessment   Right   2022 Left  2022 Left   2022 Left   2023 Left   2023 Left   2023   9 hole peg test 9 pegs in/out for time WNL Unable  7:22  With therapist holding pegs + pt reaching for them (not resting on table) 1 pe seconds    12:47    *No physical help from therapist  Verbal cues for problem solving  8:25       *Normal setup 7:45      *normal setup   *WNL - Within Normal Limits  *NT = Not Tested     Tone:  Modified Erasmo Scale:   1-  Slight increase in muscle tone, manifested by a catch and release or by minimal resistance at the end of the range of motino when the affected part(s) is moved in flexion or extension     Sensation:  Sarbjit  reports numbness on left side.       Sensation Intact  Impaired Comments    [place] Dustin  Deep Touch (6.65) []  [x]       Protective Sensation (4.56) []  [x]       Light  Touch (3.61) []  [x]                  Other Kinesthesia  []  [x]       Temperature []  []       Stereognosis  []  []         Treatment      Moist heat applied to left shoulder for 5 minutes start of session to increase blood flow and joint pliability to prepare for OT    Sarbjit participated in dynamic functional therapeutic exercises to improve functional performance for 35 minutes, including:  - discharge education  - reviewed/complete home exercise program with self passive range of motion movements 2x10 each     Home Exercises and Education Provided     Education provided:   - active assisted range of motion supine and seated   - SPROM home exercise program initiated  - Progress towards goals     Written Home Exercises Provided: yes.  Exercises were reviewed and Sarbjit was able to demonstrate them prior to the end of the session.  Sarbjit demonstrated fair  understanding of the HEP provided.   .   See EMR under Patient Instructions for exercises provided 12/1/2022. ; 1/18/2023     Assessment     Sarbjit has made fair progress with therapy since start of care in November of 2022. Sarbjit is very limited with pain in his left shoulder, affecting his tolerance with manual therapy, active assisted range of motion and active range of motion exercises. He reports intermittent compliance with home exercise program, however, his wife states he has not been doing his self passive range of motion and will not move his arm. Therapist has provided extensive education on risks including adhesive capsulitis, contractures and muscle weakness if he is non-compliant with home exercise program. During his last reassessment, he exhibits a decline in shoulder active range of motion and limited change with  and pinch strength. His fine motor coordination has improved with 9 hole peg but significant compensatory movements are present with task. Due to decline and reports of pain, therapist discussed discharge from OT at this time and  suggested Sarbjit and/or his wife contact his neurologist to determine how to manage pain/possible contracture  in his left shoulder. Sarbjit and his wife are agreeable to discharge from OT. Increased time required for discharge discussion, review of home exercise program, and pt education due to patient's cognitive impairments.     Anticipated barriers to occupational therapy: cognitive impairments     Pt's spiritual, cultural and educational needs considered and pt agreeable to plan of care and goals.    Goals:   Short Term Goals: 3 weeks  - Pt will be independent with Home Exercise Program (HEP)/Home Activity Program (HAP) and report at least 50% compliance. Not met 2/20/2023  - Pt will demonstrate greater than or equal to 90 degrees of active shoulder flexion with LUE to increase independence with dressing and overhead reaching tasks. Not met 2/20/2023  - Pt will increase L  strength to 15# or more to improve participation with ADL and IADL tasks. Not met 2/20/2023  - Pt will identify 3 or more compensatory strategies and/or pieces of adaptive equipment to assist with home care and other IADL tasks. Not met 2/20/2023    Modified STG:   - Pt will complete 9 hole peg assessment with left upper extremity in 9 minutes or less in order to improve coordination with buttons, laces, etc Met 2/14/2023    Long Term Goals: 6 weeks  - Pt will reduce limitation score on FOTO by less than or equal to 30 to demonstrate an increase in self-perceived functional performance. Not met 2/20/2023  - Pt will be SBA with upper body dressing with use of adaptive equipment/techniques as needed in order to increase independence ans decrease CG burden. Met 2/14/2023  - Pt will report return to meaningful activities using left upper extremity in order to improve quality of life. Not met 2/20/2023     Modified LTG:   - Pt will complete 9 hole peg assessment in 7 minutes or less in order to improve dexterity in left upper extremity to  increase participation with meaningful activities. Not met 2/20/2023    Plan     Updates/Grading for next session: discharge from OT     Corinne Rapier, OT        Mr Parker

## 2023-02-22 ENCOUNTER — TELEPHONE (OUTPATIENT)
Dept: NEUROLOGY | Facility: CLINIC | Age: 58
End: 2023-02-22
Payer: MEDICAID

## 2023-02-22 ENCOUNTER — CLINICAL SUPPORT (OUTPATIENT)
Dept: REHABILITATION | Facility: HOSPITAL | Age: 58
End: 2023-02-22
Payer: MEDICAID

## 2023-02-22 DIAGNOSIS — R41.841 COGNITIVE COMMUNICATION DEFICIT: Primary | ICD-10-CM

## 2023-02-22 DIAGNOSIS — R47.1 DYSARTHRIA: ICD-10-CM

## 2023-02-22 PROCEDURE — 92507 TX SP LANG VOICE COMM INDIV: CPT | Mod: PN | Performed by: SPEECH-LANGUAGE PATHOLOGIST

## 2023-02-22 NOTE — PROGRESS NOTES
"[facility]  Speech Therapy PROGRESS Note- Neurological Rehabilitation  Date: 2/22/2023     Name: Sarbjit Brewer Sr.   MRN: [id number]   Therapy Diagnosis:   Encounter Diagnoses   Name Primary?    Cognitive communication deficit Yes    Dysarthria          Physician: Steven Krueger MD  Physician Orders: [id number] - AMB REFERRAL/CONSULT TO SPEECH THERAPY  Medical Diagnosis: I63.9 (ICD-10-CM) - Cerebral vascular accident    Visit #/ Visits Authorized: 15/20 (plus evaluation)  Date of Evaluation:  11/21/22  Insurance Authorization Period: 11/24/22 to 12/31/22, 1/9/23 to 4/29/23  Plan of Care Expiration Date:    2/3/23, 4/3/23  Extended Plan of Care:  4/3/23  Progress Note: due 1/21/22, 2/23/23, 3/22/23  Visits Cancelled: 0  Visits No Show: 3    Time In: 12:09 pm  (patient arrived late)  Time Out: 12:54 pm   Total Billable Time: 45 minutes     Precautions: Standard  Subjective:   Patient reports: "Nothing changed" Patient endorsed that he saw one parade in [place]. Patient recalled students presence and inquired why she was not here today. Patient attended podiatry appointment last week.  Response to previous treatment: positive    Pain Scale:  0/10 on a Visual Analog Scale currently.    Pain Location: none indicated     Objective:      UNTIMED  Procedure   Speech- Language- Voice Therapy   Total Untimed Units: 1  Charges Billed/Number of units: 1       Short Term Goals: 4 weeks Current Progress:   1 Patient will recall visual and/or auditory information using memory strategies with 90% given Min cues to improve memory skills.  Progressing/ Not Met 2/22/2023   Picture retention: Recalled details from a picture x2 trials with max A; Completed task again with attention to detail and max encouragement to write down specific details. Reviewed details with ST.  Then answered proceeded to questions about story with 80% acc.    Immediate recalling 4 items using memory strategy (association) : 100%   1 minute " "delay - 75% accuracy independently; unable to achieve >75% accuracy despite max cues   2. Patient will complete mental manipulation/working memory tasks with 80% accuracy given Min cues to improve immediate memory skills.  Progressing/ Not Met 2/22/2023    3 unit word progression: 40% accuracy independently; max cues and repetitions for patient to achieve >40% accuracy    3. Patient will complete selective attention tasks with 90% acc independently to improve attention skills.   Progressing/ Not Met 2/22/2023   Door closed during session to aid in attention. Multiple tangents during each task but responded good to single cue to redirect though when redirected, patient often initiated negative self talk (I.e., I'm so dumb now")   4.  Patient will complete simple executive function tasks (I.e. functional scheduling, problem-solving/reasoning, goal/plan/action/review) with 90% accuracy given Min cues to improve executive functioning skills.   Progressing/ Not Met 2/22/2023   Patient answered questions regarding a simple calendar with 70% acc Independently; 100% acc minimum -mod A    5. Patient will participate in Oral Reading for Language in Aphasia (level 2) with min A to improve oral expression  Progressing/ Not Met 2/22/2023   Not formally addressed     6. Patient will answer questions with adequate thought organization/topic maintenance given no more than 2 cues to improve expressive language for conversation and to decrease tangential responses   Progressing/ Not Met 2/22/2023   Mod cues required for topic maintenance throughout session - patient with some improvement to tangential speech but  decreased insight continues to be apparent   7. Patient will will exhibit <3% of stuttering-like disfluencies per 100 words or <2% stuttering-like disfluencies per 100 syllables across a variety of communication contexts (e.g. picture  description, sentence production, paragraph reading, conversation, storytelling) across " three therapy sessions using preferred fluency techniques independently.  Progressing/ Not Met 2/22/2023   Speech improves with slowness and easy onset. Self-awareness is good. Rated speech in conversation at a 2 and 3 today.      8. . Patient will demonstrate the Stretched Syllable Technique with 90% accuracy in picture description given minimum cues across three sessions.  Progressing/ Not Met 2/22/2023   Stretched syllables during initial word utterances with model x 10 with 70% acc on 1st attempt; 100% acc second attempt         Patient Education/Response:   Skilled education provided regarding:  - SLP role in plan of care   - fluency strategies    -home exercise program  Patient verbalized understanding of all discussed.     Home program established: yes. Memory and attention strategies at home.   Exercises were reviewed and Sarbjit was able to demonstrate them prior to the end of the session.  Sarbjit demonstrated good  understanding of the education provided.     See Electronic Medical Record under Patient Instructions for exercises provided throughout therapy.  Assessment:   Sarbjit is progressing well towards his goals. Some improvement in self-awareness noted to decrease tangential speech with improved focus towards end of the session although significant tangents remain - some improvement in topic repair when tangents occur. Overall, patient towards established goals and continues to benefit from ongoing skilled ST services. Continued moderate to max cues required for attention, memory, in addition to executive functioning tasks. VERNELL not yet addressed.  Patient has met 0/8 short term goals throughout current plan of care as Short Term Goals were recently updated.   Current goals remain appropriate. Goals to be updated as necessary. Pt has met 3 goals since initial plan of care.  Pt is progressing towards other goals.      Patient prognosis is Fair to good. Patient will continue to benefit from skilled  outpatient speech and language therapy to address the deficits listed in the problem list on initial evaluation, provide patient/family education and to maximize patient's level of independence in the home and community environment.   Medical necessity is demonstrated by the following IMPAIRMENTS:  Deficits in executive functioning, attention, and memory in addition to decreased word finding and speech intelligibility prevent the pt from relaying medically and safety relevant information in a timely manner in a state of emergency.   Barriers to Therapy: none  Patient's spiritual, cultural and educational needs considered and patient agreeable to plan of care and goals.  Plan:   Continue Plan of Care with focus on improving cognitive-linguistic skills and use of motor speech strategies to enhance speech intelligibility.     NEO Crocker, L-SLP, CCC-SLP  Speech Language Pathologist   2/22/2023

## 2023-02-22 NOTE — TELEPHONE ENCOUNTER
----- Message from Kiara Hoyos sent at 2/22/2023  9:59 AM CST -----  Contact: pt wife  Pt wife is calling in regards to scheduling appt from a referral in [facility].      Confirmed contact below:   Contact Name Pt spouse   Phone Number: [phone]

## 2023-02-23 NOTE — PROGRESS NOTES
"[facility]  Speech Therapy PROGRESS Note- Neurological Rehabilitation  Date: 2/24/2023     Name: Sarbjit Brewer Sr.   MRN: [id number]   Therapy Diagnosis:   Encounter Diagnoses   Name Primary?    Cognitive communication deficit Yes    Dysarthria            Physician: Steven Krueger MD  Physician Orders: [id number] - AMB REFERRAL/CONSULT TO SPEECH THERAPY  Medical Diagnosis: I63.9 (ICD-10-CM) - Cerebral vascular accident    Visit #/ Visits Authorized: 16/20 (plus evaluation)  Date of Evaluation:  11/21/22  Insurance Authorization Period: 11/24/22 to 12/31/22, 1/9/23 to 4/29/23  Plan of Care Expiration Date:    2/3/23, 4/3/23  Extended Plan of Care:  4/3/23  Progress Note: due 1/21/22, 2/23/23, 3/22/23  Visits Cancelled: 0  Visits No Show: 3    Time In: 2:32 pm    Time Out: 3:16 pm    Total Billable Time: 44 minutes     Precautions: Standard  Subjective:   Patient reports: "Nothing changed" Patient endorsed that he saw one parade in [place]. Patient recalled students presence and inquired why she was not here today. Patient attended podiatry appointment last week.  Response to previous treatment: positive    Pain Scale:  2 /10 on a Visual Analog Scale currently.    Pain Location: arm     Objective:      UNTIMED  Procedure   Speech- Language- Voice Therapy   Total Untimed Units: 1  Charges Billed/Number of units: 1       Short Term Goals: 4 weeks Current Progress:   1 Patient will recall visual and/or auditory information using memory strategies with 90% given Min cues to improve memory skills.  Progressing/ Not Met 2/24/2023   Not formally addressed.    2. Patient will complete mental manipulation/working memory tasks with 80% accuracy given Min cues to improve immediate memory skills.  Progressing/ Not Met 2/24/2023    Not formally addressed     3. Patient will complete selective attention tasks with 90% acc independently to improve attention skills.   Progressing/ Not Met 2/24/2023   Door closed " during session to aid in attention. Tangents were noted; clinician educated awareness.    4.  Patient will complete simple executive function tasks (I.e. functional scheduling, problem-solving/reasoning, goal/plan/action/review) with 90% accuracy given Min cues to improve executive functioning skills.   Progressing/ Not Met 2/24/2023   Completed calendar of next month's appointments with max A   5. Patient will participate in Oral Reading for Language in Aphasia (level 2) with min A to improve oral expression  Progressing/ Not Met 2/24/2023   Not formally addressed.      6. Patient will answer questions with adequate thought organization/topic maintenance given no more than 2 cues to improve expressive language for conversation and to decrease tangential responses   Progressing/ Not Met 2/24/2023   Topic maintenance with moderate A throughout session. Difficulty with awareness even with cue.    7. Patient will will exhibit <3% of stuttering-like disfluencies per 100 words or <2% stuttering-like disfluencies per 100 syllables across a variety of communication contexts (e.g. picture  description, sentence production, paragraph reading, conversation, storytelling) across three therapy sessions using preferred fluency techniques independently.  Progressing/ Not Met 2/24/2023   Speech observed to improve with slowness and awareness. Patient reported overall speech improvement.       8. . Patient will demonstrate the Stretched Syllable Technique with 90% accuracy in picture description given minimum cues across three sessions.  Progressing/ Not Met 2/24/2023   Not formally addressed.          Patient Education/Response:   Skilled education provided regarding:  - SLP role in plan of care   - fluency strategies    -home exercise program  Patient verbalized understanding of all discussed.     Home program established: yes. Memory and attention strategies at home.   Exercises were reviewed and Sarbjit was able to demonstrate  them prior to the end of the session.  Sarbjit demonstrated good  understanding of the education provided.     See Electronic Medical Record under Patient Instructions for exercises provided throughout therapy.  Assessment:   Sarbjit is progressing well towards his goals. Majority of session spent on simple scheduling task which required max cues. Overall, patient continues to benefit from ongoing skilled ST services. Pt has met 3 goals since initial plan of care. Patient has met 0/8 short term goals throughout current plan of care as Short Term Goals were recently updated.   Current goals remain appropriate. Goals to be updated as necessary.    Patient prognosis is Fair to good. Patient will continue to benefit from skilled outpatient speech and language therapy to address the deficits listed in the problem list on initial evaluation, provide patient/family education and to maximize patient's level of independence in the home and community environment.   Medical necessity is demonstrated by the following IMPAIRMENTS:  Deficits in executive functioning, attention, and memory in addition to decreased word finding and speech intelligibility prevent the pt from relaying medically and safety relevant information in a timely manner in a state of emergency.   Barriers to Therapy: none  Patient's spiritual, cultural and educational needs considered and patient agreeable to plan of care and goals.  Plan:   Continue Plan of Care with focus on improving cognitive-linguistic skills and use of motor speech strategies to enhance speech intelligibility.     Crystal Chapin, [facility]  Student   2/24/2023     I, Doreen Shelton, certify that I was present in the room directing the student and service delivery and guiding them using my skilled judgement. As the co-signing therapist, I have reviewed the student's documentation, and am responsible for the treatment, assessment and plan.   NEO Crocker, CCC-SLP  Speech Language Pathologist    2/24/2023

## 2023-02-24 ENCOUNTER — CLINICAL SUPPORT (OUTPATIENT)
Dept: REHABILITATION | Facility: HOSPITAL | Age: 58
End: 2023-02-24
Payer: MEDICAID

## 2023-02-24 DIAGNOSIS — R47.1 DYSARTHRIA: ICD-10-CM

## 2023-02-24 DIAGNOSIS — R41.841 COGNITIVE COMMUNICATION DEFICIT: Primary | ICD-10-CM

## 2023-02-24 PROCEDURE — 92507 TX SP LANG VOICE COMM INDIV: CPT | Mod: PN | Performed by: SPEECH-LANGUAGE PATHOLOGIST

## 2023-02-27 ENCOUNTER — OFFICE VISIT (OUTPATIENT)
Dept: CARDIOLOGY | Facility: CLINIC | Age: 58
End: 2023-02-27
Payer: MEDICAID

## 2023-02-27 VITALS
OXYGEN SATURATION: 98 % | BODY MASS INDEX: 30.1 KG/M2 | SYSTOLIC BLOOD PRESSURE: 142 MMHG | HEIGHT: 71 IN | DIASTOLIC BLOOD PRESSURE: 82 MMHG | WEIGHT: 215 LBS | HEART RATE: 82 BPM

## 2023-02-27 DIAGNOSIS — Z87.891 FORMER SMOKER: ICD-10-CM

## 2023-02-27 DIAGNOSIS — Z91.89 AT RISK FOR SLEEP APNEA: ICD-10-CM

## 2023-02-27 DIAGNOSIS — G45.9 TIA (TRANSIENT ISCHEMIC ATTACK): ICD-10-CM

## 2023-02-27 DIAGNOSIS — I50.32 CHRONIC HEART FAILURE WITH PRESERVED EJECTION FRACTION: ICD-10-CM

## 2023-02-27 DIAGNOSIS — I65.23 BILATERAL STENOSIS OF CAROTID ARTERIES GREATER THAN 50%: ICD-10-CM

## 2023-02-27 DIAGNOSIS — I48.0 PAROXYSMAL ATRIAL FIBRILLATION: ICD-10-CM

## 2023-02-27 DIAGNOSIS — Z79.01 ON CONTINUOUS ORAL ANTICOAGULATION: ICD-10-CM

## 2023-02-27 DIAGNOSIS — E78.2 MIXED HYPERLIPIDEMIA: ICD-10-CM

## 2023-02-27 DIAGNOSIS — Z86.73 HISTORY OF CVA (CEREBROVASCULAR ACCIDENT): ICD-10-CM

## 2023-02-27 DIAGNOSIS — I10 ESSENTIAL HYPERTENSION: Primary | ICD-10-CM

## 2023-02-27 PROBLEM — I50.42 CHRONIC COMBINED SYSTOLIC (CONGESTIVE) AND DIASTOLIC (CONGESTIVE) HEART FAILURE: Status: RESOLVED | Noted: 2022-01-05 | Resolved: 2023-02-27

## 2023-02-27 PROCEDURE — 3077F SYST BP >= 140 MM HG: CPT | Mod: CPTII,,, | Performed by: INTERNAL MEDICINE

## 2023-02-27 PROCEDURE — 4010F PR ACE/ARB THEARPY RXD/TAKEN: ICD-10-PCS | Mod: CPTII,,, | Performed by: INTERNAL MEDICINE

## 2023-02-27 PROCEDURE — 99214 OFFICE O/P EST MOD 30 MIN: CPT | Mod: S$PBB,25,, | Performed by: INTERNAL MEDICINE

## 2023-02-27 PROCEDURE — 3008F BODY MASS INDEX DOCD: CPT | Mod: CPTII,,, | Performed by: INTERNAL MEDICINE

## 2023-02-27 PROCEDURE — 99214 OFFICE O/P EST MOD 30 MIN: CPT | Mod: PBBFAC,PN | Performed by: INTERNAL MEDICINE

## 2023-02-27 PROCEDURE — 93005 ELECTROCARDIOGRAM TRACING: CPT | Mod: PBBFAC,PN | Performed by: INTERNAL MEDICINE

## 2023-02-27 PROCEDURE — 93010 EKG 12-LEAD: ICD-10-PCS | Mod: S$PBB,,, | Performed by: INTERNAL MEDICINE

## 2023-02-27 PROCEDURE — 3008F PR BODY MASS INDEX (BMI) DOCUMENTED: ICD-10-PCS | Mod: CPTII,,, | Performed by: INTERNAL MEDICINE

## 2023-02-27 PROCEDURE — 3079F DIAST BP 80-89 MM HG: CPT | Mod: CPTII,,, | Performed by: INTERNAL MEDICINE

## 2023-02-27 PROCEDURE — 1159F PR MEDICATION LIST DOCUMENTED IN MEDICAL RECORD: ICD-10-PCS | Mod: CPTII,,, | Performed by: INTERNAL MEDICINE

## 2023-02-27 PROCEDURE — 1160F PR REVIEW ALL MEDS BY PRESCRIBER/CLIN PHARMACIST DOCUMENTED: ICD-10-PCS | Mod: CPTII,,, | Performed by: INTERNAL MEDICINE

## 2023-02-27 PROCEDURE — 3079F PR MOST RECENT DIASTOLIC BLOOD PRESSURE 80-89 MM HG: ICD-10-PCS | Mod: CPTII,,, | Performed by: INTERNAL MEDICINE

## 2023-02-27 PROCEDURE — 1159F MED LIST DOCD IN RCRD: CPT | Mod: CPTII,,, | Performed by: INTERNAL MEDICINE

## 2023-02-27 PROCEDURE — 4010F ACE/ARB THERAPY RXD/TAKEN: CPT | Mod: CPTII,,, | Performed by: INTERNAL MEDICINE

## 2023-02-27 PROCEDURE — 99214 PR OFFICE/OUTPT VISIT, EST, LEVL IV, 30-39 MIN: ICD-10-PCS | Mod: S$PBB,25,, | Performed by: INTERNAL MEDICINE

## 2023-02-27 PROCEDURE — 99999 PR PBB SHADOW E&M-EST. PATIENT-LVL IV: CPT | Mod: PBBFAC,,, | Performed by: INTERNAL MEDICINE

## 2023-02-27 PROCEDURE — 99999 PR PBB SHADOW E&M-EST. PATIENT-LVL IV: ICD-10-PCS | Mod: PBBFAC,,, | Performed by: INTERNAL MEDICINE

## 2023-02-27 PROCEDURE — 93010 ELECTROCARDIOGRAM REPORT: CPT | Mod: S$PBB,,, | Performed by: INTERNAL MEDICINE

## 2023-02-27 PROCEDURE — 3077F PR MOST RECENT SYSTOLIC BLOOD PRESSURE >= 140 MM HG: ICD-10-PCS | Mod: CPTII,,, | Performed by: INTERNAL MEDICINE

## 2023-02-27 PROCEDURE — 1160F RVW MEDS BY RX/DR IN RCRD: CPT | Mod: CPTII,,, | Performed by: INTERNAL MEDICINE

## 2023-02-27 RX ORDER — AMLODIPINE BESYLATE 10 MG/1
10 TABLET ORAL DAILY
Qty: 90 TABLET | Refills: 3 | Status: SHIPPED | OUTPATIENT
Start: 2023-02-27 | End: 2023-03-29

## 2023-02-27 NOTE — ASSESSMENT & PLAN NOTE
Compensated.  Compliant w/ meds.    - continue medical therapy   - risk factor and lifestyle modifications

## 2023-02-27 NOTE — ASSESSMENT & PLAN NOTE
Controlled.  CHADSVASc= 4 on AC w/ apixaban however was taking once daily.    - check ECG  - pt to resume apixaban to BID   - risk factor and lifestyle modifications

## 2023-02-27 NOTE — ASSESSMENT & PLAN NOTE
Pt w/ h/o carotid stenosis and PAF.    - continue medical therapy   - risk factor and lifestyle modifications

## 2023-02-27 NOTE — ASSESSMENT & PLAN NOTE
Goal LDL < 70, last  10/2022.  Compliant w/ statin therapy.    - check FLP   - continue statin therapy   - risk factor and lifestyle modifications

## 2023-02-27 NOTE — ASSESSMENT & PLAN NOTE
Noted on CTA w/ CVA and TIA.    - optimize medical therapy   - risk factor and lifestyle modifications

## 2023-02-27 NOTE — ASSESSMENT & PLAN NOTE
Uncontrolled.  Goal BP < 130/80.  Compliant w/ meds.    - increase amlodipine to 10 mg daily   - risk factor and lifestyle modifications  - enroll in dig med prgm

## 2023-02-27 NOTE — PROGRESS NOTES
Subjective:    Patient ID:  Sarbjit Brewer Sr. is a 57 y.o. male who presents for follow-up of Hospital Follow Up      PCP: Tadeo Gleason MD     HPI  Pt is a 58 yo M w/ PMH of CVA w/ L hemaparesis, HTN, HLD, PAF on AC, tobacco abuse, and HFpEF who presents to [facility] care.  He was previously seen by Dr. Jaramillo 4/21/2022 for his chronic medical illnesses and he was continued on medical therapy.  Since this time he was admitted 10/27/2022-10/29/2022 for a TIA and had a MRI which noted multiple punctate foci within the R parieto-occipital region as well as R insular region consistent with acute infarcts and had a CTA head and neck w/ bilateral stenosis of the distal internal carotid arteries and he was also noted to have malignant HTN.  Since this time he has been doing well and has been in rehab following the CVA.  He denies cp, sob, edema, orthopnea, PND, presyncope, LOC, palpitations, and claudications.  He notes compliance w/ meds and denies side effects however states he was taking apixaban 1x/day.  He monitors his BP at home and states it runs 130-150s/80s.  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         Past Medical History:   Diagnosis Date    A-fib     CHF (congestive heart failure)     Hypertension     Insomnia      Past Surgical History:   Procedure Laterality Date    KIDNEY STONE SURGERY       Social History     Socioeconomic History    Marital status:    Tobacco Use    Smoking status: Every Day     Packs/day: 1.00     Years: 41.00     Pack years: 41.00     Types: Cigarettes     Start date: 1981    Smokeless tobacco: Never    Tobacco comments:     Pt enrolled in the [employer] on 1/29/20 (SCT Member ID # [id number]). He declines Ambulatory referral to Smoking Cessation Program.   Substance and Sexual Activity    Alcohol use: Yes     Comment: 12 years ago    Drug use: No     History reviewed. No pertinent family history.    Review of patient's allergies  indicates:  No Known Allergies    Medication List with Changes/Refills   Current Medications    APIXABAN (ELIQUIS) 5 MG TAB    Take 1 tablet (5 mg total) by mouth 2 (two) times daily.    ASPIRIN (ECOTRIN) 81 MG EC TABLET    Take 1 tablet (81 mg total) by mouth once daily.    ATORVASTATIN (LIPITOR) 40 MG TABLET    Take 1 tablet (40 mg total) by mouth every evening.    CICLOPIROX (PENLAC) 8 % SOLN    Apply topically nightly.    CLONIDINE (CATAPRES) 0.1 MG TABLET    Take 0.1 mg by mouth 2 (two) times daily.    LOSARTAN (COZAAR) 100 MG TABLET    Take 100 mg by mouth once daily.    METFORMIN (GLUCOPHAGE) 500 MG TABLET    Take 500 mg by mouth once daily.    METOPROLOL SUCCINATE (TOPROL-XL) 50 MG 24 HR TABLET    Take 1 tablet (50 mg total) by mouth once daily.    ZOLPIDEM (AMBIEN) 10 MG TAB    Take 10 mg by mouth once daily.   Changed and/or Refilled Medications    Modified Medication Previous Medication    AMLODIPINE (NORVASC) 10 MG TABLET amLODIPine (NORVASC) 5 MG tablet       Take 1 tablet (10 mg total) by mouth once daily.    Take 1 tablet (5 mg total) by mouth once daily.   Discontinued Medications    FUROSEMIDE (LASIX) 20 MG TABLET    Take 1 tablet (20 mg total) by mouth once daily.    MIRTAZAPINE (REMERON) 15 MG TABLET    Take 1 tablet (15 mg total) by mouth every evening.       Review of Systems   Constitutional: Negative for diaphoresis and fever.   HENT:  Negative for congestion and hearing loss.    Eyes:  Negative for blurred vision and pain.   Cardiovascular:  Negative for chest pain, claudication, dyspnea on exertion, leg swelling, near-syncope, palpitations and syncope.   Respiratory:  Positive for sleep disturbances due to breathing and snoring. Negative for shortness of breath.    Hematologic/Lymphatic: Negative for bleeding problem. Does not bruise/bleed easily.   Skin:  Negative for color change and poor wound healing.   Gastrointestinal:  Negative for abdominal pain and nausea.   Genitourinary:  Negative  "for bladder incontinence and flank pain.   Neurological:  Negative for focal weakness and light-headedness.      Objective:   BP (!) 142/82   Pulse 82   Ht 5' 11" (1.803 m)   Wt 97.5 kg (215 lb)   SpO2 98%   BMI 29.99 kg/m²    Physical Exam  Constitutional:       Appearance: He is well-developed. He is not diaphoretic.   HENT:      Head: Normocephalic and atraumatic.   Eyes:      General: No scleral icterus.     Pupils: Pupils are equal, round, and reactive to light.   Neck:      Vascular: No JVD.   Cardiovascular:      Rate and Rhythm: Normal rate and regular rhythm.      Pulses: Intact distal pulses.      Heart sounds: S1 normal and S2 normal. No murmur heard.    No friction rub. No gallop.   Pulmonary:      Effort: Pulmonary effort is normal. No respiratory distress.      Breath sounds: Normal breath sounds. No wheezing or rales.   Chest:      Chest wall: No tenderness.   Abdominal:      General: Bowel sounds are normal. There is no distension.      Palpations: Abdomen is soft. There is no mass.      Tenderness: There is no abdominal tenderness. There is no rebound.   Musculoskeletal:         General: No tenderness. Normal range of motion.      Cervical back: Normal range of motion and neck supple.   Skin:     General: Skin is warm and dry.      Coloration: Skin is not pale.   Neurological:      Mental Status: He is alert and oriented to person, place, and time.      Motor: Weakness present.      Coordination: Coordination abnormal.      Gait: Gait abnormal.      Deep Tendon Reflexes: Reflexes normal.   Psychiatric:         Behavior: Behavior normal.         Judgment: Judgment normal.         ECG: 10/27/2022- reviewed.  NSR, NL ECG.    Echo: 10/28/2022- reviewed.   Summary    Concentric hypertrophy and normal systolic function.  The estimated ejection fraction is 60%.  Normal left ventricular diastolic function.  Normal right ventricular size with normal right ventricular systolic function.  Moderate aortic " regurgitation.  Intermediate central venous pressure (8 mmHg).  The estimated PA systolic pressure is 25 mmHg.  There is no evidence of intracardiac shunting.    Lexiscan: 2/1/2022- reviewed.    Conclusion         The Baseline ECG reveals atrial fibrillation with RVR    The EKG portion of this study is abnormal but not diagnostic.    The exercise capacity was severely impaired.    The nuclear portion of this study will be reported separately.    Impression:     1. No convincing scintigraphic evidence of ischemia or infarct.  2. No focal wall motion abnormalities.  3. Borderline left ventricular function.  Ejection fraction estimated to be 50% during stress and 56% during rest.    Assessment:       1. Essential hypertension    2. TIA (transient ischemic attack)    3. History of CVA (cerebrovascular accident)    4. Chronic heart failure with preserved ejection fraction    5. Paroxysmal atrial fibrillation    6. Mixed hyperlipidemia    7. On continuous oral anticoagulation    8. Former smoker    9. Bilateral stenosis of carotid arteries greater than 50%    10. At risk for sleep apnea         Plan:         History of CVA (cerebrovascular accident)  Pt w/ h/o carotid stenosis and PAF.    - continue medical therapy   - risk factor and lifestyle modifications    Chronic heart failure with preserved ejection fraction  Compensated.  Compliant w/ meds.    - continue medical therapy   - risk factor and lifestyle modifications     Essential hypertension  Uncontrolled.  Goal BP < 130/80.  Compliant w/ meds.    - increase amlodipine to 10 mg daily   - risk factor and lifestyle modifications  - enroll in dig med prgm     Paroxysmal atrial fibrillation  Controlled.  CHADSVASc= 4 on AC w/ apixaban however was taking once daily.    - check ECG  - pt to resume apixaban to BID   - risk factor and lifestyle modifications     At risk for sleep apnea  Refer to sleep med.      Mixed hyperlipidemia  Goal LDL < 70, last  10/2022.   Compliant w/ statin therapy.    - check FLP   - continue statin therapy   - risk factor and lifestyle modifications     On continuous oral anticoagulation  Encouraged compliance w/ BID apixaban.      Former smoker  Pt quit 10/2022 due to stroke.    - cessation counseling reinforced     Bilateral stenosis of carotid arteries greater than 50%  Noted on CTA w/ CVA and TIA.    - optimize medical therapy   - risk factor and lifestyle modifications       Total duration of face to face visit time 30 minutes.  Total time spent counseling greater than fifty percent of total visit time.  Counseling included discussion regarding imaging findings, diagnosis, possibilities, treatment options, risks and benefits.  The patient had many questions regarding the options and long-term effects      Mike Corado M.D.  Interventional Cardiology

## 2023-02-28 ENCOUNTER — CLINICAL SUPPORT (OUTPATIENT)
Dept: REHABILITATION | Facility: HOSPITAL | Age: 58
End: 2023-02-28
Payer: MEDICAID

## 2023-02-28 DIAGNOSIS — R47.1 DYSARTHRIA: ICD-10-CM

## 2023-02-28 DIAGNOSIS — R41.841 COGNITIVE COMMUNICATION DEFICIT: Primary | ICD-10-CM

## 2023-02-28 PROCEDURE — 92507 TX SP LANG VOICE COMM INDIV: CPT | Mod: PN

## 2023-02-28 NOTE — PROGRESS NOTES
[facility]  Speech Therapy PROGRESS Note- Neurological Rehabilitation  Date: 2/28/2023     Name: Sarbjit Brewer Sr.   MRN: [id number]   Therapy Diagnosis:   Encounter Diagnoses   Name Primary?    Cognitive communication deficit Yes    Dysarthria      Physician: Steven Krueger MD  Physician Orders: [id number] - AMB REFERRAL/CONSULT TO SPEECH THERAPY  Medical Diagnosis: I63.9 (ICD-10-CM) - Cerebral vascular accident    Visit #/ Visits Authorized: 17/20 (plus evaluation)  Date of Evaluation:  11/21/22  Insurance Authorization Period: 11/24/22 to 12/31/22, 1/9/23 to 4/29/23  Plan of Care Expiration Date:    2/3/23, 4/3/23  Extended Plan of Care:  4/3/23  Progress Note: due 1/21/22, 2/23/23, 3/22/23  Visits Cancelled: 0  Visits No Show: 3    Time In: 11:15 pm    Time Out: 12:00 pm    Total Billable Time: 45 minutes     Precautions: Standard  Subjective:   Patient reports: I got a new primary doctor. I have to do some blood work tomorrow in [place].  Response to previous treatment: positive    Pain Scale:  0/10 on a Visual Analog Scale currently.    Pain Location: n/a   Objective:      UNTIMED  Procedure   Speech- Language- Voice Therapy   Total Untimed Units: 1  Charges Billed/Number of units: 1       Short Term Goals: 4 weeks Current Progress:   1 Patient will recall visual and/or auditory information using memory strategies with 90% given Min cues to improve memory skills.  Progressing/ Not Met 2/28/2023   Not formally addressed.    2. Patient will complete mental manipulation/working memory tasks with 80% accuracy given Min cues to improve immediate memory skills.  Progressing/ Not Met 2/28/2023    Not formally addressed     3. Patient will complete selective attention tasks with 90% acc independently to improve attention skills.   Progressing/ Not Met 2/28/2023   Door closed during session to aid in attention. Tangents were noted; clinician educated awareness.    4.  Patient will complete simple  executive function tasks (I.e. functional scheduling, problem-solving/reasoning, goal/plan/action/review) with 90% accuracy given Min cues to improve executive functioning skills.   Progressing/ Not Met 2/28/2023   Completed calendar of next month's appointments with 60% acc min A; 80% acc max A   5. Patient will participate in Oral Reading for Language in Aphasia (level 2) with min A to improve oral expression  Progressing/ Not Met 2/28/2023   Not formally addressed.      6. Patient will answer questions with adequate thought organization/topic maintenance given no more than 2 cues to improve expressive language for conversation and to decrease tangential responses   Progressing/ Not Met 2/28/2023   Topic maintenance with moderate A throughout session. Difficulty with awareness even with cue.    7. Patient will will exhibit <3% of stuttering-like disfluencies per 100 words or <2% stuttering-like disfluencies per 100 syllables across a variety of communication contexts (e.g. picture  description, sentence production, paragraph reading, conversation, storytelling) across three therapy sessions using preferred fluency techniques independently.  Progressing/ Not Met 2/28/2023   Increased dysfluency noted today.  Speech observed to improve with slowness and awareness. Patient reported overall speech improvement.       8. . Patient will demonstrate the Stretched Syllable Technique with 90% accuracy in picture description given minimum cues across three sessions.  Progressing/ Not Met 2/28/2023   X 10 with mod A         Patient Education/Response:   Skilled education provided regarding:  - SLP role in plan of care   - fluency strategies    -home exercise program  Patient verbalized understanding of all discussed.     Home program established: yes. Memory and attention strategies at home.   Exercises were reviewed and Sarbjit was able to demonstrate them prior to the end of the session.  Sarbjit demonstrated good   understanding of the education provided.     See Electronic Medical Record under Patient Instructions for exercises provided throughout therapy.  Assessment:   Sarbjit is progressing well towards his goals. Max ask for calendar activity.  Mod A for Stretch Syllable Technique.  Overall, patient continues to benefit from ongoing skilled ST services. Pt has met 3 goals since initial plan of care. Patient has met 0/8 short term goals throughout current plan of care as Short Term Goals were recently updated.   Current goals remain appropriate. Goals to be updated as necessary.    Patient prognosis is Fair to good. Patient will continue to benefit from skilled outpatient speech and language therapy to address the deficits listed in the problem list on initial evaluation, provide patient/family education and to maximize patient's level of independence in the home and community environment.   Medical necessity is demonstrated by the following IMPAIRMENTS:  Deficits in executive functioning, attention, and memory in addition to decreased word finding and speech intelligibility prevent the pt from relaying medically and safety relevant information in a timely manner in a state of emergency.   Barriers to Therapy: none  Patient's spiritual, cultural and educational needs considered and patient agreeable to plan of care and goals.  Plan:   Continue Plan of Care with focus on improving cognitive-linguistic skills and use of motor speech strategies to enhance speech intelligibility.     Iesha Cortes M.S.CCC-SLP  Speech Language Pathologist     2/28/2023

## 2023-03-03 ENCOUNTER — CLINICAL SUPPORT (OUTPATIENT)
Dept: REHABILITATION | Facility: HOSPITAL | Age: 58
End: 2023-03-03
Payer: MEDICAID

## 2023-03-03 DIAGNOSIS — R41.841 COGNITIVE COMMUNICATION DEFICIT: Primary | ICD-10-CM

## 2023-03-03 DIAGNOSIS — R47.1 DYSARTHRIA: ICD-10-CM

## 2023-03-03 PROCEDURE — 92507 TX SP LANG VOICE COMM INDIV: CPT | Mod: PN | Performed by: SPEECH-LANGUAGE PATHOLOGIST

## 2023-03-03 NOTE — PROGRESS NOTES
"[facility]  Speech Therapy PROGRESS Note- Neurological Rehabilitation  Date: 3/3/2023     Name: Sarbjit Brewer Sr.   MRN: [id number]   Therapy Diagnosis:   Encounter Diagnoses   Name Primary?    Cognitive communication deficit Yes    Dysarthria      Physician: Steven Krueger MD  Physician Orders: [id number] - AMB REFERRAL/CONSULT TO SPEECH THERAPY  Medical Diagnosis: I63.9 (ICD-10-CM) - Cerebral vascular accident    Visit #/ Visits Authorized: 18/20 (plus evaluation)  Date of Evaluation:  11/21/22  Insurance Authorization Period: 11/24/22 to 12/31/22, 1/9/23 to 4/29/23  Plan of Care Expiration Date:    2/3/23, 4/3/23  Extended Plan of Care:  4/3/23  Progress Note: due 1/21/22, 2/23/23, 3/22/23  Visits Cancelled: 0  Visits No Show: 3    Time In: 10:21 am    Time Out: 11:02 am    Total Billable Time: 41 minutes     Precautions: Standard  Subjective:   Patient reports: "I don't have any income." Patient endorsed that he had a little bit of spicer bits last night and he almost had a stroke because he consumed salt - patient became worried and thought he would have another stroke.   Response to previous treatment: positive    Pain Scale:  0/10 on a Visual Analog Scale currently.    Pain Location: no pain indicated  Objective:      UNTIMED  Procedure   Speech- Language- Voice Therapy   Total Untimed Units: 1  Charges Billed/Number of units: 1       Short Term Goals: 4 weeks Current Progress:   1 Patient will recall visual and/or auditory information using memory strategies with 90% given Min cues to improve memory skills.  Progressing/ Not Met 3/3/2023   Not formally addressed.    2. Patient will complete mental manipulation/working memory tasks with 80% accuracy given Min cues to improve immediate memory skills.  Progressing/ Not Met 3/3/2023    Not formally addressed     3. Patient will complete selective attention tasks with 90% acc independently to improve attention skills.   Progressing/ Not Met " 3/3/2023   Door open when completing visual selective attention tasks:  - large print symbol cancellation task 1 target: 90% accuracy independently; 100% accuracy given opportunity to self correct  -large print symbol cancellation task 2 target: 97% accuracy independently; 100% accuracy given opportunity to self correct  -large print symbol cancellation task 2 target with differing directions ([place] one target and cross out the other target):97% accuracy independently; 100% accuracy given opportunity to self correct     4.  Patient will complete simple executive function tasks (I.e. functional scheduling, problem-solving/reasoning, goal/plan/action/review) with 90% accuracy given Min cues to improve executive functioning skills.   Progressing/ Not Met 3/3/2023   Not formally addressed     5. Patient will participate in Oral Reading for Language in Aphasia (level 2) with min A to improve oral expression  Progressing/ Not Met 3/3/2023   1. pt observed SLP reading stimuli and pointing to the targeted word x 10  2. pt pointed to target word as SLP read stimuli for 10 sentences.   3. Pt read sentences simultaneously with SLP with exaggeration by SLP with additional cues required by SLPx 10  4. Pt read sentences simultaneously with SLP fading out as sentence progressed with additional cues required by SLPx  2  5. Pt read words identified by SLP with 100% acc (10/10)  6. Pt pointed to words identified by SLP with 100% acc (10/10)  7. Pt read sentences  Simultaneously with SLP x 10 with  1 additional cues required.     6. Patient will answer questions with adequate thought organization/topic maintenance given no more than 2 cues to improve expressive language for conversation and to decrease tangential responses   Progressing/ Not Met 3/3/2023   Door closed during conversation to aid in attention. Tangents were noted; clinician educated on awareness.  Topic maintenance able to be maintained with moderate A throughout  conversation.     7. Patient will will exhibit <3% of stuttering-like disfluencies per 100 words or <2% stuttering-like disfluencies per 100 syllables across a variety of communication contexts (e.g. picture  description, sentence production, paragraph reading, conversation, storytelling) across three therapy sessions using preferred fluency techniques independently.  Progressing/ Not Met 3/3/2023   Slightly improved fluency noted today.  Speech observed to improve with slowness and awareness. Patient reported overall speech improvement but significant frustration at times      8. . Patient will demonstrate the Stretched Syllable Technique with 90% accuracy in picture description given minimum cues across three sessions.  Progressing/ Not Met 3/3/2023   X 10 with mod cues          Patient Education/Response:   Skilled education provided regarding:  - SLP role in plan of care   - fluency strategies    -home exercise program  Patient verbalized understanding of all discussed.     Home program established: yes. Memory and attention strategies at home.   Exercises were reviewed and Sarbjit was able to demonstrate them prior to the end of the session.  Sarbjit demonstrated good  understanding of the education provided.     See Electronic Medical Record under Patient Instructions for exercises provided throughout therapy.  Assessment:   Sarbjit is progressing well towards his goals. Good attention with selective attention tasks today despite increasingly complex tasks. Conversational tangents were noted; topic maintenance able to be maintained with moderate A throughout conversation.    Current goals remain appropriate. Goals to be updated as necessary.    Patient prognosis is Fair to good. Patient will continue to benefit from skilled outpatient speech and language therapy to address the deficits listed in the problem list on initial evaluation, provide patient/family education and to maximize patient's level of independence  in the home and community environment.   Medical necessity is demonstrated by the following IMPAIRMENTS:  Deficits in executive functioning, attention, and memory in addition to decreased word finding and speech intelligibility prevent the pt from relaying medically and safety relevant information in a timely manner in a state of emergency.   Barriers to Therapy: none  Patient's spiritual, cultural and educational needs considered and patient agreeable to plan of care and goals.  Plan:   Continue Plan of Care with focus on improving cognitive-linguistic skills and use of motor speech strategies to enhance speech intelligibility.     NEO Crocker, L-SLP, CCC-SLP  Speech Language Pathologist   3/3/2023

## 2023-03-06 ENCOUNTER — CLINICAL SUPPORT (OUTPATIENT)
Dept: REHABILITATION | Facility: HOSPITAL | Age: 58
End: 2023-03-06
Payer: MEDICAID

## 2023-03-06 DIAGNOSIS — R47.1 DYSARTHRIA: ICD-10-CM

## 2023-03-06 DIAGNOSIS — R41.841 COGNITIVE COMMUNICATION DEFICIT: Primary | ICD-10-CM

## 2023-03-06 PROCEDURE — 92507 TX SP LANG VOICE COMM INDIV: CPT | Mod: PN | Performed by: SPEECH-LANGUAGE PATHOLOGIST

## 2023-03-06 NOTE — PROGRESS NOTES
[facility]  Speech Therapy PROGRESS Note- Neurological Rehabilitation  Date: 3/6/2023     Name: Sarbjit Brewer Sr.   MRN: [id number]   Therapy Diagnosis:   Encounter Diagnoses   Name Primary?    Cognitive communication deficit Yes    Dysarthria      Physician: Steven Krueger MD  Physician Orders: [id number] - AMB REFERRAL/CONSULT TO SPEECH THERAPY  Medical Diagnosis: I63.9 (ICD-10-CM) - Cerebral vascular accident    Visit #/ Visits Authorized: 19/20 (plus evaluation)  Date of Evaluation:  11/21/22  Insurance Authorization Period: 11/24/22 to 12/31/22, 1/9/23 to 4/29/23  Plan of Care Expiration Date:    2/3/23, 4/3/23  Extended Plan of Care:  4/3/23  Progress Note: due 1/21/22, 2/23/23, 3/22/23  Visits Cancelled: 0  Visits No Show: 3    Time In: 11:17 am      Time Out: 12:02 pm      Total Billable Time: 45  minutes     Precautions: Standard  Subjective:   Patient reports: Patient reported difficulty with calendar at home, however his wife supports him in appointment management. He reported no changes since last session. However, he was concerned about his doctor's appointment tomorrow with some testing to be done.   Response to previous treatment: positive    Pain Scale:  0 /10 on a Visual Analog Scale currently.    Pain Location: none indicated    Objective:      UNTIMED  Procedure   Speech- Language- Voice Therapy   Total Untimed Units: 1  Charges Billed/Number of units: 1       Short Term Goals: 4 weeks Current Progress:   1 Patient will recall visual and/or auditory information using memory strategies with 90% given Min cues to improve memory skills.  Progressing/ Not Met 3/6/2023   Not formally addressed.    2. Patient will complete mental manipulation/working memory tasks with 80% accuracy given Min cues to improve immediate memory skills.  Progressing/ Not Met 3/6/2023    Not formally addressed     3. Patient will complete selective attention tasks with 90% acc independently to improve attention  skills.   Progressing/ Not Met 3/6/2023   66% accuracy with max A   Constant Therapy: Find the same symbols          4.  Patient will complete simple executive function tasks (I.e. functional scheduling, problem-solving/reasoning, goal/plan/action/review) with 90% accuracy given Min cues to improve executive functioning skills.   Progressing/ Not Met 3/6/2023   Simple reasoning task: picture scene description and what happened next     80% accuracy given moderate cues      5. Patient will participate in Oral Reading for Language in Aphasia (level 2) with min A to improve oral expression  Progressing/ Not Met 3/6/2023   Not formally addressed.    6. Patient will answer questions with adequate thought organization/topic maintenance given no more than 2 cues to improve expressive language for conversation and to decrease tangential responses   Progressing/ Not Met 3/6/2023   -4 tangential behaviors noted during conversation.   -Topic maintenance supported with min A throughout conversation.      -9 tangential behaviors noted during structured therapy tasks  -Topic maintenance supported with mod A throughout tasks         7. Patient will will exhibit <3% of stuttering-like disfluencies per 100 words or <2% stuttering-like disfluencies per 100 syllables across a variety of communication contexts (e.g. picture  description, sentence production, paragraph reading, conversation, storytelling) across three therapy sessions using preferred fluency techniques independently.  Progressing/ Not Met 3/6/2023   Patient reported little to no stuttering overall. Minimal disfluency behaviors noted during session.       8. . Patient will demonstrate the Stretched Syllable Technique with 90% accuracy in picture description given minimum cues across three sessions.  Progressing/ Not Met 3/6/2023   Not formally addressed.          Patient Education/Response:   Skilled education provided regarding:  - SLP role in plan of care   - fluency  strategies    -home exercise program  Patient verbalized understanding of all discussed.     Home program established: yes. Memory and attention strategies at home.   Exercises were reviewed and Sarbjit was able to demonstrate them prior to the end of the session.  Sarbjit demonstrated good  understanding of the education provided.     See Electronic Medical Record under Patient Instructions for exercises provided throughout therapy.  Assessment:   Sarbjit is progressing well towards his goals. Tangential behaviors noted and educated on during session; awareness dicussed. Minimal to moderate cues needed on all tasks today, with patient participation good. Disfluency was observed to be minimal today; patient reported use of slow rate increases speech intelligibility. Current goals remain appropriate. Goals to be updated as necessary.      Patient prognosis is Fair to good. Patient will continue to benefit from skilled outpatient speech and language therapy to address the deficits listed in the problem list on initial evaluation, provide patient/family education and to maximize patient's level of independence in the home and community environment.   Medical necessity is demonstrated by the following IMPAIRMENTS:  Deficits in executive functioning, attention, and memory in addition to decreased word finding and speech intelligibility prevent the pt from relaying medically and safety relevant information in a timely manner in a state of emergency.   Barriers to Therapy: none  Patient's spiritual, cultural and educational needs considered and patient agreeable to plan of care and goals.  Plan:   Continue Plan of Care with focus on improving cognitive-linguistic skills and use of motor speech strategies to enhance speech intelligibility.     Crystal Chapin, HALIMA  Student  03/06/2023    NEO Crocker, L-SLP, CCC-SLP  Speech Language Pathologist   3/6/2023

## 2023-03-07 ENCOUNTER — PATIENT MESSAGE (OUTPATIENT)
Dept: ADMINISTRATIVE | Facility: OTHER | Age: 58
End: 2023-03-07
Payer: MEDICAID

## 2023-03-09 NOTE — PROGRESS NOTES
[facility]  Speech Therapy PROGRESS Note- Neurological Rehabilitation  Date: 3/10/2023     Name: Sarbjit Brewer Sr.   MRN: [id number]   Therapy Diagnosis:   Encounter Diagnoses   Name Primary?    Cognitive communication deficit Yes    Dysarthria      Physician: Steven Krueger MD  Physician Orders: [id number] - AMB REFERRAL/CONSULT TO SPEECH THERAPY  Medical Diagnosis: I63.9 (ICD-10-CM) - Cerebral vascular accident    Visit #/ Visits Authorized: 14/20 (plus evaluation + 8 visits prior to this authorization)  Date of Evaluation:  11/21/22  Insurance Authorization Period: 11/24/22 to 12/31/22, 1/9/23 to 4/29/23  Plan of Care Expiration Date:    2/3/23, 4/3/23  Extended Plan of Care:  4/3/23  Progress Note: due 1/21/22, 2/23/23, 3/22/23  Visits Cancelled: 0  Visits No Show: 3    Time In: 10:35 am      Time Out: 11:16 am       Total Billable Time: 41 minutes     Precautions: Standard  Subjective:   Patient reports: that his pressure will be monitored with his phone - already set it up and waiting for something to come in the mail before beginning.    Response to previous treatment: positive    Pain Scale:  0 /10 on a Visual Analog Scale currently.    Pain Location: none indicated    Objective:      UNTIMED  Procedure   Speech- Language- Voice Therapy   Total Untimed Units: 1  Charges Billed/Number of units: 1       Short Term Goals: 4 weeks Current Progress:   1 Patient will recall visual and/or auditory information using memory strategies with 90% given Min cues to improve memory skills.  Progressing/ Not Met 3/10/2023   Not formally addressed.    2. Patient will complete mental manipulation/working memory tasks with 80% accuracy given Min cues to improve immediate memory skills.  Progressing/ Not Met 3/10/2023    Not formally addressed     3. Patient will complete selective attention tasks with 90% acc independently to improve attention skills.   Progressing/ Not Met 3/10/2023   Number cancellation task:  patient correctly cancelled 13/14 targets independently in 3 minutes; able to identify missing target with 100% accuracy     patient correctly cancelled 33/48 targets independently in 7 minutes; patient given opportunity to self correct + cues and patient able to cancel 46/48 targets   4.  Patient will complete simple executive function tasks (I.e. functional scheduling, problem-solving/reasoning, goal/plan/action/review) with 90% accuracy given Min cues to improve executive functioning skills.   Progressing/ Not Met 3/10/2023   Max cues required for simple functional scheduling task     5. Patient will participate in Oral Reading for Language in Aphasia (level 2) with min A to improve oral expression  Progressing/ Not Met 3/10/2023   1. pt observed SLP reading stimuli and pointing to the targeted word x 10  2. pt pointed to target word as SLP read stimuli for 10 sentences.   3. Pt read sentences simultaneously with SLP with exaggeration by SLP with additional cues required by SLPx 3  4. Pt read sentences simultaneously with SLP fading out as sentence progressed with additional cues required by SLPx  0  5. Pt read words identified by SLP with 100% acc (10/10)  6. Pt pointed to words identified by SLP with 100% acc (10/10)  7. Pt read sentences  Simultaneously with SLP x 10 with  0 additional cues required.      6. Patient will answer questions with adequate thought organization/topic maintenance given no more than 2 cues to improve expressive language for conversation and to decrease tangential responses   Progressing/ Not Met 3/10/2023   -1 tangential behaviors noted during 2 separate conversation.   -Topic maintenance supported with min A throughout conversation.           7. Patient will will exhibit <3% of stuttering-like disfluencies per 100 words or <2% stuttering-like disfluencies per 100 syllables across a variety of communication contexts (e.g. picture  description, sentence production, paragraph reading,  conversation, storytelling) across three therapy sessions using preferred fluency techniques independently.  Progressing/ Not Met 3/10/2023   Patient exhibited mild stuttering overall. Minimal disfluency behaviors noted during session.     Patient endorses that slowing down is the best strategy to enhance fluency       8. . Patient will demonstrate the Stretched Syllable Technique with 90% accuracy in picture description given minimum cues across three sessions.  Progressing/ Not Met 3/10/2023   Patient able to complete at sentence initiation on 4/5 opportunities          Patient Education/Response:   Skilled education provided regarding:  - SLP role in plan of care   - fluency strategies    -home exercise program  Patient verbalized understanding of all discussed.     Home program established: yes. Memory and attention strategies at home.   Exercises were reviewed and Sarbjit was able to demonstrate them prior to the end of the session.  Sarbjit demonstrated good  understanding of the education provided.     See Electronic Medical Record under Patient Instructions for exercises provided throughout therapy.  Assessment:   Sarbjit is progressing well towards his goals. Increase in topic maintenance today following education. Patient able to utilize fluency enhancing techniques to decrease stuttering behaviors.  Cues required for patient to complete simple, but extensive selective attention task. Current goals remain appropriate. Goals to be updated as necessary.      Patient prognosis is Fair to good. Patient will continue to benefit from skilled outpatient speech and language therapy to address the deficits listed in the problem list on initial evaluation, provide patient/family education and to maximize patient's level of independence in the home and community environment.   Medical necessity is demonstrated by the following IMPAIRMENTS:  Deficits in executive functioning, attention, and memory in addition to decreased  word finding and speech intelligibility prevent the pt from relaying medically and safety relevant information in a timely manner in a state of emergency.   Barriers to Therapy: none  Patient's spiritual, cultural and educational needs considered and patient agreeable to plan of care and goals.  Plan:   Continue Plan of Care with focus on improving cognitive-linguistic skills and use of motor speech strategies to enhance speech intelligibility.     NEO Crocker, L-SLP, CCC-SLP  Speech Language Pathologist   3/10/2023

## 2023-03-10 ENCOUNTER — CLINICAL SUPPORT (OUTPATIENT)
Dept: REHABILITATION | Facility: HOSPITAL | Age: 58
End: 2023-03-10
Payer: MEDICAID

## 2023-03-10 DIAGNOSIS — R47.1 DYSARTHRIA: ICD-10-CM

## 2023-03-10 DIAGNOSIS — R41.841 COGNITIVE COMMUNICATION DEFICIT: Primary | ICD-10-CM

## 2023-03-10 PROCEDURE — 92507 TX SP LANG VOICE COMM INDIV: CPT | Mod: PN | Performed by: SPEECH-LANGUAGE PATHOLOGIST

## 2023-03-13 ENCOUNTER — CLINICAL SUPPORT (OUTPATIENT)
Dept: REHABILITATION | Facility: HOSPITAL | Age: 58
End: 2023-03-13
Payer: MEDICAID

## 2023-03-13 DIAGNOSIS — R41.841 COGNITIVE COMMUNICATION DEFICIT: Primary | ICD-10-CM

## 2023-03-13 DIAGNOSIS — R47.1 DYSARTHRIA: ICD-10-CM

## 2023-03-13 PROCEDURE — 92507 TX SP LANG VOICE COMM INDIV: CPT | Mod: PN | Performed by: SPEECH-LANGUAGE PATHOLOGIST

## 2023-03-13 NOTE — PROGRESS NOTES
[facility]  Speech Therapy Treatment Note- Neurological Rehabilitation  Date: 3/13/2023     Name: Sarbjit Brewer Sr.   MRN: [id number]   Therapy Diagnosis:   Encounter Diagnoses   Name Primary?    Cognitive communication deficit Yes    Dysarthria      Physician: Steven Krueger MD  Physician Orders: [id number] - AMB REFERRAL/CONSULT TO SPEECH THERAPY  Medical Diagnosis: I63.9 (ICD-10-CM) - Cerebral vascular accident    Visit #/ Visits Authorized: 15/20 (plus evaluation + 8 visits prior to this authorization)  Date of Evaluation:  11/21/22  Insurance Authorization Period: 11/24/22 to 12/31/22, 1/9/23 to 4/29/23  Plan of Care Expiration Date:    2/3/23, 4/3/23  Extended Plan of Care:  4/3/23  Progress Note: due 1/21/22, 2/23/23, 3/22/23  Visits Cancelled: 0  Visits No Show: 3    Time In: 10:22 am  Time Out: 11:03 am  Total Billable Time: 41 minutes     Precautions: Standard  Subjective:   Patient reports: Patient reported he has an appointment with his neurologist tomorrow. He also has a meeting with his  about his disability paperwork, as he is concerned about not working at this time. Continued concerns in attention and memory.    Response to previous treatment: positive    Pain Scale:  0 /10 on a Visual Analog Scale currently.    Pain Location: none indicated    Objective:      UNTIMED  Procedure   Speech- Language- Voice Therapy   Total Untimed Units: 1  Charges Billed/Number of units: 1       Short Term Goals: 4 weeks Current Progress:   1 Patient will recall visual and/or auditory information using memory strategies with 90% given Min cues to improve memory skills.  Progressing/ Not Met 3/13/2023   Remembered there was a new contract [employer] for the [employer] including how much it pays.   2. Patient will complete mental manipulation/working memory tasks with 80% accuracy given Min cues to improve immediate memory skills.  Progressing/ Not Met 3/13/2023    Not formally addressed     3.  Patient will complete selective attention tasks with 90% acc independently to improve attention skills.   Progressing/ Not Met 3/13/2023   Constant Therapy find the same symbol, level 1: 70% accuracy given moderate cues. Door was open during session.    4.  Patient will complete simple executive function tasks (I.e. functional scheduling, problem-solving/reasoning, goal/plan/action/review) with 90% accuracy given Min cues to improve executive functioning skills.   Progressing/ Not Met 3/13/2023   Given deadlines and appointments to put into a one week calendar, max cues were required to complete simple functional scheduling task.      5. Patient will participate in Oral Reading for Language in Aphasia (level 2) with min A to improve oral expression  Progressing/ Not Met 3/13/2023   Not formally addressed.      6. Patient will answer questions with adequate thought organization/topic maintenance given no more than 2 cues to improve expressive language for conversation and to decrease tangential responses   Progressing/ Not Met 3/13/2023   -4 tangential behaviors noted during 2 separate conversations. 6 times in therapy tasks.   -Topic maintenance supported with min A throughout conversation, moderate A needed for therapy tasks.          7. Patient will will exhibit <3% of stuttering-like disfluencies per 100 words or <2% stuttering-like disfluencies per 100 syllables across a variety of communication contexts (e.g. picture  description, sentence production, paragraph reading, conversation, storytelling) across three therapy sessions using preferred fluency techniques independently.  Progressing/ Not Met 3/13/2023   Minimal disfluency during session observed today. Patient reported overall improvement.     Patient endorses that slowing down is the best strategy to enhance fluency.       8. . Patient will demonstrate the Stretched Syllable Technique with 90% accuracy in picture description given minimum cues across  three sessions.  Progressing/ Not Met 3/13/2023   Not formally addressed.          Patient Education/Response:   Skilled education provided regarding:  - SLP role in plan of care   - fluency strategies    -home exercise program  Patient verbalized understanding of all discussed.     Home program established: yes. Memory and attention strategies at home.   Exercises were reviewed and Sarbjit was able to demonstrate them prior to the end of the session.  Sarbjit demonstrated good  understanding of the education provided.     See Electronic Medical Record under Patient Instructions for exercises provided throughout therapy.  Assessment:   Sarbjit is progressing towards his goals. Improved selection attention with moderate cues needed. Patient demonstrated difficulty in tangential awareness and scheduling task. However, patient is receptive to clinician assistance. Overall minimal speech disfluency observed during session. Dedicated time in session to prioritize concerns to discuss for his upcoming appointment and meeting. Current goals remain appropriate. Goals to be updated as necessary.      Patient prognosis is Fair to good. Patient will continue to benefit from skilled outpatient speech and language therapy to address the deficits listed in the problem list on initial evaluation, provide patient/family education and to maximize patient's level of independence in the home and community environment.   Medical necessity is demonstrated by the following IMPAIRMENTS:  Deficits in executive functioning, attention, and memory in addition to decreased word finding and speech intelligibility prevent the pt from relaying medically and safety relevant information in a timely manner in a state of emergency.   Barriers to Therapy: none  Patient's spiritual, cultural and educational needs considered and patient agreeable to plan of care and goals.  Plan:   Continue Plan of Care with focus on improving cognitive-linguistic skills and  use of motor speech strategies to enhance speech intelligibility.     Crystal Chapin, [facility]  Student   3/13/2023

## 2023-03-13 NOTE — PROGRESS NOTES
[facility]  Speech Therapy Treatment Note- Neurological Rehabilitation  Date: 3/13/2023     Name: Sarbjit Brewer Sr.   MRN: [id number]   Therapy Diagnosis:   Encounter Diagnoses   Name Primary?    Cognitive communication deficit Yes    Dysarthria      Physician: Steven Krueger MD  Physician Orders: [id number] - AMB REFERRAL/CONSULT TO SPEECH THERAPY  Medical Diagnosis: I63.9 (ICD-10-CM) - Cerebral vascular accident    Visit #/ Visits Authorized: 15/20 (plus evaluation + 8 visits prior to this authorization)  Date of Evaluation:  11/21/22  Insurance Authorization Period: 11/24/22 to 12/31/22, 1/9/23 to 4/29/23  Plan of Care Expiration Date:    2/3/23, 4/3/23  Extended Plan of Care:  4/3/23  Progress Note: due 1/21/22, 2/23/23, 3/22/23  Visits Cancelled: 0  Visits No Show: 3    Time In: *** ***     Time Out: *** ***       Total Billable Time: *** minutes     Precautions: Standard  Subjective:   Patient reports: ***   Response to previous treatment: positive    Pain Scale:  0 /10 on a Visual Analog Scale currently.    Pain Location: none indicated    Objective:      UNTIMED  Procedure   Speech- Language- Voice Therapy   Total Untimed Units: 1  Charges Billed/Number of units: 1       Short Term Goals: 4 weeks Current Progress:   1 Patient will recall visual and/or auditory information using memory strategies with 90% given Min cues to improve memory skills.  Progressing/ Not Met 3/13/2023   Not formally addressed.    2. Patient will complete mental manipulation/working memory tasks with 80% accuracy given Min cues to improve immediate memory skills.  Progressing/ Not Met 3/13/2023    Not formally addressed     3. Patient will complete selective attention tasks with 90% acc independently to improve attention skills.   Progressing/ Not Met 3/13/2023   Number cancellation task: patient correctly cancelled 13/14 targets independently in 3 minutes; able to identify missing target with 100% accuracy     patient  correctly cancelled 33/48 targets independently in 7 minutes; patient given opportunity to self correct + cues and patient able to cancel 46/48 targets   4.  Patient will complete simple executive function tasks (I.e. functional scheduling, problem-solving/reasoning, goal/plan/action/review) with 90% accuracy given Min cues to improve executive functioning skills.   Progressing/ Not Met 3/13/2023   Max cues required for simple functional scheduling task     5. Patient will participate in Oral Reading for Language in Aphasia (level 2) with min A to improve oral expression  Progressing/ Not Met 3/13/2023   1. pt observed SLP reading stimuli and pointing to the targeted word x 10  2. pt pointed to target word as SLP read stimuli for 10 sentences.   3. Pt read sentences simultaneously with SLP with exaggeration by SLP with additional cues required by SLPx 3  4. Pt read sentences simultaneously with SLP fading out as sentence progressed with additional cues required by SLPx  0  5. Pt read words identified by SLP with 100% acc (10/10)  6. Pt pointed to words identified by SLP with 100% acc (10/10)  7. Pt read sentences  Simultaneously with SLP x 10 with  0 additional cues required.      6. Patient will answer questions with adequate thought organization/topic maintenance given no more than 2 cues to improve expressive language for conversation and to decrease tangential responses   Progressing/ Not Met 3/13/2023   -1 tangential behaviors noted during 2 separate conversation.   -Topic maintenance supported with min A throughout conversation.           7. Patient will will exhibit <3% of stuttering-like disfluencies per 100 words or <2% stuttering-like disfluencies per 100 syllables across a variety of communication contexts (e.g. picture  description, sentence production, paragraph reading, conversation, storytelling) across three therapy sessions using preferred fluency techniques independently.  Progressing/ Not Met  3/13/2023   Patient exhibited mild stuttering overall. Minimal disfluency behaviors noted during session.     Patient endorses that slowing down is the best strategy to enhance fluency       8. . Patient will demonstrate the Stretched Syllable Technique with 90% accuracy in picture description given minimum cues across three sessions.  Progressing/ Not Met 3/13/2023   Patient able to complete at sentence initiation on 4/5 opportunities          Patient Education/Response:   Skilled education provided regarding:  - SLP role in plan of care   - fluency strategies    -home exercise program  Patient verbalized understanding of all discussed.     Home program established: yes. Memory and attention strategies at home.   Exercises were reviewed and Sarbjit was able to demonstrate them prior to the end of the session.  Sarbjit demonstrated good  understanding of the education provided.     See Electronic Medical Record under Patient Instructions for exercises provided throughout therapy.  Assessment:   Sarbjit is progressing well towards his goals. ***  Current goals remain appropriate. Goals to be updated as necessary.      Patient prognosis is Fair to good. Patient will continue to benefit from skilled outpatient speech and language therapy to address the deficits listed in the problem list on initial evaluation, provide patient/family education and to maximize patient's level of independence in the home and community environment.   Medical necessity is demonstrated by the following IMPAIRMENTS:  Deficits in executive functioning, attention, and memory in addition to decreased word finding and speech intelligibility prevent the pt from relaying medically and safety relevant information in a timely manner in a state of emergency.   Barriers to Therapy: none  Patient's spiritual, cultural and educational needs considered and patient agreeable to plan of care and goals.  Plan:   Continue Plan of Care with focus on improving  cognitive-linguistic skills and use of motor speech strategies to enhance speech intelligibility.     NEO Crocker, L-SLP, CCC-SLP  Speech Language Pathologist   3/13/2023

## 2023-03-14 ENCOUNTER — OFFICE VISIT (OUTPATIENT)
Dept: NEUROLOGY | Facility: CLINIC | Age: 58
End: 2023-03-14
Payer: MEDICAID

## 2023-03-14 VITALS
WEIGHT: 220 LBS | HEART RATE: 81 BPM | DIASTOLIC BLOOD PRESSURE: 83 MMHG | SYSTOLIC BLOOD PRESSURE: 137 MMHG | HEIGHT: 72 IN | BODY MASS INDEX: 29.8 KG/M2

## 2023-03-14 DIAGNOSIS — Z86.73 CEREBRAL INFARCTION, WATERSHED DISTRIBUTION, BILATERAL, CHRONIC: Primary | ICD-10-CM

## 2023-03-14 DIAGNOSIS — M25.612 STIFFNESS OF LEFT SHOULDER JOINT: ICD-10-CM

## 2023-03-14 DIAGNOSIS — G89.29 CHRONIC LEFT SHOULDER PAIN: ICD-10-CM

## 2023-03-14 DIAGNOSIS — E78.2 MIXED HYPERLIPIDEMIA: ICD-10-CM

## 2023-03-14 DIAGNOSIS — I10 ESSENTIAL HYPERTENSION: ICD-10-CM

## 2023-03-14 DIAGNOSIS — Z87.891 FORMER SMOKER: ICD-10-CM

## 2023-03-14 DIAGNOSIS — I65.23 BILATERAL STENOSIS OF CAROTID ARTERIES GREATER THAN 50%: ICD-10-CM

## 2023-03-14 DIAGNOSIS — R47.1 DYSARTHRIA: ICD-10-CM

## 2023-03-14 DIAGNOSIS — I50.32 CHRONIC HEART FAILURE WITH PRESERVED EJECTION FRACTION: ICD-10-CM

## 2023-03-14 DIAGNOSIS — M25.512 CHRONIC LEFT SHOULDER PAIN: ICD-10-CM

## 2023-03-14 DIAGNOSIS — I48.0 PAROXYSMAL ATRIAL FIBRILLATION: ICD-10-CM

## 2023-03-14 DIAGNOSIS — F10.21 ALCOHOL DEPENDENCE IN REMISSION: ICD-10-CM

## 2023-03-14 PROCEDURE — 1160F RVW MEDS BY RX/DR IN RCRD: CPT | Mod: CPTII,,, | Performed by: STUDENT IN AN ORGANIZED HEALTH CARE EDUCATION/TRAINING PROGRAM

## 2023-03-14 PROCEDURE — 3075F PR MOST RECENT SYSTOLIC BLOOD PRESS GE 130-139MM HG: ICD-10-PCS | Mod: CPTII,,, | Performed by: STUDENT IN AN ORGANIZED HEALTH CARE EDUCATION/TRAINING PROGRAM

## 2023-03-14 PROCEDURE — 1159F PR MEDICATION LIST DOCUMENTED IN MEDICAL RECORD: ICD-10-PCS | Mod: CPTII,,, | Performed by: STUDENT IN AN ORGANIZED HEALTH CARE EDUCATION/TRAINING PROGRAM

## 2023-03-14 PROCEDURE — 4010F ACE/ARB THERAPY RXD/TAKEN: CPT | Mod: CPTII,,, | Performed by: STUDENT IN AN ORGANIZED HEALTH CARE EDUCATION/TRAINING PROGRAM

## 2023-03-14 PROCEDURE — 99214 PR OFFICE/OUTPT VISIT, EST, LEVL IV, 30-39 MIN: ICD-10-PCS | Mod: S$PBB,,, | Performed by: STUDENT IN AN ORGANIZED HEALTH CARE EDUCATION/TRAINING PROGRAM

## 2023-03-14 PROCEDURE — 4010F PR ACE/ARB THEARPY RXD/TAKEN: ICD-10-PCS | Mod: CPTII,,, | Performed by: STUDENT IN AN ORGANIZED HEALTH CARE EDUCATION/TRAINING PROGRAM

## 2023-03-14 PROCEDURE — 99214 OFFICE O/P EST MOD 30 MIN: CPT | Mod: PBBFAC | Performed by: STUDENT IN AN ORGANIZED HEALTH CARE EDUCATION/TRAINING PROGRAM

## 2023-03-14 PROCEDURE — 3079F DIAST BP 80-89 MM HG: CPT | Mod: CPTII,,, | Performed by: STUDENT IN AN ORGANIZED HEALTH CARE EDUCATION/TRAINING PROGRAM

## 2023-03-14 PROCEDURE — 3075F SYST BP GE 130 - 139MM HG: CPT | Mod: CPTII,,, | Performed by: STUDENT IN AN ORGANIZED HEALTH CARE EDUCATION/TRAINING PROGRAM

## 2023-03-14 PROCEDURE — 3008F BODY MASS INDEX DOCD: CPT | Mod: CPTII,,, | Performed by: STUDENT IN AN ORGANIZED HEALTH CARE EDUCATION/TRAINING PROGRAM

## 2023-03-14 PROCEDURE — 99999 PR PBB SHADOW E&M-EST. PATIENT-LVL IV: CPT | Mod: PBBFAC,,, | Performed by: STUDENT IN AN ORGANIZED HEALTH CARE EDUCATION/TRAINING PROGRAM

## 2023-03-14 PROCEDURE — 99999 PR PBB SHADOW E&M-EST. PATIENT-LVL IV: ICD-10-PCS | Mod: PBBFAC,,, | Performed by: STUDENT IN AN ORGANIZED HEALTH CARE EDUCATION/TRAINING PROGRAM

## 2023-03-14 PROCEDURE — 3079F PR MOST RECENT DIASTOLIC BLOOD PRESSURE 80-89 MM HG: ICD-10-PCS | Mod: CPTII,,, | Performed by: STUDENT IN AN ORGANIZED HEALTH CARE EDUCATION/TRAINING PROGRAM

## 2023-03-14 PROCEDURE — 1159F MED LIST DOCD IN RCRD: CPT | Mod: CPTII,,, | Performed by: STUDENT IN AN ORGANIZED HEALTH CARE EDUCATION/TRAINING PROGRAM

## 2023-03-14 PROCEDURE — 99214 OFFICE O/P EST MOD 30 MIN: CPT | Mod: S$PBB,,, | Performed by: STUDENT IN AN ORGANIZED HEALTH CARE EDUCATION/TRAINING PROGRAM

## 2023-03-14 PROCEDURE — 1160F PR REVIEW ALL MEDS BY PRESCRIBER/CLIN PHARMACIST DOCUMENTED: ICD-10-PCS | Mod: CPTII,,, | Performed by: STUDENT IN AN ORGANIZED HEALTH CARE EDUCATION/TRAINING PROGRAM

## 2023-03-14 PROCEDURE — 3008F PR BODY MASS INDEX (BMI) DOCUMENTED: ICD-10-PCS | Mod: CPTII,,, | Performed by: STUDENT IN AN ORGANIZED HEALTH CARE EDUCATION/TRAINING PROGRAM

## 2023-03-14 NOTE — PATIENT INSTRUCTIONS
Continue Eliquis 5 mg twice a day to prevent a stroke from atrial fibrillation  Make sure you are taking your Lipitor 40 mg to control your cholesterol   OT therapy for shoulder and LUE weakness  X-rays to make sure there is no structural issues with your left shoulder  Make sure you are drinking plenty of water    - Blood pressure goal at around 130/80   Monitor your blood pressure at home

## 2023-03-14 NOTE — PROGRESS NOTES
"[facility]  Initial Consult    Patient Name: Sarbjit Brewer Sr.  MRN: [id number]    CC: B/L multi-focal, multi-territory  HPI: Sarbjit Brewer Sr. is a 57 y.o. R-handed male w/ PMH significant for Afib (on eliquis), HFpEF (EF 60% w/ G1D Dysfxn), CVA, EtOH use d/o, and Tobacco abuse   presenting as referral status-post hospital admission on 10/29/2022.   States that he is doing "ok".   L shoulder weakness is his main issue   Compliant with Eliquis and Aspirin 81 mg    - States that he was only taking it once a day until he saw  on 2/27/2023  Is compliant with Lipitor 40 mg   Stopped smoking/drinking     - Stopped when he had a stroke in November   Attempting to file for disability but is having issues    - They do state there is an  that is involved in his case   - Previously worked for the [employer] company   Tries to do exercises at home    - Per wife, who accompanies the patient, patient is largely sedentary and doesn't do much in regards to exercise    Brain Imaging  MRI Brain  10/28/2022  Multiple punctate foci of restricted diffusion involving the right parietooccipital region as well as the right insular region consistent with acute infarcts.  Previous restricted diffusion within the bilateral occipital lobes has resolved.  Multiple scattered remote appearing infarcts including within the bilateral basal ganglia, right thalamus, right caudate, adjacent to the bilateral posterior horns of the lateral ventricles, left eve damion, and cerebellum.  Sequela of chronic microvascular ischemic change and senescent change.  Stable scattered regions of hemosiderin deposition.  Apparent slow flow through the left sigmoid sinus.  Vessel Imaging  CTA H/N   There is a normal 3 vessel branching arch.  The subclavian arteries are unremarkable.  The common carotid arteries are unremarkable.  The proximal internal carotid arteries are unremarkable.  There is no significant narrowing at the internal " carotid artery origin.  There is a 50-60% stenosis identified of the right internal carotid artery cavernous segment.  There is focal high-grade stenosis measuring 80% or greater involving the supraclinoid segment of the right internal carotid artery.  There is complete to near complete occlusion of the cavernous segment of the left internal carotid artery.  The lumen is partially obscured due to densely calcified plaque.  There is near immediate reconstitution of flow.  The A1 segment of the left anterior cerebral artery is absent or aplastic.  The right anterior cerebral artery is unremarkable.  The middle cerebral arteries have a normal appearance.  The vertebral arteries and basilar artery have a normal appearance.  The posterior cerebral arteries have a normal appearance.    Cardiac Evaluation  ECHO 10/28  Concentric hypertrophy and normal systolic function.  The estimated ejection fraction is 60%.  Normal left ventricular diastolic function.  Normal right ventricular size with normal right ventricular systolic function.  Moderate aortic regurgitation.  Intermediate central venous pressure (8 mmHg).  The estimated PA systolic pressure is 25 mmHg.  There is no evidence of intracardiac shunting.    Relevant Lab work   Recent Labs   Lab 10/30/22  [id number]  [id number]   Hemoglobin A1C 5.7  --    LDL Cholesterol  --  69.6   HDL  --  24 L   Triglycerides  --  72   Cholesterol  --  108 L         NIH Stroke Scale:  Interval: baseline (upon arrival/admit)  Level of Consciousness: 0 - alert  LOC Questions: 0 - answers both correctly  LOC Commands: 0 - performs both correctly  Best Gaze: 0 - normal  Visual: 0 - no visual loss  Facial Palsy: 1 - minor  Motor Left Arm: 1 - drift  Motor Right Arm: 0 - no drift  Motor Left Le - drift  Motor Right Le - no drift  Limb Ataxia: 0 - absent  Sensory: 0 - normal  Best Language: 0 - no aphasia  Dysarthria: 1 - mild to moderate dysarthria  Extinction and Inattention: 0 - no  neglect  NIH Stroke Scale Total: 4       Review of Systems:  General: No fevers, chills  Eyes: No changes in vision  ENT: No changes in hearing  Respiratory: No SOB  CV: No chest pain, palpitations  GI: No diarrhea, blood in stool  Urinary: No dysuria, hematuria  Skin: No rashes  Neurological: No weakness, confusion  Psychiatric: No auditory nor visual hallucinations      Past Medical History  Past Medical History:   Diagnosis Date    A-fib     CHF (congestive heart failure)     Hypertension     Insomnia        Medications    Current Outpatient Medications:     amLODIPine (NORVASC) 10 MG tablet, Take 1 tablet (10 mg total) by mouth once daily., Disp: 90 tablet, Rfl: 3    apixaban (ELIQUIS) 5 mg Tab, Take 1 tablet (5 mg total) by mouth 2 (two) times daily., Disp: 180 tablet, Rfl: 3    ciclopirox (PENLAC) 8 % Soln, Apply topically nightly., Disp: 6.6 mL, Rfl: 6    cloNIDine (CATAPRES) 0.1 MG tablet, Take 0.1 mg by mouth 2 (two) times daily., Disp: , Rfl:     losartan (COZAAR) 100 MG tablet, Take 100 mg by mouth once daily., Disp: , Rfl:     metFORMIN (GLUCOPHAGE) 500 MG tablet, Take 500 mg by mouth once daily., Disp: , Rfl:     zolpidem (AMBIEN) 10 mg Tab, Take 10 mg by mouth once daily., Disp: , Rfl:     aspirin (ECOTRIN) 81 MG EC tablet, Take 1 tablet (81 mg total) by mouth once daily., Disp: 30 tablet, Rfl: 0    atorvastatin (LIPITOR) 40 MG tablet, Take 1 tablet (40 mg total) by mouth every evening., Disp: 90 tablet, Rfl: 3    metoprolol succinate (TOPROL-XL) 50 MG 24 hr tablet, Take 1 tablet (50 mg total) by mouth once daily., Disp: 90 tablet, Rfl: 3  Any other notable medications as documented in HPI    Allergies  Review of patient's allergies indicates:  No Known Allergies    Social History  Social History     Socioeconomic History    Marital status:    Tobacco Use    Smoking status: Every Day     Packs/day: 1.00     Years: 41.00     Pack years: 41.00     Types: Cigarettes     Start date: 1981     "Smokeless tobacco: Never    Tobacco comments:     Pt enrolled in the [employer] on 1/29/20 (SCT Member ID # [id number]). He declines Ambulatory referral to Smoking Cessation Program.   Substance and Sexual Activity    Alcohol use: Yes     Comment: 12 years ago    Drug use: No     Any other notable Social History as documented in HPI.    Family History  History reviewed. No pertinent family history.  Any other notable FMH as documented in HPI.    Physical Exam  /83   Pulse 81   Ht 5' 11.5" (1.816 m)   Wt 99.8 kg (220 lb)   BMI 30.26 kg/m²     General: Well-developed, well-groomed. No apparent distress  HENT: Normocephalic, atraumatic.    Cardiovascular: Regular rate and rhythm  Chest: No audible wheezes, stridor, ronchi appreciated.  Musculoskeletal: No peripheral edema    Neurologic Exam: The patient is awake, alert and oriented to person, place, time and situation. Attentive, vigilant during exam. Language is fluent, although tangential. Naming & repetition intact, +2-step commands.      Cranial nerves:   CN II: Visual fields are full to confrontation. Pupils are 4 mm and briskly reactive to light.  CN III, IV, VI: EOMI, no nystagmus, no ptosis  CN V: Facial sensation is intact in all 3 divisions bilaterally.  CN VII: LFD noted at rest and with activation.   CN VIII: Hearing is normal bilaterally  CN IX, X: Palate elevates symmetrically. Phonation is normal.  CN XI: Head turning and shoulder shrug are intact  CN XII: Tongue is midline with normal movements and no atrophy.    Motor examination of all extremities demonstrates normal bulk and tone in all four limbs. There are no atrophy or fasciculations.      Left Right   Left Right   Deltoid 4/5 5/5  Hip Flexion 4/5 5/5   Biceps 5/5 5/5  Hip Extension 5/5 5/5   Triceps 4-/5 5/5  Knee Flexion 4/5 5/5   Wrist Ext 4-/5 5/5  Knee Extension 5/5 5/5   Finger Abd 4-/5 5/5  Ankle dorsiflex 5/5 5/5       Ankle plantar flex 5/5 5/5       Sensory examination is " normal light touch in BUE and BLE.     Deep tendon reflexes 3+ in LUE and LLE.  No clonus. Negative Curry's    Gait: Antalgic gait noted.    Coordination: No dysmetria with finger-to-nose. Rapid alternating movements and fine finger movements are intact.     Miscellaneous:Reports LUE/shoulder pain at baseline, TTP overlying the shoulder joint       Assessment and Plan  Continue Eliquis 5 mg twice a day to prevent a stroke from atrial fibrillation  Make sure you are taking your Lipitor 40 mg to control your cholesterol   OT therapy for shoulder and LUE weakness  X-rays to make sure there is no structural issues with your left shoulder  Make sure you are drinking plenty of water    - Blood pressure goal at around 130/80   Monitor your blood pressure at home    Diagnosis/Etiology: Multifocal territory infarcts  Stroke Risk Factors:Afib (on eliquis), HFpEF (EF 60% w/ G1D Dysfxn), CVA, EtOH use d/o, and HTN      Recommendations:   Antiplatelet/Anticoagulation: Eliquis 5 mg BID  Lipid Management: Atorvastatin 40 mg QHS (long-term goal LDL < 70).   - Monitoring for liver dysfunction and myopathy is suggested for statins. To be addressed by PCP  Diabetes: Target hemoglobin A1c <7%, measured 2X/year or quarterly if not meeting goals  Hypertension: Long term goal is normotension w/ target BP of less than 130/80 mmHg  Sleep: Denies any symptoms of SHAGUFTA. Consider Sleep Medicine follow up in the future if suspicion for SHAGUFTA occurs.   Smoking: Goal is smoking cessation and encouragement not to start smoking in the future.  - Reports that he had quit and has no cravings    Diet: Discussed Mediterranean Diet recommendations (Adopted from Sukhdeep et al, NE, 2018.)  - Eat primarily plant-based foods, such as fruits and vegetables, whole grains, legumes (beans) and nuts  - Limit refined carbohydrates (white pasta, bread, rice).  - Replace butter with healthy fats such as olive oil.  - Use herbs and spices instead of salt to flavor  foods.  - Limit red meat and processed meats to no more than a few times a month.  - Avoid sugary sodas, bakery goods, and sweets.  - Eat fish and poultry at least twice a week.  - Get plenty of exercise (150 minutes per week).    RTC in PRN    Problem List Items Addressed This Visit          Neuro    Acute bilat watershed infarction - Primary    Dysarthria       Psychiatric    Alcoholism /alcohol abuse    Overview     2021 IMO Regulatory Import            Cardiac/Vascular    Essential hypertension    Paroxysmal atrial fibrillation    Chronic heart failure with preserved ejection fraction    Mixed hyperlipidemia    Bilateral stenosis of carotid arteries greater than 50%       Orthopedic    Stiffness of left shoulder joint       Other    Former smoker     Other Visit Diagnoses       Chronic left shoulder pain        Relevant Orders    X-Ray Shoulder 2 or More Views Left (Completed)                Nissa Almendarez MD  Vascular Neurology  [facility]  [address] [place] [address]

## 2023-03-15 ENCOUNTER — CLINICAL SUPPORT (OUTPATIENT)
Dept: REHABILITATION | Facility: HOSPITAL | Age: 58
End: 2023-03-15
Attending: STUDENT IN AN ORGANIZED HEALTH CARE EDUCATION/TRAINING PROGRAM
Payer: MEDICAID

## 2023-03-15 DIAGNOSIS — R27.8 IMPAIRED GROSS MOTOR COORDINATION: Primary | ICD-10-CM

## 2023-03-15 DIAGNOSIS — M25.612 STIFFNESS OF LEFT SHOULDER JOINT: ICD-10-CM

## 2023-03-15 DIAGNOSIS — M62.81 MUSCLE WEAKNESS: ICD-10-CM

## 2023-03-15 DIAGNOSIS — I63.89 ACUTE BILAT WATERSHED INFARCTION: ICD-10-CM

## 2023-03-15 PROCEDURE — 97166 OT EVAL MOD COMPLEX 45 MIN: CPT | Mod: PN

## 2023-03-15 PROCEDURE — 97530 THERAPEUTIC ACTIVITIES: CPT | Mod: PN

## 2023-03-15 NOTE — PROGRESS NOTES
[facility] Occupational Therapy  Initial Neurological Evaluation     Date: 3/15/2023  Patient: Sarbjit Brewer Sr.  Chart Number: [id number]    Therapy Diagnosis: No diagnosis found.  Physician: Nissa Almendarez MD    Physician Orders: OT eval and treat   Medical Diagnosis: I63.89 (ICD-10-CM) - Acute bilat watershed infarction   Evaluation Date: 3/15/2023  Plan of Care Expiration Period: 04/14/2023  Insurance Authorization period Expiration: 3/13/2023  Visit # / Visits Authorized: 1 / 1  FOTO: 3/15/2023    Time In:4:45  Time Out: 5:30  Total Billable (one on one) Time: 45 minutes  Billed Units: low eval - 1, TE - 1    Precautions: {Precautions:65872}    Subjective     History of Current Condition: ***      Date of Onset: ***  Surgical Procedure: ***  Imaging: {Mri/ctscan/bone scan:08433} *** (reviewed/not reviewed)  Previous Therapy: ***     Right Left   Involved Side   []     []   Dominant Side    []     []       Pain:  Pain Related Behaviors Observed: {YES/NO:20267} ***  Functional Pain Scale Rating 0-10:   {NUMBERS:72284}/10 on average  {NUMBERS:47933}/10 at best  {NUMBERS:87900}/10 at worst  Location: ***  Description: {Pain Description:75700}  Aggravating Factors: {Causes; Pain:80745}  Easing Factors: {Pain (activities that relieve):37388}    Occupation: ***  Working presently: {Work history:61776}  Duties: ***    Functional Limitations/Social History:    Prior Level of Function: ***  Current Level of Function:***    Home/Living environment : {LIVES WITH:53986}  Home Access: ***SH , *** steps to enter,   DME: {DME OWNED:45272}     Leisure: {AMB OT HAND LEISURE:39127}    Driving: ***    Functional Status      Functional Mobility:  Bed mobility: {AMB OT NEURO ASSISTANCE:76758}  Roll to left: {AMB OT NEURO ASSISTANCE:23512}  Roll to right: {AMB OT NEURO ASSISTANCE:42920}  Supine to sit: {AMB OT NEURO ASSISTANCE:41257}  Sit to supine: {AMB OT NEURO ASSISTANCE:10211}  Transfers to bed: {AMB OT NEURO  ASSISTANCE:46896}  Transfers to toilet: {AMB OT NEURO ASSISTANCE:66417}  Car transfers: {AMB OT NEURO ASSISTANCE:76126}  Wheelchair mobility: {AMB OT NEURO ASSISTANCE:19034}    ADL's:  Feeding: {AMB OT NEURO ASSISTANCE:40063}  Grooming: {AMB OT NEURO ASSISTANCE:52258}  Hygiene: {AMB OT NEURO ASSISTANCE:83784}  UB Dressing: {AMB OT NEURO ASSISTANCE:56205}  LB Dressing: {AMB OT NEURO ASSISTANCE:34895}  Toileting: {AMB OT NEURO ASSISTANCE:35555}  Bathing: {AMB OT NEURO ASSISTANCE:93203}    IADL's:  Homecare: {AMB OT NEURO ASSISTANCE:73127}  Cooking: {AMB OT NEURO ASSISTANCE:88114}  Laundry: {AMB OT NEURO ASSISTANCE:66448}  Yard work: {AMB OT NEURO ASSISTANCE:92489}  Use of telephone: {AMB OT NEURO ASSISTANCE:94053}  Money management: {AMB OT NEURO ASSISTANCE:93618}  Medication management: {AMB OT NEURO ASSISTANCE:00235}  Handwriting:{AMB OT NEURO ASSISTANCE:64775}  Technology Use:{AMB OT NEURO ASSISTANCE:71610}    Patient's Goals for Therapy: ***    Past Medical History/Physical Systems Review:     Past Medical History:  Sarbjit Brewer Sr.  has a past medical history of A-fib, CHF (congestive heart failure), Hypertension, and Insomnia.    Past Surgical History:  Sarbjit Brewer Sr.  has a past surgical history that includes Kidney stone surgery.    Current Medications:  Sarbjit has a current medication list which includes the following prescription(s): amlodipine, apixaban, aspirin, atorvastatin, ciclopirox, clonidine, losartan, metformin, metoprolol succinate, and zolpidem.    Allergies:  Review of patient's allergies indicates:  No Known Allergies     Objective     Physical Exam:  Postural examination/scapula alignment: Rounded shoulder and Head forward  Joint integrity: Firm end feeling  Skin integrity: skin is intact on eval   Edema: Mild edema noted in left hand   Palpation: no pain with palpation      Joint Evaluation  [facility]   11/23/2022 AROM  11/23/2022 MMS   11/23/2022 AROM  12/30/2022 AROM  1/25/2023 AROM  2/14/2023  AROM  3/15/2023 PROM  3/15/2023     Right Left Left Left  Left  Left  Left  Left    Shoulder Flex 0-180 5/5 38 2-/5 55 56 50 60 80   Shoulder Extension 0-80 5/5 50 2/5 60 60 58 58    Shoulder Abd 0-180 5/5   2-/5 42 45 40 45 75   Shoulder ER 0-90 5/5 28 2-/5 18 28 28 20    Shoulder IR 0-90 5/5 hip 2-/5 PSIS PSIS hip PSIS    Shoulder Horizontal adduction 0-90 5/5   2-/5           Elbow Flex/Ext 0-150 5/5 119 2/5 0-129 132 120 138    Wrist Flex 0-80 5/5 57 2-/5 50     75    Wrist Ext 0-70 5/5 20 2-/5 27     35    Supination 0-80 5/5   2/5           Pronation 0-80 5/5   2/5           Ulnar Deviation 5/5   2-/5       23    Radial Deviation  5/5   2-/5       20    *WNL - Within Normal Limits  *NT = Not Tested     Fist: loose left hand       Strength: (LEE ANN Dynamometer in lbs.) Average 3 trials, Position II:       2022 2022 2022 2023 2023 3/15/2023   Rung II Right Left Left  Left  Left  Left    Trial 1 73# 1# 0# 10# 10# 10   Trial 2 57# 1# 0# 11# 10# 10   Trial 3 55# 2# 0# 6# 10# 8   Average 61.6# 1.3# 0# 9# 10#       Normal  Average Values  Female Right Left Male: Right Left   20-29 66 lbs 62 lbs         94 lbs 86 lbs   30-39 68 lbs 64 lbs 90 lbs 82 lbs   40-49 64 lbs 62 lbs 80 lbs 74 lbs   50-59 62 lbs 57 lbs 72 lbs 65 lbs   60-69 53 lbs 51 lbs 63 lbs 56 lbs   70+ 44 lbs 42 lbs 54 lbs 48 lbs   SD = +/- 19lbs         Pinch Strength (Measured in lbs)       2022 2022 2022 2023 2023 3/15/2023     Right Left Left  Left  Left  Left    Key Pinch 19 # 4 # 4 7# 5# 5   3pt Pinch 14 # unable unable 4# 5# 4   2pt Pinch 10 # unable 2# 2# 3# 4         Fine/Gross Motor Coordination     Assessment   Right   2022 Left  2022 Left   2022 Left   2023 Left   2023 Left   2023 Left   3/15/2023   9 hole peg test 9 pegs in/out for time WNL Unable  7:22  With therapist holding pegs + pt reaching for them (not resting on table) 1 pe seconds    "  12:47     *No physical help from therapist  Verbal cues for problem solving  8:25         *Normal setup 7:45       *normal setup 7:15      *Normal setup    *WNL - Within Normal Limits  *NT = Not Tested     Tone:  Modified Erasmo Scale:   1-  Slight increase in muscle tone, manifested by a catch and release or by minimal resistance at the end of the range of motino when the affected part(s) is moved in flexion or extension    Comments: ***               CMS Impairment/Limitation/Restriction for FOTO *** Survey    Therapist reviewed FOTO scores for Sarbjit Brewer Sr. on 3/15/2023.   FOTO documents entered into [employer] - see Media section.    Limitation Score: ***%  Category: {Blank single:18624::"Other","Self Care","Body Position","Carrying","Mobility"}         Treatment     Treatment Time In: ***  Treatment Time Out: ***  Total Treatment time separate from Evaluation time:***    Sarbjit participated in dynamic functional therapeutic activities to improve functional performance for ***  minutes, including:  ***    Sarbjit participated in neuromuscular re-education activities to improve: {AMB PT PROGRESS NEURO RE-ED:96456} for *** minutes. The following activities were included:  ***    Sarbjit received therapeutic exercises to develop {AMB PT PROGRESS OBJECTIVE:69547} for *** minutes including:  ***    Sarbjit received the following manual therapy techniques: {AMB PT PROGRESS MANUAL THERAPY:27004} were applied to the: *** for *** minutes, including:  ***    Sarbjit received the following supervised modalities after being cleared for contradictions: {AMB PT SUPERVISED MODES:63931}  ***    Sarbjit received the following direct contact modalities after being cleared for contraindications: {AMB PT PROGRESS DIRECT CONTACT MODES:90931}  ***    Home Exercises and Patient Education Provided    Education provided:   -role of OT, goals for OT, scheduling/cancellations, insurance limitations with patient.  -Additional Education provided: " "***    Written Home Exercises Provided: {Blank single:35802::"yes","Patient instructed to cont prior HEP"}.  Exercises were reviewed and Sarbjit was able to demonstrate them prior to the end of the session.    Sarbjit demonstrated {Desc; good/fair/poor:67351} understanding of the education provided.     See EMR under {Blank single:69618::"Media","Patient Instructions"} for exercises provided {Blank single:85896::"3/15/2023","prior visit"}.    Assessment     Sarbjit Brewer Sr. is a 57 y.o. male referred to outpatient occupational therapy and presents with a medical diagnosis of ***, resulting in {AMB OT NEURO IMPAIRMENTS:89228} and demonstrates limitations as described in the chart below. Following medical record review it is determined that patient will benefit from occupational therapy services in order to maximize pain free and/or functional use of {LEFT/RIGHT:52798} ***.    Patient prognosis is {REHAB PROGNOSIS OHS:33055} due to  ***  Patient will benefit from skilled outpatient Occupational Therapy to address the deficits stated above and in the chart below, provide patient/family education, and to maximize patient's level of independence.     Plan of care discussed with patient: {YES:48482}  Patient's spiritual, cultural and educational needs considered and patient is agreeable to the plan of care and goals as stated below:     Anticipated Barriers for therapy: *** (co-morbidities, transportation, etc)    Medical Necessity is demonstrated by the following  Profile and History Assessment of Occupational Performance Level of Clinical Decision Making Complexity Score   Occupational Profile:   Sarbjit Brewer Sr. is a 57 y.o. male who {LIVES WITH:60439} and is currently {Work history:45134} as ***. Sarbjit Brewer Sr. has difficulty with  {ADLs:79520}  {IADLs:83112}  affecting {lowercase his/her:800202986} daily functional abilities. Sarbjit Brewer Sr. main goal for therapy is ***.     Comorbidities: " "  {Co-morbidities:08678}    Medical and Therapy History Review:   {History Review:16663}               Performance Deficits    Physical:  {Physical:74479}    Cognitive:  {Cognitive:97178}    Psychosocial:    {Psychosocial:24792}     Clinical Decision Making:  {Desc; low/moderate/high:900681}    Assessment Process:  {Assessment Complexity:66807}    Modification/Need for Assistance:  {Modification:79389}    Intervention Selection:  {Treatment Options:01592}       {Desc; low/moderate/high:319747}  Based on PMHX, co morbidities , data from assessments and functional level of assistance required with task and clinical presentation directly impacting function.       The following goals were discussed with the patient and patient is in agreement with them as to be addressed in the treatment plan.     Goals:   Short Term Goals: *** weeks  - Pt will be independent with Home Exercise Program (HEP)/Home Activity Program (HAP) to promote long term maintenance of progress made in therapy.     Long Term Goals: *** weeks      Plan   Certification Period/Plan of care expiration: 3/15/2023 to ***/***/2023.    Outpatient Occupational Therapy {NUMBERS 1-5:11747} times weekly for {0-10:28397::"0"} weeks to include the following interventions: {TX PLAN:75946}.    Corinne Rapier, OT    "

## 2023-03-16 ENCOUNTER — CLINICAL SUPPORT (OUTPATIENT)
Dept: REHABILITATION | Facility: HOSPITAL | Age: 58
End: 2023-03-16
Payer: MEDICAID

## 2023-03-16 DIAGNOSIS — R41.841 COGNITIVE COMMUNICATION DEFICIT: Primary | ICD-10-CM

## 2023-03-16 DIAGNOSIS — R47.1 DYSARTHRIA: ICD-10-CM

## 2023-03-16 PROCEDURE — 92507 TX SP LANG VOICE COMM INDIV: CPT | Mod: PN

## 2023-03-16 NOTE — PLAN OF CARE
"[facility] Occupational Therapy  Initial Neurological Evaluation     Date: 3/15/2023  Patient: Sarbjit Brewer .  Chart Number: [id number]    Therapy Diagnosis:   Encounter Diagnoses   Name Primary?    Acute bilat watershed infarction     Impaired gross motor coordination Yes    Stiffness of left shoulder joint     Muscle weakness      Physician: Nissa Almendarez MD    Physician Orders: OT eval and treat   Medical Diagnosis: I63.89 (ICD-10-CM) - Acute bilat watershed infarction   Evaluation Date: 3/15/2023  Plan of Care Expiration Period: 04/14/2023  Insurance Authorization period Expiration: 3/13/2023  Visit # / Visits Authorized: 1 / 1  FOTO: 3/15/2023    Time In:4:45  Time Out: 5:30  Total Billable (one on one) Time: 45 minutes  Billed Units: low eval - 1, TE - 1    Precautions: Standard and Fall    Subjective     History of Current Condition: Sarbjit reports he went to the Neurologist yesterday and she "was very nice. She sent me back to therapy". Sarbjit stated he has been doing the home exercise program since discharge on 2/20/2023 and his shoulder pain is "gone".     Date of Onset: 10/27/2022  Surgical Procedure: none  Imaging: none recently  Previous Therapy: Pt received OT at this facility from 11/23/2022 to 2/20/2023.     Right Left   Involved Side   []     [x]   Dominant Side    [x]     []       Pain:  Pain Related Behaviors Observed: yes, pain and guarding with passive range of motion   Patient stated he did not have pain, however, when therapist performed passive range of motion, guarding present    Occupation: unemployed    Functional Limitations/Social History:    Prior Level of Function: independent prior to CVA  Current Level of Function:mod I - mod A     Home/Living environment : lives with their spouse  Home Access: SS , 0 steps to enter,   DME: quad cane      Leisure: watch TV     Driving: not driving     Functional Status      Functional Mobility:  Bed mobility: Mod I  Roll to left: " "Mod I  Roll to right: Mod I  Supine to sit: Mod I  Sit to supine: Mod I  Transfers to bed: Mod I  Transfers to toilet: Mod I  Car transfers: Mod I  Wheelchair mobility: Mod I    ADL's:  Feeding: Mod I  Grooming: Mod I  Hygiene: Mod I  UB Dressing: Min A  LB Dressing: Min A  Toileting: Mod I  Bathing: Mod I    IADL's:  Homecare: D  Cooking: D  Laundry: Mod A  Yard work: D  Use of telephone: Max A  Money management: D  Medication management: D  Handwriting:Mod I  Technology Use:Max A    Patient's Goals for Therapy: "To get my arm moving again"     Past Medical History/Physical Systems Review:     Past Medical History:  Sarbjit Brewer Sr.  has a past medical history of A-fib, CHF (congestive heart failure), Hypertension, and Insomnia.    Past Surgical History:  Sarbjit Brewer Sr.  has a past surgical history that includes Kidney stone surgery.    Current Medications:  Sarbjit has a current medication list which includes the following prescription(s): amlodipine, apixaban, aspirin, atorvastatin, ciclopirox, clonidine, losartan, metformin, metoprolol succinate, and zolpidem.    Allergies:  Review of patient's allergies indicates:  No Known Allergies     Objective     Physical Exam:  Postural examination/scapula alignment: Rounded shoulder and Head forward  Joint integrity: Firm end feeling  Skin integrity: skin is intact on eval   Edema: Mild edema noted in left hand   Palpation: no pain with palpation      Joint Evaluation  [facility]   3/15/2022 AROM  2/14/2023 AROM  3/15/2023 PROM  3/15/2023     Right Left  Left  Left    Shoulder Flex 0-180 5/5 50 60 80   Shoulder Extension 0-80 5/5 58 58 65   Shoulder Abd 0-180 5/5 40 45 75   Shoulder ER 0-90 5/5 28 20    Shoulder IR 0-90 5/5 hip PSIS    Shoulder Horizontal adduction 0-90 5/5       Elbow Flex/Ext 0-150 5/5 120 138    Wrist Flex 0-80 5/5   75    Wrist Ext 0-70 5/5   35    Supination 0-80 5/5       Pronation 0-80 5/5       Ulnar Deviation 5/5   23    Radial Deviation  5/5   20  "   *WNL - Within Normal Limits  *NT = Not Tested     Fist: loose left hand       Strength: (LEE ANN Dynamometer in lbs.) Average 3 trials, Position II:       11/23/2022 11/23/2022 12/30/2022 1/25/2023 2/14/2023 3/15/2023   Rung II Right Left Left  Left  Left  Left    Trial 1 73# 1# 0# 10# 10# 10#   Trial 2 57# 1# 0# 11# 10# 10#   Trial 3 55# 2# 0# 6# 10# 8#   Average 61.6# 1.3# 0# 9# 10# 9.3#      Normal  Average Values  Female Right Left Male: Right Left   20-29 66 lbs 62 lbs         94 lbs 86 lbs   30-39 68 lbs 64 lbs 90 lbs 82 lbs   40-49 64 lbs 62 lbs 80 lbs 74 lbs   50-59 62 lbs 57 lbs 72 lbs 65 lbs   60-69 53 lbs 51 lbs 63 lbs 56 lbs   70+ 44 lbs 42 lbs 54 lbs 48 lbs   SD = +/- 19lbs         Pinch Strength (Measured in lbs)       11/23/2022 2/14/2023 3/15/2023     Right Left  Left    Key Pinch 19 # 5# 5#   3pt Pinch 14 # 5# 4#   2pt Pinch 10 # 3# 4#         Fine/Gross Motor Coordination     Assessment   Right   11/23/2022 Left   2/14/2023 Left   2/20/2023 Left   3/15/2023   9 hole peg test 9 pegs in/out for time WNL 8:25         *Normal setup 7:45       *normal setup 7:15      *Normal setup    *WNL - Within Normal Limits  *NT = Not Tested     Tone:  Modified Erasmo Scale:   1-  Slight increase in muscle tone, manifested by a catch and release or by minimal resistance at the end of the range of motino when the affected part(s) is moved in flexion or extension           CMS Impairment/Limitation/Restriction for FOTO Survey    Staff did not capture.          Treatment     Treatment Time In: 5:10  Treatment Time Out: 5:30  Total Treatment time separate from Evaluation time:20 minutes    Sarbjit received therapeutic exercises to develop strength and ROM for 20 minutes including:  - reviewed self passive range of motion   - upgraded / reviewed home exercise program initiated last admission with scapular elevation, retraction, posterior rotation, active assisted range of motion with cane supine     Home Exercises  and Patient Education Provided    Education provided:   -role of OT, goals for OT, scheduling/cancellations, insurance limitations with patient.  -Additional Education provided: recent discharge, current level of function, importance of using the left upper extremity     Written Home Exercises Provided: yes.  Exercises were reviewed and Sarbjit was able to demonstrate them prior to the end of the session.    Sarbjit demonstrated good  understanding of the education provided.     See EMR under Patient Instructions for exercises provided 3/15/2023.    Assessment     Sarbjit Brewer Sr. is a 57 y.o. male referred to outpatient occupational therapy and presents with a medical diagnosis of I63.89 (ICD-10-CM) - Acute bilat watershed infarction , resulting in pain, decreased flexibility, decreased range of motion, decreased muscle strength, and impaired function and demonstrates limitations as described in the chart below. Sarbjit received occupational therapy from 11/2022 to 2/2023 and participated in various therapeutic exercises, activities, received manual therapy and neuromuscular re-education to address left upper extremity following CVA. This therapist educated on importance of joint mobility and risk of contracture or adhesive capsulitis. Sarbjit reported he did not complete home exercise program as prescribed and demonstrated a decline in active range of motion/passive range of motion of left shoulder and significant pain, limiting therapy at the time. Due to plateau and pain, therapist re-issued home exercise program with SPROM program and suggested he follow up with MD. Sarbjit returned today reporting pain has decreased due to compliance with home exercise program. During reassessment, he demonstrated mild improvement (10*) of shoulder flexion. His passive range of motion is still limited by pain and a hard end feel. In addition, there was little to no change with fine motor coordination,  and pinch strength. Due to  recent discharge and compliance with discharge instructions, therapist upgraded home exercise program with active assisted range of motion and discussed Sarbjit continue to the program and return in 3 weeks. If he is compliant and shows improvement, he will demonstrate good rehab potential for continued OT. Sarbjit demonstrated understanding of all movements using his cane and was agreeable to the plan of care.     Patient prognosis is Fair due to  non-compliance with home exercise program during previous admission, cognitive impairments, chronicity of deficits  Patient will benefit from skilled outpatient Occupational Therapy to address the deficits stated above and in the chart below, provide patient/family education, and to maximize patient's level of independence.     Plan of care discussed with patient: Yes  Patient's spiritual, cultural and educational needs considered and patient is agreeable to the plan of care and goals as stated below:     Anticipated Barriers for therapy: cognitive impairments, limited carry over, co-morbidities     Medical Necessity is demonstrated by the following  Profile and History Assessment of Occupational Performance Level of Clinical Decision Making Complexity Score   Occupational Profile:   Sarbjit Brewer Sr. is a 57 y.o. male who lives with their spouse and is currently unemployed. Sarbjit Brewer Sr. has difficulty with  dressing  driving/transportation management, phone/computer use, and housework/household chores  affecting his daily functional abilities. Sarbjit Brewer Sr. main goal for therapy is to improve use of his left arm.     Comorbidities:   A-fib, CHF (congestive heart failure), Hypertension, and Insomnia.    Medical and Therapy History Review:   Expanded               Performance Deficits    Physical:  Joint Mobility  Muscle Power/Strength  Muscle Endurance   Strength  Pinch Strength  Gross Motor Coordination  Fine Motor Coordination  Proprioception  Functions  Pain    Cognitive:  Attention  Memory  Safety Awareness/Insight to Disability  Emotional Control    Psychosocial:    Social Interaction  Habits  Routines  Rituals     Clinical Decision Making:  moderate    Assessment Process:  Detailed Assessments    Modification/Need for Assistance:  Minimal-Moderate Modifications/Assistance    Intervention Selection:  Limited Treatment Options       moderate  Based on PMHX, co morbidities , data from assessments and functional level of assistance required with task and clinical presentation directly impacting function.       The following goals were discussed with the patient and patient is in agreement with them as to be addressed in the treatment plan.     Goals:   Long Term Goals: 4 weeks  - Pt will be independent with Home Exercise Program (HEP)/Home Activity Program (HAP) to promote long term maintenance of progress made in therapy.   - Pt will improve active range of motion in all planes of left shoulder by 5-10 degrees or better in order to improve use during daily tasks  - pt will exhibit decreased signs of physical pain with passive range of motion of left shoulder     Plan   Certification Period/Plan of care expiration: 3/15/2023 to 04/14/2023.    Outpatient Occupational Therapy 1 time to include the following interventions: Manual Therapy, Moist Heat/ Ice, Neuromuscular Re-ed, Patient Education, Self Care, Therapeutic Activities, and Therapeutic Exercise.    Corinne Rapier, OT

## 2023-03-16 NOTE — PROGRESS NOTES
[facility]  Speech Therapy Treatment Note- Neurological Rehabilitation  Date: 3/16/2023     Name: Sarbjit Brewer Sr.   MRN: [id number]   Therapy Diagnosis:   Encounter Diagnoses   Name Primary?    Cognitive communication deficit Yes    Dysarthria      Physician: Steven Krueger MD  Physician Orders: [id number] - AMB REFERRAL/CONSULT TO SPEECH THERAPY  Medical Diagnosis: I63.9 (ICD-10-CM) - Cerebral vascular accident    Visit #/ Visits Authorized: 15/20 (plus evaluation + 8 visits prior to this authorization)  Date of Evaluation:  11/21/22  Insurance Authorization Period: 11/24/22 to 12/31/22, 1/9/23 to 4/29/23  Plan of Care Expiration Date:    2/3/23, 4/3/23  Extended Plan of Care:  4/3/23  Progress Note: due 1/21/22, 2/23/23, 3/22/23  Visits Cancelled: 0  Visits No Show: 3    Time In: 10:31 am  Time Out: 11:10 am  Total Billable Time: 39 minutes     Precautions: Standard  Subjective:   Patient reports: Pleasant; no complaints. Apologized for his behavior on Tuesday.   Response to previous treatment: positive    Pain Scale:  0 /10 on a Visual Analog Scale currently.    Pain Location: none indicated    Objective:      UNTIMED  Procedure   Speech- Language- Voice Therapy   Total Untimed Units: 1  Charges Billed/Number of units: 1       Short Term Goals: 4 weeks Current Progress:   1 Patient will recall visual and/or auditory information using memory strategies with 90% given Min cues to improve memory skills.  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   2. Patient will complete mental manipulation/working memory tasks with 80% accuracy given Min cues to improve immediate memory skills.  Progressing/ Not Met 3/16/2023    Max assist putting holidays in order that they occur.      3. Patient will complete selective attention tasks with 90% acc independently to improve attention skills.   Progressing/ Not Met 3/16/2023   Constant  Therapy find the same symbol, level 1: 51% accuracy given moderate cues.     Wrote alphabet forward with no distractions and 96% acc Independently; 2 redirections to task; 1 self correction   4.  Patient will complete simple executive function tasks (I.e. functional scheduling, problem-solving/reasoning, goal/plan/action/review) with 90% accuracy given Min cues to improve executive functioning skills.   Progressing/ Not Met 3/16/2023   Given deadlines and appointments to put into a one week calendar, mod-max cues were required to complete simple functional scheduling task. Multiple repetitions were required.     Answer questions about calendar appointments that he wrote on his schedule.  40% acc Independently; 80% acc mod A      5. Patient will participate in Oral Reading for Language in Aphasia (level 2) with min A to improve oral expression  Progressing/ Not Met 3/16/2023   Not formally addressed.      6. Patient will answer questions with adequate thought organization/topic maintenance given no more than 2 cues to improve expressive language for conversation and to decrease tangential responses   Progressing/ Not Met 3/16/2023   -3 tangential behaviors noted during 2 separate conversations. 5 times in therapy tasks.   -Topic maintenance supported with min A throughout conversation, moderate A needed for therapy tasks.          7. Patient will will exhibit <3% of stuttering-like disfluencies per 100 words or <2% stuttering-like disfluencies per 100 syllables across a variety of communication contexts (e.g. picture  description, sentence production, paragraph reading, conversation, storytelling) across three therapy sessions using preferred fluency techniques independently.  Progressing/ Not Met 3/16/2023   Minimal disfluency during session observed today. Patient reported overall improvement.     Patient endorses that slowing down is the best strategy to enhance fluency.       8. . Patient will demonstrate the  Stretched Syllable Technique with 90% accuracy in picture description given minimum cues across three sessions.  Progressing/ Not Met 3/16/2023   X 10 with model needed each time.         Patient Education/Response:   Skilled education provided regarding:  - SLP role in plan of care   - fluency strategies    -home exercise program  Patient verbalized understanding of all discussed.     Home program established: yes. Memory and attention strategies at home.   Exercises were reviewed and Sarbjit was able to demonstrate them prior to the end of the session.  Sarbjit demonstrated good  understanding of the education provided.     See Electronic Medical Record under Patient Instructions for exercises provided throughout therapy.  Assessment:   Sarbjit is progressing towards his goals. Decreased selection attention today. Patient demonstrated difficulty in tangential awareness and scheduling task. However, patient is receptive to clinician assistance. Increased speech disfluency observed during session. Current goals remain appropriate. Goals to be updated as necessary.      Patient prognosis is Fair to good. Patient will continue to benefit from skilled outpatient speech and language therapy to address the deficits listed in the problem list on initial evaluation, provide patient/family education and to maximize patient's level of independence in the home and community environment.   Medical necessity is demonstrated by the following IMPAIRMENTS:  Deficits in executive functioning, attention, and memory in addition to decreased word finding and speech intelligibility prevent the pt from relaying medically and safety relevant information in a timely manner in a state of emergency.   Barriers to Therapy: none  Patient's spiritual, cultural and educational needs considered and patient agreeable to plan of care and goals.  Plan:   Continue Plan of Care with focus on improving cognitive-linguistic skills and use of motor speech  strategies to enhance speech intelligibility.     Iesha Cortes M.S.CCC-SLP  Speech Language Pathologist   3/16/2023

## 2023-03-17 ENCOUNTER — DOCUMENTATION ONLY (OUTPATIENT)
Dept: REHABILITATION | Facility: HOSPITAL | Age: 58
End: 2023-03-17
Payer: MEDICAID

## 2023-03-17 ENCOUNTER — HOSPITAL ENCOUNTER (OUTPATIENT)
Dept: RADIOLOGY | Facility: HOSPITAL | Age: 58
Discharge: HOME | End: 2023-03-17
Attending: STUDENT IN AN ORGANIZED HEALTH CARE EDUCATION/TRAINING PROGRAM
Payer: MEDICAID

## 2023-03-17 DIAGNOSIS — M25.512 CHRONIC LEFT SHOULDER PAIN: ICD-10-CM

## 2023-03-17 DIAGNOSIS — G89.29 CHRONIC LEFT SHOULDER PAIN: ICD-10-CM

## 2023-03-17 PROBLEM — R27.8 IMPAIRED GROSS MOTOR COORDINATION: Status: ACTIVE | Noted: 2023-03-15

## 2023-03-17 PROBLEM — M62.81 MUSCLE WEAKNESS: Status: ACTIVE | Noted: 2023-03-15

## 2023-03-17 PROBLEM — M25.612 STIFFNESS OF LEFT SHOULDER JOINT: Status: ACTIVE | Noted: 2023-03-17

## 2023-03-17 PROCEDURE — 73030 XR SHOULDER COMPLETE 2 OR MORE VIEWS LEFT: ICD-10-PCS | Mod: 26,LT,, | Performed by: RADIOLOGY

## 2023-03-17 PROCEDURE — 73030 X-RAY EXAM OF SHOULDER: CPT | Mod: 26,LT,, | Performed by: RADIOLOGY

## 2023-03-17 PROCEDURE — 73030 X-RAY EXAM OF SHOULDER: CPT | Mod: TC,FY,LT

## 2023-03-20 ENCOUNTER — DOCUMENTATION ONLY (OUTPATIENT)
Dept: REHABILITATION | Facility: HOSPITAL | Age: 58
End: 2023-03-20

## 2023-03-20 DIAGNOSIS — R47.1 DYSARTHRIA: ICD-10-CM

## 2023-03-20 DIAGNOSIS — R41.841 COGNITIVE COMMUNICATION DEFICIT: Primary | ICD-10-CM

## 2023-03-20 NOTE — PROGRESS NOTES
No Show Note/Documentation    Patient: Sarbjit Brewer Sr.  Date of Session: 3/20/2023  Diagnosis:   1. Cognitive communication deficit        2. Dysarthria          MRN: [id number]    Sarbjit Brewer Sr. did not attend his scheduled therapy appointment today. He did not call to cancel nor reschedule. Next appointment is scheduled for 3/23/23 and will follow up with patient at that time. No charges have been posted today.     Cancel: 0  No show: 4    NEO Crocker, L-SLP, CCC-SLP  Speech Language Pathologist   3/20/2023

## 2023-03-23 ENCOUNTER — CLINICAL SUPPORT (OUTPATIENT)
Dept: REHABILITATION | Facility: HOSPITAL | Age: 58
End: 2023-03-23
Payer: MEDICAID

## 2023-03-23 DIAGNOSIS — R47.1 DYSARTHRIA: ICD-10-CM

## 2023-03-23 DIAGNOSIS — R41.841 COGNITIVE COMMUNICATION DEFICIT: Primary | ICD-10-CM

## 2023-03-23 PROCEDURE — 92507 TX SP LANG VOICE COMM INDIV: CPT | Mod: PN

## 2023-03-23 NOTE — PROGRESS NOTES
"  [facility]  Speech Therapy PROGRESS Note- Neurological Rehabilitation  Date: 3/23/2023     Name: Sarbjit Brewer Sr.   MRN: [id number]   Therapy Diagnosis:   Encounter Diagnoses   Name Primary?    Cognitive communication deficit Yes    Dysarthria      Physician: Steven Krueger MD  Physician Orders: [id number] - AMB REFERRAL/CONSULT TO SPEECH THERAPY  Medical Diagnosis: I63.9 (ICD-10-CM) - Cerebral vascular accident    Visit #/ Visits Authorized: 16/20 (plus evaluation + 8 visits prior to this authorization)  Date of Evaluation:  11/21/22  Insurance Authorization Period: 11/24/22 to 12/31/22, 1/9/23 to 4/29/23  Plan of Care Expiration Date:    2/3/23, 4/3/23  Extended Plan of Care:  4/3/23  Progress Note: due 1/21/22, 2/23/23, 3/22/23  Visits Cancelled: 0  Visits No Show: 3    Time In: 10:30 am  Time Out: 11:05 am  Total Billable Time: 35 minutes     Precautions: Standard  Subjective:   Patient reports: Pleasant; no complaints.  "I returned your glasses." I didn't come to my appointment the other day because I had to go see my .   Response to previous treatment: positive    Pain Scale:  0 /10 on a Visual Analog Scale currently.    Pain Location: none indicated    Objective:      UNTIMED  Procedure   Speech- Language- Voice Therapy   Total Untimed Units: 1  Charges Billed/Number of units: 1       Short Term Goals: 4 weeks Current Progress:   1 Patient will recall visual and/or auditory information using memory strategies with 90% given Min cues to improve memory skills.  Progressing/ Not Met 3/23/2023   Recalled today is Thursday, March.  Thought it was the 3rd day of the month.      Checked his phone to see that it is March 23rd.     Delayed recalled therapist's name, Iesha.     Immediate recall of details of a story presented verbally:  80% acc Independently 100% acc min A   2. Patient will complete mental manipulation/working memory tasks with 80% accuracy given Min cues to improve immediate " memory skills.  Progressing/ Not Met 3/23/2023    Max A with 3 words with auditory; needs visual.     Organize 3 names (Tee, Sarbjit, Elise) with max A    Oldest to youngest (Tee, Sabrjit, Elise) with mod A     Smallest to biggest (5, 10, 15) max A    Difficulty understanding directions and/or processing information.      3. Patient will complete selective attention tasks with 90% acc independently to improve attention skills.   Progressing/ Not Met 3/23/2023   Constant Therapy find the same symbol, level 1: 83% accuracy given consistent cues throughout.     Wrote alphabet forward with no distractions  -Not addressed this session.    4.  Patient will complete simple executive function tasks (I.e. functional scheduling, problem-solving/reasoning, goal/plan/action/review) with 90% accuracy given Min cues to improve executive functioning skills.   Progressing/ Not Met 3/23/2023   Given deadlines and appointments to put into a one week calendar, mod-max cues were required to complete simple functional scheduling task. Multiple repetitions were required.     Answer questions about calendar appointments that he wrote on his schedule.  -Not addressed this session.      5. Patient will participate in Oral Reading for Language in Aphasia (level 2) with min A to improve oral expression  Progressing/ Not Met 3/23/2023   Not formally addressed.      6. Patient will answer questions with adequate thought organization/topic maintenance given no more than 2 cues to improve expressive language for conversation and to decrease tangential responses   Progressing/ Not Met 3/23/2023   -3 tangential behaviors noted   -Topic maintenance supported with min A throughout conversation, moderate A needed for therapy tasks.          7. Patient will will exhibit <3% of stuttering-like disfluencies per 100 words or <2% stuttering-like disfluencies per 100 syllables across a variety of communication contexts (e.g. picture  description,  sentence production, paragraph reading, conversation, storytelling) across three therapy sessions using preferred fluency techniques independently.  Progressing/ Not Met 3/23/2023   Minimal disfluency during session observed today. Patient reported overall improvement.     Patient endorses that slowing down is the best strategy to enhance fluency.       8. . Patient will demonstrate the Stretched Syllable Technique with 90% accuracy in picture description given minimum cues across three sessions.  Progressing/ Not Met 3/23/2023   X 10 with model needed each time.         Patient Education/Response:   Skilled education provided regarding:  - SLP role in plan of care   - fluency strategies    -home exercise program  Patient verbalized understanding of all discussed.     Home program established: yes. Memory and attention strategies at home.   Exercises were reviewed and Sarbjit was able to demonstrate them prior to the end of the session.  Sarbjit demonstrated good  understanding of the education provided.     See Electronic Medical Record under Patient Instructions for exercises provided throughout therapy.  Assessment:   Sarbjit is progressing towards his goals. Significant improvement noted in selection attention today. Patient demonstrated difficulty in tangential awareness and scheduling task. However, patient is receptive to clinician assistance. Increased speech disfluency observed during session. Pt has met 5 goals since initial evaluation.  Pt has met 0/8 goals since last plan of care on 2/6/23.  Current goals remain appropriate. Goals to be updated as necessary.      Patient prognosis is Fair to good. Patient will continue to benefit from skilled outpatient speech and language therapy to address the deficits listed in the problem list on initial evaluation, provide patient/family education and to maximize patient's level of independence in the home and community environment.   Medical necessity is demonstrated by  the following IMPAIRMENTS:  Deficits in executive functioning, attention, and memory in addition to decreased word finding and speech intelligibility prevent the pt from relaying medically and safety relevant information in a timely manner in a state of emergency.   Barriers to Therapy: none  Patient's spiritual, cultural and educational needs considered and patient agreeable to plan of care and goals.  Plan:   Continue Plan of Care with focus on improving cognitive-linguistic skills and use of motor speech strategies to enhance speech intelligibility.     Iesha Cortes M.S.CCC-SLP  Speech Language Pathologist   3/23/2023

## 2023-03-23 NOTE — PROGRESS NOTES
[facility]  Speech Therapy Treatment Note- Neurological Rehabilitation  Date: 3/27/2023     Name: Sarbjit Brewer Sr.   MRN: [id number]   Therapy Diagnosis:   Encounter Diagnoses   Name Primary?    Cognitive communication deficit Yes    Dysarthria        Physician: Steven Krueger MD  Physician Orders: [id number] - AMB REFERRAL/CONSULT TO SPEECH THERAPY  Medical Diagnosis: I63.9 (ICD-10-CM) - Cerebral vascular accident    Visit #/ Visits Authorized: 17/20 (plus evaluation + 8 visits prior to this authorization)  Date of Evaluation:  11/21/22  Insurance Authorization Period: 11/24/22 to 12/31/22, 1/9/23 to 4/29/23  Plan of Care Expiration Date:    2/3/23, 4/3/23  Extended Plan of Care:  4/3/23  Progress Note: due 4/22/23  Visits Cancelled: 0  Visits No Show: 3    Time In: 10:20 am    Time Out: 11:05 am    Total Billable Time: 45 minutes     Precautions: Standard  Subjective:   Patient reports: Met with  about disability paperwork with brother; still in progress. Patient feels as though his fluency is improved; endorsed continued difficulty with attention and memory. Patient's overall concern with his health continues to be heart health and improved blood pressure.   Response to previous treatment: positive    Pain Scale:  0 /10 on a Visual Analog Scale currently.    Pain Location: none indicated    Objective:      UNTIMED  Procedure   Speech- Language- Voice Therapy   Total Untimed Units: 1  Charges Billed/Number of units: 1       Short Term Goals: 4 weeks Current Progress:   1 Patient will recall visual and/or auditory information using memory strategies with 90% given Min cues to improve memory skills.  Progressing/ Not Met 3/27/2023   Recalled name of cardiologist independently.     2. Patient will complete mental manipulation/working memory tasks with 80% accuracy given Min cues to improve immediate memory skills.  Progressing/ Not Met 3/27/2023    Not formally addressed.      3. Patient will  complete selective attention tasks with 90% acc independently to improve attention skills.   Progressing/ Not Met 3/27/2023   Probed selective attention with door open. Constant Therapy find the same symbol, level 1: 63% accuracy given moderate cues. Patient endorsed difficulty with selective attention.     4.  Patient will complete simple executive function tasks (I.e. functional scheduling, problem-solving/reasoning, goal/plan/action/review) with 90% accuracy given Min cues to improve executive functioning skills.   Progressing/ Not Met 3/27/2023   Patient given sodium tracker to organize sodium consumption in diet; max cues. Discussed organizing BP measurements in a chart; to be discussed at future sessions.      5. Patient will participate in Oral Reading for Language in Aphasia (level 2) with min A to improve oral expression  Progressing/ Not Met 3/27/2023   Not formally addressed.      6. Patient will answer questions with adequate thought organization/topic maintenance given no more than 2 cues to improve expressive language for conversation and to decrease tangential responses   Progressing/ Not Met 3/27/2023   Patient demonstrated tangential behaviors 5+ times in conversation given moderate cues. However, most were directly related to discussion topic. Patient receptive to clinician redirection.          7. Patient will exhibit <3% of stuttering-like disfluencies per 100 words or <2% stuttering-like disfluencies per 100 syllables across a variety of communication contexts (e.g. picture  description, sentence production, paragraph reading, conversation, storytelling) across three therapy sessions using preferred fluency techniques independently.  Progressing/ Not Met 3/27/2023   Minimal disfluency during session observed today. Patient reported overall improvement.     Patient endorses that slowing down is the best strategy to enhance fluency.    8. . Patient will demonstrate the Stretched Syllable Technique  with 90% accuracy in picture description given minimum cues across three sessions.  Progressing/ Not Met 3/27/2023   Not formally addressed.          Patient Education/Response:   Skilled education provided regarding:  - SLP role in plan of care   - fluency strategies    -home exercise program  Patient verbalized understanding of all discussed.     Home program established: yes. Memory and attention strategies at home.   Exercises were reviewed and Sarbjit was able to demonstrate them prior to the end of the session.  Sarbjit demonstrated good  understanding of the education provided.     See Electronic Medical Record under Patient Instructions for exercises provided throughout therapy.  Assessment:   Sarbjit is progressing towards his goals. Patient demonstrated difficulty in tangential awareness. However, patient is receptive to clinician assistance and redirection. Simple executive functioning task: organizing and planning meals and snacks in sodium tracker; patient needed max cues. Discussed organizing BP measurements into chart at future sessions to continue functional executive functioning tasks. Current goals remain appropriate. Goals to be updated as necessary.      Patient prognosis is Fair to good. Patient will continue to benefit from skilled outpatient speech and language therapy to address the deficits listed in the problem list on initial evaluation, provide patient/family education and to maximize patient's level of independence in the home and community environment.   Medical necessity is demonstrated by the following IMPAIRMENTS:  Deficits in executive functioning, attention, and memory in addition to decreased word finding and speech intelligibility prevent the pt from relaying medically and safety relevant information in a timely manner in a state of emergency.   Barriers to Therapy: none  Patient's spiritual, cultural and educational needs considered and patient agreeable to plan of care and  goals.  Plan:   Continue Plan of Care with focus on improving cognitive-linguistic skills and use of motor speech strategies to enhance speech intelligibility.     Crystal Chapin, [facility]  Student SLP  3/27/2023     I, Doreen Shelton, certify that I was present in the room directing the student and service delivery and guiding them using my skilled judgement. As the co-signing therapist, I have reviewed the student's documentation, and am responsible for the treatment, assessment and plan.   NEO Crocker, CCC-SLP  Speech Language Pathologist   3/27/2023

## 2023-03-27 ENCOUNTER — CLINICAL SUPPORT (OUTPATIENT)
Dept: REHABILITATION | Facility: HOSPITAL | Age: 58
End: 2023-03-27
Payer: MEDICAID

## 2023-03-27 DIAGNOSIS — R41.841 COGNITIVE COMMUNICATION DEFICIT: Primary | ICD-10-CM

## 2023-03-27 DIAGNOSIS — R47.1 DYSARTHRIA: ICD-10-CM

## 2023-03-27 PROCEDURE — 92507 TX SP LANG VOICE COMM INDIV: CPT | Mod: PN | Performed by: SPEECH-LANGUAGE PATHOLOGIST

## 2023-03-31 ENCOUNTER — CLINICAL SUPPORT (OUTPATIENT)
Dept: REHABILITATION | Facility: HOSPITAL | Age: 58
End: 2023-03-31
Payer: MEDICAID

## 2023-03-31 DIAGNOSIS — R47.1 DYSARTHRIA: ICD-10-CM

## 2023-03-31 DIAGNOSIS — R41.841 COGNITIVE COMMUNICATION DEFICIT: Primary | ICD-10-CM

## 2023-03-31 PROCEDURE — 92507 TX SP LANG VOICE COMM INDIV: CPT | Mod: PN | Performed by: SPEECH-LANGUAGE PATHOLOGIST

## 2023-03-31 NOTE — PROGRESS NOTES
"[facility]  Speech Therapy Treatment Note- Neurological Rehabilitation  Date: 3/31/2023     Name: Sarbjti Brewer Sr.   MRN: [id number]   Therapy Diagnosis:   Encounter Diagnoses   Name Primary?    Cognitive communication deficit Yes    Dysarthria        Physician: Steven Krueger MD  Physician Orders: [id number] - AMB REFERRAL/CONSULT TO SPEECH THERAPY  Medical Diagnosis: I63.9 (ICD-10-CM) - Cerebral vascular accident    Visit #/ Visits Authorized: 18/20 (plus evaluation + 8 visits prior to this authorization)  Date of Evaluation:  11/21/22  Insurance Authorization Period: 11/24/22 to 12/31/22, 1/9/23 to 4/29/23  Plan of Care Expiration Date:    2/3/23, 4/3/23  Extended Plan of Care:  4/3/23  Progress Note: due 4/22/23  Visits Cancelled: 0  Visits No Show: 3    Time In: 9:34 am   Time Out: 10:12   Total Billable Time: 38 minutes     Precautions: Standard  Subjective:   Patient reports: that there's nothing to do and he hasn't done anything all week. Discussed discharge on Monday.   Response to previous treatment: positive    Pain Scale:  0 /10 on a Visual Analog Scale currently.    Pain Location: none indicated    Objective:      UNTIMED  Procedure   Speech- Language- Voice Therapy   Total Untimed Units: 1  Charges Billed/Number of units: 1       Short Term Goals: 4 weeks Current Progress:   1 Patient will recall visual and/or auditory information using memory strategies with 90% given Min cues to improve memory skills.  Progressing/ Not Met 3/31/2023   When asked to recall memory strategies patient states "I don't know I don't have no great memories"     Reviewed memory strategies at length     "I remember when so and so was here" and I dont want to remember bad memories    At end of session, patient recalled 0 strategies despite max cues    2. Patient will complete mental manipulation/working memory tasks with 80% accuracy given Min cues to improve immediate memory skills.  Progressing/ Not Met " 3/31/2023    Not formally addressed.      3. Patient will complete selective attention tasks with 90% acc independently to improve attention skills.   Progressing/ Not Met 3/31/2023   60% accuracy independently; 90% accuracy given moderate cues   4.  Patient will complete simple executive function tasks (I.e. functional scheduling, problem-solving/reasoning, goal/plan/action/review) with 90% accuracy given Min cues to improve executive functioning skills.   Progressing/ Not Met 3/31/2023   Attempted scheduling task but patient did not bring glasses     5. Patient will participate in Oral Reading for Language in Aphasia (level 2) with min A to improve oral expression  Progressing/ Not Met 3/31/2023   1. pt observed SLP reading stimuli and pointing to the targeted word x 10 (large print used)  2. pt pointed to target word as SLP read stimuli for 10 sentences.   3. Pt read sentences simultaneously with SLP with exaggeration by SLP with additional cues required by SLPx 0  4. Pt read sentences simultaneously with SLP fading out as sentence progressed with additional cues required by SLPx  0  5. Pt read words identified by SLP with 100% acc (10/10)  6. Pt pointed to words identified by SLP with 100% acc (10/10)  7. Pt read sentences  Simultaneously with SLP x 10 with  0 additional cues required.        6. Patient will answer questions with adequate thought organization/topic maintenance given no more than 2 cues to improve expressive language for conversation and to decrease tangential responses   Progressing/ Not Met 3/31/2023   Patient demonstrated tangential behaviors 10+ times in conversation independently; patient required moderate cues to redirect conversation. However, most were directly related to discussion topic. Patient receptive to clinician redirection at times but sometimes increased volume and talked over clinician.          7. Patient will exhibit <3% of stuttering-like disfluencies per 100 words or <2%  "stuttering-like disfluencies per 100 syllables across a variety of communication contexts (e.g. picture  description, sentence production, paragraph reading, conversation, storytelling) across three therapy sessions using preferred fluency techniques independently.  Progressing/ Not Met 3/31/2023   Minimal disfluency during session observed today. Patient reported overall improvement. Patient endorses that slowing down is the best strategy to enhance fluency. Patient states "my speech is much better."   8. . Patient will demonstrate the Stretched Syllable Technique with 90% accuracy in picture description given minimum cues across three sessions.  Progressing/ Not Met 3/31/2023   75% accuracy in picture description  75% accuracy in conversation level         Patient Education/Response:   Skilled education provided regarding:  - SLP role in plan of care   - fluency strategies    -home exercise program  - discharge plan  Patient verbalized understanding of all discussed.     Home program established: yes. Memory and attention strategies at home.   Exercises were reviewed and Sarbjit was able to demonstrate them prior to the end of the session.  Sarbjit demonstrated good  understanding of the education provided.     See Electronic Medical Record under Patient Instructions for exercises provided throughout therapy.  Assessment:   Sarbjit is progressing towards his goals. Patient demonstrated difficulty in tangential awareness but was responsive to clinician assistance and redirection. Cues required for attention. Unable to recall memory strategies despite consistent education. Limited progress and carryover warrant discharge next session. Current goals remain appropriate. Goals to be updated as necessary.      Patient prognosis is Fair to good. Patient will continue to benefit from skilled outpatient speech and language therapy to address the deficits listed in the problem list on initial evaluation, provide patient/family " education and to maximize patient's level of independence in the home and community environment.   Medical necessity is demonstrated by the following IMPAIRMENTS:  Deficits in executive functioning, attention, and memory in addition to decreased word finding and speech intelligibility prevent the pt from relaying medically and safety relevant information in a timely manner in a state of emergency.   Barriers to Therapy: none  Patient's spiritual, cultural and educational needs considered and patient agreeable to plan of care and goals.  Plan:   Continue Plan of Care with focus on improving cognitive-linguistic skills and use of motor speech strategies to enhance speech intelligibility.   - discharge next session    NEO Crocker, L-SLP, CCC-SLP  Speech Language Pathologist   3/31/2023

## 2023-04-03 ENCOUNTER — CLINICAL SUPPORT (OUTPATIENT)
Dept: REHABILITATION | Facility: HOSPITAL | Age: 58
End: 2023-04-03
Payer: MEDICAID

## 2023-04-03 DIAGNOSIS — R41.841 COGNITIVE COMMUNICATION DEFICIT: Primary | ICD-10-CM

## 2023-04-03 DIAGNOSIS — R47.1 DYSARTHRIA: ICD-10-CM

## 2023-04-03 PROCEDURE — 92507 TX SP LANG VOICE COMM INDIV: CPT | Mod: PN | Performed by: SPEECH-LANGUAGE PATHOLOGIST

## 2023-04-03 NOTE — PROGRESS NOTES
[facility]  Speech Therapy Treatment Note- Neurological Rehabilitation  Date: 4/3/2023     Name: Sarbjit Brewer Sr.   MRN: [id number]   Therapy Diagnosis:   Encounter Diagnoses   Name Primary?    Cognitive communication deficit Yes    Dysarthria          Physician: No ref. provider found  Physician Orders: [id number] - AMB REFERRAL/CONSULT TO SPEECH THERAPY  Medical Diagnosis: I63.9 (ICD-10-CM) - Cerebral vascular accident    Visit #/ Visits Authorized: 18/20 (plus evaluation + 8 visits prior to this authorization)  Date of Evaluation:  11/21/22  Insurance Authorization Period: 11/24/22 to 12/31/22, 1/9/23 to 4/29/23  Plan of Care Expiration Date:    2/3/23, 4/3/23  Extended Plan of Care:  4/3/23  Progress Note: due 4/22/23  Visits Cancelled: 0  Visits No Show: 3    Time In: 9:30 am   Time Out: 10:15 am   Total Billable Time: 45  minutes     Precautions: Standard  Subjective:   Patient reports: Patient reported no changes since last session. Was worried he was late, but he arrived about 10 minutes prior to appointment. Overall patient reported no generalization of strategies.   Response to previous treatment: positive    Pain Scale:  0 /10 on a Visual Analog Scale currently.    Pain Location: none indicated    Objective:      UNTIMED  Procedure   Speech- Language- Voice Therapy   Total Untimed Units: 1  Charges Billed/Number of units: 1       Short Term Goals: 4 weeks Current Progress:   1 Patient will recall visual and/or auditory information using memory strategies with 90% given Min cues to improve memory skills.  Progressing/ Not Met 4/3/2023   Patient had difficulty recalling next occupational therapy appointment this week. However, he independently asked for clarification.    2. Patient will complete mental manipulation/working memory tasks with 80% accuracy given Min cues to improve immediate memory skills.  Progressing/ Not Met 4/3/2023    Not formally addressed.      3. Patient will complete  "selective attention tasks with 90% acc independently to improve attention skills.   Progressing/ Not Met 4/3/2023   Patient attended to conversation with minimal cues needed.    4.  Patient will complete simple executive function tasks (I.e. functional scheduling, problem-solving/reasoning, goal/plan/action/review) with 90% accuracy given Min cues to improve executive functioning skills.   Progressing/ Not Met 4/3/2023   Patient encouraged to think of strategies he uses while discussing cognitive strategies.      5. Patient will participate in Oral Reading for Language in Aphasia (level 2) with min A to improve oral expression  Progressing/ Not Met 4/3/2023   Not formally addressed.        6. Patient will answer questions with adequate thought organization/topic maintenance given no more than 2 cues to improve expressive language for conversation and to decrease tangential responses   Progressing/ Not Met 4/3/2023   Patient demonstrated tangential behaviors 10+ times in session; patient required moderate cues to redirect conversation. However, most were directly related to discussion topic. Patient receptive to clinician redirection after finishing his thought.          7. Patient will exhibit <3% of stuttering-like disfluencies per 100 words or <2% stuttering-like disfluencies per 100 syllables across a variety of communication contexts (e.g. picture  description, sentence production, paragraph reading, conversation, storytelling) across three therapy sessions using preferred fluency techniques independently.  Progressing/ Not Met 4/3/2023   Minimal disfluency during session observed today. Patient reported overall improvement. Patient endorses that slowing down is the best strategy to enhance fluency. Patient states "my speech is much better."   8. . Patient will demonstrate the Stretched Syllable Technique with 90% accuracy in picture description given minimum cues across three sessions.  Progressing/ Not Met " 4/3/2023   Not formally addressed.          Patient Education/Response:   Skilled education provided regarding:  - SLP role in plan of care   - fluency strategies    -home exercise program  - discharge plan  Patient verbalized understanding of all discussed.     Home program established: yes. Memory and attention strategies at home.   Exercises were reviewed and Sarbjit was able to demonstrate them prior to the end of the session.  Sarbjit demonstrated good  understanding of the education provided.     See Electronic Medical Record under Patient Instructions for exercises provided throughout therapy.  Assessment:   Sarbjit is progressing towards his goals. Patient demonstrated difficulty in tangential awareness but was responsive to clinician assistance and redirection. Cues required for attention. Patient has demonstrated maximum rehabilitation potential at this time. Education today included discharge plans and cognitive linguistic strategies to use at home: attention, processing speed, executive functioning, storing and recalling information, and ways to support cognitive health. Patient encouraged to incorporate socialization into his routine, such as going to [employer], for cognitive activity and to improve quality of life. Discussed talking to other medical providers about various health concerns that speech therapy cannot address.     Patient prognosis is Fair to good.    Medical necessity is demonstrated by the following IMPAIRMENTS:  Deficits in executive functioning, attention, and memory in addition to decreased word finding and speech intelligibility prevent the pt from relaying medically and safety relevant information in a timely manner in a state of emergency.   Barriers to Therapy: none  Patient's spiritual, cultural and educational needs considered and patient agreeable to plan of care and goals.  Plan:   See discharge note.     Crystal Chapin HALIMA  Student Speech Language Pathologist   4/3/2023

## 2023-04-03 NOTE — PLAN OF CARE
Outpatient Therapy Discharge Summary   Discharge Date: 4/3/2023   Name: Sarbjit Brewer Sr.  Clinic Number: [id number]  Therapy Diagnosis:   Encounter Diagnoses   Name Primary?    Cognitive communication deficit Yes    Dysarthria      Physician: No ref. provider found  Physician Orders: [id number] - AMB REFERRAL/CONSULT TO SPEECH THERAPY  Medical Diagnosis: I63.9 (ICD-10-CM) - Cerebral vascular accident  Evaluation Date:  11/21/22    Date of Last visit: 04/03/2023   Total Visits Received: 18/20 (plus evaluation + 8 visits prior to this authorization)  Cancelled Visits: 0  No Show Visits: 3    Assessment    Assessment of Current Status: Patient demonstrated difficulty in tangential awareness but was responsive to clinician assistance and redirection. Cues required for attention. Patient has demonstrated maximum rehabilitation potential at this time. Education today included discharge plans and cognitive linguistic strategies to use at home: attention, processing speed, executive functioning, storing and recalling information, and ways to support cognitive health. Patient encouraged to incorporate socialization into his routine, such as going to [employer], for cognitive activity and to improve quality of life. Discussed talking to other medical providers about various health concerns that speech therapy cannot address.      Short Term Goals: 4 weeks Current Progress:   1 Patient will recall visual and/or auditory information using memory strategies with 90% given Min cues to improve memory skills.   Discharge/Discontinue    2. Patient will complete mental manipulation/working memory tasks with 80% accuracy given Min cues to improve immediate memory skills.   Discharge/Discontinue       3. Patient will complete selective attention tasks with 90% acc independently to improve attention skills.    Discharge/Discontinue    4.  Patient will complete simple executive function tasks (I.e. functional scheduling,  problem-solving/reasoning, goal/plan/action/review) with 90% accuracy given Min cues to improve executive functioning skills.    Discharge/Discontinue    5. Patient will participate in Oral Reading for Language in Aphasia (level 2) with min A to improve oral expression    Discharge/Discontinue          6. Patient will answer questions with adequate thought organization/topic maintenance given no more than 2 cues to improve expressive language for conversation and to decrease tangential responses    Discharge/Discontinue          7. Patient will exhibit <3% of stuttering-like disfluencies per 100 words or <2% stuttering-like disfluencies per 100 syllables across a variety of communication contexts (e.g. picture  description, sentence production, paragraph reading, conversation, storytelling) across three therapy sessions using preferred fluency techniques independently.   Discharge/Discontinue    8. . Patient will demonstrate the Stretched Syllable Technique with 90% accuracy in picture description given minimum cues across three sessions   Discharge/Discontinue        Discharge reason: Patient has reached the maximum rehab potential for the present time.     Plan   This patient is discharged from Speech Therapy    PABLO Acosta   Student SLP   4/3/2023

## 2023-04-12 ENCOUNTER — CLINICAL SUPPORT (OUTPATIENT)
Dept: REHABILITATION | Facility: HOSPITAL | Age: 58
End: 2023-04-12
Payer: MEDICAID

## 2023-04-12 DIAGNOSIS — M25.612 STIFFNESS OF LEFT SHOULDER JOINT: Primary | ICD-10-CM

## 2023-04-12 DIAGNOSIS — R27.8 IMPAIRED GROSS MOTOR COORDINATION: ICD-10-CM

## 2023-04-12 DIAGNOSIS — M62.81 MUSCLE WEAKNESS: ICD-10-CM

## 2023-04-12 PROCEDURE — 97530 THERAPEUTIC ACTIVITIES: CPT | Mod: PN

## 2023-04-12 NOTE — PROGRESS NOTES
"Occupational Therapy Discharge Summary     Name: Sarbjit Brewer Sr.  Clinic Number: [id number]    Therapy Diagnosis:   Encounter Diagnoses   Name Primary?    Stiffness of left shoulder joint Yes    Impaired gross motor coordination     Muscle weakness      Physician: Nissa Almendarez MD    Visit Date: 4/12/2023    Physician Orders: OT eval and treat   Medical Diagnosis: I63.89 (ICD-10-CM) - Acute bilat watershed infarction   Evaluation Date: 3/15/2023  Plan of Care Expiration Period: 04/14/2023  Insurance Authorization period Expiration: 10/03/2023  Visit # / Visits Authorized: 1 / 20  FOTO: 3/15/2023     Time In: 4:52  Time Out: 4:45  Total Billable (one on one) Time: 53 minutes  Billed Units: TA - 3     Precautions: Standard and Fall    Subjective     Pt reports: "I did the push down and then I stretched my arm. I am trying to use it (left arm) more" "I didn't do the ones with my cane. I was scared I was going to hit my roof"  he  was partially  compliant with home exercise program given last session.   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 or drive    Pain: 0/10  Location: left arms     Objective     Physical Exam:  Postural examination/scapula alignment: Rounded shoulder and Head forward  Joint integrity: Firm end feeling  Skin integrity: dried skin noted upon arrival  Edema: none present during today's session   Palpation: no pain with palpation      Joint Evaluation  MMS   3/15/2022 AROM  2/14/2023 AROM  3/15/2023 PROM  3/15/2023 AROM  3/15/2023 PROM  3/15/2023     Right Left  Left  Left  Left  Left    Shoulder Flex 0-180 5/5 50 60 80 60 81   Shoulder Extension 0-80 5/5 58 58 65 67 69   Shoulder Abd 0-180 5/5 40 45 75 54 76   Shoulder ER 0-90 5/5 28 20   30 32   Shoulder IR 0-90 5/5 hip PSIS   PSIS     Elbow Flex/Ext 0-150 5/5 120 138   126 140   *WNL - Within Normal Limits  *NT = Not Tested     Fist: loose left hand       Strength: " (LEE ANN Dynamometer in lbs.) Average 3 trials, Position II:       11/23/2022 11/23/2022 12/30/2022 1/25/2023 2/14/2023 3/15/2023 4/12/2023   Rung II Right Left Left  Left  Left  Left  Left    Trial 1 73# 1# 0# 10# 10# 10# 15#   Trial 2 57# 1# 0# 11# 10# 10# 13#   Trial 3 55# 2# 0# 6# 10# 8# 10#   Average 61.6# 1.3# 0# 9# 10# 9.3# 12#      Normal  Average Values  Female Right Left Male: Right Left   20-29 66 lbs 62 lbs         94 lbs 86 lbs   30-39 68 lbs 64 lbs 90 lbs 82 lbs   40-49 64 lbs 62 lbs 80 lbs 74 lbs   50-59 62 lbs 57 lbs 72 lbs 65 lbs   60-69 53 lbs 51 lbs 63 lbs 56 lbs   70+ 44 lbs 42 lbs 54 lbs 48 lbs   SD = +/- 19lbs         Pinch Strength (Measured in lbs)       11/23/2022 2/14/2023 3/15/2023 4/12/2023     Right Left  Left  Left    Key Pinch 19 # 5# 5# 6#   3pt Pinch 14 # 5# 4# 4#   2pt Pinch 10 # 3# 4# 3#         Fine/Gross Motor Coordination     Assessment   Right   11/23/2022 Left   2/14/2023 Left   2/20/2023 Left   3/15/2023 Left   4/12/2023   9 hole peg test 9 pegs in/out for time WNL 8:25         *Normal setup 7:45       *normal setup 7:15        *Normal setup  4:35      *Normal setup   *WNL - Within Normal Limits  *NT = Not Tested    Treatment     Sarbjit received therapeutic exercises for 53 minutes including:  - reassessment, reviewed goals, current level of function and baseline   - home exercise program reviewed with patient    - green putty   - supine dowel exercises   - Upper Body Ergometer (UBE) L2.5 for 8 minutes to provide proprioceptive awareness, postural stability, strength, activity tolerance, and reciprocal patterns with bimanual coordination completed to improve use of bilateral upper extremities. Cues provided as needed for postural stability/positioning    Home Exercises and Education Provided     Education provided:   - green putty, active assisted range of motion for left upper extremity   - Progress towards goals     Written Home Exercises Provided: yes.  Exercises were  reviewed and Sarbjit was able to demonstrate them prior to the end of the session.  Sarbjit demonstrated good  understanding of the HEP provided.   .   See EMR under Patient Instructions for exercises provided 4/12/2023.        Assessment     Sarbjit has made fair progress with therapy since initial evaluation. His left upper extremity pain has resolved, allowing for increased use with daily tasks. However, since recent evaluation, he does not exhibit any improvement in left shoulder active range of motion,  or pinch strength. Due to plateau, Sarbjit is agreeable to discharge from OT at this time with an home exercise program to address remaining deficits. Therapist educated and reviewed all movements during today's session and Sarbjit verbalized and demonstrated understanding. Therapist also spoke with his wife who verbalized understanding of POC. No further questions at this time. Increased time for instruction due to redirection and education.     Anticipated barriers to occupational therapy: chronicity of impairments, cognitive impairments     Pt's spiritual, cultural and educational needs considered and pt agreeable to plan of care and goals.    Goals:  - Pt will be independent with Home Exercise Program (HEP)/Home Activity Program (HAP) to promote long term maintenance of progress made in therapy. Partially met 4/12/2023  - Pt will improve active range of motion in all planes of left shoulder by 5-10 degrees or better in order to improve use during daily tasks Not met 4/12/2023  - pt will exhibit decreased signs of physical pain with passive range of motion of left shoulder Met 4/12/2023    Plan     Updates/Grading for next session: patient to be discharged from OT at this time.     Corinne Rapier, OT

## 2023-04-12 NOTE — PATIENT INSTRUCTIONS
Rules with the putty:   Do not leave it in the sun/car  Do not get it on fabric/clothes, it will not come out  Do not let children/pets play with it because it can be toxic

## 2023-05-12 ENCOUNTER — PATIENT MESSAGE (OUTPATIENT)
Dept: FAMILY MEDICINE | Facility: HOSPITAL | Age: 58
End: 2023-05-12
Payer: MEDICAID

## 2023-05-12 RX ORDER — METOPROLOL SUCCINATE 50 MG/1
50 TABLET, EXTENDED RELEASE ORAL DAILY
Qty: 30 TABLET | Refills: 1 | Status: SHIPPED | OUTPATIENT
Start: 2023-05-12 | End: 2024-05-11

## 2023-08-01 ENCOUNTER — TELEPHONE (OUTPATIENT)
Dept: SLEEP MEDICINE | Facility: CLINIC | Age: 58
End: 2023-08-01
Payer: MEDICAID

## 2023-08-01 NOTE — TELEPHONE ENCOUNTER
Staff contacted patient in attempted to informed them on their missed appointment time (over 15 minutes late). Staff left a message for the patient, and offer to reschedule their missed appointment.

## 2023-09-22 ENCOUNTER — OFFICE VISIT (OUTPATIENT)
Dept: URGENT CARE | Facility: CLINIC | Age: 58
End: 2023-09-22
Payer: MEDICAID

## 2023-09-22 VITALS
RESPIRATION RATE: 17 BRPM | DIASTOLIC BLOOD PRESSURE: 87 MMHG | HEART RATE: 98 BPM | BODY MASS INDEX: 29.8 KG/M2 | WEIGHT: 220 LBS | SYSTOLIC BLOOD PRESSURE: 125 MMHG | HEIGHT: 72 IN | OXYGEN SATURATION: 95 % | TEMPERATURE: 98 F

## 2023-09-22 DIAGNOSIS — J18.9 ATYPICAL PNEUMONIA: Primary | ICD-10-CM

## 2023-09-22 DIAGNOSIS — R05.9 COUGH, UNSPECIFIED TYPE: ICD-10-CM

## 2023-09-22 LAB
CTP QC/QA: YES
SARS-COV-2 AG RESP QL IA.RAPID: NEGATIVE

## 2023-09-22 PROCEDURE — 99215 PR OFFICE/OUTPT VISIT, EST, LEVL V, 40-54 MIN: ICD-10-PCS | Mod: S$GLB,,, | Performed by: PHYSICIAN ASSISTANT

## 2023-09-22 PROCEDURE — 87811 SARS-COV-2 COVID19 W/OPTIC: CPT | Mod: QW,S$GLB,, | Performed by: PHYSICIAN ASSISTANT

## 2023-09-22 PROCEDURE — 99215 OFFICE O/P EST HI 40 MIN: CPT | Mod: S$GLB,,, | Performed by: PHYSICIAN ASSISTANT

## 2023-09-22 PROCEDURE — 71046 X-RAY EXAM CHEST 2 VIEWS: CPT | Mod: FY,S$GLB,, | Performed by: RADIOLOGY

## 2023-09-22 PROCEDURE — 87811 SARS CORONAVIRUS 2 ANTIGEN POCT, MANUAL READ: ICD-10-PCS | Mod: QW,S$GLB,, | Performed by: PHYSICIAN ASSISTANT

## 2023-09-22 PROCEDURE — 71046 XR CHEST PA AND LATERAL: ICD-10-PCS | Mod: FY,S$GLB,, | Performed by: RADIOLOGY

## 2023-09-22 RX ORDER — BENZONATATE 200 MG/1
200 CAPSULE ORAL 3 TIMES DAILY PRN
Qty: 30 CAPSULE | Refills: 0 | Status: SHIPPED | OUTPATIENT
Start: 2023-09-22 | End: 2023-10-02

## 2023-09-22 RX ORDER — AZITHROMYCIN 250 MG/1
TABLET, FILM COATED ORAL
Qty: 6 TABLET | Refills: 0 | Status: SHIPPED | OUTPATIENT
Start: 2023-09-22 | End: 2023-09-27

## 2023-09-22 RX ORDER — AMOXICILLIN AND CLAVULANATE POTASSIUM 875; 125 MG/1; MG/1
1 TABLET, FILM COATED ORAL EVERY 12 HOURS
Qty: 14 TABLET | Refills: 0 | Status: SHIPPED | OUTPATIENT
Start: 2023-09-22 | End: 2023-09-29

## 2023-09-22 RX ORDER — PREDNISONE 20 MG/1
20 TABLET ORAL 2 TIMES DAILY
Qty: 10 TABLET | Refills: 0 | Status: SHIPPED | OUTPATIENT
Start: 2023-09-22 | End: 2023-09-27

## 2023-09-22 RX ORDER — CEPHALEXIN 500 MG/1
500 CAPSULE ORAL 3 TIMES DAILY
COMMUNITY
Start: 2023-09-08 | End: 2023-09-22 | Stop reason: ALTCHOICE

## 2023-09-22 RX ORDER — TEMAZEPAM 30 MG/1
30 CAPSULE ORAL
COMMUNITY
Start: 2023-06-05

## 2023-09-22 NOTE — PROGRESS NOTES
"Subjective:      Patient ID: Sarbjit Brewer Sr. is a 57 y.o. male.    Vitals:  height is 5' 11.5" (1.816 m) and weight is 99.8 kg (220 lb). His temperature is 98 °F (36.7 °C). His blood pressure is 125/87 and his pulse is 98. His respiration is 17 and oxygen saturation is 95%.     Chief Complaint: Cough    Sarbjit Brewer Sr. is a 57 y.o. male who complains of  cough for 2 weeks. Patient has a Past Medical History:A-fib, CHF (congestive heart failure), Hypertension, and Insomnia. He went to his doctor today and his doctor told him he needed to come here to get swabbed for COVID, because he could not prescribe him anything until then.     Wife states that he finished a week of antibiotics (keflex 500 mg TID) last week; prescribed on 9/18/23 by PCP.      He reports 40 year history of smoking; smokes 1 pack every 1.5 days (7 cigarettes a day). He had a stroke 2 years ago.  He had a left sided stroke 2 years ago.     Patient denies chest pain, SOB, HA, fever, chills, shortness of breath(at rest and/or with exertion), orthopnea, paroxysmal nocturnal dyspnea, lower extremity edema, and RUQ abdominal pain.    URI   This is a new problem. The current episode started 1 to 4 weeks ago. Associated symptoms include coughing. Pertinent negatives include no abdominal pain, chest pain, congestion, dysuria, headaches, joint pain, joint swelling, nausea, neck pain, plugged ear sensation, rash, sinus pain, sore throat, vomiting or wheezing.       Constitution: Positive for fatigue. Negative for chills and fever.   HENT:  Negative for congestion, postnasal drip, sinus pain, sinus pressure, sore throat and trouble swallowing.    Neck: Negative for neck pain, neck stiffness and neck swelling.   Cardiovascular:  Negative for chest pain, leg swelling, palpitations and sob on exertion.   Eyes:  Negative for eye discharge and eye redness.   Respiratory:  Positive for sleep apnea, cough, sputum production and shortness of breath. Negative for " chest tightness, COPD, wheezing and asthma.    Gastrointestinal:  Negative for abdominal pain, nausea and vomiting.   Genitourinary:  Negative for dysuria and urgency.   Skin:  Negative for rash.   Allergic/Immunologic: Negative for asthma and immunizations up-to-date.   Neurological:  Positive for speech difficulty. Negative for dizziness, loss of balance and headaches.     Past Medical History:   Diagnosis Date    A-fib     CHF (congestive heart failure)     Hypertension     Insomnia          Current Outpatient Medications on File Prior to Visit   Medication Sig Dispense Refill    apixaban (ELIQUIS) 5 mg Tab Take 1 tablet (5 mg total) by mouth 2 (two) times daily. 180 tablet 3    ciclopirox (PENLAC) 8 % Soln Apply topically nightly. 6.6 mL 6    cloNIDine (CATAPRES) 0.1 MG tablet Take 0.1 mg by mouth 2 (two) times daily.      metFORMIN (GLUCOPHAGE) 500 MG tablet Take 500 mg by mouth once daily.      metoprolol succinate (TOPROL-XL) 50 MG 24 hr tablet Take 1 tablet (50 mg total) by mouth once daily. 30 tablet 1    olmesartan (BENICAR) 40 MG tablet Take 1 tablet (40 mg total) by mouth once daily. 90 tablet 1    zolpidem (AMBIEN) 10 mg Tab Take 10 mg by mouth once daily.      amLODIPine (NORVASC) 10 MG tablet Take 1 tablet (10 mg total) by mouth once daily. 90 tablet 3    aspirin (ECOTRIN) 81 MG EC tablet Take 1 tablet (81 mg total) by mouth once daily. 30 tablet 0    atorvastatin (LIPITOR) 40 MG tablet Take 1 tablet (40 mg total) by mouth every evening. 90 tablet 3    temazepam (RESTORIL) 30 mg capsule Take 30 mg by mouth.      [DISCONTINUED] cephALEXin (KEFLEX) 500 MG capsule Take 500 mg by mouth 3 (three) times daily.       No current facility-administered medications on file prior to visit.       Objective:     Physical Exam   Constitutional: He is oriented to person, place, and time. He appears well-developed. He is cooperative.  Non-toxic appearance. He does not appear ill. No distress.   HENT:    Head: Normocephalic and atraumatic.   Ears:   Right Ear: Hearing, tympanic membrane, external ear and ear canal normal.   Left Ear: Hearing, tympanic membrane, external ear and ear canal normal.   Nose: Congestion present. No mucosal edema, rhinorrhea or nasal deformity. No epistaxis. Right sinus exhibits no maxillary sinus tenderness and no frontal sinus tenderness. Left sinus exhibits no maxillary sinus tenderness and no frontal sinus tenderness.   Mouth/Throat: Uvula is midline, oropharynx is clear and moist and mucous membranes are normal. Mucous membranes are moist. No trismus in the jaw. Normal dentition. No uvula swelling. No oropharyngeal exudate, posterior oropharyngeal edema or posterior oropharyngeal erythema. Oropharynx is clear.   Eyes: Conjunctivae and lids are normal. Pupils are equal, round, and reactive to light. No scleral icterus. Extraocular movement intact   Neck: Trachea normal and phonation normal. Neck supple. No edema present. No erythema present. No neck rigidity present.   Cardiovascular: Normal rate, regular rhythm, normal heart sounds and normal pulses.   Pulmonary/Chest: Effort normal and breath sounds normal. No respiratory distress. He has no decreased breath sounds. He has no wheezes. He has no rhonchi. He has no rales.   Abdominal: Normal appearance and bowel sounds are normal. Soft. There is no abdominal tenderness.   Musculoskeletal: Normal range of motion.         General: No deformity. Normal range of motion.      Cervical back: He exhibits no tenderness.      Right lower leg: No edema.      Left lower leg: No edema.   Lymphadenopathy:     He has no cervical adenopathy.   Neurological: He is alert and oriented to person, place, and time. He displays no weakness. He exhibits normal muscle tone. Coordination normal.   Skin: Skin is warm, dry, intact, not diaphoretic and not pale.   Psychiatric: His speech is normal and behavior is normal. Judgment and thought content normal.    Nursing note and vitals reviewed.    Results for orders placed or performed in visit on 09/22/23   SARS Coronavirus 2 Antigen, POCT Manual Read   Result Value Ref Range    SARS Coronavirus 2 Antigen Negative Negative     Acceptable Yes      XR CHEST PA AND LATERAL    Result Date: 9/22/2023  EXAMINATION: XR CHEST PA AND LATERAL TECHNIQUE: PA and lateral views of the chest were performed. COMPARISON: 02/02/2022 FINDINGS: The cardiac silhouette is normal in size, midline. The pulmonary vascularity is normal. The pulmonary eyad appear normal bilaterally. The lungs are well expanded and clear. No focal airspace disease. No pleural effusion, no pneumothorax. The osseous structures appear within normal limits for age.     No significant abnormality seen. Electronically signed by: Ladonna Feliciano MD Date:    09/22/2023 Time:    16:21       Assessment:     1. Atypical pneumonia    2. Cough, unspecified type      Patient presents with clinical exam findings and history consistent with above.      On exam, patient is nontoxic appearing and vitals are stable.        Diagnostic testing results were reviewed and discussed with patient/guardian.   Tests ordered in clinic:  SARS Coronavirus 2 Antigen, POCT : negative  XR CHEST PA AND LATERAL:No focal airspace disease. No pleural effusion, no pneumothorax.  Previous progress notes/admissions/labs and medications were reviewed.      Plan:   Recommend Coricidin HBP® Maximum Strength Cold & Flu Day/Night , steroid nasal spray (flonase), prescription/OTC cough medicine (tessalon),  Tylenol (Acetaminophen) and/or Motrin (Ibuprofen) as directed for control of pain and/or fever.    Atypical pneumonia  -     amoxicillin-clavulanate 875-125mg (AUGMENTIN) 875-125 mg per tablet; Take 1 tablet by mouth every 12 (twelve) hours. for 7 days  Dispense: 14 tablet; Refill: 0  -     azithromycin (Z-YAMILET) 250 MG tablet; Take 2 tablets by mouth on day 1; Take 1 tablet by mouth on  days 2-5  Dispense: 6 tablet; Refill: 0  -     predniSONE (DELTASONE) 20 MG tablet; Take 1 tablet (20 mg total) by mouth 2 (two) times daily. for 5 days  Dispense: 10 tablet; Refill: 0    Cough, unspecified type  -     SARS Coronavirus 2 Antigen, POCT Manual Read  -     XR CHEST PA AND LATERAL; Future; Expected date: 09/22/2023  -     benzonatate (TESSALON) 200 MG capsule; Take 1 capsule (200 mg total) by mouth 3 (three) times daily as needed for Cough.  Dispense: 30 capsule; Refill: 0             Additional MDM:     Heart Failure Score:   COPD = No        1) See orders for this visit as documented in the electronic medical record.  2) Symptomatic therapy suggested: use acetaminophen prn.   3) Call or return to clinic prn if these symptoms worsen or fail to improve as anticipated.    Discussed results/diagnosis/plan with patient in clinic.  We had shared decision making for patient's treatment. Patient verbalized understanding and in agreement with current treatment plan.     Patient was instructed to return for re-evaluation with urgent care or PCP for continued outpatient workup and management if symptoms do not improve/worsening symptoms. Strict ED versus clinic precautions given in depth.    Discharge and follow-up instructions given verbally/printed with the patient who expressed understanding. The instructions and results are also available on [facility].              Lucie Pollard PA-C          Patient Instructions   Recommend Coricidin HBP® Maximum Strength Cold & Flu Day/Night , steroid nasal spray (flonase), prescription/OTC cough medicine (tessalon),  Tylenol (Acetaminophen) and/or Motrin (Ibuprofen) as directed for control of pain and/or fever.      If you were prescribed antibiotics, please take them to completion.  Please drink plenty of fluids.  Please get plenty of rest.  Nasal irrigation with a saline spray or Netti Pot like device per their directions is also recommended.  If you  smoke, please stop  smoking.    To help ease a sore throat, you can:  Use a sore throat spray.  Suck on hard candy or throat lozenges.  Gargle with warm saltwater a few times each day. Mix of 1/4 teaspoon (1.25 grams) salt in 8 ounces (240 mL) of warm water.  Use a cool mist humidifier to help you breathe easier.    If you negative (-) for a COVID test today and you are continuing to have symptoms, it is recommended to repeat the test in 48 hours x 3. If you continue to be negative, you may return to school/work once you have improved symptoms and no fever for 24 hours without any medications. This applies to all viral illnesses.           Discussed prescriptions and over-the-counter medicines to help with patient's symptoms:  A steroid nose spray (flonase) can help with a stuffy nose. It can also help with drainage down the back of your throat.  An antihistamine (loratadine,zyrtec,allegra, xyzal) can help with itching, sneezing, or runny nose.  A decongestant (pseudoephedrine,  Phenylephrine) can help with a stuffy nose. Take <10 days for congestion and rhinorrhea. Once symptoms improve, proceed with loratadine/zyrtec once a day. These ingredients can keep you up all night, decrease appetite, feel jittery, and raise blood pressure with long term use.  OTC Coricidin can be used for patients with hypertension and palpitations because you cannot use ingredients such as pseudoephedrine and phenylephrine for oral decongestants.  Coricidin HBP Cough & Cold (Chlorpheniramine/Dextromethorphan)  Coricidin HBP Maximum Strength Multi-Symptom Flu  (Acetaminophen,Dextromethorphan, Chlorpheniramine)  Coricidin HBP® Maximum Strength Cold & Flu Day/Night (Acetaminophen,Dextromethorphan, Doxylamine, Guaifenesin)  Coricidin HBP Chest Congestion & Cough (Dextromethorphan + Guaifenesin)    Medications that control cough are suppressants and expectorants. Suppressants are tessalon pearls and dextromethorphan. If you have a productive cough with sputum,  you need an expectorant called guaifenesin. Dextromethorphan and Guaifenesin are active ingredients in many OTC cough/cold medications such as Dayquil/Nyquil, Mucinex, and Robitussin Mucus+Chest Congestion.            Common Cold Medicine Ingredients Cheat sheet  Acetaminophen (APAP) -pain reliever/fever reducer  Dextromethorphan - cough suppressant  Guaifenesin - expectorant/thins and loosens mucus  Phenylephrine - nasal decongestant  Diphenhydramine or Doxylamine succinate - antihistamine, helps you fall asleep  Promethazine or Brompheniramine - Prescription strength antihistamines    If not allergic, please take over the counter Tylenol (Acetaminophen) and/or Motrin (Ibuprofen) as directed for control of pain and/or fever.        Please remember that you have received care at an urgent care today. Urgent cares are not emergency rooms and are not equipped to handle life threatening emergencies and cannot rule in or out certain medical conditions and you may be released before all of your medical problems are known or treated.     Please arrange follow up with your primary care physician or speciality clinic within 2-5 days if your signs and symptoms have not resolved or worsen.     Patient can call our Referral Hotline at [phone] to make an appointment.      Please return here or go to the Emergency Department for any concerns or worsening of condition.  Signs of infection. These include a fever of 100.4°F (38°C) or higher, chills, cough, more sputum or change in color of sputum.  You are having so much trouble breathing that you can only say one or two words at a time.  You need to sit upright at all times to be able to breathe and or cannot lie down.  You have trouble breathing when talking or sitting still.  You have a fever of 100.4°F (38°C) or higher or chills.  You have chest pain when you cough, have trouble breathing but can still talk in full sentences, or cough up blood.

## 2023-09-22 NOTE — PATIENT INSTRUCTIONS
Recommend Coricidin HBP® Maximum Strength Cold & Flu Day/Night , steroid nasal spray (flonase), prescription/OTC cough medicine (tessalon),  Tylenol (Acetaminophen) and/or Motrin (Ibuprofen) as directed for control of pain and/or fever.      If you were prescribed antibiotics, please take them to completion.  Please drink plenty of fluids.  Please get plenty of rest.  Nasal irrigation with a saline spray or Netti Pot like device per their directions is also recommended.  If you  smoke, please stop smoking.    To help ease a sore throat, you can:  Use a sore throat spray.  Suck on hard candy or throat lozenges.  Gargle with warm saltwater a few times each day. Mix of 1/4 teaspoon (1.25 grams) salt in 8 ounces (240 mL) of warm water.  Use a cool mist humidifier to help you breathe easier.    If you negative (-) for a COVID test today and you are continuing to have symptoms, it is recommended to repeat the test in 48 hours x 3. If you continue to be negative, you may return to school/work once you have improved symptoms and no fever for 24 hours without any medications. This applies to all viral illnesses.           Discussed prescriptions and over-the-counter medicines to help with patient's symptoms:  A steroid nose spray (flonase) can help with a stuffy nose. It can also help with drainage down the back of your throat.  An antihistamine (loratadine,zyrtec,allegra, xyzal) can help with itching, sneezing, or runny nose.  A decongestant (pseudoephedrine,  Phenylephrine) can help with a stuffy nose. Take <10 days for congestion and rhinorrhea. Once symptoms improve, proceed with loratadine/zyrtec once a day. These ingredients can keep you up all night, decrease appetite, feel jittery, and raise blood pressure with long term use.  OTC Coricidin can be used for patients with hypertension and palpitations because you cannot use ingredients such as pseudoephedrine and phenylephrine for oral decongestants.  Coricidin HBP  Cough & Cold (Chlorpheniramine/Dextromethorphan)  Coricidin HBP Maximum Strength Multi-Symptom Flu  (Acetaminophen,Dextromethorphan, Chlorpheniramine)  Coricidin HBP® Maximum Strength Cold & Flu Day/Night (Acetaminophen,Dextromethorphan, Doxylamine, Guaifenesin)  Coricidin HBP Chest Congestion & Cough (Dextromethorphan + Guaifenesin)    Medications that control cough are suppressants and expectorants. Suppressants are tessalon pearls and dextromethorphan. If you have a productive cough with sputum, you need an expectorant called guaifenesin. Dextromethorphan and Guaifenesin are active ingredients in many OTC cough/cold medications such as Dayquil/Nyquil, Mucinex, and Robitussin Mucus+Chest Congestion.            Common Cold Medicine Ingredients Cheat sheet  Acetaminophen (APAP) -pain reliever/fever reducer  Dextromethorphan - cough suppressant  Guaifenesin - expectorant/thins and loosens mucus  Phenylephrine - nasal decongestant  Diphenhydramine or Doxylamine succinate - antihistamine, helps you fall asleep  Promethazine or Brompheniramine - Prescription strength antihistamines    If not allergic, please take over the counter Tylenol (Acetaminophen) and/or Motrin (Ibuprofen) as directed for control of pain and/or fever.        Please remember that you have received care at an urgent care today. Urgent cares are not emergency rooms and are not equipped to handle life threatening emergencies and cannot rule in or out certain medical conditions and you may be released before all of your medical problems are known or treated.     Please arrange follow up with your primary care physician or speciality clinic within 2-5 days if your signs and symptoms have not resolved or worsen.     Patient can call our Referral Hotline at [phone] to make an appointment.      Please return here or go to the Emergency Department for any concerns or worsening of condition.  Signs of infection. These include a fever of 100.4°F (38°C)  or higher, chills, cough, more sputum or change in color of sputum.  You are having so much trouble breathing that you can only say one or two words at a time.  You need to sit upright at all times to be able to breathe and or cannot lie down.  You have trouble breathing when talking or sitting still.  You have a fever of 100.4°F (38°C) or higher or chills.  You have chest pain when you cough, have trouble breathing but can still talk in full sentences, or cough up blood.